# Patient Record
Sex: FEMALE | Race: WHITE | NOT HISPANIC OR LATINO | URBAN - METROPOLITAN AREA
[De-identification: names, ages, dates, MRNs, and addresses within clinical notes are randomized per-mention and may not be internally consistent; named-entity substitution may affect disease eponyms.]

---

## 2017-06-13 ENCOUNTER — EMERGENCY (EMERGENCY)
Facility: HOSPITAL | Age: 77
LOS: 1 days | Discharge: HOME | End: 2017-06-13
Attending: EMERGENCY MEDICINE | Admitting: EMERGENCY MEDICINE
Payer: MEDICARE

## 2017-06-13 VITALS
RESPIRATION RATE: 20 BRPM | DIASTOLIC BLOOD PRESSURE: 67 MMHG | TEMPERATURE: 97 F | OXYGEN SATURATION: 97 % | SYSTOLIC BLOOD PRESSURE: 104 MMHG | HEART RATE: 106 BPM

## 2017-06-13 PROCEDURE — 99285 EMERGENCY DEPT VISIT HI MDM: CPT | Mod: GC

## 2017-06-13 NOTE — ED ADULT TRIAGE NOTE - CHIEF COMPLAINT QUOTE
as per son patient was having difficulty getting out of the bathtub today. Generalized weakness. Patient denies falling.

## 2017-06-13 NOTE — ED ADULT NURSE NOTE - OBJECTIVE STATEMENT
pt states, "I was taking a bath at approx. at 1400 today and I could not find the strength to get out of the tub. my son found me approx x5 hours later" pt denies any LOC or fall. pt states she did not eat or drink a lot today and the house was very warm.

## 2017-06-13 NOTE — ED ADULT NURSE NOTE - ED STAT RN HANDOFF DETAILS
Report received from MARIE Orourke pt with sons at bedside awaiting Ultrasound  no signs of distress noted.

## 2017-06-14 VITALS
SYSTOLIC BLOOD PRESSURE: 138 MMHG | RESPIRATION RATE: 18 BRPM | HEART RATE: 86 BPM | DIASTOLIC BLOOD PRESSURE: 83 MMHG | OXYGEN SATURATION: 100 %

## 2017-06-14 LAB
24R-OH-CALCIDIOL SERPL-MCNC: 53.7 NG/ML — SIGNIFICANT CHANGE UP (ref 30–100)
ALBUMIN SERPL ELPH-MCNC: 4.2 G/DL — SIGNIFICANT CHANGE UP (ref 3.3–5)
ALP SERPL-CCNC: 50 U/L — SIGNIFICANT CHANGE UP (ref 40–120)
ALT FLD-CCNC: 16 U/L RC — SIGNIFICANT CHANGE UP (ref 10–45)
ANION GAP SERPL CALC-SCNC: 14 MMOL/L — SIGNIFICANT CHANGE UP (ref 5–17)
APPEARANCE UR: CLEAR — SIGNIFICANT CHANGE UP
AST SERPL-CCNC: 24 U/L — SIGNIFICANT CHANGE UP (ref 10–40)
BACTERIA # UR AUTO: ABNORMAL /HPF
BASOPHILS # BLD AUTO: 0 K/UL — SIGNIFICANT CHANGE UP (ref 0–0.2)
BASOPHILS NFR BLD AUTO: 0.3 % — SIGNIFICANT CHANGE UP (ref 0–2)
BILIRUB SERPL-MCNC: 0.8 MG/DL — SIGNIFICANT CHANGE UP (ref 0.2–1.2)
BILIRUB UR-MCNC: NEGATIVE — SIGNIFICANT CHANGE UP
BUN SERPL-MCNC: 32 MG/DL — HIGH (ref 7–23)
CALCIUM SERPL-MCNC: 10 MG/DL — SIGNIFICANT CHANGE UP (ref 8.4–10.5)
CALCIUM SERPL-MCNC: 10.7 MG/DL — HIGH (ref 8.4–10.5)
CHLORIDE SERPL-SCNC: 102 MMOL/L — SIGNIFICANT CHANGE UP (ref 96–108)
CK MB BLD-MCNC: 1 % — SIGNIFICANT CHANGE UP (ref 0–3.5)
CK MB CFR SERPL CALC: 4 NG/ML — HIGH (ref 0–3.8)
CK SERPL-CCNC: 398 U/L — HIGH (ref 25–170)
CO2 SERPL-SCNC: 22 MMOL/L — SIGNIFICANT CHANGE UP (ref 22–31)
COLOR SPEC: YELLOW — SIGNIFICANT CHANGE UP
CREAT SERPL-MCNC: 1.44 MG/DL — HIGH (ref 0.5–1.3)
DIFF PNL FLD: NEGATIVE — SIGNIFICANT CHANGE UP
EOSINOPHIL # BLD AUTO: 0 K/UL — SIGNIFICANT CHANGE UP (ref 0–0.5)
EOSINOPHIL NFR BLD AUTO: 0.3 % — SIGNIFICANT CHANGE UP (ref 0–6)
GLUCOSE SERPL-MCNC: 135 MG/DL — HIGH (ref 70–99)
GLUCOSE UR QL: NEGATIVE — SIGNIFICANT CHANGE UP
HCT VFR BLD CALC: 39.4 % — SIGNIFICANT CHANGE UP (ref 34.5–45)
HGB BLD-MCNC: 13.5 G/DL — SIGNIFICANT CHANGE UP (ref 11.5–15.5)
HYALINE CASTS # UR AUTO: ABNORMAL
KETONES UR-MCNC: NEGATIVE — SIGNIFICANT CHANGE UP
LEUKOCYTE ESTERASE UR-ACNC: ABNORMAL
LYMPHOCYTES # BLD AUTO: 1.2 K/UL — SIGNIFICANT CHANGE UP (ref 1–3.3)
LYMPHOCYTES # BLD AUTO: 7.5 % — LOW (ref 13–44)
MCHC RBC-ENTMCNC: 33.2 PG — SIGNIFICANT CHANGE UP (ref 27–34)
MCHC RBC-ENTMCNC: 34.4 GM/DL — SIGNIFICANT CHANGE UP (ref 32–36)
MCV RBC AUTO: 96.6 FL — SIGNIFICANT CHANGE UP (ref 80–100)
MONOCYTES # BLD AUTO: 1.1 K/UL — HIGH (ref 0–0.9)
MONOCYTES NFR BLD AUTO: 7.2 % — SIGNIFICANT CHANGE UP (ref 2–14)
NEUTROPHILS # BLD AUTO: 13.1 K/UL — HIGH (ref 1.8–7.4)
NEUTROPHILS NFR BLD AUTO: 84.7 % — HIGH (ref 43–77)
NITRITE UR-MCNC: NEGATIVE — SIGNIFICANT CHANGE UP
PH UR: 6 — SIGNIFICANT CHANGE UP (ref 5–8)
PLATELET # BLD AUTO: 264 K/UL — SIGNIFICANT CHANGE UP (ref 150–400)
POTASSIUM SERPL-MCNC: 4.5 MMOL/L — SIGNIFICANT CHANGE UP (ref 3.5–5.3)
POTASSIUM SERPL-SCNC: 4.5 MMOL/L — SIGNIFICANT CHANGE UP (ref 3.5–5.3)
PROT SERPL-MCNC: 7.4 G/DL — SIGNIFICANT CHANGE UP (ref 6–8.3)
PROT UR-MCNC: 30 MG/DL
PTH-INTACT FLD-MCNC: 43 PG/ML — SIGNIFICANT CHANGE UP (ref 15–65)
RBC # BLD: 4.08 M/UL — SIGNIFICANT CHANGE UP (ref 3.8–5.2)
RBC # FLD: 12 % — SIGNIFICANT CHANGE UP (ref 10.3–14.5)
RBC CASTS # UR COMP ASSIST: SIGNIFICANT CHANGE UP /HPF (ref 0–2)
SODIUM SERPL-SCNC: 138 MMOL/L — SIGNIFICANT CHANGE UP (ref 135–145)
SP GR SPEC: 1.02 — SIGNIFICANT CHANGE UP (ref 1.01–1.02)
TROPONIN T SERPL-MCNC: <0.01 NG/ML — SIGNIFICANT CHANGE UP (ref 0–0.06)
UROBILINOGEN FLD QL: NEGATIVE — SIGNIFICANT CHANGE UP
WBC # BLD: 15.5 K/UL — HIGH (ref 3.8–10.5)
WBC # FLD AUTO: 15.5 K/UL — HIGH (ref 3.8–10.5)
WBC UR QL: SIGNIFICANT CHANGE UP /HPF (ref 0–5)

## 2017-06-14 PROCEDURE — 83519 RIA NONANTIBODY: CPT

## 2017-06-14 PROCEDURE — 80053 COMPREHEN METABOLIC PANEL: CPT

## 2017-06-14 PROCEDURE — 82306 VITAMIN D 25 HYDROXY: CPT

## 2017-06-14 PROCEDURE — 81001 URINALYSIS AUTO W/SCOPE: CPT

## 2017-06-14 PROCEDURE — 82553 CREATINE MB FRACTION: CPT

## 2017-06-14 PROCEDURE — 82310 ASSAY OF CALCIUM: CPT

## 2017-06-14 PROCEDURE — 85027 COMPLETE CBC AUTOMATED: CPT

## 2017-06-14 PROCEDURE — 93926 LOWER EXTREMITY STUDY: CPT

## 2017-06-14 PROCEDURE — 83970 ASSAY OF PARATHORMONE: CPT

## 2017-06-14 PROCEDURE — 93005 ELECTROCARDIOGRAM TRACING: CPT

## 2017-06-14 PROCEDURE — 71010: CPT | Mod: 26

## 2017-06-14 PROCEDURE — 82550 ASSAY OF CK (CPK): CPT

## 2017-06-14 PROCEDURE — 93971 EXTREMITY STUDY: CPT

## 2017-06-14 PROCEDURE — 99284 EMERGENCY DEPT VISIT MOD MDM: CPT | Mod: 25

## 2017-06-14 PROCEDURE — 84484 ASSAY OF TROPONIN QUANT: CPT

## 2017-06-14 PROCEDURE — 93971 EXTREMITY STUDY: CPT | Mod: 26

## 2017-06-14 PROCEDURE — 71045 X-RAY EXAM CHEST 1 VIEW: CPT

## 2017-06-14 RX ORDER — SODIUM CHLORIDE 9 MG/ML
1000 INJECTION INTRAMUSCULAR; INTRAVENOUS; SUBCUTANEOUS ONCE
Qty: 0 | Refills: 0 | Status: COMPLETED | OUTPATIENT
Start: 2017-06-14 | End: 2017-06-14

## 2017-06-14 RX ORDER — CEPHALEXIN 500 MG
1 CAPSULE ORAL
Qty: 21 | Refills: 0 | OUTPATIENT
Start: 2017-06-14 | End: 2017-06-21

## 2017-06-14 RX ORDER — CEPHALEXIN 500 MG
500 CAPSULE ORAL ONCE
Qty: 0 | Refills: 0 | Status: COMPLETED | OUTPATIENT
Start: 2017-06-14 | End: 2017-06-14

## 2017-06-14 RX ADMIN — Medication 500 MILLIGRAM(S): at 11:39

## 2017-06-14 RX ADMIN — SODIUM CHLORIDE 1000 MILLILITER(S): 9 INJECTION INTRAMUSCULAR; INTRAVENOUS; SUBCUTANEOUS at 01:31

## 2017-06-14 RX ADMIN — SODIUM CHLORIDE 1000 MILLILITER(S): 9 INJECTION INTRAMUSCULAR; INTRAVENOUS; SUBCUTANEOUS at 02:17

## 2017-06-14 NOTE — ED PROVIDER NOTE - OBJECTIVE STATEMENT
77 year old woman with Hypertension, OA, osteoperosis presents with one day of weakness. Today ( a day with 96F temperatures) she was at home without air conditioning and decided to take a hot bath. On taking the bath she felt weak and was unable to climb out for 5 hours until family ( son and grandson ) arrived. She cites her OA knee pain as the reason for not being able to climb out and denies leg weakness, bowel or bladder incontinence, denies dizziness, chest pain shortness of breath , syncope, loss of consciousness. She feels her mouth is dry and says she has not been drinking any water.   On examination her left leg is visibly swollen and son at bedside says this is brand new. When sitting up from lying position in bed her HR rises from an 85 to a 107 BPM. 77 year old woman with Hypertension, OA, osteoporosis presents with one day of weakness. Today ( a day with 96F temperatures) she was at home without air conditioning and decided to take a hot bath. On taking the bath she felt weak and was unable to climb out for 5 hours until family ( son and grandson ) arrived. She cites her OA knee pain as the reason for not being able to climb out and denies leg weakness, bowel or bladder incontinence, denies dizziness, chest pain shortness of breath , syncope, loss of consciousness. She feels her mouth is dry and says she has not been drinking any water.   On examination her left leg is visibly swollen and son at bedside says this is brand new. When sitting up from lying position in bed her HR rises from an 85 to a 107 BPM.

## 2017-06-14 NOTE — ED PROVIDER NOTE - PLAN OF CARE
1. Follow up with your PMD within 48-72 hours.   2. Take Keflex 500 mg 1 tab 3x/day for 7 days.  Increase fluids. Tylenol 650mg every 4-6 hours with food for pain. 3. Worsening pain, new fever, chills, nausea, vomiting return to ER

## 2017-06-14 NOTE — ED PROVIDER NOTE - PROGRESS NOTE DETAILS
spoke to pt and family at bedside in details for the results of labs, UA, US. given copies of results for pmd followup. Started on keflex for UTI. agrees with plan of discharge -DOLORES Saunders pt w negative work up. stable for discharge. Tim bustos negative work up. stable for discharge.

## 2017-06-14 NOTE — ED PROVIDER NOTE - CARE PLAN
Principal Discharge DX:	Arthritis Principal Discharge DX:	Acute cystitis without hematuria  Instructions for follow-up, activity and diet:	1. Follow up with your PMD within 48-72 hours.   2. Take Keflex 500 mg 1 tab 3x/day for 7 days.  Increase fluids. Tylenol 650mg every 4-6 hours with food for pain. 3. Worsening pain, new fever, chills, nausea, vomiting return to ER

## 2017-06-14 NOTE — ED PROVIDER NOTE - MUSCULOSKELETAL, MLM
LLE visibly swollen more than the right, no calf tenderness, no pitting edema, full rom of all joints with knee crepitus, point tender at level of L5 verteba, marked Kyphosis , did not assess gait due to patient fear of rising from seated position and she was tachycardic ( orthostatic)

## 2017-06-14 NOTE — ED PROVIDER NOTE - NS ED MD ATTENDING STATEMENT
I have personally seen and examined this patient.  I have fully participated in the care of this patient. I have reviewed all pertinent clinical information, including history, physical exam, plan and the Resident’s note and agree except as noted

## 2017-06-14 NOTE — ED PROVIDER NOTE - ATTENDING CONTRIBUTION TO CARE
I was physically present for the E/M service provided. I agree with above history, physical, and plan which I have reviewed and edited where appropriate. I was physically present for the key portions of the service provided.    77F PMH of HTN and b/l OA domiciled with son p/w c/o spending 5 hours in a bathtub due to not being able to stand up. Ambulatory in ED. Does not normally take baths.  -NAD, Neurologic exam: A&O x3, speech clear, MAHENDRA, CN II-XII intact, motor strength +5/5 in all extremities, sensation equal to light touch bilaterally, finger-to-nose normal, gait steady; no focal knee TTP, no LE trauma, mild non-pitting edema of LLE to knee  -No CP, SOB or vertigo or focal weakness, afebrile  -Check electrolytes, CK, EKG, UA and LLE DVT Duplex  -Sons feel pt is safe to be at home if above neg. Pt ambulated from stretcher to commode several times during ED course without assistance.

## 2017-06-18 LAB — PTH RELATED PROT SERPL-MCNC: <1.1 PMOL/L — SIGNIFICANT CHANGE UP

## 2017-07-31 ENCOUNTER — INPATIENT (INPATIENT)
Facility: HOSPITAL | Age: 77
LOS: 16 days | Discharge: HOME | DRG: 457 | End: 2017-08-17
Attending: INTERNAL MEDICINE | Admitting: INTERNAL MEDICINE
Payer: MEDICARE

## 2017-07-31 VITALS — DIASTOLIC BLOOD PRESSURE: 70 MMHG | SYSTOLIC BLOOD PRESSURE: 104 MMHG | RESPIRATION RATE: 18 BRPM | HEART RATE: 90 BPM

## 2017-07-31 DIAGNOSIS — D72.829 ELEVATED WHITE BLOOD CELL COUNT, UNSPECIFIED: ICD-10-CM

## 2017-07-31 DIAGNOSIS — S22.089A UNSPECIFIED FRACTURE OF T11-T12 VERTEBRA, INITIAL ENCOUNTER FOR CLOSED FRACTURE: ICD-10-CM

## 2017-07-31 DIAGNOSIS — M25.559 PAIN IN UNSPECIFIED HIP: ICD-10-CM

## 2017-07-31 DIAGNOSIS — E78.00 PURE HYPERCHOLESTEROLEMIA, UNSPECIFIED: ICD-10-CM

## 2017-07-31 DIAGNOSIS — I10 ESSENTIAL (PRIMARY) HYPERTENSION: ICD-10-CM

## 2017-07-31 DIAGNOSIS — Z29.9 ENCOUNTER FOR PROPHYLACTIC MEASURES, UNSPECIFIED: ICD-10-CM

## 2017-07-31 DIAGNOSIS — M80.88XA OTHER OSTEOPOROSIS WITH CURRENT PATHOLOGICAL FRACTURE, VERTEBRA(E), INITIAL ENCOUNTER FOR FRACTURE: ICD-10-CM

## 2017-07-31 DIAGNOSIS — R53.1 WEAKNESS: ICD-10-CM

## 2017-07-31 LAB
ALBUMIN SERPL ELPH-MCNC: 3.9 G/DL — SIGNIFICANT CHANGE UP (ref 3.3–5)
ALP SERPL-CCNC: 74 U/L — SIGNIFICANT CHANGE UP (ref 40–120)
ALT FLD-CCNC: 14 U/L RC — SIGNIFICANT CHANGE UP (ref 10–45)
ANION GAP SERPL CALC-SCNC: 14 MMOL/L — SIGNIFICANT CHANGE UP (ref 5–17)
APPEARANCE UR: CLEAR — SIGNIFICANT CHANGE UP
AST SERPL-CCNC: 23 U/L — SIGNIFICANT CHANGE UP (ref 10–40)
BASOPHILS # BLD AUTO: 0 K/UL — SIGNIFICANT CHANGE UP (ref 0–0.2)
BASOPHILS NFR BLD AUTO: 0.3 % — SIGNIFICANT CHANGE UP (ref 0–2)
BILIRUB SERPL-MCNC: 0.3 MG/DL — SIGNIFICANT CHANGE UP (ref 0.2–1.2)
BILIRUB UR-MCNC: NEGATIVE — SIGNIFICANT CHANGE UP
BUN SERPL-MCNC: 18 MG/DL — SIGNIFICANT CHANGE UP (ref 7–23)
CALCIUM SERPL-MCNC: 9.6 MG/DL — SIGNIFICANT CHANGE UP (ref 8.4–10.5)
CHLORIDE SERPL-SCNC: 98 MMOL/L — SIGNIFICANT CHANGE UP (ref 96–108)
CO2 SERPL-SCNC: 23 MMOL/L — SIGNIFICANT CHANGE UP (ref 22–31)
COLOR SPEC: SIGNIFICANT CHANGE UP
CREAT SERPL-MCNC: 0.94 MG/DL — SIGNIFICANT CHANGE UP (ref 0.5–1.3)
DIFF PNL FLD: NEGATIVE — SIGNIFICANT CHANGE UP
EOSINOPHIL # BLD AUTO: 0.1 K/UL — SIGNIFICANT CHANGE UP (ref 0–0.5)
EOSINOPHIL NFR BLD AUTO: 0.8 % — SIGNIFICANT CHANGE UP (ref 0–6)
GLUCOSE SERPL-MCNC: 129 MG/DL — HIGH (ref 70–99)
GLUCOSE UR QL: NEGATIVE — SIGNIFICANT CHANGE UP
HCT VFR BLD CALC: 41.8 % — SIGNIFICANT CHANGE UP (ref 34.5–45)
HGB BLD-MCNC: 14.3 G/DL — SIGNIFICANT CHANGE UP (ref 11.5–15.5)
KETONES UR-MCNC: NEGATIVE — SIGNIFICANT CHANGE UP
LEUKOCYTE ESTERASE UR-ACNC: NEGATIVE — SIGNIFICANT CHANGE UP
LYMPHOCYTES # BLD AUTO: 1.4 K/UL — SIGNIFICANT CHANGE UP (ref 1–3.3)
LYMPHOCYTES # BLD AUTO: 10.3 % — LOW (ref 13–44)
MCHC RBC-ENTMCNC: 33.1 PG — SIGNIFICANT CHANGE UP (ref 27–34)
MCHC RBC-ENTMCNC: 34.3 GM/DL — SIGNIFICANT CHANGE UP (ref 32–36)
MCV RBC AUTO: 96.7 FL — SIGNIFICANT CHANGE UP (ref 80–100)
MONOCYTES # BLD AUTO: 1.2 K/UL — HIGH (ref 0–0.9)
MONOCYTES NFR BLD AUTO: 9 % — SIGNIFICANT CHANGE UP (ref 2–14)
NEUTROPHILS # BLD AUTO: 11.1 K/UL — HIGH (ref 1.8–7.4)
NEUTROPHILS NFR BLD AUTO: 79.6 % — HIGH (ref 43–77)
NITRITE UR-MCNC: NEGATIVE — SIGNIFICANT CHANGE UP
PH UR: 7 — SIGNIFICANT CHANGE UP (ref 5–8)
PLATELET # BLD AUTO: 403 K/UL — HIGH (ref 150–400)
POTASSIUM SERPL-MCNC: 5 MMOL/L — SIGNIFICANT CHANGE UP (ref 3.5–5.3)
POTASSIUM SERPL-SCNC: 5 MMOL/L — SIGNIFICANT CHANGE UP (ref 3.5–5.3)
PROT SERPL-MCNC: 7.5 G/DL — SIGNIFICANT CHANGE UP (ref 6–8.3)
PROT UR-MCNC: NEGATIVE — SIGNIFICANT CHANGE UP
RBC # BLD: 4.32 M/UL — SIGNIFICANT CHANGE UP (ref 3.8–5.2)
RBC # FLD: 12.3 % — SIGNIFICANT CHANGE UP (ref 10.3–14.5)
SODIUM SERPL-SCNC: 135 MMOL/L — SIGNIFICANT CHANGE UP (ref 135–145)
SP GR SPEC: 1.01 — SIGNIFICANT CHANGE UP (ref 1.01–1.02)
UROBILINOGEN FLD QL: NEGATIVE — SIGNIFICANT CHANGE UP
WBC # BLD: 13.9 K/UL — HIGH (ref 3.8–10.5)
WBC # FLD AUTO: 13.9 K/UL — HIGH (ref 3.8–10.5)

## 2017-07-31 PROCEDURE — 99223 1ST HOSP IP/OBS HIGH 75: CPT

## 2017-07-31 PROCEDURE — 72131 CT LUMBAR SPINE W/O DYE: CPT | Mod: 26

## 2017-07-31 PROCEDURE — 73523 X-RAY EXAM HIPS BI 5/> VIEWS: CPT | Mod: 26

## 2017-07-31 PROCEDURE — 99285 EMERGENCY DEPT VISIT HI MDM: CPT | Mod: 25

## 2017-07-31 PROCEDURE — 71250 CT THORAX DX C-: CPT | Mod: 26

## 2017-07-31 PROCEDURE — 93010 ELECTROCARDIOGRAM REPORT: CPT

## 2017-07-31 RX ORDER — TRAMADOL HYDROCHLORIDE 50 MG/1
25 TABLET ORAL EVERY 12 HOURS
Qty: 0 | Refills: 0 | Status: DISCONTINUED | OUTPATIENT
Start: 2017-07-31 | End: 2017-08-01

## 2017-07-31 RX ORDER — SODIUM CHLORIDE 9 MG/ML
500 INJECTION INTRAMUSCULAR; INTRAVENOUS; SUBCUTANEOUS ONCE
Qty: 0 | Refills: 0 | Status: COMPLETED | OUTPATIENT
Start: 2017-07-31 | End: 2017-07-31

## 2017-07-31 RX ORDER — SODIUM CHLORIDE 9 MG/ML
1000 INJECTION INTRAMUSCULAR; INTRAVENOUS; SUBCUTANEOUS
Qty: 0 | Refills: 0 | Status: DISCONTINUED | OUTPATIENT
Start: 2017-07-31 | End: 2017-08-03

## 2017-07-31 RX ORDER — ACETAMINOPHEN 500 MG
650 TABLET ORAL ONCE
Qty: 0 | Refills: 0 | Status: COMPLETED | OUTPATIENT
Start: 2017-07-31 | End: 2017-07-31

## 2017-07-31 RX ORDER — SODIUM CHLORIDE 9 MG/ML
500 INJECTION, SOLUTION INTRAVENOUS ONCE
Qty: 0 | Refills: 0 | Status: COMPLETED | OUTPATIENT
Start: 2017-07-31 | End: 2017-07-31

## 2017-07-31 RX ORDER — HEPARIN SODIUM 5000 [USP'U]/ML
5000 INJECTION INTRAVENOUS; SUBCUTANEOUS EVERY 8 HOURS
Qty: 0 | Refills: 0 | Status: DISCONTINUED | OUTPATIENT
Start: 2017-07-31 | End: 2017-08-06

## 2017-07-31 RX ORDER — LISINOPRIL 2.5 MG/1
10 TABLET ORAL DAILY
Qty: 0 | Refills: 0 | Status: DISCONTINUED | OUTPATIENT
Start: 2017-07-31 | End: 2017-08-07

## 2017-07-31 RX ADMIN — SODIUM CHLORIDE 2000 MILLILITER(S): 9 INJECTION INTRAMUSCULAR; INTRAVENOUS; SUBCUTANEOUS at 21:30

## 2017-07-31 RX ADMIN — Medication 650 MILLIGRAM(S): at 16:14

## 2017-07-31 RX ADMIN — TRAMADOL HYDROCHLORIDE 25 MILLIGRAM(S): 50 TABLET ORAL at 23:23

## 2017-07-31 RX ADMIN — TRAMADOL HYDROCHLORIDE 25 MILLIGRAM(S): 50 TABLET ORAL at 22:00

## 2017-07-31 RX ADMIN — Medication 650 MILLIGRAM(S): at 15:12

## 2017-07-31 RX ADMIN — HEPARIN SODIUM 5000 UNIT(S): 5000 INJECTION INTRAVENOUS; SUBCUTANEOUS at 22:00

## 2017-07-31 RX ADMIN — SODIUM CHLORIDE 500 MILLILITER(S): 9 INJECTION, SOLUTION INTRAVENOUS at 16:14

## 2017-07-31 NOTE — H&P ADULT - PROBLEM SELECTOR PLAN 7
- ppx: hsq  - diet: DASH/TLC, f/u nutrition consult  - dispo: 8 yue for further evaluation and treatment statin discontinued as outpatient due to concern over statin myopathy. May continue holding for now and have pt follow up with PCP upon discharge.

## 2017-07-31 NOTE — H&P ADULT - PROBLEM SELECTOR PLAN 5
continue lisinopril with hold parameters Likely acute stress reaction to pain and hospitalization with possible component of hemoconcentration  - fluid hydrate and recheck CBC  - pain control

## 2017-07-31 NOTE — H&P ADULT - PROBLEM SELECTOR PLAN 1
2/2 disuse atrophy and sedentary lifestyle from debilitating chronic hip and back pain with likely superimposed component of malnutrition. Unlikely neurologic etiology given benign/nonfocal neuro exam or cardiogenic etiology given no signs of heart failure on exam and no reports of chest pain.  - physical therapy consult for rehab eval  - nutrition consult 2/2 disuse atrophy and sedentary lifestyle from debilitating chronic hip and back pain with likely superimposed component of malnutrition. Given vertebral fractures, must also consider cord compression although less likely given good strength in bilateral LE's and no sensory deficits. Unlikely cardiogenic etiology given no signs of heart failure on exam and no reports of chest pain.  - physical therapy consult for rehab eval  - f/u neurosurgery consult  - nutrition consult

## 2017-07-31 NOTE — H&P ADULT - PROBLEM SELECTOR PLAN 2
2/2 severe osteoarthritis on R hip  - patient states OTC medications do not help her and she is not ready to try opiates/narcotics. Willing to try low dose Tramadol for now. Advised on side effects, including anticholinergic effects and possibility of sedation. Family and patient aware and agree to trial of medication.  - pt eval  - ortho eval

## 2017-07-31 NOTE — ED PROVIDER NOTE - ATTENDING CONTRIBUTION TO CARE
Attending MD Avila:   I personally have seen and examined this patient.  Physician assistant note reviewed and agree on plan of care and except where noted.  See HPI, PE, and MDM for details.

## 2017-07-31 NOTE — H&P ADULT - NSHPLABSRESULTS_GEN_ALL_CORE
.  LABS:                         14.3   13.9  )-----------( 403      ( 2017 14:30 )             41.8         135  |  98  |  18  ----------------------------<  129<H>  5.0   |  23  |  0.94    Ca    9.6      2017 14:30    TPro  7.5  /  Alb  3.9  /  TBili  0.3  /  DBili  x   /  AST  23  /  ALT  14  /  AlkPhos  74        Urinalysis Basic - ( 2017 15:14 )    Color: x / Appearance: Clear / S.014 / pH: x  Gluc: x / Ketone: Negative  / Bili: Negative / Urobili: Negative   Blood: x / Protein: Negative / Nitrite: Negative   Leuk Esterase: Negative / RBC: x / WBC x   Sq Epi: x / Non Sq Epi: x / Bacteria: x            RADIOLOGY, EKG & ADDITIONAL TESTS:  Pelvic/hip xray: severe degenerative disease with joint space narrowing, possible bone spur in L hip joint  CT lumbar spine: +posterior 12th rib fracture, +L1 transverse process fracture. + subacute/chronic t12 compression fracture

## 2017-07-31 NOTE — ED PROVIDER NOTE - MEDICAL DECISION MAKING DETAILS
Attending MD Avila: 77F presenting with b/l hip pain x weeks, no deformities or bony ttp, full PROM of hips so doubt fx. Will obtain XR pelvis and hips to r/o fx, labs, UA to eval for metabolic or infectious precipitant for generalized fatigue and leg pain

## 2017-07-31 NOTE — ED PROVIDER NOTE - PHYSICAL EXAMINATION
Attending MD Avila: A & O x 3, NAD, HEENT WNL and no facial asymmetry; lungs CTAB, heart with reg rhythm without murmur; abdomen soft NTND; extremities with no edema; neuro exam non focal with no motor or sensory deficits. painless PROM of b/l hips and knees, no bony ttp of pelvis or LEs, no distal NV compromise in b/l LEs, spine nontender

## 2017-07-31 NOTE — ED ADULT NURSE NOTE - OBJECTIVE STATEMENT
78 yo F BIBA for bilaterally hip pain and BLE weakness x 3 days. Pt states this morning the pain got progressively worse and was unable to ambulate to the bathroom and had to be carried by her son. VSS. a&ox3. Peripheral pulses equal bilat. Skin warm, pink. Denies urinary symptoms, abd/back pain, CP/SOB, fevers/chills.

## 2017-07-31 NOTE — H&P ADULT - PROBLEM SELECTOR PLAN 8
- ppx: hsq  - diet: DASH/TLC, f/u nutrition consult  - dispo: 8 yue for further evaluation and treatment

## 2017-07-31 NOTE — ED PROVIDER NOTE - OBJECTIVE STATEMENT
78yo F with PMH HTN BIBEMS c/o R hip pain and inability to walk x 3 days. Pt lives home with son. Reports she typically walks without assistance at baseline, now uses cane but has had difficulty ambulating 2/2 worsening weakness and hip pain over last 3 days. Pt on abx for UTI after seen in [facility] ED 7/14/17 c/o weakness and LLE swelling (doppler negative at time). Eating and drinking well. Also admits to L hip pain and back pain. Denies trauma, recent fall, dizziness, CP, palpitations, SOB, abd pain, n/v/c/d, urinary symptoms, calf pain, numbness/tingling.  PCP Sung Felder (not afil) 76yo F with PMH HTN, HLD, BIBEMS c/o R hip pain and inability to walk x 3 days. Pt lives home with son. Pt reports she typically walks without assistance at baseline, now uses cane but has had difficulty ambulating 2/2 worsening weakness and hip pain over last 3 days. As per son, pt seen in [facility] ED 7/14/17 for weakness and unable to get herself out of bath tub. Pt dx with UTI at time and reports complaint with abx, also with LLE swelling (doppler negative at time). Per son, pt with worsening weakness mostly over last week, now requiring to be carried for last 3 days. Pt was c/o R thigh pain so PCP d/c statin therapy recently.  Pt eating and drinking less. Also admits to L hip pain and back pain. Denies trauma, recent fall, dizziness, CP, palpitations, SOB, abd pain, n/v/c/d, urinary symptoms, calf pain, numbness/tingling.    PCP Sung Felder (not afil)

## 2017-07-31 NOTE — ED ADULT NURSE REASSESSMENT NOTE - NS ED NURSE REASSESS COMMENT FT1
Patient appears to be resting comfortably in stretcher; denies n/v/d, chest pain, sob; a&ox3; safety and comfort measures provided; family at bedside

## 2017-07-31 NOTE — H&P ADULT - PROBLEM SELECTOR PLAN 6
statin discontinued as outpatient due to concern over statin myopathy. May continue holding for now and have pt follow up with PCP upon discharge. continue lisinopril with hold parameters

## 2017-07-31 NOTE — ED PROVIDER NOTE - PROGRESS NOTE DETAILS
Attending MD Avila: XR hips neg for fx, labs unrevealing. patient still very unsteady on feet, unclear etiology but not safe for discharge, will admit

## 2017-07-31 NOTE — ED ADULT NURSE REASSESSMENT NOTE - NS ED NURSE REASSESS COMMENT FT1
Attempted to ambulated pt at this time, pt c/o inability to stand by herself. Pt unstable to ambulate on own, MD made aware. VSS.

## 2017-07-31 NOTE — H&P ADULT - PROBLEM SELECTOR PLAN 4
Pt with normal vit d 25 oh levels in June and no known history of osteoporosis. Repeat workup today. Must also rule out MM. Pt with normal vit d 25 oh levels in June and no known history of osteoporosis. Repeat workup today. Must also rule out MM.  - check vit d1,25 OH, vit d 25 OH  - recheck CBC upon fluid hydration as there is suspicion of hemoconcentration and pt may be anemic.   - check spep/upep to r/o monoclonal gammopathy/MM Pt with normal vit d 25 oh levels in June and no known history of osteoporosis. Repeat workup today. Given anxiety and tangientiality, concern for hyperthyroid driven osteoporosis. Must also rule out MM.  - check vit d1,25 OH, vit d 25 OH  - check TSH  - recheck CBC upon fluid hydration as there is suspicion of hemoconcentration and pt may be anemic.   - check spep/upep to r/o monoclonal gammopathy/MM

## 2017-08-01 LAB
% ALBUMIN: 49.3 % — SIGNIFICANT CHANGE UP
% ALPHA 1: 6.4 % — SIGNIFICANT CHANGE UP
% ALPHA 2: 15.1 % — SIGNIFICANT CHANGE UP
% BETA: 13.8 % — SIGNIFICANT CHANGE UP
% GAMMA: 15.4 % — SIGNIFICANT CHANGE UP
ALBUMIN SERPL ELPH-MCNC: 3.4 G/DL — LOW (ref 3.6–5.5)
ALBUMIN/GLOB SERPL ELPH: 1 RATIO — SIGNIFICANT CHANGE UP
ALPHA1 GLOB SERPL ELPH-MCNC: 0.4 G/DL — SIGNIFICANT CHANGE UP (ref 0.1–0.4)
ALPHA2 GLOB SERPL ELPH-MCNC: 1 G/DL — SIGNIFICANT CHANGE UP (ref 0.5–1)
ANION GAP SERPL CALC-SCNC: 15 MMOL/L — SIGNIFICANT CHANGE UP (ref 5–17)
B-GLOBULIN SERPL ELPH-MCNC: 0.9 G/DL — SIGNIFICANT CHANGE UP (ref 0.5–1)
BASOPHILS # BLD AUTO: 0.02 K/UL — SIGNIFICANT CHANGE UP (ref 0–0.2)
BASOPHILS NFR BLD AUTO: 0.2 % — SIGNIFICANT CHANGE UP (ref 0–2)
BUN SERPL-MCNC: 15 MG/DL — SIGNIFICANT CHANGE UP (ref 7–23)
CALCIUM SERPL-MCNC: 9.7 MG/DL — SIGNIFICANT CHANGE UP (ref 8.4–10.5)
CHLORIDE SERPL-SCNC: 98 MMOL/L — SIGNIFICANT CHANGE UP (ref 96–108)
CO2 SERPL-SCNC: 19 MMOL/L — LOW (ref 22–31)
CREAT SERPL-MCNC: 0.79 MG/DL — SIGNIFICANT CHANGE UP (ref 0.5–1.3)
CULTURE RESULTS: NO GROWTH — SIGNIFICANT CHANGE UP
EOSINOPHIL # BLD AUTO: 0.17 K/UL — SIGNIFICANT CHANGE UP (ref 0–0.5)
EOSINOPHIL NFR BLD AUTO: 1.8 % — SIGNIFICANT CHANGE UP (ref 0–6)
GAMMA GLOBULIN: 1 G/DL — SIGNIFICANT CHANGE UP (ref 0.6–1.6)
GLUCOSE SERPL-MCNC: 111 MG/DL — HIGH (ref 70–99)
HCT VFR BLD CALC: 38.5 % — SIGNIFICANT CHANGE UP (ref 34.5–45)
HGB BLD-MCNC: 13.3 G/DL — SIGNIFICANT CHANGE UP (ref 11.5–15.5)
IMM GRANULOCYTES NFR BLD AUTO: 0.4 % — SIGNIFICANT CHANGE UP (ref 0–1.5)
INTERPRETATION SERPL IFE-IMP: SIGNIFICANT CHANGE UP
LYMPHOCYTES # BLD AUTO: 1.71 K/UL — SIGNIFICANT CHANGE UP (ref 1–3.3)
LYMPHOCYTES # BLD AUTO: 17.6 % — SIGNIFICANT CHANGE UP (ref 13–44)
MCHC RBC-ENTMCNC: 31.1 PG — SIGNIFICANT CHANGE UP (ref 27–34)
MCHC RBC-ENTMCNC: 34.5 GM/DL — SIGNIFICANT CHANGE UP (ref 32–36)
MCV RBC AUTO: 90.2 FL — SIGNIFICANT CHANGE UP (ref 80–100)
MONOCYTES # BLD AUTO: 0.89 K/UL — SIGNIFICANT CHANGE UP (ref 0–0.9)
MONOCYTES NFR BLD AUTO: 9.2 % — SIGNIFICANT CHANGE UP (ref 2–14)
NEUTROPHILS # BLD AUTO: 6.87 K/UL — SIGNIFICANT CHANGE UP (ref 1.8–7.4)
NEUTROPHILS NFR BLD AUTO: 70.8 % — SIGNIFICANT CHANGE UP (ref 43–77)
PLATELET # BLD AUTO: 375 K/UL — SIGNIFICANT CHANGE UP (ref 150–400)
POTASSIUM SERPL-MCNC: 4.9 MMOL/L — SIGNIFICANT CHANGE UP (ref 3.5–5.3)
POTASSIUM SERPL-SCNC: 4.9 MMOL/L — SIGNIFICANT CHANGE UP (ref 3.5–5.3)
PROT PATTERN SERPL ELPH-IMP: SIGNIFICANT CHANGE UP
PROT SERPL-MCNC: 6.8 G/DL — SIGNIFICANT CHANGE UP (ref 6–8.3)
PROT SERPL-MCNC: 6.8 G/DL — SIGNIFICANT CHANGE UP (ref 6–8.3)
RBC # BLD: 4.27 M/UL — SIGNIFICANT CHANGE UP (ref 3.8–5.2)
RBC # FLD: 13.6 % — SIGNIFICANT CHANGE UP (ref 10.3–14.5)
SODIUM SERPL-SCNC: 132 MMOL/L — LOW (ref 135–145)
SPECIMEN SOURCE: SIGNIFICANT CHANGE UP
TSH SERPL-MCNC: 1.42 UIU/ML — SIGNIFICANT CHANGE UP (ref 0.27–4.2)
WBC # BLD: 9.7 K/UL — SIGNIFICANT CHANGE UP (ref 3.8–10.5)
WBC # FLD AUTO: 9.7 K/UL — SIGNIFICANT CHANGE UP (ref 3.8–10.5)

## 2017-08-01 PROCEDURE — 72149 MRI LUMBAR SPINE W/DYE: CPT | Mod: 26

## 2017-08-01 RX ORDER — ACETAMINOPHEN 500 MG
1000 TABLET ORAL ONCE
Qty: 0 | Refills: 0 | Status: COMPLETED | OUTPATIENT
Start: 2017-08-01 | End: 2017-08-01

## 2017-08-01 RX ORDER — TRAMADOL HYDROCHLORIDE 50 MG/1
25 TABLET ORAL EVERY 6 HOURS
Qty: 0 | Refills: 0 | Status: DISCONTINUED | OUTPATIENT
Start: 2017-08-01 | End: 2017-08-07

## 2017-08-01 RX ADMIN — TRAMADOL HYDROCHLORIDE 25 MILLIGRAM(S): 50 TABLET ORAL at 20:31

## 2017-08-01 RX ADMIN — TRAMADOL HYDROCHLORIDE 25 MILLIGRAM(S): 50 TABLET ORAL at 15:20

## 2017-08-01 RX ADMIN — Medication 400 MILLIGRAM(S): at 11:05

## 2017-08-01 RX ADMIN — TRAMADOL HYDROCHLORIDE 25 MILLIGRAM(S): 50 TABLET ORAL at 21:30

## 2017-08-01 RX ADMIN — LISINOPRIL 10 MILLIGRAM(S): 2.5 TABLET ORAL at 05:32

## 2017-08-01 RX ADMIN — HEPARIN SODIUM 5000 UNIT(S): 5000 INJECTION INTRAVENOUS; SUBCUTANEOUS at 14:39

## 2017-08-01 RX ADMIN — Medication 1000 MILLIGRAM(S): at 11:35

## 2017-08-01 RX ADMIN — HEPARIN SODIUM 5000 UNIT(S): 5000 INJECTION INTRAVENOUS; SUBCUTANEOUS at 20:34

## 2017-08-01 RX ADMIN — SODIUM CHLORIDE 75 MILLILITER(S): 9 INJECTION INTRAMUSCULAR; INTRAVENOUS; SUBCUTANEOUS at 00:47

## 2017-08-01 RX ADMIN — TRAMADOL HYDROCHLORIDE 25 MILLIGRAM(S): 50 TABLET ORAL at 14:35

## 2017-08-01 RX ADMIN — HEPARIN SODIUM 5000 UNIT(S): 5000 INJECTION INTRAVENOUS; SUBCUTANEOUS at 05:32

## 2017-08-01 NOTE — PROGRESS NOTE ADULT - PROBLEM SELECTOR PLAN 5
Likely acute stress reaction to pain and hospitalization with possible component of hemoconcentration  - fluid hydrate and recheck CBC  - pain control

## 2017-08-01 NOTE — PROGRESS NOTE ADULT - SUBJECTIVE AND OBJECTIVE BOX
Patient is a 77y old  Female who presents with a chief complaint of Progressively worsening hip and back pain + weakness (2017 21:16)      SUBJECTIVE / OVERNIGHT EVENTS:   Feels better.  Denies CP/SOB/Palpitation/HA.    MEDICATIONS  (STANDING):  sodium chloride 0.9%. 1000 milliLiter(s) (75 mL/Hr) IV Continuous <Continuous>  heparin  Injectable 5000 Unit(s) SubCutaneous every 8 hours  lisinopril 10 milliGRAM(s) Oral daily    MEDICATIONS  (PRN):  traMADol 25 milliGRAM(s) Oral every 6 hours PRN Moderate Pain (4 - 6)        CAPILLARY BLOOD GLUCOSE        I&O's Summary    01 Aug 2017 07:01  -  02 Aug 2017 02:14  --------------------------------------------------------  IN: 100 mL / OUT: 400 mL / NET: -300 mL        PHYSICAL EXAM:  GENERAL: NAD, well-developed  HEAD:  Atraumatic, Normocephalic  EYES: conjunctiva and sclera clear  NECK: Supple, No JVD  CHEST/LUNG: Clear to auscultation bilaterally; No wheeze  HEART: Regular rate and rhythm; No murmurs, rubs, or gallops  ABDOMEN: Soft, Nontender, Nondistended; Bowel sounds present  EXTREMITIES:  2+ Peripheral Pulses, No clubbing, cyanosis, or edema  NEUROLOGY: AAO X 3  SKIN: No rashes    LABS:                        13.3   9.70  )-----------( 375      ( 01 Aug 2017 07:42 )             38.5     08-    132<L>  |  98  |  15  ----------------------------<  111<H>  4.9   |  19<L>  |  0.79    Ca    9.7      01 Aug 2017 07:35    TPro  6.8  /  Alb  3.4<L>  /  TBili  x   /  DBili  x   /  AST  x   /  ALT  x   /  AlkPhos  x             Urinalysis Basic - ( 2017 15:14 )    Color: x / Appearance: Clear / S.014 / pH: x  Gluc: x / Ketone: Negative  / Bili: Negative / Urobili: Negative   Blood: x / Protein: Negative / Nitrite: Negative   Leuk Esterase: Negative / RBC: x / WBC x   Sq Epi: x / Non Sq Epi: x / Bacteria: x      CAPILLARY BLOOD GLUCOSE                    RADIOLOGY & ADDITIONAL TESTS:    Imaging Personally Reviewed:    Consultant(s) Notes Reviewed:      Care Discussed with Consultants/Other Providers:

## 2017-08-01 NOTE — PROGRESS NOTE ADULT - PROBLEM SELECTOR PLAN 1
T12 Compression Fx with Spinal cord compression:  Dw neuroSx / No need for steroids.  Bed rest  Pain control with tramadol.

## 2017-08-01 NOTE — PROGRESS NOTE ADULT - SUBJECTIVE AND OBJECTIVE BOX
Patient seen and examined at bedside.    T(C): 36.4 (08-01-17 @ 07:37), Max: 37.1 (07-31-17 @ 15:13)  HR: 86 (08-01-17 @ 07:37) (83 - 97)  BP: 121/70 (08-01-17 @ 07:37) (99/75 - 130/68)  RR: 18 (08-01-17 @ 07:37) (17 - 18)  SpO2: 99% (08-01-17 @ 07:37) (98% - 99%)  Wt(kg): --    Exam:    Awake, alert, AOX3  BUE 5/5 no hoffmans  BLE 5/5 except b/l HF 4/5 pain limited  SILT

## 2017-08-01 NOTE — CONSULT NOTE ADULT - SUBJECTIVE AND OBJECTIVE BOX
p ([id number])     HPI: 77F with history of osteoarthritis with 2 months of back pain and 1 week of progressive difficulty walking due to weakness with couple instances of urinary incontinence. She has chronic bilateral hip pain which contributes to her lower extremity weakness. Recently admitted for sepsis due to UTI. Denies history of falls, but has generalized weakness due to poor appetite and pain. Denies saddle anesthesia. On eval, patient denies any back pain or radiating pain. CT Lspine shows severe T12 compression fracture with retropulsion and mild-moderate canal stenosis.       PAST MEDICAL HISTORY   High cholesterol  Hypertension  Arthritis    PAST SURGICAL HISTORY   No significant past surgical history        MEDICATIONS:  Antibiotics:    Neuro:  traMADol 25 milliGRAM(s) Oral every 12 hours PRN    Anticoagulation:  heparin  Injectable 5000 Unit(s) SubCutaneous every 8 hours    Other:  sodium chloride 0.9%. 1000 milliLiter(s) IV Continuous <Continuous>  lisinopril 10 milliGRAM(s) Oral daily      FAMILY HISTORY:  No significant family history      REVIEW OF SYSTEMS:  Check here if all are normal other than Neurological [x]  General:  Eyes:  ENT:  Cardiac:  Respiratory:  GI:  Musculoskeletal:   Skin:  Neurologic:   Psychiatric:     PHYSICAL EXAMINATION:   T(C): 36.3 (17 @ 00:27), Max: 37.1 (17 @ 15:13)  HR: 97 (17 @ 00:27) (83 - 97)  BP: 122/75 (17 @ 21:16) (99/75 - 130/68)  RR: 18 (17 @ 00:27) (17 - 18)  SpO2: 98% (17 @ 00:27) (98% - 98%)  Wt(kg): --Height (cm): 168.91 ( @ 20:55)  Weight (kg): 78 ( @ 20:55)    General Examination:     Neurologic Examination:             Higher functions                 Normal [x]               Abnormal:      Cranial Nerves (ii-xii)           Normal [x]              Abnormal:     Motor Exam                       Normal []              Abnormal:   BLE HF 4/5, KF 5/5, KE 5/5, DF 5/5, PF 5/5                            Sensory Exam                   Normal [x]              Abnormal:    Reflexes                            Normal [x]              Abnormal:     Coordination                      Normal []              Abnormal:    Other:     LABS:                        14.3   13.9  )-----------( 403      ( 2017 14:30 )             41.8     -    135  |  98  |  18  ----------------------------<  129<H>  5.0   |  23  |  0.94    Ca    9.6      2017 14:30    TPro  7.5  /  Alb  3.9  /  TBili  0.3  /  DBili  x   /  AST  23  /  ALT  14  /  AlkPhos  74        Urinalysis Basic - ( 2017 15:14 )    Color: x / Appearance: Clear / S.014 / pH: x  Gluc: x / Ketone: Negative  / Bili: Negative / Urobili: Negative   Blood: x / Protein: Negative / Nitrite: Negative   Leuk Esterase: Negative / RBC: x / WBC x   Sq Epi: x / Non Sq Epi: x / Bacteria: x        RADIOLOGY & ADDITIONAL STUDIES:    CT: Near complete collapse of the T12 vertebral body with vacuum phenomenon   suggestive of a subacute to chronic process. MRI can be obtained as   clinically indicated for complete evaluation.

## 2017-08-01 NOTE — PROGRESS NOTE ADULT - PROBLEM SELECTOR PLAN 4
Pt with normal vit d 25 oh levels in June and no known history of osteoporosis. Repeat workup today. Given anxiety and tangientiality, concern for hyperthyroid driven osteoporosis. Must also rule out MM.  - check vit d1,25 OH, vit d 25 OH  - check TSH  spep/upep to r/o monoclonal gammopathy/MM

## 2017-08-01 NOTE — CONSULT NOTE ADULT - ASSESSMENT
77F with difficulty walking due to progressive lower extremity weakness with subacute to chronic appearing T12 compression fracture. Weakness may be related to hip arthritis although retropulsion of T12 vertebral body with cord compression is a consideration. Recommend urgent MRI of L-spine to assess degree of compression of conus. Bedrest until MRI done. Neurochecks q4h. Pain control.

## 2017-08-02 DIAGNOSIS — E87.1 HYPO-OSMOLALITY AND HYPONATREMIA: ICD-10-CM

## 2017-08-02 LAB
ANION GAP SERPL CALC-SCNC: 12 MMOL/L — SIGNIFICANT CHANGE UP (ref 5–17)
BUN SERPL-MCNC: 14 MG/DL — SIGNIFICANT CHANGE UP (ref 7–23)
CALCIUM SERPL-MCNC: 8.9 MG/DL — SIGNIFICANT CHANGE UP (ref 8.4–10.5)
CHLORIDE SERPL-SCNC: 92 MMOL/L — LOW (ref 96–108)
CO2 SERPL-SCNC: 22 MMOL/L — SIGNIFICANT CHANGE UP (ref 22–31)
CREAT SERPL-MCNC: 0.84 MG/DL — SIGNIFICANT CHANGE UP (ref 0.5–1.3)
GLUCOSE SERPL-MCNC: 118 MG/DL — HIGH (ref 70–99)
HCT VFR BLD CALC: 36.5 % — SIGNIFICANT CHANGE UP (ref 34.5–45)
HGB BLD-MCNC: 13.1 G/DL — SIGNIFICANT CHANGE UP (ref 11.5–15.5)
MCHC RBC-ENTMCNC: 32 PG — SIGNIFICANT CHANGE UP (ref 27–34)
MCHC RBC-ENTMCNC: 35.9 GM/DL — SIGNIFICANT CHANGE UP (ref 32–36)
MCV RBC AUTO: 89 FL — SIGNIFICANT CHANGE UP (ref 80–100)
PLATELET # BLD AUTO: 374 K/UL — SIGNIFICANT CHANGE UP (ref 150–400)
POTASSIUM SERPL-MCNC: 4.5 MMOL/L — SIGNIFICANT CHANGE UP (ref 3.5–5.3)
POTASSIUM SERPL-SCNC: 4.5 MMOL/L — SIGNIFICANT CHANGE UP (ref 3.5–5.3)
RBC # BLD: 4.1 M/UL — SIGNIFICANT CHANGE UP (ref 3.8–5.2)
RBC # FLD: 13.3 % — SIGNIFICANT CHANGE UP (ref 10.3–14.5)
SODIUM SERPL-SCNC: 126 MMOL/L — LOW (ref 135–145)
WBC # BLD: 9.27 K/UL — SIGNIFICANT CHANGE UP (ref 3.8–10.5)
WBC # FLD AUTO: 9.27 K/UL — SIGNIFICANT CHANGE UP (ref 3.8–10.5)

## 2017-08-02 RX ADMIN — TRAMADOL HYDROCHLORIDE 25 MILLIGRAM(S): 50 TABLET ORAL at 11:55

## 2017-08-02 RX ADMIN — HEPARIN SODIUM 5000 UNIT(S): 5000 INJECTION INTRAVENOUS; SUBCUTANEOUS at 05:54

## 2017-08-02 RX ADMIN — HEPARIN SODIUM 5000 UNIT(S): 5000 INJECTION INTRAVENOUS; SUBCUTANEOUS at 23:10

## 2017-08-02 RX ADMIN — HEPARIN SODIUM 5000 UNIT(S): 5000 INJECTION INTRAVENOUS; SUBCUTANEOUS at 17:15

## 2017-08-02 RX ADMIN — LISINOPRIL 10 MILLIGRAM(S): 2.5 TABLET ORAL at 05:54

## 2017-08-02 RX ADMIN — TRAMADOL HYDROCHLORIDE 25 MILLIGRAM(S): 50 TABLET ORAL at 12:56

## 2017-08-02 NOTE — CONSULT NOTE ADULT - SUBJECTIVE AND OBJECTIVE BOX
Patient is a 77y old  Female who presents with a chief complaint of Progressively worsening hip and back pain associated with weakness     HPI:  77f hx HTN, HLD, osteoarthritis presents with 3 days of Right hip pain and generalized weakness limiting ambulation. Also notes progressively worsening lower back pain since few months. Was recently admitted to [facility] for weakness which was attributed to sepsis 2/2 UTI. No injury, trauma, falls, no focal weaknesses, normally sedentary and often bedridden due to pain and weakness. At baseline has poor appetite, no acute changes. No dysuria, flank pain, n/v/d, no bowel habit changes. No sick contacts or recent travel. No HA, nuchal rigidity. Denies chest pain dyspnea,no prior cardiac history.MRI revealing severe T12 compression deformity for  possible Neurosurgical intervention.        PAST MEDICAL & SURGICAL HISTORY:  High cholesterol  Hypertension  Arthritis  No significant past surgical history      MEDICATIONS  (STANDING):  sodium chloride 0.9%. 1000 milliLiter(s) (75 mL/Hr) IV Continuous <Continuous>  heparin  Injectable 5000 Unit(s) SubCutaneous every 8 hours  lisinopril 10 milliGRAM(s) Oral daily    MEDICATIONS  (PRN):  traMADol 25 milliGRAM(s) Oral every 6 hours PRN Moderate Pain (4 - 6)      FAMILY HISTORY:No significant family history      SOCIAL HISTORY:Lives at home-Limited mobility bedridden    CIGARETTES:Non Smoker    ALCOHOL:Denies    REVIEW OF SYSTEMS:  CONSTITUTIONAL: No fever, weight loss, chronic fatigue  EYES: No eye pain, visual disturbances, or discharge  ENMT:  No difficulty hearing, tinnitus, vertigo; No sinus or throat pain  NECK: No pain or stiffness  RESPIRATORY: No cough, wheezing, chills or hemoptysis; No shortness of breath  CARDIOVASCULAR: No chest pain, palpitations, dizziness, or leg swelling  GASTROINTESTINAL: No abdominal or epigastric pain. No nausea, vomiting, or hematemesis; No diarrhea or constipation. No melena or hematochezia.  GENITOURINARY: No dysuria, frequency, hematuria, or incontinence  NEUROLOGICAL: No headaches, memory loss, loss of strength, numbness, or tremors  SKIN: No itching, burning, rashes, or lesions   LYMPH NODES: No enlarged glands  ENDOCRINE: No heat or cold intolerance; No hair loss  MUSCULOSKELETAL: Hip and back joint pain No swelling;No saddle anaesthesia  PSYCHIATRIC: No depression, anxiety, mood swings, has  difficulty sleeping  HEME/LYMPH: No easy bruising, or bleeding gums  ALLERY AND IMMUNOLOGIC: No hives or eczema    Vital Signs Last 24 Hrs  T(C): 36.3 (01 Aug 2017 23:32), Max: 36.5 (01 Aug 2017 15:02)  T(F): 97.4 (01 Aug 2017 23:32), Max: 97.7 (01 Aug 2017 15:02)  HR: 95 (02 Aug 2017 05:52) (86 - 96)  BP: 139/73 (02 Aug 2017 05:52) (121/70 - 139/81)  RR: 18 (01 Aug 2017 23:32) (18 - 18)  SpO2: 96% (01 Aug 2017 23:32) (96% - 99%)      PHYSICAL EXAM:  GENERAL: NAD, well-groomed, well-developed BMI-27  HEAD:  Atraumatic, Normocephalic  EYES: EOMI, PERRLA, conjunctiva and sclera clear  ENMT: No tonsillar erythema, exudates, or enlargement; Moist mucous membranes, Good dentition, No lesions  NECK: Supple, No JVD, Normal thyroid  NERVOUS SYSTEM:  Alert & Oriented X3, Fair concentration; Motor Strength 5/5 B/L upper and lower extremities; DTRs 2+ intact and symmetric  CHEST/LUNG: Clear to percussion bilaterally; No rales, rhonchi, wheezing, or rubs  HEART: Regular rate and rhythm; 2/6 systolic murmur,no rubs, or gallops  ABDOMEN: Soft, Nontender, Nondistended; Bowel sounds present  EXTREMITIES:  2+ Peripheral Pulses, No clubbing, cyanosis, trace edema  LYMPH: No lymphadenopathy noted  SKIN: No rashes or lesions      ECG: NORMAL SINUS RHYTHM  Ventricular Rate 90 BPM  QRS Duration 60 ms,NO ACUTE ST, T ABNORMALITIES>    LABS:                        13.3   9.70  )-----------( 375      ( 01 Aug 2017 07:42 )             38.5         132<L>  |  98  |  15  ----------------------------<  111<H>  4.9   |  19<L>  |  0.79    Ca    9.7      01 Aug 2017 07:35    TPro  6.8  /  Alb  3.4<L>  /  TBili  x   /  DBili  x   /  AST  x   /  ALT  x   /  AlkPhos  x       Urinalysis Basic - ( 2017 15:14 )    Color: x / Appearance: Clear / S.014 / pH: x  Gluc: x / Ketone: Negative  / Bili: Negative / Urobili: Negative   Blood: x / Protein: Negative / Nitrite: Negative   Leuk Esterase: Negative / RBC: x / WBC x   Sq Epi: x / Non Sq Epi: x / Bacteria: x    I&O's Summary    01 Aug 2017 07:01  -  02 Aug 2017 06:51  --------------------------------------------------------  IN: 100 mL / OUT: 700 mL / NET: -600 mL      RADIOLOGY & ADDITIONAL STUDIES:  LUMBO-SACRAL SPINE MRI :--Impression: Marked compression deformity of T12 with bony retropulsion and  compression of the distal cord which is likely is due to a subacute to  chronic fracture with some acute components.    CT CHEST  Acute comminuted segmental fracture of the posterior left 12th rib.  Nondisplaced anterior left third rib fracture. No pneumothorax.Complete  collapse of the T12 vertebral body with retropulsion into the canal.    CHEST-PORTABLE 2017 IMPRESSION:  Clear lungs.

## 2017-08-02 NOTE — PROGRESS NOTE ADULT - SUBJECTIVE AND OBJECTIVE BOX
Patient is a 77y old  Female who presents with a chief complaint of Progressively worsening hip and back pain + weakness (31 Jul 2017 21:16)      SUBJECTIVE / OVERNIGHT EVENTS:   Feels better.  Denies CP/SOB/Palpitation/HA.    MEDICATIONS  (STANDING):  sodium chloride 0.9%. 1000 milliLiter(s) (75 mL/Hr) IV Continuous <Continuous>  heparin  Injectable 5000 Unit(s) SubCutaneous every 8 hours  lisinopril 10 milliGRAM(s) Oral daily    MEDICATIONS  (PRN):  traMADol 25 milliGRAM(s) Oral every 6 hours PRN Moderate Pain (4 - 6)        CAPILLARY BLOOD GLUCOSE        I&O's Summary    01 Aug 2017 07:01  -  02 Aug 2017 07:00  --------------------------------------------------------  IN: 100 mL / OUT: 700 mL / NET: -600 mL    02 Aug 2017 07:01  -  02 Aug 2017 21:48  --------------------------------------------------------  IN: 0 mL / OUT: 400 mL / NET: -400 mL        PHYSICAL EXAM:  GENERAL: NAD, well-developed  HEAD:  Atraumatic, Normocephalic  EYES: conjunctiva and sclera clear  NECK: Supple, No JVD  CHEST/LUNG: Clear to auscultation bilaterally; No wheeze  HEART: Regular rate and rhythm; No murmurs, rubs, or gallops  ABDOMEN: Soft, Nontender, Nondistended; Bowel sounds present  EXTREMITIES:  2+ Peripheral Pulses, No clubbing, cyanosis, or edema  NEUROLOGY: AAO X 3  SKIN: No rashes    LABS:                        13.1   9.27  )-----------( 374      ( 02 Aug 2017 07:33 )             36.5     08-02    126<L>  |  92<L>  |  14  ----------------------------<  118<H>  4.5   |  22  |  0.84    Ca    8.9      02 Aug 2017 08:28    TPro  6.8  /  Alb  3.4<L>  /  TBili  x   /  DBili  x   /  AST  x   /  ALT  x   /  AlkPhos  x   08-01            CAPILLARY BLOOD GLUCOSE                    RADIOLOGY & ADDITIONAL TESTS:    Imaging Personally Reviewed:    Consultant(s) Notes Reviewed:      Care Discussed with Consultants/Other Providers:

## 2017-08-02 NOTE — PROGRESS NOTE ADULT - SUBJECTIVE AND OBJECTIVE BOX
Patient seen and examined at bedside.    T(C): 36.4 (08-02-17 @ 15:43), Max: 36.4 (08-02-17 @ 15:43)  HR: 96 (08-02-17 @ 15:43) (95 - 99)  BP: 138/81 (08-02-17 @ 15:43) (125/75 - 148/82)  RR: 18 (08-02-17 @ 15:43) (18 - 18)  SpO2: 97% (08-02-17 @ 15:43) (96% - 97%)  Wt(kg): --    Exam:    Awake, alert, AOX3  BUE 5/5  BLE HF 4/5 otherwise 5/5  SILT

## 2017-08-02 NOTE — CONSULT NOTE ADULT - ASSESSMENT
77f hx HTN, HLD, osteoarthritis presents with 3 days of Right hip pain and generalized weakness limiting ambulation.No syncope fall or dyspnea MRI reveals marked compression deformity of T12 with bony retropulsion and  compression of the distal cord,for possible Neurosurgical intervention.    She is at LOW to INTERMEDIATE cardiac risk with no cardiac contraindications for proposed surgery.  HTN controlled on Lisinopril  DVT prophylaxis and Incentive spirometry

## 2017-08-02 NOTE — PROGRESS NOTE ADULT - PROBLEM SELECTOR PLAN 1
Neurosurgery f/up noted.  For OR on monday. Considering pt's medical condition and nature of surgery, pt is at intermediate risk for surgery.

## 2017-08-02 NOTE — PROGRESS NOTE ADULT - PROBLEM SELECTOR PLAN 3
T12 Compression Fx with Spinal cord compression:  Dw neuroSx / No need for steroids.  Bed rest  Pain control with tramadol.  OR on monday.

## 2017-08-03 LAB
ANION GAP SERPL CALC-SCNC: 14 MMOL/L — SIGNIFICANT CHANGE UP (ref 5–17)
ANION GAP SERPL CALC-SCNC: 16 MMOL/L — SIGNIFICANT CHANGE UP (ref 5–17)
BUN SERPL-MCNC: 17 MG/DL — SIGNIFICANT CHANGE UP (ref 7–23)
BUN SERPL-MCNC: 19 MG/DL — SIGNIFICANT CHANGE UP (ref 7–23)
CALCIUM SERPL-MCNC: 8.8 MG/DL — SIGNIFICANT CHANGE UP (ref 8.4–10.5)
CALCIUM SERPL-MCNC: 9.3 MG/DL — SIGNIFICANT CHANGE UP (ref 8.4–10.5)
CHLORIDE SERPL-SCNC: 92 MMOL/L — LOW (ref 96–108)
CHLORIDE SERPL-SCNC: 95 MMOL/L — LOW (ref 96–108)
CO2 SERPL-SCNC: 20 MMOL/L — LOW (ref 22–31)
CO2 SERPL-SCNC: 21 MMOL/L — LOW (ref 22–31)
CREAT SERPL-MCNC: 0.73 MG/DL — SIGNIFICANT CHANGE UP (ref 0.5–1.3)
CREAT SERPL-MCNC: 0.77 MG/DL — SIGNIFICANT CHANGE UP (ref 0.5–1.3)
GLUCOSE SERPL-MCNC: 118 MG/DL — HIGH (ref 70–99)
GLUCOSE SERPL-MCNC: 120 MG/DL — HIGH (ref 70–99)
HCT VFR BLD CALC: 37.7 % — SIGNIFICANT CHANGE UP (ref 34.5–45)
HGB BLD-MCNC: 13.3 G/DL — SIGNIFICANT CHANGE UP (ref 11.5–15.5)
MCHC RBC-ENTMCNC: 31.7 PG — SIGNIFICANT CHANGE UP (ref 27–34)
MCHC RBC-ENTMCNC: 35.3 GM/DL — SIGNIFICANT CHANGE UP (ref 32–36)
MCV RBC AUTO: 89.8 FL — SIGNIFICANT CHANGE UP (ref 80–100)
PLATELET # BLD AUTO: 394 K/UL — SIGNIFICANT CHANGE UP (ref 150–400)
POTASSIUM SERPL-MCNC: 4.3 MMOL/L — SIGNIFICANT CHANGE UP (ref 3.5–5.3)
POTASSIUM SERPL-MCNC: 4.5 MMOL/L — SIGNIFICANT CHANGE UP (ref 3.5–5.3)
POTASSIUM SERPL-SCNC: 4.3 MMOL/L — SIGNIFICANT CHANGE UP (ref 3.5–5.3)
POTASSIUM SERPL-SCNC: 4.5 MMOL/L — SIGNIFICANT CHANGE UP (ref 3.5–5.3)
RBC # BLD: 4.2 M/UL — SIGNIFICANT CHANGE UP (ref 3.8–5.2)
RBC # FLD: 13.6 % — SIGNIFICANT CHANGE UP (ref 10.3–14.5)
SODIUM SERPL-SCNC: 128 MMOL/L — LOW (ref 135–145)
SODIUM SERPL-SCNC: 130 MMOL/L — LOW (ref 135–145)
WBC # BLD: 11.46 K/UL — HIGH (ref 3.8–10.5)
WBC # FLD AUTO: 11.46 K/UL — HIGH (ref 3.8–10.5)

## 2017-08-03 RX ORDER — SENNA PLUS 8.6 MG/1
1 TABLET ORAL AT BEDTIME
Qty: 0 | Refills: 0 | Status: DISCONTINUED | OUTPATIENT
Start: 2017-08-03 | End: 2017-08-07

## 2017-08-03 RX ORDER — DOCUSATE SODIUM 100 MG
100 CAPSULE ORAL THREE TIMES A DAY
Qty: 0 | Refills: 0 | Status: DISCONTINUED | OUTPATIENT
Start: 2017-08-03 | End: 2017-08-07

## 2017-08-03 RX ORDER — SODIUM CHLORIDE 9 MG/ML
1000 INJECTION INTRAMUSCULAR; INTRAVENOUS; SUBCUTANEOUS
Qty: 0 | Refills: 0 | Status: COMPLETED | OUTPATIENT
Start: 2017-08-03 | End: 2017-08-04

## 2017-08-03 RX ADMIN — HEPARIN SODIUM 5000 UNIT(S): 5000 INJECTION INTRAVENOUS; SUBCUTANEOUS at 14:26

## 2017-08-03 RX ADMIN — SENNA PLUS 1 TABLET(S): 8.6 TABLET ORAL at 21:17

## 2017-08-03 RX ADMIN — TRAMADOL HYDROCHLORIDE 25 MILLIGRAM(S): 50 TABLET ORAL at 22:53

## 2017-08-03 RX ADMIN — TRAMADOL HYDROCHLORIDE 25 MILLIGRAM(S): 50 TABLET ORAL at 09:57

## 2017-08-03 RX ADMIN — HEPARIN SODIUM 5000 UNIT(S): 5000 INJECTION INTRAVENOUS; SUBCUTANEOUS at 06:35

## 2017-08-03 RX ADMIN — LISINOPRIL 10 MILLIGRAM(S): 2.5 TABLET ORAL at 06:35

## 2017-08-03 RX ADMIN — HEPARIN SODIUM 5000 UNIT(S): 5000 INJECTION INTRAVENOUS; SUBCUTANEOUS at 21:17

## 2017-08-03 RX ADMIN — TRAMADOL HYDROCHLORIDE 25 MILLIGRAM(S): 50 TABLET ORAL at 08:57

## 2017-08-03 RX ADMIN — SODIUM CHLORIDE 50 MILLILITER(S): 9 INJECTION INTRAMUSCULAR; INTRAVENOUS; SUBCUTANEOUS at 14:25

## 2017-08-03 RX ADMIN — TRAMADOL HYDROCHLORIDE 25 MILLIGRAM(S): 50 TABLET ORAL at 23:25

## 2017-08-03 RX ADMIN — Medication 100 MILLIGRAM(S): at 21:17

## 2017-08-03 NOTE — PROGRESS NOTE ADULT - PROBLEM SELECTOR PLAN 1
Neurosurgery f/up noted.  For OR on monday. Considering pt's medical condition and nature of surgery, pt is at intermediate risk for surgery. Pt is cleared by cardio also.

## 2017-08-03 NOTE — PROGRESS NOTE ADULT - SUBJECTIVE AND OBJECTIVE BOX
Patient is a 77y old  Female who presents with a chief complaint of Progressively worsening hip and back pain + weakness (31 Jul 2017 21:16)      SUBJECTIVE / OVERNIGHT EVENTS:   Feels better.  Denies CP/SOB/Palpitation/HA.    MEDICATIONS  (STANDING):  heparin  Injectable 5000 Unit(s) SubCutaneous every 8 hours  lisinopril 10 milliGRAM(s) Oral daily  sodium chloride 0.9%. 1000 milliLiter(s) (50 mL/Hr) IV Continuous <Continuous>  docusate sodium 100 milliGRAM(s) Oral three times a day  senna 1 Tablet(s) Oral at bedtime    MEDICATIONS  (PRN):  traMADol 25 milliGRAM(s) Oral every 6 hours PRN Moderate Pain (4 - 6)        CAPILLARY BLOOD GLUCOSE        I&O's Summary    02 Aug 2017 07:01  -  03 Aug 2017 07:00  --------------------------------------------------------  IN: 0 mL / OUT: 400 mL / NET: -400 mL    03 Aug 2017 07:01  -  04 Aug 2017 02:30  --------------------------------------------------------  IN: 540 mL / OUT: 300 mL / NET: 240 mL        PHYSICAL EXAM:  GENERAL: NAD, well-developed  HEAD:  Atraumatic, Normocephalic  EYES: conjunctiva and sclera clear  NECK: Supple, No JVD  CHEST/LUNG: Clear to auscultation bilaterally; No wheeze  HEART: Regular rate and rhythm; No murmurs, rubs, or gallops  ABDOMEN: Soft, Nontender, Nondistended; Bowel sounds present  EXTREMITIES:  2+ Peripheral Pulses, No clubbing, cyanosis, or edema  NEUROLOGY: AAO X 3  SKIN: No rashes    LABS:                        13.3   11.46 )-----------( 394      ( 03 Aug 2017 08:53 )             37.7     08-03    128<L>  |  92<L>  |  17  ----------------------------<  118<H>  4.5   |  20<L>  |  0.73    Ca    8.8      03 Aug 2017 08:54              CAPILLARY BLOOD GLUCOSE                    RADIOLOGY & ADDITIONAL TESTS:    Imaging Personally Reviewed:    Consultant(s) Notes Reviewed:      Care Discussed with Consultants/Other Providers:

## 2017-08-03 NOTE — PROGRESS NOTE ADULT - PROBLEM SELECTOR PLAN 3
T12 Compression Fx with Spinal cord compression:  As per neuroSx / No need for steroids.  Bed rest  Pain control with tramadol.  OR on monday.

## 2017-08-03 NOTE — PROGRESS NOTE ADULT - SUBJECTIVE AND OBJECTIVE BOX
Patient seen and examined at bedside.    T(C): 36.5 (08-03-17 @ 08:16), Max: 36.5 (08-03-17 @ 08:16)  HR: 112 (08-03-17 @ 08:16) (96 - 112)  BP: 120/77 (08-03-17 @ 08:16) (120/67 - 138/81)  RR: 20 (08-03-17 @ 08:16) (18 - 20)  SpO2: 98% (08-03-17 @ 08:16) (97% - 98%)  Wt(kg): --    Exam:    Awake, Alert, AOX3  BUE 5/5  BLE HF 4+/5, otherwise 5/5  SILT

## 2017-08-04 LAB
ANION GAP SERPL CALC-SCNC: 14 MMOL/L — SIGNIFICANT CHANGE UP (ref 5–17)
BUN SERPL-MCNC: 23 MG/DL — SIGNIFICANT CHANGE UP (ref 7–23)
CALCIUM SERPL-MCNC: 9.2 MG/DL — SIGNIFICANT CHANGE UP (ref 8.4–10.5)
CHLORIDE SERPL-SCNC: 97 MMOL/L — SIGNIFICANT CHANGE UP (ref 96–108)
CO2 SERPL-SCNC: 22 MMOL/L — SIGNIFICANT CHANGE UP (ref 22–31)
CREAT SERPL-MCNC: 0.78 MG/DL — SIGNIFICANT CHANGE UP (ref 0.5–1.3)
GLUCOSE SERPL-MCNC: 108 MG/DL — HIGH (ref 70–99)
HCT VFR BLD CALC: 38 % — SIGNIFICANT CHANGE UP (ref 34.5–45)
HGB BLD-MCNC: 13.1 G/DL — SIGNIFICANT CHANGE UP (ref 11.5–15.5)
MCHC RBC-ENTMCNC: 33.3 PG — SIGNIFICANT CHANGE UP (ref 27–34)
MCHC RBC-ENTMCNC: 34.4 GM/DL — SIGNIFICANT CHANGE UP (ref 32–36)
MCV RBC AUTO: 96.6 FL — SIGNIFICANT CHANGE UP (ref 80–100)
PLATELET # BLD AUTO: 321 K/UL — SIGNIFICANT CHANGE UP (ref 150–400)
POTASSIUM SERPL-MCNC: 4.5 MMOL/L — SIGNIFICANT CHANGE UP (ref 3.5–5.3)
POTASSIUM SERPL-SCNC: 4.5 MMOL/L — SIGNIFICANT CHANGE UP (ref 3.5–5.3)
RBC # BLD: 3.94 M/UL — SIGNIFICANT CHANGE UP (ref 3.8–5.2)
RBC # FLD: 12.3 % — SIGNIFICANT CHANGE UP (ref 10.3–14.5)
SODIUM SERPL-SCNC: 133 MMOL/L — LOW (ref 135–145)
WBC # BLD: 13 K/UL — HIGH (ref 3.8–10.5)
WBC # FLD AUTO: 13 K/UL — HIGH (ref 3.8–10.5)

## 2017-08-04 RX ADMIN — Medication 100 MILLIGRAM(S): at 14:30

## 2017-08-04 RX ADMIN — Medication 100 MILLIGRAM(S): at 06:18

## 2017-08-04 RX ADMIN — SODIUM CHLORIDE 50 MILLILITER(S): 9 INJECTION INTRAMUSCULAR; INTRAVENOUS; SUBCUTANEOUS at 06:17

## 2017-08-04 RX ADMIN — HEPARIN SODIUM 5000 UNIT(S): 5000 INJECTION INTRAVENOUS; SUBCUTANEOUS at 21:33

## 2017-08-04 RX ADMIN — SODIUM CHLORIDE 50 MILLILITER(S): 9 INJECTION INTRAMUSCULAR; INTRAVENOUS; SUBCUTANEOUS at 14:31

## 2017-08-04 RX ADMIN — TRAMADOL HYDROCHLORIDE 25 MILLIGRAM(S): 50 TABLET ORAL at 20:10

## 2017-08-04 RX ADMIN — TRAMADOL HYDROCHLORIDE 25 MILLIGRAM(S): 50 TABLET ORAL at 19:37

## 2017-08-04 RX ADMIN — SENNA PLUS 1 TABLET(S): 8.6 TABLET ORAL at 21:33

## 2017-08-04 RX ADMIN — LISINOPRIL 10 MILLIGRAM(S): 2.5 TABLET ORAL at 06:18

## 2017-08-04 RX ADMIN — HEPARIN SODIUM 5000 UNIT(S): 5000 INJECTION INTRAVENOUS; SUBCUTANEOUS at 14:31

## 2017-08-04 RX ADMIN — Medication 100 MILLIGRAM(S): at 21:33

## 2017-08-04 RX ADMIN — HEPARIN SODIUM 5000 UNIT(S): 5000 INJECTION INTRAVENOUS; SUBCUTANEOUS at 06:17

## 2017-08-04 NOTE — PROGRESS NOTE ADULT - SUBJECTIVE AND OBJECTIVE BOX
MEDICINE, PROGRESS NOTE [phone] Covering Dr Curtis SCHREIBER, DOROTHY 77y [id number]    Patient seen and examined.  Patient is a 77y old  Female who presents with a chief complaint of Progressively worsening hip and back pain + weakness (31 Jul 2017 21:16)  Pt feels ok, has no current new complaints. States she is incontinent of urine.    PAST MEDICAL & SURGICAL HISTORY:  High cholesterol  Hypertension  Arthritis  No significant past surgical history    MEDICATIONS  (STANDING):  heparin  Injectable 5000 Unit(s) SubCutaneous every 8 hours  lisinopril 10 milliGRAM(s) Oral daily  docusate sodium 100 milliGRAM(s) Oral three times a day  senna 1 Tablet(s) Oral at bedtime    MEDICATIONS  (PRN):  traMADol 25 milliGRAM(s) Oral every 6 hours PRN Moderate Pain (4 - 6)    Allergies    No Known Allergies    Intolerances        PHYSICAL EXAM:  Constitutional: NAD  HEENT: Normocephalic, EOMI  Neck:  No JVD  Respiratory: CTA B/L, No wheezes  Cardiovascular: S1, S2, RRR, + systolic murmur  Gastrointestinal: BS+, soft, NT/ND  Extremities: No peripheral edema  Neurological: AAOX3, no focal deficits, urinary incontinence, sensation intact le b/l, +arom and prom le b/l  Psychiatric: Normal mood, normal affect  : No Robb    Vital Signs Last 24 Hrs  T(C): 36.6 (04 Aug 2017 15:20), Max: 36.6 (04 Aug 2017 15:20)  T(F): 97.9 (04 Aug 2017 15:20), Max: 97.9 (04 Aug 2017 15:20)  HR: 95 (04 Aug 2017 15:20) (83 - 102)  BP: 125/71 (04 Aug 2017 15:20) (93/65 - 125/71)  BP(mean): --  RR: 18 (04 Aug 2017 15:20) (18 - 18)  SpO2: 98% (04 Aug 2017 15:20) (97% - 99%)  I&O's Summary    03 Aug 2017 07:01  -  04 Aug 2017 07:00  --------------------------------------------------------  IN: 1140 mL / OUT: 300 mL / NET: 840 mL        LABS:                        13.1   13.0  )-----------( 321      ( 04 Aug 2017 11:57 )             38.0     08-04    133<L>  |  97  |  23  ----------------------------<  108<H>  4.5   |  22  |  0.78    Ca    9.2      04 Aug 2017 11:57                REVIEW OF SYSTEMS:      RADIOLOGY & ADDITIONAL STUDIES:      ASSESSMENT/PLAN:

## 2017-08-05 RX ADMIN — HEPARIN SODIUM 5000 UNIT(S): 5000 INJECTION INTRAVENOUS; SUBCUTANEOUS at 13:21

## 2017-08-05 RX ADMIN — Medication 100 MILLIGRAM(S): at 21:07

## 2017-08-05 RX ADMIN — SENNA PLUS 1 TABLET(S): 8.6 TABLET ORAL at 21:08

## 2017-08-05 RX ADMIN — TRAMADOL HYDROCHLORIDE 25 MILLIGRAM(S): 50 TABLET ORAL at 03:43

## 2017-08-05 RX ADMIN — HEPARIN SODIUM 5000 UNIT(S): 5000 INJECTION INTRAVENOUS; SUBCUTANEOUS at 21:07

## 2017-08-05 RX ADMIN — HEPARIN SODIUM 5000 UNIT(S): 5000 INJECTION INTRAVENOUS; SUBCUTANEOUS at 06:05

## 2017-08-05 RX ADMIN — TRAMADOL HYDROCHLORIDE 25 MILLIGRAM(S): 50 TABLET ORAL at 14:00

## 2017-08-05 RX ADMIN — Medication 100 MILLIGRAM(S): at 13:19

## 2017-08-05 RX ADMIN — TRAMADOL HYDROCHLORIDE 25 MILLIGRAM(S): 50 TABLET ORAL at 04:10

## 2017-08-05 RX ADMIN — TRAMADOL HYDROCHLORIDE 25 MILLIGRAM(S): 50 TABLET ORAL at 13:19

## 2017-08-05 RX ADMIN — LISINOPRIL 10 MILLIGRAM(S): 2.5 TABLET ORAL at 06:05

## 2017-08-05 RX ADMIN — Medication 100 MILLIGRAM(S): at 06:05

## 2017-08-05 NOTE — PROGRESS NOTE ADULT - SUBJECTIVE AND OBJECTIVE BOX
Patient seen and examined at bedside.    T(C): 36.3 (08-05-17 @ 08:11), Max: 36.8 (08-05-17 @ 00:38)  HR: 87 (08-05-17 @ 08:11) (87 - 95)  BP: 115/71 (08-05-17 @ 08:11) (113/72 - 125/71)  RR: 18 (08-05-17 @ 08:11) (18 - 18)  SpO2: 95% (08-05-17 @ 08:11) (95% - 98%)  Wt(kg): --    Exam:  AOx3, FC, PERRL, EOMI, V1-3 intact, no facial, palate renato symmetric, tongue midline, shrug 5/5  4/5 bilateral hip felxion, otherwise 5/5 throughout, no drift  SILT  No clonus or babinski    MRI yesterday: Marked compression deformity of T12 with bony retropulsion and compression  of the distal cord which is likely is due to a subacute to chronic fracture  with some acute components. Cannot determine if this is benign or malignant,  favor benign due to the lack of posterior element involvement. Large  right-sided synovial cyst with marked thecal sac compression L4-5.

## 2017-08-05 NOTE — PROGRESS NOTE ADULT - SUBJECTIVE AND OBJECTIVE BOX
MEDICINE, PROGRESS NOTE [phone]    DOROTHY SCHREIBER 77y [id number]    Patient seen and examined.  Patient is a 77y old  Female who presents with a chief complaint of Progressively worsening hip and back pain + weakness (31 Jul 2017 21:16)  Pt feels ok.    PAST MEDICAL & SURGICAL HISTORY:  High cholesterol  Hypertension  Arthritis  No significant past surgical history    MEDICATIONS  (STANDING):  heparin  Injectable 5000 Unit(s) SubCutaneous every 8 hours  lisinopril 10 milliGRAM(s) Oral daily  docusate sodium 100 milliGRAM(s) Oral three times a day  senna 1 Tablet(s) Oral at bedtime    MEDICATIONS  (PRN):  traMADol 25 milliGRAM(s) Oral every 6 hours PRN Moderate Pain (4 - 6)    Allergies    No Known Allergies    Intolerances        PHYSICAL EXAM:  Constitutional: NAD  HEENT: Normocephalic, EOMI  Neck:  No JVD  Respiratory: CTA B/L, No wheezes  Cardiovascular: S1, S2, RRR, + systolic murmur  Gastrointestinal: BS+, soft, NT/ND  Extremities: No peripheral edema  Neurological: AAOX3, no focal deficits  Psychiatric: Normal mood, normal affect  : No Robb    Vital Signs Last 24 Hrs  T(C): 36.6 (05 Aug 2017 15:25), Max: 36.8 (05 Aug 2017 00:38)  T(F): 97.9 (05 Aug 2017 15:25), Max: 98.2 (05 Aug 2017 00:38)  HR: 83 (05 Aug 2017 15:25) (83 - 95)  BP: 121/72 (05 Aug 2017 15:25) (113/72 - 121/72)  BP(mean): --  RR: 18 (05 Aug 2017 15:25) (18 - 18)  SpO2: 96% (05 Aug 2017 15:25) (95% - 97%)  I&O's Summary      LABS:                        13.1   13.0  )-----------( 321      ( 04 Aug 2017 11:57 )             38.0     08-04    133<L>  |  97  |  23  ----------------------------<  108<H>  4.5   |  22  |  0.78    Ca    9.2      04 Aug 2017 11:57                REVIEW OF SYSTEMS:      RADIOLOGY & ADDITIONAL STUDIES:      ASSESSMENT/PLAN:

## 2017-08-06 LAB
ANION GAP SERPL CALC-SCNC: 15 MMOL/L — SIGNIFICANT CHANGE UP (ref 5–17)
APTT BLD: 26.4 SEC — LOW (ref 27.5–37.4)
BLD GP AB SCN SERPL QL: NEGATIVE — SIGNIFICANT CHANGE UP
BUN SERPL-MCNC: 19 MG/DL — SIGNIFICANT CHANGE UP (ref 7–23)
CALCIUM SERPL-MCNC: 9.2 MG/DL — SIGNIFICANT CHANGE UP (ref 8.4–10.5)
CHLORIDE SERPL-SCNC: 94 MMOL/L — LOW (ref 96–108)
CO2 SERPL-SCNC: 21 MMOL/L — LOW (ref 22–31)
CREAT SERPL-MCNC: 0.74 MG/DL — SIGNIFICANT CHANGE UP (ref 0.5–1.3)
GLUCOSE SERPL-MCNC: 174 MG/DL — HIGH (ref 70–99)
HCT VFR BLD CALC: 40.8 % — SIGNIFICANT CHANGE UP (ref 34.5–45)
HGB BLD-MCNC: 12.9 G/DL — SIGNIFICANT CHANGE UP (ref 11.5–15.5)
INR BLD: 1.02 RATIO — SIGNIFICANT CHANGE UP (ref 0.88–1.16)
MCHC RBC-ENTMCNC: 30.5 PG — SIGNIFICANT CHANGE UP (ref 27–34)
MCHC RBC-ENTMCNC: 31.6 GM/DL — LOW (ref 32–36)
MCV RBC AUTO: 96.5 FL — SIGNIFICANT CHANGE UP (ref 80–100)
PLATELET # BLD AUTO: 376 K/UL — SIGNIFICANT CHANGE UP (ref 150–400)
POTASSIUM SERPL-MCNC: 4.4 MMOL/L — SIGNIFICANT CHANGE UP (ref 3.5–5.3)
POTASSIUM SERPL-SCNC: 4.4 MMOL/L — SIGNIFICANT CHANGE UP (ref 3.5–5.3)
PROTHROM AB SERPL-ACNC: 11.5 SEC — SIGNIFICANT CHANGE UP (ref 10–13.1)
RBC # BLD: 4.23 M/UL — SIGNIFICANT CHANGE UP (ref 3.8–5.2)
RBC # FLD: 12.5 % — SIGNIFICANT CHANGE UP (ref 10.3–14.5)
RH IG SCN BLD-IMP: POSITIVE — SIGNIFICANT CHANGE UP
SODIUM SERPL-SCNC: 130 MMOL/L — LOW (ref 135–145)
WBC # BLD: 12.6 K/UL — HIGH (ref 3.8–10.5)
WBC # FLD AUTO: 12.6 K/UL — HIGH (ref 3.8–10.5)

## 2017-08-06 RX ADMIN — TRAMADOL HYDROCHLORIDE 25 MILLIGRAM(S): 50 TABLET ORAL at 13:22

## 2017-08-06 RX ADMIN — Medication 100 MILLIGRAM(S): at 06:19

## 2017-08-06 RX ADMIN — TRAMADOL HYDROCHLORIDE 25 MILLIGRAM(S): 50 TABLET ORAL at 21:28

## 2017-08-06 RX ADMIN — Medication 100 MILLIGRAM(S): at 21:29

## 2017-08-06 RX ADMIN — SENNA PLUS 1 TABLET(S): 8.6 TABLET ORAL at 21:28

## 2017-08-06 RX ADMIN — HEPARIN SODIUM 5000 UNIT(S): 5000 INJECTION INTRAVENOUS; SUBCUTANEOUS at 06:19

## 2017-08-06 RX ADMIN — Medication 100 MILLIGRAM(S): at 15:24

## 2017-08-06 RX ADMIN — TRAMADOL HYDROCHLORIDE 25 MILLIGRAM(S): 50 TABLET ORAL at 22:28

## 2017-08-06 RX ADMIN — TRAMADOL HYDROCHLORIDE 25 MILLIGRAM(S): 50 TABLET ORAL at 12:22

## 2017-08-06 NOTE — PROGRESS NOTE ADULT - SUBJECTIVE AND OBJECTIVE BOX
Patient seen and examined at bedside.    T(C): 36.3 (08-06-17 @ 08:07), Max: 36.6 (08-05-17 @ 15:25)  HR: 89 (08-06-17 @ 08:07) (83 - 96)  BP: 103/63 (08-06-17 @ 08:07) (98/61 - 125/63)  RR: 18 (08-06-17 @ 08:07) (18 - 19)  SpO2: 94% (08-06-17 @ 08:07) (94% - 98%)  Wt(kg): --    Exam: NAD patient sitting eating in bed  AOx3, FC, PERRL, EOMI, V1-3 intact, no facial, palate renato symmetric, tongue midline, shrug 5/5  4/5 bilateral hip felxion, otherwise 5/5 throughout, no drift  SILT  No clonus or babinski

## 2017-08-06 NOTE — PROGRESS NOTE ADULT - SUBJECTIVE AND OBJECTIVE BOX
PRE-OP NOTE    Procedure: T10 - L2 psoterior spinal fusion  Attending: Vandana    HPI:  77f hx HTN, HLD, osteoarthritis presents with 3 days of R hip pain and generalized weakness limiting ambulation. Had to be carried to and from her room this morning due to weakness and pain. Also notes progressively worsening lower back pain x months. Was recently admitted to [facility] for weakness which was attributed to sepsis 2/2 UTI. No injury, trauma, falls, no focal weaknesses, normally sedentary and often bedridden due to pain and weakness. At baseline has poor appetite, no acute changes. No dysuria, flank pain, n/v/d, no bowel habit changes. No sick contacts or recent travel. No HA, nuchal rigidity. ROS otherwise negative.    In ED  ICU Vital Signs Last 24 Hrs  T(C): 36.7 (31 Jul 2017 20:55), Max: 37.1 (31 Jul 2017 15:13)  T(F): 98 (31 Jul 2017 20:55), Max: 98.7 (31 Jul 2017 15:13)  HR: 83 (31 Jul 2017 20:55) (83 - 94)  BP: 130/68 (31 Jul 2017 20:55) (99/75 - 130/68)  BP(mean): --  ABP: --  ABP(mean): --  RR: 18 (31 Jul 2017 20:55) (17 - 18)  SpO2: 98% (31 Jul 2017 20:55) (98% - 98%) (31 Jul 2017 21:16)      T(C): 36.3 (08-06-17 @ 08:07), Max: 36.6 (08-05-17 @ 15:25)  HR: 89 (08-06-17 @ 08:07) (83 - 96)  BP: 103/63 (08-06-17 @ 08:07) (98/61 - 125/63)  RR: 18 (08-06-17 @ 08:07) (18 - 19)  SpO2: 94% (08-06-17 @ 08:07) (94% - 98%)  Wt(kg): --    08-04    133<L>  |  97  |  23  ----------------------------<  108<H>  4.5   |  22  |  0.78    Ca    9.2      04 Aug 2017 11:57      CBC Full  -  ( 04 Aug 2017 11:57 )  WBC Count : 13.0 K/uL  Hemoglobin : 13.1 g/dL  Hematocrit : 38.0 %  Platelet Count - Automated : 321 K/uL  Mean Cell Volume : 96.6 fl  Mean Cell Hemoglobin : 33.3 pg  Mean Cell Hemoglobin Concentration : 34.4 gm/dL  Auto Neutrophil # : x  Auto Lymphocyte # : x  Auto Monocyte # : x  Auto Eosinophil # : x  Auto Basophil # : x  Auto Neutrophil % : x  Auto Lymphocyte % : x  Auto Monocyte % : x  Auto Eosinophil % : x  Auto Basophil % : x    PT/INR - ( 06 Aug 2017 09:15 )   PT: 11.5 sec;   INR: 1.02 ratio         PTT - ( 06 Aug 2017 09:15 )  PTT:26.4 sec    Pregnancy test: not needed    Type & Screen (in past 72hrs): one done and one pending    Medical clearance: done as per primary team     Consent: in chart

## 2017-08-06 NOTE — PROGRESS NOTE ADULT - SUBJECTIVE AND OBJECTIVE BOX
MEDICINE, PROGRESS NOTE [phone]    DOROTHY SCHREIBER 77y [id number]    Patient seen and examined.  Patient is a 77y old  Female who presents with a chief complaint of Progressively worsening hip and back pain + weakness (31 Jul 2017 21:16)  Pt resting in bed, states she feels ok.    PAST MEDICAL & SURGICAL HISTORY:  High cholesterol  Hypertension  Arthritis  No significant past surgical history    MEDICATIONS  (STANDING):  lisinopril 10 milliGRAM(s) Oral daily  docusate sodium 100 milliGRAM(s) Oral three times a day  senna 1 Tablet(s) Oral at bedtime    MEDICATIONS  (PRN):  traMADol 25 milliGRAM(s) Oral every 6 hours PRN Moderate Pain (4 - 6)    Allergies    No Known Allergies    Intolerances        PHYSICAL EXAM:  Constitutional: NAD  HEENT: Normocephalic, EOMI  Neck:  No JVD  Respiratory: CTA B/L, No wheezes  Cardiovascular: S1, S2, RRR, + systolic murmur  Gastrointestinal: BS+, soft, NT/ND  Extremities: No peripheral edema  Neurological: AAOX3, no focal deficits, + arom and prom x 4  Psychiatric: Normal mood, normal affect  : No Robb    Vital Signs Last 24 Hrs  T(C): 36.3 (06 Aug 2017 08:07), Max: 36.6 (05 Aug 2017 15:25)  T(F): 97.3 (06 Aug 2017 08:07), Max: 97.9 (05 Aug 2017 15:25)  HR: 89 (06 Aug 2017 08:07) (83 - 96)  BP: 103/63 (06 Aug 2017 08:07) (98/61 - 125/63)  BP(mean): --  RR: 18 (06 Aug 2017 08:07) (18 - 19)  SpO2: 94% (06 Aug 2017 08:07) (94% - 98%)  I&O's Summary    05 Aug 2017 07:01  -  06 Aug 2017 07:00  --------------------------------------------------------  IN: 200 mL / OUT: 0 mL / NET: 200 mL    06 Aug 2017 07:01  -  06 Aug 2017 13:37  --------------------------------------------------------  IN: 0 mL / OUT: 100 mL / NET: -100 mL        LABS:                        12.9   12.6  )-----------( 376      ( 06 Aug 2017 11:15 )             40.8     08-06    130<L>  |  94<L>  |  19  ----------------------------<  174<H>  4.4   |  21<L>  |  0.74    Ca    9.2      06 Aug 2017 11:15                REVIEW OF SYSTEMS:      RADIOLOGY & ADDITIONAL STUDIES:      ASSESSMENT/PLAN:

## 2017-08-07 ENCOUNTER — APPOINTMENT (OUTPATIENT)
Dept: NEUROSURGERY | Facility: HOSPITAL | Age: 77
End: 2017-08-07
Payer: MEDICARE

## 2017-08-07 PROBLEM — Z00.00 ENCOUNTER FOR PREVENTIVE HEALTH EXAMINATION: Status: ACTIVE | Noted: 2017-08-07

## 2017-08-07 LAB
ANION GAP SERPL CALC-SCNC: 12 MMOL/L — SIGNIFICANT CHANGE UP (ref 5–17)
BASOPHILS # BLD AUTO: 0 K/UL — SIGNIFICANT CHANGE UP (ref 0–0.2)
BASOPHILS NFR BLD AUTO: 0.2 % — SIGNIFICANT CHANGE UP (ref 0–2)
BUN SERPL-MCNC: 17 MG/DL — SIGNIFICANT CHANGE UP (ref 7–23)
CALCIUM SERPL-MCNC: 8.5 MG/DL — SIGNIFICANT CHANGE UP (ref 8.4–10.5)
CHLORIDE SERPL-SCNC: 102 MMOL/L — SIGNIFICANT CHANGE UP (ref 96–108)
CO2 SERPL-SCNC: 19 MMOL/L — LOW (ref 22–31)
CREAT SERPL-MCNC: 0.52 MG/DL — SIGNIFICANT CHANGE UP (ref 0.5–1.3)
EOSINOPHIL # BLD AUTO: 0.1 K/UL — SIGNIFICANT CHANGE UP (ref 0–0.5)
EOSINOPHIL NFR BLD AUTO: 1 % — SIGNIFICANT CHANGE UP (ref 0–6)
GLUCOSE SERPL-MCNC: 125 MG/DL — HIGH (ref 70–99)
HCT VFR BLD CALC: 29.8 % — LOW (ref 34.5–45)
HGB BLD-MCNC: 10.5 G/DL — LOW (ref 11.5–15.5)
LYMPHOCYTES # BLD AUTO: 1.3 K/UL — SIGNIFICANT CHANGE UP (ref 1–3.3)
LYMPHOCYTES # BLD AUTO: 9 % — LOW (ref 13–44)
MCHC RBC-ENTMCNC: 33.7 PG — SIGNIFICANT CHANGE UP (ref 27–34)
MCHC RBC-ENTMCNC: 35.2 GM/DL — SIGNIFICANT CHANGE UP (ref 32–36)
MCV RBC AUTO: 95.7 FL — SIGNIFICANT CHANGE UP (ref 80–100)
MONOCYTES # BLD AUTO: 1 K/UL — HIGH (ref 0–0.9)
MONOCYTES NFR BLD AUTO: 7.3 % — SIGNIFICANT CHANGE UP (ref 2–14)
NEUTROPHILS # BLD AUTO: 11.7 K/UL — HIGH (ref 1.8–7.4)
NEUTROPHILS NFR BLD AUTO: 82.5 % — HIGH (ref 43–77)
PLATELET # BLD AUTO: 276 K/UL — SIGNIFICANT CHANGE UP (ref 150–400)
POTASSIUM SERPL-MCNC: 4.5 MMOL/L — SIGNIFICANT CHANGE UP (ref 3.5–5.3)
POTASSIUM SERPL-SCNC: 4.5 MMOL/L — SIGNIFICANT CHANGE UP (ref 3.5–5.3)
RBC # BLD: 3.12 M/UL — LOW (ref 3.8–5.2)
RBC # FLD: 12.3 % — SIGNIFICANT CHANGE UP (ref 10.3–14.5)
SODIUM SERPL-SCNC: 133 MMOL/L — LOW (ref 135–145)
WBC # BLD: 14.2 K/UL — HIGH (ref 3.8–10.5)
WBC # FLD AUTO: 14.2 K/UL — HIGH (ref 3.8–10.5)

## 2017-08-07 PROCEDURE — 22614 ARTHRD PST TQ 1NTRSPC EA ADD: CPT

## 2017-08-07 PROCEDURE — 22842 INSERT SPINE FIXATION DEVICE: CPT

## 2017-08-07 PROCEDURE — 22610 ARTHRD PST TQ 1NTRSPC THRC: CPT

## 2017-08-07 RX ORDER — CEFAZOLIN SODIUM 1 G
2000 VIAL (EA) INJECTION EVERY 8 HOURS
Qty: 0 | Refills: 0 | Status: COMPLETED | OUTPATIENT
Start: 2017-08-07 | End: 2017-08-08

## 2017-08-07 RX ORDER — LISINOPRIL 2.5 MG/1
10 TABLET ORAL DAILY
Qty: 0 | Refills: 0 | Status: DISCONTINUED | OUTPATIENT
Start: 2017-08-07 | End: 2017-08-10

## 2017-08-07 RX ORDER — HYDROMORPHONE HYDROCHLORIDE 2 MG/ML
30 INJECTION INTRAMUSCULAR; INTRAVENOUS; SUBCUTANEOUS
Qty: 0 | Refills: 0 | Status: DISCONTINUED | OUTPATIENT
Start: 2017-08-07 | End: 2017-08-09

## 2017-08-07 RX ORDER — HYDROMORPHONE HYDROCHLORIDE 2 MG/ML
0.25 INJECTION INTRAMUSCULAR; INTRAVENOUS; SUBCUTANEOUS
Qty: 0 | Refills: 0 | Status: DISCONTINUED | OUTPATIENT
Start: 2017-08-07 | End: 2017-08-07

## 2017-08-07 RX ORDER — ONDANSETRON 8 MG/1
4 TABLET, FILM COATED ORAL EVERY 6 HOURS
Qty: 0 | Refills: 0 | Status: DISCONTINUED | OUTPATIENT
Start: 2017-08-07 | End: 2017-08-09

## 2017-08-07 RX ORDER — SENNA PLUS 8.6 MG/1
1 TABLET ORAL AT BEDTIME
Qty: 0 | Refills: 0 | Status: DISCONTINUED | OUTPATIENT
Start: 2017-08-07 | End: 2017-08-17

## 2017-08-07 RX ORDER — DIAZEPAM 5 MG
5 TABLET ORAL EVERY 12 HOURS
Qty: 0 | Refills: 0 | Status: DISCONTINUED | OUTPATIENT
Start: 2017-08-07 | End: 2017-08-14

## 2017-08-07 RX ORDER — NALOXONE HYDROCHLORIDE 4 MG/.1ML
0.1 SPRAY NASAL
Qty: 0 | Refills: 0 | Status: DISCONTINUED | OUTPATIENT
Start: 2017-08-07 | End: 2017-08-09

## 2017-08-07 RX ORDER — ONDANSETRON 8 MG/1
4 TABLET, FILM COATED ORAL ONCE
Qty: 0 | Refills: 0 | Status: DISCONTINUED | OUTPATIENT
Start: 2017-08-07 | End: 2017-08-07

## 2017-08-07 RX ORDER — DEXTROSE MONOHYDRATE, SODIUM CHLORIDE, AND POTASSIUM CHLORIDE 50; .745; 4.5 G/1000ML; G/1000ML; G/1000ML
1000 INJECTION, SOLUTION INTRAVENOUS
Qty: 0 | Refills: 0 | Status: DISCONTINUED | OUTPATIENT
Start: 2017-08-07 | End: 2017-08-15

## 2017-08-07 RX ORDER — ACETAMINOPHEN 500 MG
650 TABLET ORAL EVERY 6 HOURS
Qty: 0 | Refills: 0 | Status: DISCONTINUED | OUTPATIENT
Start: 2017-08-07 | End: 2017-08-17

## 2017-08-07 RX ORDER — HYDROMORPHONE HYDROCHLORIDE 2 MG/ML
0.25 INJECTION INTRAMUSCULAR; INTRAVENOUS; SUBCUTANEOUS
Qty: 0 | Refills: 0 | Status: DISCONTINUED | OUTPATIENT
Start: 2017-08-07 | End: 2017-08-09

## 2017-08-07 RX ORDER — DOCUSATE SODIUM 100 MG
100 CAPSULE ORAL THREE TIMES A DAY
Qty: 0 | Refills: 0 | Status: DISCONTINUED | OUTPATIENT
Start: 2017-08-07 | End: 2017-08-17

## 2017-08-07 RX ADMIN — HYDROMORPHONE HYDROCHLORIDE 30 MILLILITER(S): 2 INJECTION INTRAMUSCULAR; INTRAVENOUS; SUBCUTANEOUS at 22:11

## 2017-08-07 RX ADMIN — HYDROMORPHONE HYDROCHLORIDE 30 MILLILITER(S): 2 INJECTION INTRAMUSCULAR; INTRAVENOUS; SUBCUTANEOUS at 20:00

## 2017-08-07 RX ADMIN — HYDROMORPHONE HYDROCHLORIDE 0.25 MILLIGRAM(S): 2 INJECTION INTRAMUSCULAR; INTRAVENOUS; SUBCUTANEOUS at 17:02

## 2017-08-07 RX ADMIN — HYDROMORPHONE HYDROCHLORIDE 30 MILLILITER(S): 2 INJECTION INTRAMUSCULAR; INTRAVENOUS; SUBCUTANEOUS at 18:04

## 2017-08-07 RX ADMIN — Medication 100 MILLIGRAM(S): at 22:25

## 2017-08-07 RX ADMIN — SENNA PLUS 1 TABLET(S): 8.6 TABLET ORAL at 22:25

## 2017-08-07 RX ADMIN — HYDROMORPHONE HYDROCHLORIDE 30 MILLILITER(S): 2 INJECTION INTRAMUSCULAR; INTRAVENOUS; SUBCUTANEOUS at 20:47

## 2017-08-07 RX ADMIN — HYDROMORPHONE HYDROCHLORIDE 0.25 MILLIGRAM(S): 2 INJECTION INTRAMUSCULAR; INTRAVENOUS; SUBCUTANEOUS at 17:28

## 2017-08-07 RX ADMIN — HYDROMORPHONE HYDROCHLORIDE 0.25 MILLIGRAM(S): 2 INJECTION INTRAMUSCULAR; INTRAVENOUS; SUBCUTANEOUS at 17:15

## 2017-08-07 NOTE — PROGRESS NOTE ADULT - SUBJECTIVE AND OBJECTIVE BOX
MEDICINE, PROGRESS NOTE [phone]    DOROTHY SCHREIBER 77y [id number]    Patient seen and examined.  Patient is a 77y old  Female who presents with a chief complaint of Progressively worsening hip and back pain + weakness (31 Jul 2017 21:16)  Pt feels ok.    PAST MEDICAL & SURGICAL HISTORY:  High cholesterol  Hypertension  Arthritis  No significant past surgical history    MEDICATIONS  (STANDING):  HYDROmorphone PCA (1 mG/mL) 30 milliLiter(s) PCA Continuous PCA Continuous  lisinopril 10 milliGRAM(s) Oral daily  docusate sodium 100 milliGRAM(s) Oral three times a day  senna 1 Tablet(s) Oral at bedtime  sodium chloride 0.9% with potassium chloride 20 mEq/L 1000 milliLiter(s) (60 mL/Hr) IV Continuous <Continuous>  ceFAZolin   IVPB 2000 milliGRAM(s) IV Intermittent every 8 hours    MEDICATIONS  (PRN):  HYDROmorphone PCA (1 mG/mL) Rescue Clinician Bolus 0.25 milliGRAM(s) IV Push every 15 minutes PRN for Pain Scale GREATER THAN 6  naloxone Injectable 0.1 milliGRAM(s) IV Push every 3 minutes PRN For ANY of the following changes in patient status:  A. RR LESS THAN 10 breaths per minute, B. Oxygen saturation LESS THAN 90%, C. Sedation score of 6  ondansetron Injectable 4 milliGRAM(s) IV Push every 6 hours PRN Nausea  acetaminophen   Tablet 650 milliGRAM(s) Oral every 6 hours PRN For Temp greater than 38 C (100.4 F)  acetaminophen   Tablet. 650 milliGRAM(s) Oral every 6 hours PRN Mild Pain (1 - 3)  diazepam    Tablet 5 milliGRAM(s) Oral every 12 hours PRN muscle spasm    Allergies    No Known Allergies    Intolerances        PHYSICAL EXAM:  Constitutional: NAD  HEENT: Normocephalic, EOMI  Neck:  No JVD  Respiratory: CTA B/L, No wheezes  Cardiovascular: S1, S2, RRR, + systolic murmur  Gastrointestinal: BS+, soft, NT/ND  Extremities: No peripheral edema  Neurological: AAOX3, no focal deficits  Psychiatric: Normal mood, normal affect  : No Robb    Vital Signs Last 24 Hrs  T(C): 36.4 (07 Aug 2017 19:30), Max: 36.9 (07 Aug 2017 05:27)  T(F): 97.5 (07 Aug 2017 19:30), Max: 98.4 (07 Aug 2017 05:27)  HR: 98 (07 Aug 2017 19:30) (78 - 99)  BP: 998/46 (07 Aug 2017 19:30) (98/46 - 998/46)  BP(mean): 67 (07 Aug 2017 19:30) (67 - 84)  RR: 16 (07 Aug 2017 19:30) (13 - 18)  SpO2: 98% (07 Aug 2017 19:30) (95% - 100%)  I&O's Summary    06 Aug 2017 07:01  -  07 Aug 2017 07:00  --------------------------------------------------------  IN: 0 mL / OUT: 100 mL / NET: -100 mL    07 Aug 2017 07:01  -  07 Aug 2017 20:29  --------------------------------------------------------  IN: 300 mL / OUT: 255 mL / NET: 45 mL        LABS:                        10.5   14.2  )-----------( 276      ( 07 Aug 2017 16:22 )             29.8     08-07    133<L>  |  102  |  17  ----------------------------<  125<H>  4.5   |  19<L>  |  0.52    Ca    8.5      07 Aug 2017 16:22                REVIEW OF SYSTEMS:      RADIOLOGY & ADDITIONAL STUDIES:      ASSESSMENT/PLAN:

## 2017-08-07 NOTE — PROGRESS NOTE ADULT - SUBJECTIVE AND OBJECTIVE BOX
Patient seen and examined at bedside. Patient is now sp t10-l2 fusion. HVC, pca, arango.     T(C): 36.2 (08-07-17 @ 15:15), Max: 36.9 (08-07-17 @ 05:27)  HR: 97 (08-07-17 @ 17:00) (78 - 98)  BP: 98/46 (08-07-17 @ 17:00) (98/46 - 129/58)  RR: 15 (08-07-17 @ 17:00) (13 - 18)  SpO2: 97% (08-07-17 @ 17:00) (95% - 100%)  Wt(kg): --    Exam:  AOx3, FC, PERRL, EOMI, no facial   4/5 bilateral HF, otherwise 5/5 throughout, no drift  SILT  no clonus Patient seen and examined at bedside. Patient is now sp t10-l2 fusion. HVC, pca, arango.     T(C): 36.2 (08-07-17 @ 15:15), Max: 36.9 (08-07-17 @ 05:27)  HR: 97 (08-07-17 @ 17:00) (78 - 98)  BP: 98/46 (08-07-17 @ 17:00) (98/46 - 129/58)  RR: 15 (08-07-17 @ 17:00) (13 - 18)  SpO2: 97% (08-07-17 @ 17:00) (95% - 100%)  Wt(kg): --    PHYSICAL EXAM:    Constitutional: No Acute Distress     Neurological: AOx3, Following Commands    Motor exam:          Upper extremity                         Delt     Bicep     Tricep    HG                                                 R         5/5        5/5        5/5       5/5                                               L          5/5        5/5        5/5       5/5          Lower extremity                        HF         KF        KE       DF         PF                                                  R        4/5        5/5        5/5       5/5         5/5                                               L         4/5        5/5       5/5       5/5          5/5                                                 Sensation: [x] intact to light touch  [] decreased:     No clonus

## 2017-08-08 LAB
ANION GAP SERPL CALC-SCNC: 12 MMOL/L — SIGNIFICANT CHANGE UP (ref 5–17)
BASOPHILS # BLD AUTO: 0 K/UL — SIGNIFICANT CHANGE UP (ref 0–0.2)
BASOPHILS # BLD AUTO: 0.01 K/UL — SIGNIFICANT CHANGE UP (ref 0–0.2)
BASOPHILS NFR BLD AUTO: 0 % — SIGNIFICANT CHANGE UP (ref 0–2)
BASOPHILS NFR BLD AUTO: 0.1 % — SIGNIFICANT CHANGE UP (ref 0–2)
BUN SERPL-MCNC: 12 MG/DL — SIGNIFICANT CHANGE UP (ref 7–23)
CALCIUM SERPL-MCNC: 8 MG/DL — LOW (ref 8.4–10.5)
CHLORIDE SERPL-SCNC: 95 MMOL/L — LOW (ref 96–108)
CO2 SERPL-SCNC: 22 MMOL/L — SIGNIFICANT CHANGE UP (ref 22–31)
CREAT SERPL-MCNC: 0.58 MG/DL — SIGNIFICANT CHANGE UP (ref 0.5–1.3)
EOSINOPHIL # BLD AUTO: 0.05 K/UL — SIGNIFICANT CHANGE UP (ref 0–0.5)
EOSINOPHIL # BLD AUTO: 0.1 K/UL — SIGNIFICANT CHANGE UP (ref 0–0.5)
EOSINOPHIL NFR BLD AUTO: 0.3 % — SIGNIFICANT CHANGE UP (ref 0–6)
EOSINOPHIL NFR BLD AUTO: 0.5 % — SIGNIFICANT CHANGE UP (ref 0–6)
GLUCOSE SERPL-MCNC: 107 MG/DL — HIGH (ref 70–99)
HCT VFR BLD CALC: 25.8 % — LOW (ref 34.5–45)
HCT VFR BLD CALC: 27.1 % — LOW (ref 34.5–45)
HCT VFR BLD CALC: 28.5 % — LOW (ref 34.5–45)
HGB BLD-MCNC: 9.1 G/DL — LOW (ref 11.5–15.5)
HGB BLD-MCNC: 9.2 G/DL — LOW (ref 11.5–15.5)
HGB BLD-MCNC: 9.9 G/DL — LOW (ref 11.5–15.5)
IMM GRANULOCYTES NFR BLD AUTO: 0.6 % — SIGNIFICANT CHANGE UP (ref 0–1.5)
LYMPHOCYTES # BLD AUTO: 0.63 K/UL — LOW (ref 1–3.3)
LYMPHOCYTES # BLD AUTO: 1 K/UL — SIGNIFICANT CHANGE UP (ref 1–3.3)
LYMPHOCYTES # BLD AUTO: 5.7 % — LOW (ref 13–44)
LYMPHOCYTES # BLD AUTO: 5.8 % — LOW (ref 13–44)
MCHC RBC-ENTMCNC: 31.1 PG — SIGNIFICANT CHANGE UP (ref 27–34)
MCHC RBC-ENTMCNC: 31.7 PG — SIGNIFICANT CHANGE UP (ref 27–34)
MCHC RBC-ENTMCNC: 33.9 GM/DL — SIGNIFICANT CHANGE UP (ref 32–36)
MCHC RBC-ENTMCNC: 34 PG — SIGNIFICANT CHANGE UP (ref 27–34)
MCHC RBC-ENTMCNC: 34.7 GM/DL — SIGNIFICANT CHANGE UP (ref 32–36)
MCHC RBC-ENTMCNC: 35.3 GM/DL — SIGNIFICANT CHANGE UP (ref 32–36)
MCV RBC AUTO: 91.3 FL — SIGNIFICANT CHANGE UP (ref 80–100)
MCV RBC AUTO: 91.6 FL — SIGNIFICANT CHANGE UP (ref 80–100)
MCV RBC AUTO: 96.2 FL — SIGNIFICANT CHANGE UP (ref 80–100)
MONOCYTES # BLD AUTO: 0.68 K/UL — SIGNIFICANT CHANGE UP (ref 0–0.9)
MONOCYTES # BLD AUTO: 1.6 K/UL — HIGH (ref 0–0.9)
MONOCYTES NFR BLD AUTO: 6.1 % — SIGNIFICANT CHANGE UP (ref 2–14)
MONOCYTES NFR BLD AUTO: 9.1 % — SIGNIFICANT CHANGE UP (ref 2–14)
NEUTROPHILS # BLD AUTO: 14.6 K/UL — HIGH (ref 1.8–7.4)
NEUTROPHILS # BLD AUTO: 9.63 K/UL — HIGH (ref 1.8–7.4)
NEUTROPHILS NFR BLD AUTO: 84.6 % — HIGH (ref 43–77)
NEUTROPHILS NFR BLD AUTO: 87 % — HIGH (ref 43–77)
PLATELET # BLD AUTO: 258 K/UL — SIGNIFICANT CHANGE UP (ref 150–400)
PLATELET # BLD AUTO: 281 K/UL — SIGNIFICANT CHANGE UP (ref 150–400)
PLATELET # BLD AUTO: 282 K/UL — SIGNIFICANT CHANGE UP (ref 150–400)
POTASSIUM SERPL-MCNC: 5 MMOL/L — SIGNIFICANT CHANGE UP (ref 3.5–5.3)
POTASSIUM SERPL-SCNC: 5 MMOL/L — SIGNIFICANT CHANGE UP (ref 3.5–5.3)
RBC # BLD: 2.69 M/UL — LOW (ref 3.8–5.2)
RBC # BLD: 2.96 M/UL — LOW (ref 3.8–5.2)
RBC # BLD: 3.12 M/UL — LOW (ref 3.8–5.2)
RBC # FLD: 12.3 % — SIGNIFICANT CHANGE UP (ref 10.3–14.5)
RBC # FLD: 14.1 % — SIGNIFICANT CHANGE UP (ref 10.3–14.5)
RBC # FLD: 14.1 % — SIGNIFICANT CHANGE UP (ref 10.3–14.5)
SODIUM SERPL-SCNC: 129 MMOL/L — LOW (ref 135–145)
WBC # BLD: 10.96 K/UL — HIGH (ref 3.8–10.5)
WBC # BLD: 11.07 K/UL — HIGH (ref 3.8–10.5)
WBC # BLD: 17.2 K/UL — HIGH (ref 3.8–10.5)
WBC # FLD AUTO: 10.96 K/UL — HIGH (ref 3.8–10.5)
WBC # FLD AUTO: 11.07 K/UL — HIGH (ref 3.8–10.5)
WBC # FLD AUTO: 17.2 K/UL — HIGH (ref 3.8–10.5)

## 2017-08-08 RX ORDER — SODIUM CHLORIDE 9 MG/ML
500 INJECTION INTRAMUSCULAR; INTRAVENOUS; SUBCUTANEOUS ONCE
Qty: 0 | Refills: 0 | Status: COMPLETED | OUTPATIENT
Start: 2017-08-08 | End: 2017-08-08

## 2017-08-08 RX ADMIN — SENNA PLUS 1 TABLET(S): 8.6 TABLET ORAL at 22:39

## 2017-08-08 RX ADMIN — Medication 100 MILLIGRAM(S): at 05:28

## 2017-08-08 RX ADMIN — HYDROMORPHONE HYDROCHLORIDE 30 MILLILITER(S): 2 INJECTION INTRAMUSCULAR; INTRAVENOUS; SUBCUTANEOUS at 02:09

## 2017-08-08 RX ADMIN — HYDROMORPHONE HYDROCHLORIDE 30 MILLILITER(S): 2 INJECTION INTRAMUSCULAR; INTRAVENOUS; SUBCUTANEOUS at 23:09

## 2017-08-08 RX ADMIN — Medication 100 MILLIGRAM(S): at 13:51

## 2017-08-08 RX ADMIN — HYDROMORPHONE HYDROCHLORIDE 30 MILLILITER(S): 2 INJECTION INTRAMUSCULAR; INTRAVENOUS; SUBCUTANEOUS at 18:51

## 2017-08-08 RX ADMIN — Medication 100 MILLIGRAM(S): at 22:40

## 2017-08-08 RX ADMIN — LISINOPRIL 10 MILLIGRAM(S): 2.5 TABLET ORAL at 05:29

## 2017-08-08 RX ADMIN — HYDROMORPHONE HYDROCHLORIDE 30 MILLILITER(S): 2 INJECTION INTRAMUSCULAR; INTRAVENOUS; SUBCUTANEOUS at 06:07

## 2017-08-08 RX ADMIN — HYDROMORPHONE HYDROCHLORIDE 30 MILLILITER(S): 2 INJECTION INTRAMUSCULAR; INTRAVENOUS; SUBCUTANEOUS at 07:28

## 2017-08-08 RX ADMIN — Medication 100 MILLIGRAM(S): at 05:29

## 2017-08-08 RX ADMIN — HYDROMORPHONE HYDROCHLORIDE 30 MILLILITER(S): 2 INJECTION INTRAMUSCULAR; INTRAVENOUS; SUBCUTANEOUS at 13:54

## 2017-08-08 RX ADMIN — SODIUM CHLORIDE 3000 MILLILITER(S): 9 INJECTION INTRAMUSCULAR; INTRAVENOUS; SUBCUTANEOUS at 05:28

## 2017-08-08 RX ADMIN — Medication 100 MILLIGRAM(S): at 13:52

## 2017-08-08 NOTE — DIETITIAN INITIAL EVALUATION ADULT. - OTHER INFO
Nutrition assessment for length of stay. Pt reports stable wt around 156 pounds, denies significant changes in wt. Noted dosing wt of 171.9 pounds on 7/31 and 154.5 pounds on 8/2. Recommend confirming current wt as medically feasible. Pt reports fair po intake during admission, forgetful at times as to what she had to eat and the menu ordering procedure. RD obtained pt's dinner order to ensure pt ordered dinner. Pt denies the need for supplements. No nausea or vomiting at this time. Last bowel movement was yesterday. No chewing or swallowing difficulties at this time. No known food allergies.

## 2017-08-08 NOTE — DIETITIAN INITIAL EVALUATION ADULT. - ORAL INTAKE PTA
Pt reports having decreased appetite for awhile but reports fairly good po PTA. Pt reports taking MVI, calcium and fish oil PTA.

## 2017-08-08 NOTE — DIETITIAN INITIAL EVALUATION ADULT. - NUTRITION INTERVENTIONS
nutrient dense snacks/as per RN pt calls down meal selections with room service, RD obtained dinner order to confirm, pt declines health shakes or supplements at this time.

## 2017-08-08 NOTE — PHYSICAL THERAPY INITIAL EVALUATION ADULT - PERTINENT HX OF CURRENT PROBLEM, REHAB EVAL
77F with history of osteoarthritis with 2 months of back pain and 1 week of progressive difficulty walking due to weakness with couple instances of urinary incontinence. She has chronic bilateral hip pain which contributes to her lower extremity weakness. Denies history of falls, but has generalized weakness due to poor appetite and pain, CT Lspine shows severe T12 compression fracture with retropulsion and mild-moderate canal stenosis. Pt is now s/p of surgery mentioned above

## 2017-08-08 NOTE — DIETITIAN INITIAL EVALUATION ADULT. - NS AS NUTRI INTERV ED CONTENT
Encourage po intake with nutrient rich foods for surgical healing. Discussed avoiding canned soups and other high sodium foods.

## 2017-08-08 NOTE — DIETITIAN INITIAL EVALUATION ADULT. - ENERGY NEEDS
Ht: “, Wt: lbs, BMI: kg/m2, IBW: lbs (+/-10%), %IBW:  Pertinent Information: Pt presented with a chief complaint of Progressively worsening hip and back pain + weakness. Pt S/P thoracic spine fusion (8/7)   no edema or pressure injury Ht: 66.5“, Wt: 154.5 lbs- 8/2 (bed) also noted 171.9 lbs dosing, BMI: 24.6 kg/m2, IBW: 132.5 lbs (+/-10%), %IBW: 117%  Pertinent Information: Pt presented with a chief complaint of Progressively worsening hip and back pain + weakness. Pt S/P thoracic spine fusion (8/7)   no edema or pressure injury

## 2017-08-08 NOTE — DIETITIAN INITIAL EVALUATION ADULT. - ADHERENCE
Pt reports trying to limit salt and sugar in diet. Typical intake: cereal with milk and berries; soup for lunch; son who lives with her makes a big dinner (protein with mashed potatoes and vegetable or lasagne)

## 2017-08-08 NOTE — PHYSICAL THERAPY INITIAL EVALUATION ADULT - CRITERIA FOR SKILLED THERAPEUTIC INTERVENTIONS
risk reduction/prevention/impairments found/therapy frequency/functional limitations in following categories/anticipated discharge recommendation/anticipated equipment needs at discharge/rehab potential/predicted duration of therapy intervention

## 2017-08-08 NOTE — PROGRESS NOTE ADULT - SUBJECTIVE AND OBJECTIVE BOX
Day 1 of Anesthesia Pain Management Service    SUBJECTIVE: Patient is doing well with IV PCA    Pain Scale Score:	[X] Refer to charted pain scores    THERAPY:    [ ] IV PCA Morphine		[ ] 5 mg/mL	[ ] 1 mg/mL  [X] IV PCA Hydromorphone	[ ] 5 mg/mL	[X] 1 mg/mL  [ ] IV PCA Fentanyl		[ ] 50 micrograms/mL    Demand dose: 0.1 mg     Lockout: 6 minutes   Continuous Rate: 0 mg/hr  4 Hour Limit: 4 mg    MEDICATIONS  (STANDING):  HYDROmorphone PCA (1 mG/mL) 30 milliLiter(s) PCA Continuous PCA Continuous  lisinopril 10 milliGRAM(s) Oral daily  docusate sodium 100 milliGRAM(s) Oral three times a day  senna 1 Tablet(s) Oral at bedtime  sodium chloride 0.9% with potassium chloride 20 mEq/L 1000 milliLiter(s) (60 mL/Hr) IV Continuous <Continuous>  ceFAZolin   IVPB 2000 milliGRAM(s) IV Intermittent every 8 hours    MEDICATIONS  (PRN):  HYDROmorphone PCA (1 mG/mL) Rescue Clinician Bolus 0.25 milliGRAM(s) IV Push every 15 minutes PRN for Pain Scale GREATER THAN 6  naloxone Injectable 0.1 milliGRAM(s) IV Push every 3 minutes PRN For ANY of the following changes in patient status:  A. RR LESS THAN 10 breaths per minute, B. Oxygen saturation LESS THAN 90%, C. Sedation score of 6  ondansetron Injectable 4 milliGRAM(s) IV Push every 6 hours PRN Nausea  acetaminophen   Tablet 650 milliGRAM(s) Oral every 6 hours PRN For Temp greater than 38 C (100.4 F)  acetaminophen   Tablet. 650 milliGRAM(s) Oral every 6 hours PRN Mild Pain (1 - 3)  diazepam    Tablet 5 milliGRAM(s) Oral every 12 hours PRN muscle spasm      OBJECTIVE:    Sedation Score:	[ X] Alert	[ ] Drowsy 	[ ] Arousable	[ ] Asleep	[ ] Unresponsive    Side Effects:	[X ] None	[ ] Nausea	[ ] Vomiting	[ ] Pruritus  		[ ] Other:    Vital Signs Last 24 Hrs  T(C): 36.7 (08 Aug 2017 07:41), Max: 36.7 (08 Aug 2017 07:41)  T(F): 98.1 (08 Aug 2017 07:41), Max: 98.1 (08 Aug 2017 07:41)  HR: 111 (08 Aug 2017 07:41) (78 - 111)  BP: 104/64 (08 Aug 2017 07:41) (91/47 - 998/46)  BP(mean): 67 (07 Aug 2017 19:30) (67 - 84)  RR: 18 (08 Aug 2017 07:41) (13 - 18)  SpO2: 96% (08 Aug 2017 07:41) (16% - 100%)    ASSESSMENT/ PLAN    Therapy to  be:               [X] Continued   [ ] Discontinued   [ ] Changed to PRN Analgesics    Documentation and Verification of current medications:   [X] Done	[ ] Not done, not elligible    Comments: Consider PRN analgesics later today

## 2017-08-08 NOTE — DIETITIAN INITIAL EVALUATION ADULT. - NUTRITION INTERVENTION
Meals and Snack/Vitamin/Medical Food Supplements Medical Food Supplements/Nutrition Education/Vitamin/Meals and Snack

## 2017-08-08 NOTE — PROGRESS NOTE ADULT - SUBJECTIVE AND OBJECTIVE BOX
MEDICINE, PROGRESS NOTE [phone]    DOROTHY SCHREIBER 77y [id number]    Patient seen and examined.  Patient is a 77y old  Female who presents with a chief complaint of Progressively worsening hip and back pain + weakness (31 Jul 2017 21:16)  Pt feels ok with pain meds for now    PAST MEDICAL & SURGICAL HISTORY:  High cholesterol  Hypertension  Arthritis  No significant past surgical history    MEDICATIONS  (STANDING):  HYDROmorphone PCA (1 mG/mL) 30 milliLiter(s) PCA Continuous PCA Continuous  lisinopril 10 milliGRAM(s) Oral daily  docusate sodium 100 milliGRAM(s) Oral three times a day  senna 1 Tablet(s) Oral at bedtime  sodium chloride 0.9% with potassium chloride 20 mEq/L 1000 milliLiter(s) (60 mL/Hr) IV Continuous <Continuous>    MEDICATIONS  (PRN):  HYDROmorphone PCA (1 mG/mL) Rescue Clinician Bolus 0.25 milliGRAM(s) IV Push every 15 minutes PRN for Pain Scale GREATER THAN 6  naloxone Injectable 0.1 milliGRAM(s) IV Push every 3 minutes PRN For ANY of the following changes in patient status:  A. RR LESS THAN 10 breaths per minute, B. Oxygen saturation LESS THAN 90%, C. Sedation score of 6  ondansetron Injectable 4 milliGRAM(s) IV Push every 6 hours PRN Nausea  acetaminophen   Tablet 650 milliGRAM(s) Oral every 6 hours PRN For Temp greater than 38 C (100.4 F)  acetaminophen   Tablet. 650 milliGRAM(s) Oral every 6 hours PRN Mild Pain (1 - 3)  diazepam    Tablet 5 milliGRAM(s) Oral every 12 hours PRN muscle spasm    Allergies    No Known Allergies    Intolerances        PHYSICAL EXAM:  Constitutional: NAD  HEENT: Normocephalic, EOMI  Neck:  No JVD  Respiratory: CTA B/L, No wheezes  Cardiovascular: S1, S2, RRR, + systolic murmur  Gastrointestinal: BS+, soft, NT/ND  Extremities: No peripheral edema  Neurological: AAOX3, no focal deficits  Psychiatric: Normal mood, normal affect      Vital Signs Last 24 Hrs  T(C): 37.4 (08 Aug 2017 21:54), Max: 37.4 (08 Aug 2017 21:54)  T(F): 99.3 (08 Aug 2017 21:54), Max: 99.3 (08 Aug 2017 21:54)  HR: 108 (08 Aug 2017 22:04) (98 - 112)  BP: 92/55 (08 Aug 2017 22:04) (92/55 - 114/71)  BP(mean): --  RR: 18 (08 Aug 2017 21:54) (16 - 18)  SpO2: 98% (08 Aug 2017 21:54) (16% - 100%)  I&O's Summary    07 Aug 2017 07:01  -  08 Aug 2017 07:00  --------------------------------------------------------  IN: 550 mL / OUT: 1455 mL / NET: -905 mL    08 Aug 2017 07:01  -  08 Aug 2017 23:19  --------------------------------------------------------  IN: 890 mL / OUT: 1170 mL / NET: -280 mL        LABS:                        9.1    17.2  )-----------( 258      ( 08 Aug 2017 22:54 )             25.8     08-08    129<L>  |  95<L>  |  12  ----------------------------<  107<H>  5.0   |  22  |  0.58    Ca    8.0<L>      08 Aug 2017 07:45                REVIEW OF SYSTEMS:      RADIOLOGY & ADDITIONAL STUDIES:      ASSESSMENT/PLAN:

## 2017-08-08 NOTE — PHYSICAL THERAPY INITIAL EVALUATION ADULT - GAIT DEVIATIONS NOTED, PT EVAL
decreased velocity of limb motion/decreased weight-shifting ability/decreased stride length/increased time in double stance

## 2017-08-08 NOTE — PHYSICAL THERAPY INITIAL EVALUATION ADULT - ADDITIONAL COMMENTS
77 year old female who PTA was living in  with son and granchildren. in  reports 4 steps to enter + hand rail and 13-14 steps to 2nd floor bedroom. + bilat hand rails. PTA pt states she was walking house hold distances indepnedenly with a cane, able to take care of herself with ADL's and family helped out with IADL's.

## 2017-08-09 ENCOUNTER — TRANSCRIPTION ENCOUNTER (OUTPATIENT)
Age: 77
End: 2017-08-09

## 2017-08-09 LAB
ANION GAP SERPL CALC-SCNC: 13 MMOL/L — SIGNIFICANT CHANGE UP (ref 5–17)
BASOPHILS # BLD AUTO: 0.01 K/UL — SIGNIFICANT CHANGE UP (ref 0–0.2)
BASOPHILS NFR BLD AUTO: 0.1 % — SIGNIFICANT CHANGE UP (ref 0–2)
BUN SERPL-MCNC: 9 MG/DL — SIGNIFICANT CHANGE UP (ref 7–23)
CALCIUM SERPL-MCNC: 8.2 MG/DL — LOW (ref 8.4–10.5)
CHLORIDE SERPL-SCNC: 96 MMOL/L — SIGNIFICANT CHANGE UP (ref 96–108)
CO2 SERPL-SCNC: 22 MMOL/L — SIGNIFICANT CHANGE UP (ref 22–31)
CREAT SERPL-MCNC: 0.54 MG/DL — SIGNIFICANT CHANGE UP (ref 0.5–1.3)
EOSINOPHIL # BLD AUTO: 0.07 K/UL — SIGNIFICANT CHANGE UP (ref 0–0.5)
EOSINOPHIL NFR BLD AUTO: 0.5 % — SIGNIFICANT CHANGE UP (ref 0–6)
GLUCOSE SERPL-MCNC: 124 MG/DL — HIGH (ref 70–99)
HCT VFR BLD CALC: 26.3 % — LOW (ref 34.5–45)
HGB BLD-MCNC: 9.1 G/DL — LOW (ref 11.5–15.5)
IMM GRANULOCYTES NFR BLD AUTO: 0.7 % — SIGNIFICANT CHANGE UP (ref 0–1.5)
LYMPHOCYTES # BLD AUTO: 0.6 K/UL — LOW (ref 1–3.3)
LYMPHOCYTES # BLD AUTO: 3.9 % — LOW (ref 13–44)
MCHC RBC-ENTMCNC: 31.3 PG — SIGNIFICANT CHANGE UP (ref 27–34)
MCHC RBC-ENTMCNC: 34.6 GM/DL — SIGNIFICANT CHANGE UP (ref 32–36)
MCV RBC AUTO: 90.4 FL — SIGNIFICANT CHANGE UP (ref 80–100)
MONOCYTES # BLD AUTO: 1.28 K/UL — HIGH (ref 0–0.9)
MONOCYTES NFR BLD AUTO: 8.4 % — SIGNIFICANT CHANGE UP (ref 2–14)
NEUTROPHILS # BLD AUTO: 13.13 K/UL — HIGH (ref 1.8–7.4)
NEUTROPHILS NFR BLD AUTO: 86.4 % — HIGH (ref 43–77)
PLATELET # BLD AUTO: 274 K/UL — SIGNIFICANT CHANGE UP (ref 150–400)
POTASSIUM SERPL-MCNC: 3.8 MMOL/L — SIGNIFICANT CHANGE UP (ref 3.5–5.3)
POTASSIUM SERPL-SCNC: 3.8 MMOL/L — SIGNIFICANT CHANGE UP (ref 3.5–5.3)
RBC # BLD: 2.91 M/UL — LOW (ref 3.8–5.2)
RBC # FLD: 14 % — SIGNIFICANT CHANGE UP (ref 10.3–14.5)
SODIUM SERPL-SCNC: 131 MMOL/L — LOW (ref 135–145)
WBC # BLD: 15.19 K/UL — HIGH (ref 3.8–10.5)
WBC # FLD AUTO: 15.19 K/UL — HIGH (ref 3.8–10.5)

## 2017-08-09 RX ADMIN — HYDROMORPHONE HYDROCHLORIDE 30 MILLILITER(S): 2 INJECTION INTRAMUSCULAR; INTRAVENOUS; SUBCUTANEOUS at 02:46

## 2017-08-09 RX ADMIN — SENNA PLUS 1 TABLET(S): 8.6 TABLET ORAL at 22:03

## 2017-08-09 RX ADMIN — Medication 100 MILLIGRAM(S): at 13:49

## 2017-08-09 RX ADMIN — Medication 100 MILLIGRAM(S): at 06:33

## 2017-08-09 RX ADMIN — HYDROMORPHONE HYDROCHLORIDE 30 MILLILITER(S): 2 INJECTION INTRAMUSCULAR; INTRAVENOUS; SUBCUTANEOUS at 07:25

## 2017-08-09 RX ADMIN — Medication 100 MILLIGRAM(S): at 22:03

## 2017-08-09 NOTE — PROGRESS NOTE ADULT - SUBJECTIVE AND OBJECTIVE BOX
MEDICINE, PROGRESS NOTE [phone]    DOROTHY SCHREIBER 77y [id number]    Patient seen and examined.  Patient is a 77y old  Female who presents with a chief complaint of Progressively worsening hip and back pain + weakness (31 Jul 2017 21:16)  pt feels ok.    PAST MEDICAL & SURGICAL HISTORY:  High cholesterol  Hypertension  Arthritis  No significant past surgical history    MEDICATIONS  (STANDING):  lisinopril 10 milliGRAM(s) Oral daily  docusate sodium 100 milliGRAM(s) Oral three times a day  senna 1 Tablet(s) Oral at bedtime  sodium chloride 0.9% with potassium chloride 20 mEq/L 1000 milliLiter(s) (60 mL/Hr) IV Continuous <Continuous>    MEDICATIONS  (PRN):  acetaminophen   Tablet 650 milliGRAM(s) Oral every 6 hours PRN For Temp greater than 38 C (100.4 F)  acetaminophen   Tablet. 650 milliGRAM(s) Oral every 6 hours PRN Mild Pain (1 - 3)  diazepam    Tablet 5 milliGRAM(s) Oral every 12 hours PRN muscle spasm    Allergies    No Known Allergies    Intolerances        PHYSICAL EXAM:  Constitutional: NAD  HEENT: Normocephalic, EOMI  Neck:  No JVD  Respiratory: CTA B/L, No wheezes  Cardiovascular: S1, S2, RRR, + systolic murmur  Gastrointestinal: BS+, soft, NT/ND  Extremities: No peripheral edema  Neurological: AAOX3, no focal deficits  Psychiatric: Normal mood, normal affect  : No Robb    Vital Signs Last 24 Hrs  T(C): 36.7 (09 Aug 2017 16:21), Max: 37.4 (08 Aug 2017 21:54)  T(F): 98 (09 Aug 2017 16:21), Max: 99.3 (08 Aug 2017 21:54)  HR: 112 (09 Aug 2017 16:21) (102 - 118)  BP: 110/73 (09 Aug 2017 16:21) (92/49 - 110/73)  BP(mean): --  RR: 18 (09 Aug 2017 16:21) (18 - 18)  SpO2: 98% (09 Aug 2017 16:21) (95% - 98%)  I&O's Summary    08 Aug 2017 07:01  -  09 Aug 2017 07:00  --------------------------------------------------------  IN: 1860 mL / OUT: 2380 mL / NET: -520 mL    09 Aug 2017 07:01  -  09 Aug 2017 20:17  --------------------------------------------------------  IN: 360 mL / OUT: 655 mL / NET: -295 mL        LABS:                        9.1    15.19 )-----------( 274      ( 09 Aug 2017 10:42 )             26.3     08-09    131<L>  |  96  |  9   ----------------------------<  124<H>  3.8   |  22  |  0.54    Ca    8.2<L>      09 Aug 2017 10:42                REVIEW OF SYSTEMS:      RADIOLOGY & ADDITIONAL STUDIES:      ASSESSMENT/PLAN:

## 2017-08-09 NOTE — PROGRESS NOTE ADULT - SUBJECTIVE AND OBJECTIVE BOX
Day 2 of Anesthesia Pain Management Service    SUBJECTIVE: I didn't even realize I had the button    Pain Scale Score:	[X] Refer to charted pain scores    THERAPY:    [ ] IV PCA Morphine		[ ] 5 mg/mL	[ ] 1 mg/mL  [X] IV PCA Hydromorphone	[ ] 5 mg/mL	[X] 1 mg/mL  [ ] IV PCA Fentanyl		[ ] 50 micrograms/mL    Demand dose: 0.1 mg     Lockout: 6 minutes   Continuous Rate: 0 mg/hr  4 Hour Limit: 4 mg    MEDICATIONS  (STANDING):  lisinopril 10 milliGRAM(s) Oral daily  docusate sodium 100 milliGRAM(s) Oral three times a day  senna 1 Tablet(s) Oral at bedtime  sodium chloride 0.9% with potassium chloride 20 mEq/L 1000 milliLiter(s) (60 mL/Hr) IV Continuous <Continuous>    MEDICATIONS  (PRN):  acetaminophen   Tablet 650 milliGRAM(s) Oral every 6 hours PRN For Temp greater than 38 C (100.4 F)  acetaminophen   Tablet. 650 milliGRAM(s) Oral every 6 hours PRN Mild Pain (1 - 3)  diazepam    Tablet 5 milliGRAM(s) Oral every 12 hours PRN muscle spasm      OBJECTIVE:    Sedation Score:	[ X] Alert	[ ] Drowsy 	[ ] Arousable	[ ] Asleep	[ ] Unresponsive    Side Effects:	[X ] None	[ ] Nausea	[ ] Vomiting	[ ] Pruritus  		[ ] Other:    Vital Signs Last 24 Hrs  T(C): 36.9 (09 Aug 2017 08:00), Max: 37.4 (08 Aug 2017 21:54)  T(F): 98.4 (09 Aug 2017 08:00), Max: 99.3 (08 Aug 2017 21:54)  HR: 102 (09 Aug 2017 08:00) (98 - 118)  BP: 100/69 (09 Aug 2017 08:00) (92/49 - 100/69)  BP(mean): --  RR: 18 (09 Aug 2017 06:00) (18 - 18)  SpO2: 98% (09 Aug 2017 06:00) (96% - 100%)    ASSESSMENT/ PLAN    Therapy to  be:               [ ] Continued   [X ] Discontinued   [ ] Changed to PRN Analgesics    Documentation and Verification of current medications:   [X] Done	[ ] Not done, not elligible    Comments: Minimal use. D\C PCA change to PRN analgesics

## 2017-08-09 NOTE — PROGRESS NOTE ADULT - SUBJECTIVE AND OBJECTIVE BOX
Pain Management Attending Addendum    SUBJECTIVE:  no complaints    Therapy:	  [X ] IV PCA	   [ ] Epidural           [ ] s/p Spinal Opoid              [ ] Postpartum infusion	  [ ] Patient controlled regional anesthesia (PCRA)    [ ] prn Analgesics    OBJECTIVE:   [X ] No new signs     [ ] Other:    Side Effects:  [X ] None			[ ] Other:    Assessment of Catheter Site:		[ ] Intact		[ ] Other:    ASSESSMENT/PLAN  [X ] Continue current therapy    [ ] Therapy changed to:    [ ] IV PCA       [ ] Epidural     [ ] prn Analgesics     [ ] post partum infusion     no changes to current pain plan at this time

## 2017-08-09 NOTE — PROGRESS NOTE ADULT - SUBJECTIVE AND OBJECTIVE BOX
Patient seen and examined at bedside.    T(C): 36.7 (08-09-17 @ 07:49), Max: 36.7 (08-08-17 @ 17:21)  HR: 68 (08-09-17 @ 07:49) (64 - 71)  BP: 120/75 (08-09-17 @ 07:49) (110/68 - 120/75)  RR: 22 (08-09-17 @ 07:49) (16 - 22)  SpO2: 92% (08-09-17 @ 07:49) (92% - 93%)  Wt(kg): --    Exam:    Awake, alert, AOX3  BUE 5/5  BLE pain limited, at least 4/5  Incision clean/dry  HMV(70)

## 2017-08-10 LAB
ANION GAP SERPL CALC-SCNC: 12 MMOL/L — SIGNIFICANT CHANGE UP (ref 5–17)
BUN SERPL-MCNC: 12 MG/DL — SIGNIFICANT CHANGE UP (ref 7–23)
CALCIUM SERPL-MCNC: 8 MG/DL — LOW (ref 8.4–10.5)
CHLORIDE SERPL-SCNC: 100 MMOL/L — SIGNIFICANT CHANGE UP (ref 96–108)
CK MB BLD-MCNC: 1.8 % — SIGNIFICANT CHANGE UP (ref 0–3.5)
CK MB BLD-MCNC: 1.9 % — SIGNIFICANT CHANGE UP (ref 0–3.5)
CK MB CFR SERPL CALC: 1 NG/ML — SIGNIFICANT CHANGE UP (ref 0–3.8)
CK MB CFR SERPL CALC: 1 NG/ML — SIGNIFICANT CHANGE UP (ref 0–3.8)
CK SERPL-CCNC: 54 U/L — SIGNIFICANT CHANGE UP (ref 25–170)
CK SERPL-CCNC: 57 U/L — SIGNIFICANT CHANGE UP (ref 25–170)
CO2 SERPL-SCNC: 21 MMOL/L — LOW (ref 22–31)
CREAT SERPL-MCNC: 0.56 MG/DL — SIGNIFICANT CHANGE UP (ref 0.5–1.3)
GLUCOSE SERPL-MCNC: 134 MG/DL — HIGH (ref 70–99)
HCT VFR BLD CALC: 26.5 % — LOW (ref 34.5–45)
HGB BLD-MCNC: 9.1 G/DL — LOW (ref 11.5–15.5)
MCHC RBC-ENTMCNC: 30.8 PG — SIGNIFICANT CHANGE UP (ref 27–34)
MCHC RBC-ENTMCNC: 34.3 GM/DL — SIGNIFICANT CHANGE UP (ref 32–36)
MCV RBC AUTO: 89.8 FL — SIGNIFICANT CHANGE UP (ref 80–100)
PLATELET # BLD AUTO: 323 K/UL — SIGNIFICANT CHANGE UP (ref 150–400)
POTASSIUM SERPL-MCNC: 4.2 MMOL/L — SIGNIFICANT CHANGE UP (ref 3.5–5.3)
POTASSIUM SERPL-SCNC: 4.2 MMOL/L — SIGNIFICANT CHANGE UP (ref 3.5–5.3)
RBC # BLD: 2.95 M/UL — LOW (ref 3.8–5.2)
RBC # FLD: 14.1 % — SIGNIFICANT CHANGE UP (ref 10.3–14.5)
SODIUM SERPL-SCNC: 133 MMOL/L — LOW (ref 135–145)
T3 SERPL-MCNC: 68 NG/DL — LOW (ref 80–200)
T4 AB SER-ACNC: 5.6 UG/DL — SIGNIFICANT CHANGE UP (ref 4.6–12)
TROPONIN T SERPL-MCNC: <0.01 NG/ML — SIGNIFICANT CHANGE UP (ref 0–0.06)
TROPONIN T SERPL-MCNC: <0.01 NG/ML — SIGNIFICANT CHANGE UP (ref 0–0.06)
TSH SERPL-MCNC: 1.78 UIU/ML — SIGNIFICANT CHANGE UP (ref 0.27–4.2)
WBC # BLD: 15.25 K/UL — HIGH (ref 3.8–10.5)
WBC # FLD AUTO: 15.25 K/UL — HIGH (ref 3.8–10.5)

## 2017-08-10 PROCEDURE — 93010 ELECTROCARDIOGRAM REPORT: CPT

## 2017-08-10 RX ORDER — SODIUM CHLORIDE 9 MG/ML
250 INJECTION INTRAMUSCULAR; INTRAVENOUS; SUBCUTANEOUS ONCE
Qty: 0 | Refills: 0 | Status: COMPLETED | OUTPATIENT
Start: 2017-08-10 | End: 2017-08-10

## 2017-08-10 RX ORDER — DILTIAZEM HCL 120 MG
5 CAPSULE, EXT RELEASE 24 HR ORAL
Qty: 125 | Refills: 0 | Status: DISCONTINUED | OUTPATIENT
Start: 2017-08-10 | End: 2017-08-12

## 2017-08-10 RX ORDER — METOPROLOL TARTRATE 50 MG
5 TABLET ORAL ONCE
Qty: 0 | Refills: 0 | Status: COMPLETED | OUTPATIENT
Start: 2017-08-10 | End: 2017-08-10

## 2017-08-10 RX ADMIN — DEXTROSE MONOHYDRATE, SODIUM CHLORIDE, AND POTASSIUM CHLORIDE 60 MILLILITER(S): 50; .745; 4.5 INJECTION, SOLUTION INTRAVENOUS at 21:34

## 2017-08-10 RX ADMIN — DEXTROSE MONOHYDRATE, SODIUM CHLORIDE, AND POTASSIUM CHLORIDE 60 MILLILITER(S): 50; .745; 4.5 INJECTION, SOLUTION INTRAVENOUS at 04:55

## 2017-08-10 RX ADMIN — Medication 5 MG/HR: at 07:55

## 2017-08-10 RX ADMIN — Medication 100 MILLIGRAM(S): at 21:34

## 2017-08-10 RX ADMIN — Medication 5 MG/HR: at 21:34

## 2017-08-10 RX ADMIN — Medication 5 MILLIGRAM(S): at 06:37

## 2017-08-10 RX ADMIN — Medication 650 MILLIGRAM(S): at 15:30

## 2017-08-10 RX ADMIN — Medication 650 MILLIGRAM(S): at 16:54

## 2017-08-10 RX ADMIN — DEXTROSE MONOHYDRATE, SODIUM CHLORIDE, AND POTASSIUM CHLORIDE 60 MILLILITER(S): 50; .745; 4.5 INJECTION, SOLUTION INTRAVENOUS at 07:55

## 2017-08-10 RX ADMIN — Medication 5 MG/HR: at 04:53

## 2017-08-10 RX ADMIN — SODIUM CHLORIDE 500 MILLILITER(S): 9 INJECTION INTRAMUSCULAR; INTRAVENOUS; SUBCUTANEOUS at 07:02

## 2017-08-10 RX ADMIN — Medication 100 MILLIGRAM(S): at 04:55

## 2017-08-10 RX ADMIN — SENNA PLUS 1 TABLET(S): 8.6 TABLET ORAL at 21:33

## 2017-08-10 NOTE — CONSULT NOTE ADULT - SUBJECTIVE AND OBJECTIVE BOX
covering Dr Bales    CHIEF COMPLAINT: hip pain    HISTORY OF PRESENT ILLNESS:    77f hx HTN, HLD, osteoarthritis presents with 3 days of Right hip pain and generalized weakness limiting ambulation. Also notes progressively worsening lower back pain since few months. Was recently admitted to [facility] for weakness which was attributed to sepsis 2/2 UTI. No injury, trauma, falls, no focal weaknesses, normally sedentary and often bedridden due to pain and weakness. At baseline has poor appetite, no acute changes. No dysuria, flank pain, n/v/d, no bowel habit changes. No sick contacts or recent travel. No HA, nuchal rigidity. Denies chest pain dyspnea,no prior cardiac history.MRI revealing severe T12 compression deformity s/p spine surgery now post op day number 3 with episode of atrial fibrillation converted to sinus rhythm  She denies any chest pain, sob, palpitation, dizziness or syncope.     PAST MEDICAL & SURGICAL HISTORY:  High cholesterol  Hypertension  Arthritis  No significant past surgical history          MEDICATIONS:  diltiazem Infusion 5 mG/Hr IV Continuous <Continuous>        acetaminophen   Tablet 650 milliGRAM(s) Oral every 6 hours PRN  acetaminophen   Tablet. 650 milliGRAM(s) Oral every 6 hours PRN  diazepam    Tablet 5 milliGRAM(s) Oral every 12 hours PRN    docusate sodium 100 milliGRAM(s) Oral three times a day  senna 1 Tablet(s) Oral at bedtime      sodium chloride 0.9% with potassium chloride 20 mEq/L 1000 milliLiter(s) IV Continuous <Continuous>      FAMILY HISTORY:  No significant family history      Non-contributory    SOCIAL HISTORY:    No tobacco, drugs or etoh    Allergies    No Known Allergies    Intolerances    	    REVIEW OF SYSTEMS:  as above  The rest of the 14 points ROS reviewed and except above they are unremarkable.        PHYSICAL EXAM:  T(C): 36.3 (08-10-17 @ 20:29), Max: 37.2 (08-09-17 @ 21:54)  HR: 87 (08-10-17 @ 20:29) (85 - 130)  BP: 92/59 (08-10-17 @ 20:29) (82/40 - 108/74)  RR: 18 (08-10-17 @ 20:29) (18 - 18)  SpO2: 99% (08-10-17 @ 20:29) (97% - 99%)  Wt(kg): --  I&O's Summary    09 Aug 2017 07:01  -  10 Aug 2017 07:00  --------------------------------------------------------  IN: 600 mL / OUT: 670 mL / NET: -70 mL    10 Aug 2017 07:01  -  10 Aug 2017 21:25  --------------------------------------------------------  IN: 1140 mL / OUT: 200 mL / NET: 940 mL        Appearance: Normal	  HEENT:   Normal oral mucosa, PERRL, EOMI	  Cardiovascular: Normal S1 S2,    Murmur:   Neck: JVP normal  Respiratory: Lungs clear to auscultation  Gastrointestinal:  Soft, Non-tender, + BS	  Skin: normal   Neuro: No gross deficits.   Psychiatry:  Mood & affect appropriate  Ext: No edema    TELEMETRY: 	  afib converted to sinus   ECG:  	  RADIOLOGY:  OTHER: 	  	  LABS:	 	    CARDIAC MARKERS:  Troponin T, Serum: <0.01 ng/mL (08-10 @ 16:13)  Troponin T, Serum: <0.01 ng/mL (08-10 @ 14:46)                                  9.1    15.25 )-----------( 323      ( 10 Aug 2017 07:01 )             26.5     08-10    133<L>  |  100  |  12  ----------------------------<  134<H>  4.2   |  21<L>  |  0.56    Ca    8.0<L>      10 Aug 2017 07:01      proBNP:   Lipid Profile:   HgA1c:   TSH:       CHIEF COMPLAINT:    HISTORY OF PRESENT ILLNESS:    PAST MEDICAL & SURGICAL HISTORY:  High cholesterol  Hypertension  Arthritis  No significant past surgical history          MEDICATIONS:  diltiazem Infusion 5 mG/Hr IV Continuous <Continuous>        acetaminophen   Tablet 650 milliGRAM(s) Oral every 6 hours PRN  acetaminophen   Tablet. 650 milliGRAM(s) Oral every 6 hours PRN  diazepam    Tablet 5 milliGRAM(s) Oral every 12 hours PRN    docusate sodium 100 milliGRAM(s) Oral three times a day  senna 1 Tablet(s) Oral at bedtime      sodium chloride 0.9% with potassium chloride 20 mEq/L 1000 milliLiter(s) IV Continuous <Continuous>      FAMILY HISTORY:  No significant family history      Non-contributory    SOCIAL HISTORY:    No tobacco, drugs or etoh    Allergies    No Known Allergies    Intolerances    	    REVIEW OF SYSTEMS:  as above  The rest of the 14 points ROS reviewed and except above they are unremarkable.        PHYSICAL EXAM:  T(C): 36.3 (08-10-17 @ 20:29), Max: 37.2 (08-09-17 @ 21:54)  HR: 87 (08-10-17 @ 20:29) (85 - 130)  BP: 92/59 (08-10-17 @ 20:29) (82/40 - 108/74)  RR: 18 (08-10-17 @ 20:29) (18 - 18)  SpO2: 99% (08-10-17 @ 20:29) (97% - 99%)  Wt(kg): --  I&O's Summary    09 Aug 2017 07:01  -  10 Aug 2017 07:00  --------------------------------------------------------  IN: 600 mL / OUT: 670 mL / NET: -70 mL    10 Aug 2017 07:01  -  10 Aug 2017 21:25  --------------------------------------------------------  IN: 1140 mL / OUT: 200 mL / NET: 940 mL        Appearance: Normal	  HEENT:   Normal oral mucosa, PERRL, EOMI	  Cardiovascular: Normal S1 S2,    Murmur:   Neck: JVP normal  Respiratory: Lungs clear to auscultation  Gastrointestinal:  Soft, Non-tender, + BS	  Skin: normal   Neuro: No gross deficits.   Psychiatry:  Mood & affect appropriate  Ext: No edema    TELEMETRY: 	    ECG:  	  RADIOLOGY:  OTHER: 	  	  LABS:	 	    CARDIAC MARKERS:  Troponin T, Serum: <0.01 ng/mL (08-10 @ 16:13)  Troponin T, Serum: <0.01 ng/mL (08-10 @ 14:46)                                  9.1    15.25 )-----------( 323      ( 10 Aug 2017 07:01 )             26.5     08-10    133<L>  |  100  |  12  ----------------------------<  134<H>  4.2   |  21<L>  |  0.56    Ca    8.0<L>      10 Aug 2017 07:01      proBNP:   Lipid Profile:   HgA1c:   TSH:

## 2017-08-10 NOTE — CONSULT NOTE ADULT - ASSESSMENT
Afib  converted to sinus rhythm  change cardizem to PO at 30 q 8  DC losartan given low BP  check TSH, Check 2d echo  The calculated [id number] score is 3 however given recent spine surgery and risk of spinal hematoma the risk of bleed outweighs benefit  if in future if afib recurs, then a.c is highly recommended.     HTN  BP is on low side  will hold of to losartan  may need midodrine if she develops orthostatic hypotension   check orthostatic vitals

## 2017-08-10 NOTE — PROGRESS NOTE ADULT - SUBJECTIVE AND OBJECTIVE BOX
MEDICINE, PROGRESS NOTE [phone]    DOROTHY SCHREIBER 77y [id number]    Patient seen and examined.  Patient is a 77y old  Female who presents with a chief complaint of Progressively worsening hip and back pain + weakness (31 Jul 2017 21:16)  Pt feels ok and has no current complaints.    PAST MEDICAL & SURGICAL HISTORY:  High cholesterol  Hypertension  Arthritis  No significant past surgical history    MEDICATIONS  (STANDING):  lisinopril 10 milliGRAM(s) Oral daily  docusate sodium 100 milliGRAM(s) Oral three times a day  senna 1 Tablet(s) Oral at bedtime  sodium chloride 0.9% with potassium chloride 20 mEq/L 1000 milliLiter(s) (60 mL/Hr) IV Continuous <Continuous>  diltiazem Infusion 5 mG/Hr (5 mL/Hr) IV Continuous <Continuous>    MEDICATIONS  (PRN):  acetaminophen   Tablet 650 milliGRAM(s) Oral every 6 hours PRN For Temp greater than 38 C (100.4 F)  acetaminophen   Tablet. 650 milliGRAM(s) Oral every 6 hours PRN Mild Pain (1 - 3)  diazepam    Tablet 5 milliGRAM(s) Oral every 12 hours PRN muscle spasm    Allergies    No Known Allergies    Intolerances        PHYSICAL EXAM:  Constitutional: NAD  HEENT: Normocephalic, EOMI  Neck:  No JVD  Respiratory: CTA B/L, No wheezes  Cardiovascular: S1, S2, RRR, + systolic murmur  Gastrointestinal: BS+, soft, NT/ND  Extremities: No peripheral edema  Neurological: AAOX3, no focal deficits  Psychiatric: Normal mood, normal affect  : No Robb    Vital Signs Last 24 Hrs  T(C): 36.9 (10 Aug 2017 14:08), Max: 37.2 (09 Aug 2017 21:54)  T(F): 98.4 (10 Aug 2017 14:08), Max: 98.9 (09 Aug 2017 21:54)  HR: 94 (10 Aug 2017 15:58) (85 - 130)  BP: 96/64 (10 Aug 2017 15:58) (82/40 - 110/73)  BP(mean): --  RR: 18 (10 Aug 2017 15:58) (18 - 18)  SpO2: 97% (10 Aug 2017 15:58) (97% - 98%)  I&O's Summary    09 Aug 2017 07:01  -  10 Aug 2017 07:00  --------------------------------------------------------  IN: 600 mL / OUT: 670 mL / NET: -70 mL    10 Aug 2017 07:01  -  10 Aug 2017 16:08  --------------------------------------------------------  IN: 780 mL / OUT: 0 mL / NET: 780 mL        LABS:                        9.1    15.25 )-----------( 323      ( 10 Aug 2017 07:01 )             26.5     08-10    133<L>  |  100  |  12  ----------------------------<  134<H>  4.2   |  21<L>  |  0.56    Ca    8.0<L>      10 Aug 2017 07:01      CARDIAC MARKERS ( 10 Aug 2017 14:46 )  x     / <0.01 ng/mL / 57 U/L / x     / 1.0 ng/mL            REVIEW OF SYSTEMS:      RADIOLOGY & ADDITIONAL STUDIES:      ASSESSMENT/PLAN:

## 2017-08-10 NOTE — PROGRESS NOTE ADULT - SUBJECTIVE AND OBJECTIVE BOX
Patient seen and examined at bedside.    T(C): 36.9 (08-10-17 @ 00:44), Max: 37.2 (08-09-17 @ 21:54)  HR: 112 (08-10-17 @ 05:54) (100 - 130)  BP: 107/63 (08-10-17 @ 05:54) (94/56 - 110/73)  RR: 18 (08-10-17 @ 00:44) (18 - 18)  SpO2: 97% (08-10-17 @ 00:44) (95% - 98%)  Wt(kg): --    Exam:    Awake, alert, AOX3  BUE 5/5  BLE pain limited, at least 4/5  Incision clean/dry  HMV(70)

## 2017-08-10 NOTE — PROVIDER CONTACT NOTE (OTHER) - ASSESSMENT
Patient A/Ox4, no complaints of headache, SOB, chest pain or palpitations. BP 82/40 HR 85 rr18 98% on RA. Afib on tele, patient just received metoprolol 5mg IVP.

## 2017-08-11 LAB
ANION GAP SERPL CALC-SCNC: 13 MMOL/L — SIGNIFICANT CHANGE UP (ref 5–17)
BUN SERPL-MCNC: 11 MG/DL — SIGNIFICANT CHANGE UP (ref 7–23)
CALCIUM SERPL-MCNC: 8 MG/DL — LOW (ref 8.4–10.5)
CHLORIDE SERPL-SCNC: 101 MMOL/L — SIGNIFICANT CHANGE UP (ref 96–108)
CK MB CFR SERPL CALC: 1 NG/ML — SIGNIFICANT CHANGE UP (ref 0–3.8)
CK SERPL-CCNC: 52 U/L — SIGNIFICANT CHANGE UP (ref 25–170)
CO2 SERPL-SCNC: 19 MMOL/L — LOW (ref 22–31)
CREAT SERPL-MCNC: 0.58 MG/DL — SIGNIFICANT CHANGE UP (ref 0.5–1.3)
GLUCOSE SERPL-MCNC: 131 MG/DL — HIGH (ref 70–99)
HCT VFR BLD CALC: 26.3 % — LOW (ref 34.5–45)
HGB BLD-MCNC: 9 G/DL — LOW (ref 11.5–15.5)
MCHC RBC-ENTMCNC: 31.6 PG — SIGNIFICANT CHANGE UP (ref 27–34)
MCHC RBC-ENTMCNC: 34.2 GM/DL — SIGNIFICANT CHANGE UP (ref 32–36)
MCV RBC AUTO: 92.3 FL — SIGNIFICANT CHANGE UP (ref 80–100)
PLATELET # BLD AUTO: 330 K/UL — SIGNIFICANT CHANGE UP (ref 150–400)
POTASSIUM SERPL-MCNC: 5 MMOL/L — SIGNIFICANT CHANGE UP (ref 3.5–5.3)
POTASSIUM SERPL-SCNC: 5 MMOL/L — SIGNIFICANT CHANGE UP (ref 3.5–5.3)
RBC # BLD: 2.85 M/UL — LOW (ref 3.8–5.2)
RBC # FLD: 14 % — SIGNIFICANT CHANGE UP (ref 10.3–14.5)
SODIUM SERPL-SCNC: 133 MMOL/L — LOW (ref 135–145)
TROPONIN T SERPL-MCNC: <0.01 NG/ML — SIGNIFICANT CHANGE UP (ref 0–0.06)
WBC # BLD: 14.79 K/UL — HIGH (ref 3.8–10.5)
WBC # FLD AUTO: 14.79 K/UL — HIGH (ref 3.8–10.5)

## 2017-08-11 PROCEDURE — 93306 TTE W/DOPPLER COMPLETE: CPT | Mod: 26

## 2017-08-11 PROCEDURE — 99223 1ST HOSP IP/OBS HIGH 75: CPT | Mod: GC

## 2017-08-11 RX ADMIN — Medication 5 MG/HR: at 10:26

## 2017-08-11 RX ADMIN — Medication 100 MILLIGRAM(S): at 05:50

## 2017-08-11 RX ADMIN — Medication 100 MILLIGRAM(S): at 13:40

## 2017-08-11 RX ADMIN — DEXTROSE MONOHYDRATE, SODIUM CHLORIDE, AND POTASSIUM CHLORIDE 60 MILLILITER(S): 50; .745; 4.5 INJECTION, SOLUTION INTRAVENOUS at 10:26

## 2017-08-11 RX ADMIN — Medication 5 MILLIGRAM(S): at 19:37

## 2017-08-11 RX ADMIN — Medication 10 MILLIGRAM(S): at 10:26

## 2017-08-11 NOTE — CONSULT NOTE ADULT - CONSULT REASON
Atrial fibrillation
Cardiac preop evaluation
T12 compression fracture
s/p compression fracture surgery
afib

## 2017-08-11 NOTE — PROVIDER CONTACT NOTE (OTHER) - ASSESSMENT
Pt AOx4. No c/o pain/discomfort. Pt is asymptomatic. Temp is 98.5, BP is 103/65, Respirations are 18 and O2 saturation is 95 % on room air. Pt HR went upp to 178, pt is not sustaining, HR on cardiac monitor is 109. Pt AOx4. No c/o pain/discomfort. Pt is asymptomatic. Temp is 98.5, BP is 103/65, Respirations are 18 and O2 saturation is 95 % on room air. Pt HR went up to 178, pt is not sustaining, HR on cardiac monitor is 109.

## 2017-08-11 NOTE — PROGRESS NOTE ADULT - SUBJECTIVE AND OBJECTIVE BOX
Subjective: Patient seen and examined. No new events except as noted.     SUBJECTIVE/ROS:        MEDICATIONS:  MEDICATIONS  (STANDING):  docusate sodium 100 milliGRAM(s) Oral three times a day  senna 1 Tablet(s) Oral at bedtime  sodium chloride 0.9% with potassium chloride 20 mEq/L 1000 milliLiter(s) (60 mL/Hr) IV Continuous <Continuous>  diltiazem Infusion 5 mG/Hr (5 mL/Hr) IV Continuous <Continuous>      PHYSICAL EXAM:  T(C): 36.9 (08-11-17 @ 04:18), Max: 36.9 (08-10-17 @ 14:08)  HR: 109 (08-11-17 @ 04:25) (87 - 178)  BP: 103/65 (08-11-17 @ 04:18) (92/59 - 103/65)  RR: 18 (08-11-17 @ 04:18) (18 - 18)  SpO2: 95% (08-11-17 @ 04:18) (95% - 99%)  Wt(kg): --  I&O's Summary    10 Aug 2017 07:01  -  11 Aug 2017 07:00  --------------------------------------------------------  IN: 2160 mL / OUT: 450 mL / NET: 1710 mL          Appearance: Normal	  HEENT:   Normal oral mucosa, PERRL, EOMI	  Cardiovascular: Normal S1 S2,    Murmur:   Neck: JVP normal  Respiratory: Lungs clear to auscultation  Gastrointestinal:  Soft, Non-tender, + BS	  Skin: normal   Neuro: No gross deficits.   Psychiatry:  Mood & affect appropriate  Ext: No edema        LABS:    CARDIAC MARKERS:  CARDIAC MARKERS ( 11 Aug 2017 00:09 )  x     / <0.01 ng/mL / 52 U/L / x     / 1.0 ng/mL  CARDIAC MARKERS ( 10 Aug 2017 16:13 )  x     / <0.01 ng/mL / 54 U/L / x     / 1.0 ng/mL  CARDIAC MARKERS ( 10 Aug 2017 14:46 )  x     / <0.01 ng/mL / 57 U/L / x     / 1.0 ng/mL                                9.1    15.25 )-----------( 323      ( 10 Aug 2017 07:01 )             26.5     08-11    133<L>  |  101  |  11  ----------------------------<  131<H>  5.0   |  19<L>  |  0.58    Ca    8.0<L>      11 Aug 2017 07:12      proBNP:   Lipid Profile:   HgA1c:   TSH: Thyroid Stimulating Hormone, Serum: 1.78 uIU/mL (08-10 @ 17:59)            TELEMETRY: 	    ECG:  	  RADIOLOGY:   DIAGNOSTIC TESTING:  Echocardiogram:  Catheterization:  Stress Test:    OTHER:

## 2017-08-11 NOTE — PROGRESS NOTE ADULT - SUBJECTIVE AND OBJECTIVE BOX
MEDICINE, PROGRESS NOTE [phone]    DOROTHY SCHREIBER 77y [id number]    Patient seen and examined.  Patient is a 77y old  Female who presents with a chief complaint of Progressively worsening hip and back pain + weakness (31 Jul 2017 21:16)  pt has no new complaints.    PAST MEDICAL & SURGICAL HISTORY:  High cholesterol  Hypertension  Arthritis  No significant past surgical history    MEDICATIONS  (STANDING):  docusate sodium 100 milliGRAM(s) Oral three times a day  senna 1 Tablet(s) Oral at bedtime  sodium chloride 0.9% with potassium chloride 20 mEq/L 1000 milliLiter(s) (60 mL/Hr) IV Continuous <Continuous>  diltiazem Infusion 5 mG/Hr (5 mL/Hr) IV Continuous <Continuous>    MEDICATIONS  (PRN):  acetaminophen   Tablet 650 milliGRAM(s) Oral every 6 hours PRN For Temp greater than 38 C (100.4 F)  acetaminophen   Tablet. 650 milliGRAM(s) Oral every 6 hours PRN Mild Pain (1 - 3)  diazepam    Tablet 5 milliGRAM(s) Oral every 12 hours PRN muscle spasm    Allergies    No Known Allergies    Intolerances        PHYSICAL EXAM:  Constitutional: NAD  HEENT: Normocephalic, EOMI  Neck:  No JVD  Respiratory: CTA B/L, No wheezes  Cardiovascular: S1, S2, iRRR, + systolic murmur  Gastrointestinal: BS+, soft, NT/ND  Extremities: No peripheral edema  Neurological: AAOX3, no focal deficits  Psychiatric: Normal mood, normal affect  : No Robb    Vital Signs Last 24 Hrs  T(C): 36.5 (11 Aug 2017 14:08), Max: 36.9 (11 Aug 2017 04:18)  T(F): 97.7 (11 Aug 2017 14:08), Max: 98.5 (11 Aug 2017 04:18)  HR: 107 (11 Aug 2017 14:08) (87 - 178)  BP: 97/59 (11 Aug 2017 14:08) (92/59 - 103/65)  BP(mean): --  RR: 18 (11 Aug 2017 14:08) (18 - 18)  SpO2: 95% (11 Aug 2017 14:08) (95% - 99%)  I&O's Summary    10 Aug 2017 07:01  -  11 Aug 2017 07:00  --------------------------------------------------------  IN: 2160 mL / OUT: 450 mL / NET: 1710 mL    11 Aug 2017 07:01  -  11 Aug 2017 14:44  --------------------------------------------------------  IN: 900 mL / OUT: 0 mL / NET: 900 mL        LABS:                        9.0    14.79 )-----------( 330      ( 11 Aug 2017 07:12 )             26.3     08-11    133<L>  |  101  |  11  ----------------------------<  131<H>  5.0   |  19<L>  |  0.58    Ca    8.0<L>      11 Aug 2017 07:12      CARDIAC MARKERS ( 11 Aug 2017 00:09 )  x     / <0.01 ng/mL / 52 U/L / x     / 1.0 ng/mL  CARDIAC MARKERS ( 10 Aug 2017 16:13 )  x     / <0.01 ng/mL / 54 U/L / x     / 1.0 ng/mL  CARDIAC MARKERS ( 10 Aug 2017 14:46 )  x     / <0.01 ng/mL / 57 U/L / x     / 1.0 ng/mL            REVIEW OF SYSTEMS:      RADIOLOGY & ADDITIONAL STUDIES:      ASSESSMENT/PLAN:

## 2017-08-11 NOTE — CONSULT NOTE ADULT - SUBJECTIVE AND OBJECTIVE BOX
Date of Admission: 2017     Patient is a 77y old  Female who presents with a chief complaint of Progressively worsening hip and back pain + weakness (2017 21:16)      HISTORY OF PRESENT ILLNESS:     76yo woman w/PMHx HTN, HLD, and OA p/w R. hip pain and difficulty ambulating found to have subacute to chronic T12 compression fx s/p T10-L2 posterior spinal fusion  now EP consulted for new onset afib management.         Allergies    No Known Allergies    Intolerances    	    MEDICATIONS:  diltiazem Infusion 5 mG/Hr IV Continuous <Continuous>        acetaminophen   Tablet 650 milliGRAM(s) Oral every 6 hours PRN  acetaminophen   Tablet. 650 milliGRAM(s) Oral every 6 hours PRN  diazepam    Tablet 5 milliGRAM(s) Oral every 12 hours PRN    docusate sodium 100 milliGRAM(s) Oral three times a day  senna 1 Tablet(s) Oral at bedtime      sodium chloride 0.9% with potassium chloride 20 mEq/L 1000 milliLiter(s) IV Continuous <Continuous>      PAST MEDICAL & SURGICAL HISTORY:  High cholesterol  Hypertension  Arthritis  No significant past surgical history      FAMILY HISTORY:  No significant family history      SOCIAL HISTORY:    Nonsmoker; No alcohol use; Lives at home with son and grandson      REVIEW OF SYSTEMS:  See HPI. Otherwise, 10 point ROS done and otherwise negative.    PHYSICAL EXAM:  T(C): 36.9 (17 @ 04:18), Max: 36.9 (08-10-17 @ 14:08)  HR: 109 (17 @ 04:25) (87 - 178)  BP: 103/65 (17 @ 04:18) (92/59 - 103/65)  RR: 18 (17 @ 04:18) (18 - 18)  SpO2: 95% (17 @ 04:18) (95% - 99%)  Wt(kg): --  I&O's Summary    10 Aug 2017 07:  -  11 Aug 2017 07:00  --------------------------------------------------------  IN: 2160 mL / OUT: 450 mL / NET: 1710 mL    11 Aug 2017 07:01  -  11 Aug 2017 09:59  --------------------------------------------------------  IN: 480 mL / OUT: 0 mL / NET: 480 mL        Appearance: Normal	  HEENT:   Normal oral mucosa, PERRL, EOMI	  Lymphatic: No lymphadenopathy  Cardiovascular: Normal S1 S2, No JVD, No murmurs, No edema  Respiratory: Lungs clear to auscultation	  Psychiatry: A & O x 3, Mood & affect appropriate  Gastrointestinal:  Soft, Non-tender, + BS	  Skin: No rashes, No ecchymoses, No cyanosis	  Neurologic: Non-focal  Extremities: Normal range of motion, No clubbing, cyanosis or edema  Vascular: Peripheral pulses palpable 2+ bilaterally        LABS:	 	    CBC Full  -  ( 11 Aug 2017 07:12 )  WBC Count : 14.79 K/uL  Hemoglobin : 9.0 g/dL  Hematocrit : 26.3 %  Platelet Count - Automated : 330 K/uL  Mean Cell Volume : 92.3 fl  Mean Cell Hemoglobin : 31.6 pg  Mean Cell Hemoglobin Concentration : 34.2 gm/dL        133<L>  |  101  |  11  ----------------------------<  131<H>  5.0   |  19<L>  |  0.58  08-10    133<L>  |  100  |  12  ----------------------------<  134<H>  4.2   |  21<L>  |  0.56    Ca    8.0<L>      11 Aug 2017 07:12  Ca    8.0<L>      10 Aug 2017 07:01      TSH: Thyroid Stimulating Hormone, Serum: 1.78 uIU/mL (08-10 @ 17:59)      TELEMETRY: 	  Reviewed Afib 105-115 with PVCs/trigeminy     EC17 NSR HR 90    	  ASSESSMENT/PLAN: 	  77F w/HTN, HLD, and OA p/w R. hip pain/difficulty ambulating found to have subacute to chronic T12 compression fx s/p T10-L2 posterior spinal fusion  now EP consulted for new onset afib management.     #Atrial Fibrillation; CHADVASC 4 (Age, gender and HTN) HAS-BLED 2; Additionally with recent spinal surgery. TSH wnl. Leukocytosis may be related to recent sx. Remains afebrile. No localizing signs/symptoms of infection.   - Continue to monitor telemetry   - TTE pending   - Would titrate to dilt PO rather than gtt when possible   - AC held per cards at this time given recent spinal sx and risk of bleed. Would benefit in the future from AC.  - Will continue to follow    - Will d/w Dr. Rajput and f/u with primary team Date of Admission: 2017     Patient is a 77y old  Female who presents with a chief complaint of Progressively worsening hip and back pain + weakness (2017 21:16)      HISTORY OF PRESENT ILLNESS:     76yo woman w/PMHx HTN, HLD, and OA p/w R. hip pain and difficulty ambulating found to have subacute to chronic T12 compression fx s/p T10-L2 posterior spinal fusion  now EP consulted for new onset afib management. She currently reports feeling well and denies any symptoms of palpitations, lightheadedness, SOB, or CP. She denies any hx of afib.     Allergies    No Known Allergies    Intolerances      MEDICATIONS:  diltiazem Infusion 5 mG/Hr IV Continuous <Continuous>        acetaminophen   Tablet 650 milliGRAM(s) Oral every 6 hours PRN  acetaminophen   Tablet. 650 milliGRAM(s) Oral every 6 hours PRN  diazepam    Tablet 5 milliGRAM(s) Oral every 12 hours PRN    docusate sodium 100 milliGRAM(s) Oral three times a day  senna 1 Tablet(s) Oral at bedtime      sodium chloride 0.9% with potassium chloride 20 mEq/L 1000 milliLiter(s) IV Continuous <Continuous>      PAST MEDICAL & SURGICAL HISTORY:  High cholesterol  Hypertension  Arthritis  No significant past surgical history      FAMILY HISTORY:  No significant family history      SOCIAL HISTORY:    Nonsmoker; No alcohol use; Lives at home with son and grandson      REVIEW OF SYSTEMS:  See HPI. Otherwise, 10 point ROS done and otherwise negative.    PHYSICAL EXAM:  T(C): 36.9 (17 @ 04:18), Max: 36.9 (08-10-17 @ 14:08)  HR: 109 (17 @ 04:25) (87 - 178)  BP: 103/65 (17 @ 04:18) (92/59 - 103/65)  RR: 18 (17 @ 04:18) (18 - 18)  SpO2: 95% (17 @ 04:18) (95% - 99%)  Wt(kg): --  I&O's Summary    10 Aug 2017 07:01  -  11 Aug 2017 07:00  --------------------------------------------------------  IN: 2160 mL / OUT: 450 mL / NET: 1710 mL    11 Aug 2017 07:01  -  11 Aug 2017 09:59  --------------------------------------------------------  IN: 480 mL / OUT: 0 mL / NET: 480 mL      Appearance: Pleasant elderly woman resting in bed on her L side  HEENT:   Normal oral mucosa, OP clear  Lymphatic: No lymphadenopathy  Cardiovascular: Normal S1 S2, No JVD, No murmurs, No edema  Respiratory: Lungs clear to auscultation	  Psychiatry: A & O x 3, Mood & affect appropriate  Gastrointestinal:  Soft, Non-tender, + BS	  Skin: No rashes, No ecchymoses, No cyanosis	  Neurologic: Non-focal  Extremities: Normal range of motion, No clubbing, cyanosis or edema  Vascular: Peripheral pulses palpable 2+ bilaterally    LABS:	 	    CBC Full  -  ( 11 Aug 2017 07:12 )  WBC Count : 14.79 K/uL  Hemoglobin : 9.0 g/dL  Hematocrit : 26.3 %  Platelet Count - Automated : 330 K/uL  Mean Cell Volume : 92.3 fl  Mean Cell Hemoglobin : 31.6 pg  Mean Cell Hemoglobin Concentration : 34.2 gm/dL        133<L>  |  101  |  11  ----------------------------<  131<H>  5.0   |  19<L>  |  0.58  08-10    133<L>  |  100  |  12  ----------------------------<  134<H>  4.2   |  21<L>  |  0.56    Ca    8.0<L>      11 Aug 2017 07:12  Ca    8.0<L>      10 Aug 2017 07:01      TSH: Thyroid Stimulating Hormone, Serum: 1.78 uIU/mL (08-10 @ 17:59)      TELEMETRY: 	  Reviewed Afib 105-115 with PVCs/trigeminy     EC17 NSR HR 90    	  ASSESSMENT/PLAN: 	  77F w/HTN, HLD, and OA p/w R. hip pain/difficulty ambulating found to have subacute to chronic T12 compression fx s/p T10-L2 posterior spinal fusion  now EP consulted for new onset afib management.     #Atrial Fibrillation; CHADVASC 4 (Age, gender and HTN) HAS-BLED 2; Additionally with recent spinal surgery. TSH wnl. Leukocytosis may be acute inflammatory response related to recent sx. Remains afebrile. No localizing signs/symptoms of infection.   - Continue to monitor telemetry   - TTE pending   - Would titrate to dilt PO rather than gtt when possible; If unable to rate control with dilt, renal function wnl and could add dig.   - AC held per cards at this time given recent spinal sx and risk of bleed. Would benefit in the future from AC.  - Will continue to follow    - Will d/w Dr. Rajput and f/u with primary team Date of Admission: 2017     Patient is a 77y old  Female who presents with a chief complaint of Progressively worsening hip and back pain + weakness (2017 21:16)      HISTORY OF PRESENT ILLNESS:     76yo woman w/PMHx HTN, HLD, and OA p/w R. hip pain and difficulty ambulating found to have subacute to chronic T12 compression fx s/p T10-L2 posterior spinal fusion  now EP consulted for new onset afib management. She currently reports feeling well and denies any symptoms of palpitations, lightheadedness, SOB, or CP. She denies any hx of afib.     Allergies    No Known Allergies    Intolerances      MEDICATIONS:  diltiazem Infusion 5 mG/Hr IV Continuous <Continuous>        acetaminophen   Tablet 650 milliGRAM(s) Oral every 6 hours PRN  acetaminophen   Tablet. 650 milliGRAM(s) Oral every 6 hours PRN  diazepam    Tablet 5 milliGRAM(s) Oral every 12 hours PRN    docusate sodium 100 milliGRAM(s) Oral three times a day  senna 1 Tablet(s) Oral at bedtime      sodium chloride 0.9% with potassium chloride 20 mEq/L 1000 milliLiter(s) IV Continuous <Continuous>      PAST MEDICAL & SURGICAL HISTORY:  High cholesterol  Hypertension  Arthritis  No significant past surgical history      FAMILY HISTORY:  No significant family history      SOCIAL HISTORY:    Nonsmoker; No alcohol use; Lives at home with son and grandson      REVIEW OF SYSTEMS:  See HPI. Otherwise, 10 point ROS done and otherwise negative.    PHYSICAL EXAM:  T(C): 36.9 (17 @ 04:18), Max: 36.9 (08-10-17 @ 14:08)  HR: 109 (17 @ 04:25) (87 - 178)  BP: 103/65 (17 @ 04:18) (92/59 - 103/65)  RR: 18 (17 @ 04:18) (18 - 18)  SpO2: 95% (17 @ 04:18) (95% - 99%)  Wt(kg): --  I&O's Summary    10 Aug 2017 07:01  -  11 Aug 2017 07:00  --------------------------------------------------------  IN: 2160 mL / OUT: 450 mL / NET: 1710 mL    11 Aug 2017 07:01  -  11 Aug 2017 09:59  --------------------------------------------------------  IN: 480 mL / OUT: 0 mL / NET: 480 mL      Appearance: Pleasant elderly woman resting in bed on her L side  HEENT:   Normal oral mucosa, OP clear  Lymphatic: No lymphadenopathy  Cardiovascular: Normal S1 S2, No JVD, No murmurs, No edema  Respiratory: Lungs clear to auscultation	  Psychiatry: A & O x 3, Mood & affect appropriate  Gastrointestinal:  Soft, Non-tender, + BS	  Skin: No rashes, No ecchymoses, No cyanosis	  Neurologic: Non-focal  Extremities: Normal range of motion, No clubbing, cyanosis or edema  Vascular: Peripheral pulses palpable 2+ bilaterally    LABS:	 	    CBC Full  -  ( 11 Aug 2017 07:12 )  WBC Count : 14.79 K/uL  Hemoglobin : 9.0 g/dL  Hematocrit : 26.3 %  Platelet Count - Automated : 330 K/uL  Mean Cell Volume : 92.3 fl  Mean Cell Hemoglobin : 31.6 pg  Mean Cell Hemoglobin Concentration : 34.2 gm/dL        133<L>  |  101  |  11  ----------------------------<  131<H>  5.0   |  19<L>  |  0.58  08-10    133<L>  |  100  |  12  ----------------------------<  134<H>  4.2   |  21<L>  |  0.56    Ca    8.0<L>      11 Aug 2017 07:12  Ca    8.0<L>      10 Aug 2017 07:01      TSH: Thyroid Stimulating Hormone, Serum: 1.78 uIU/mL (08-10 @ 17:59)      TELEMETRY: 	  Reviewed Afib 105-115 with PVCs/trigeminy     EC17 NSR HR 90    	  ASSESSMENT/PLAN: 	  77F w/HTN, HLD, and OA p/w R. hip pain/difficulty ambulating found to have subacute to chronic T12 compression fx s/p T10-L2 posterior spinal fusion  now EP consulted for new onset afib management.     #Atrial Fibrillation; CHADVASC 4 (Age, gender and HTN) HAS-BLED 2; Additionally with recent spinal surgery. TSH wnl. Leukocytosis may be acute inflammatory response related to recent sx. Remains afebrile. No localizing signs/symptoms of infection.   - Continue to monitor telemetry   - Switch to PO Diltiazem 30mg q6; Titrate as needed    --TTE with normal LV function, mildly dilated LA   - AC held per cards at this time given recent spinal sx and risk of bleed. Would benefit in the future from AC.  - Will continue to follow    - Will d/w Dr. Rajput and f/u with primary team  - Patient seen and examined with Dr. Rajput

## 2017-08-11 NOTE — CONSULT NOTE ADULT - SUBJECTIVE AND OBJECTIVE BOX
Admitting Diagnosis:  Arthralgia of hip (M25.559): PAIN IN UNSPECIFIED HIP      HPI:  This is a 77y F with PMH of  HTN, HLD, osteoarthritis a/w rhip pain and generalized weakness found to have T12 compression fracture s/p posterior fusion.  Currently pt denies any weakness, numbness or incontinences.  She does note that she feels her stool is very loose, but she is on stool softeners. She has been able to get OOB to chair but not tried walking yet.  Also of note, pt had episodes of Afib with RVR post op.  She denies any other new neurologic deficits.     In ED  ICU Vital Signs Last 24 Hrs  T(C): 36.7 (31 Jul 2017 20:55), Max: 37.1 (31 Jul 2017 15:13)  T(F): 98 (31 Jul 2017 20:55), Max: 98.7 (31 Jul 2017 15:13)  HR: 83 (31 Jul 2017 20:55) (83 - 94)  BP: 130/68 (31 Jul 2017 20:55) (99/75 - 130/68)  BP(mean): --  ABP: --  ABP(mean): --  RR: 18 (31 Jul 2017 20:55) (17 - 18)  SpO2: 98% (31 Jul 2017 20:55) (98% - 98%) (31 Jul 2017 21:16)  ******    Past Medical History:  High cholesterol (E78.00)  Hypertension (I10)  Arthritis (M19.90)      Past Surgical History:  No significant past surgical history ([id number])      Social History:  No toxic habits    Family History:  FAMILY HISTORY:  No significant family history      Allergies:  No Known Allergies      ROS:  Constitutional: Patient offers no complaints of fevers or significant weight loss  Ears, Nose, Mouth and Throat: The patient presents with no abnormalities of the head, ears, eyes, nose or throat  Skin: Patient offers no concerns of new rashes or lesions  Respiratory: The patient presents with no abnormalities of the respiratory tract  Cardiovascular: The patient presents with no cardiac abnormalities  Gastrointestinal: The patient presents with no abnormalities of the GI system  Genitourinary: The patient presents with no dysuria, hematuria or frequent urination  Neurological: See HPI  Endocrine: Patient offers no complaints of excessive thirst, urination, or heat/cold intolerance    Advanced care planning reviewed and noted in the chart.    Medications:  docusate sodium 100 milliGRAM(s) Oral three times a day  senna 1 Tablet(s) Oral at bedtime  sodium chloride 0.9% with potassium chloride 20 mEq/L 1000 milliLiter(s) IV Continuous <Continuous>  acetaminophen   Tablet 650 milliGRAM(s) Oral every 6 hours PRN  acetaminophen   Tablet. 650 milliGRAM(s) Oral every 6 hours PRN  diazepam    Tablet 5 milliGRAM(s) Oral every 12 hours PRN  diltiazem Infusion 5 mG/Hr IV Continuous <Continuous>      Labs:  CBC Full  -  ( 11 Aug 2017 07:12 )  WBC Count : 14.79 K/uL  Hemoglobin : 9.0 g/dL  Hematocrit : 26.3 %  Platelet Count - Automated : 330 K/uL  Mean Cell Volume : 92.3 fl  Mean Cell Hemoglobin : 31.6 pg  Mean Cell Hemoglobin Concentration : 34.2 gm/dL  Auto Neutrophil # : x  Auto Lymphocyte # : x  Auto Monocyte # : x  Auto Eosinophil # : x  Auto Basophil # : x  Auto Neutrophil % : x  Auto Lymphocyte % : x  Auto Monocyte % : x  Auto Eosinophil % : x  Auto Basophil % : x    08-11    133<L>  |  101  |  11  ----------------------------<  131<H>  5.0   |  19<L>  |  0.58    Ca    8.0<L>      11 Aug 2017 07:12      CAPILLARY BLOOD GLUCOSE                Vitals:  Vital Signs Last 24 Hrs  T(C): 36.5 (11 Aug 2017 14:08), Max: 36.9 (11 Aug 2017 04:18)  T(F): 97.7 (11 Aug 2017 14:08), Max: 98.5 (11 Aug 2017 04:18)  HR: 107 (11 Aug 2017 14:08) (87 - 178)  BP: 97/59 (11 Aug 2017 14:08) (92/59 - 103/65)  BP(mean): --  RR: 18 (11 Aug 2017 14:08) (18 - 18)  SpO2: 95% (11 Aug 2017 14:08) (95% - 99%)    NEUROLOGICAL EXAM:    Mental status: Awake, alert, and in no apparent distress. Oriented to person, place and time. Language function is normal. Recent memory, digit span and concentration were normal.     Cranial Nerves: Pupils were equal, round, reactive to light. Extraocular movements were intact. Visual field were full. Fundoscopic exam was deferred. Facial sensation was intact to light touch. There was no facial asymmetry. The palate was upgoing symmetrically and tongue was midline. Hearing acuity was intact to finger rub AU. Shoulder shrug was full bilaterally    Motor exam: Bulk and tone were normal. Somewhat pain limited but with persistant effect strength was 5/5 in all four extremities. Fine finger movements were symmetric and normal. There was no pronator drift    Reflexes: 2+ in the bilateral upper extremities. 2+ in the bilateral lower extremities. Toes weremute.     Sensation: Intact to light touch.     Coordination: Finger-nose-finger and heel-to-shin was without dysmetria.     Gait deferred.

## 2017-08-11 NOTE — CONSULT NOTE ADULT - ASSESSMENT
77y F with PMH of  HTN, HLD, osteoarthritis a/w rhip pain and generalized weakness found to have T12 compression fracture s/p posterior fusion.    -Thought gait exam was deferred by pt, she appear so have full strength in her LE and rest of neuro exam appears intact  -Pain regimen appears sufficient.    -Would decrease dose or frequency of stool softener given pt c/o excessive loose stool.  If persists despite adjustment of dose, would consider checking stool studies to r/o infectious etiology  -neuro checks  -OOB to chair   -PT/OT  -Cardiology eval for Afib  -D/W pt and family

## 2017-08-12 LAB
ANION GAP SERPL CALC-SCNC: 13 MMOL/L — SIGNIFICANT CHANGE UP (ref 5–17)
BUN SERPL-MCNC: 7 MG/DL — SIGNIFICANT CHANGE UP (ref 7–23)
CALCIUM SERPL-MCNC: 8.6 MG/DL — SIGNIFICANT CHANGE UP (ref 8.4–10.5)
CHLORIDE SERPL-SCNC: 102 MMOL/L — SIGNIFICANT CHANGE UP (ref 96–108)
CO2 SERPL-SCNC: 20 MMOL/L — LOW (ref 22–31)
CREAT SERPL-MCNC: 0.53 MG/DL — SIGNIFICANT CHANGE UP (ref 0.5–1.3)
GLUCOSE SERPL-MCNC: 122 MG/DL — HIGH (ref 70–99)
POTASSIUM SERPL-MCNC: 4.8 MMOL/L — SIGNIFICANT CHANGE UP (ref 3.5–5.3)
POTASSIUM SERPL-SCNC: 4.8 MMOL/L — SIGNIFICANT CHANGE UP (ref 3.5–5.3)
SODIUM SERPL-SCNC: 135 MMOL/L — SIGNIFICANT CHANGE UP (ref 135–145)

## 2017-08-12 RX ORDER — DIGOXIN 250 MCG
0.25 TABLET ORAL EVERY 6 HOURS
Qty: 0 | Refills: 0 | Status: DISCONTINUED | OUTPATIENT
Start: 2017-08-12 | End: 2017-08-17

## 2017-08-12 RX ORDER — DIGOXIN 250 MCG
0.12 TABLET ORAL DAILY
Qty: 0 | Refills: 0 | Status: DISCONTINUED | OUTPATIENT
Start: 2017-08-13 | End: 2017-08-17

## 2017-08-12 RX ORDER — DIGOXIN 250 MCG
0.5 TABLET ORAL ONCE
Qty: 0 | Refills: 0 | Status: COMPLETED | OUTPATIENT
Start: 2017-08-12 | End: 2017-08-12

## 2017-08-12 RX ADMIN — Medication 100 MILLIGRAM(S): at 14:01

## 2017-08-12 RX ADMIN — Medication 650 MILLIGRAM(S): at 09:13

## 2017-08-12 RX ADMIN — SENNA PLUS 1 TABLET(S): 8.6 TABLET ORAL at 21:49

## 2017-08-12 RX ADMIN — Medication 5 MILLIGRAM(S): at 09:43

## 2017-08-12 RX ADMIN — DEXTROSE MONOHYDRATE, SODIUM CHLORIDE, AND POTASSIUM CHLORIDE 60 MILLILITER(S): 50; .745; 4.5 INJECTION, SOLUTION INTRAVENOUS at 09:14

## 2017-08-12 RX ADMIN — Medication 0.5 MILLIGRAM(S): at 11:35

## 2017-08-12 RX ADMIN — Medication 5 MG/HR: at 09:14

## 2017-08-12 RX ADMIN — Medication 5 MILLIGRAM(S): at 22:37

## 2017-08-12 RX ADMIN — Medication 100 MILLIGRAM(S): at 05:52

## 2017-08-12 RX ADMIN — Medication 650 MILLIGRAM(S): at 09:43

## 2017-08-12 RX ADMIN — Medication 100 MILLIGRAM(S): at 21:49

## 2017-08-12 NOTE — PROGRESS NOTE ADULT - SUBJECTIVE AND OBJECTIVE BOX
MEDICINE, PROGRESS NOTE [phone]    DOROTHY SCHREIBER 77y [id number]    Patient seen and examined.  Patient is a 77y old  Female who presents with a chief complaint of Progressively worsening hip and back pain + weakness (31 Jul 2017 21:16)  Pt feels ok.    PAST MEDICAL & SURGICAL HISTORY:  High cholesterol  Hypertension  Arthritis  No significant past surgical history    MEDICATIONS  (STANDING):  docusate sodium 100 milliGRAM(s) Oral three times a day  senna 1 Tablet(s) Oral at bedtime  sodium chloride 0.9% with potassium chloride 20 mEq/L 1000 milliLiter(s) (60 mL/Hr) IV Continuous <Continuous>  diltiazem Infusion 5 mG/Hr (5 mL/Hr) IV Continuous <Continuous>  digoxin  Injectable 0.25 milliGRAM(s) IV Push every 6 hours    MEDICATIONS  (PRN):  acetaminophen   Tablet 650 milliGRAM(s) Oral every 6 hours PRN For Temp greater than 38 C (100.4 F)  acetaminophen   Tablet. 650 milliGRAM(s) Oral every 6 hours PRN Mild Pain (1 - 3)  diazepam    Tablet 5 milliGRAM(s) Oral every 12 hours PRN muscle spasm    Allergies    No Known Allergies    Intolerances        PHYSICAL EXAM:  Constitutional: NAD  HEENT: Normocephalic, EOMI  Neck:  No JVD  Respiratory: CTA B/L, No wheezes  Cardiovascular: S1, S2, iRRR, + systolic murmur  Gastrointestinal: BS+, soft, NT/ND  Extremities: No peripheral edema  Neurological: AAOX3, no focal deficits  Psychiatric: Normal mood, normal affect  : No Robb    Vital Signs Last 24 Hrs  T(C): 36.4 (12 Aug 2017 13:02), Max: 36.9 (11 Aug 2017 20:47)  T(F): 97.6 (12 Aug 2017 13:02), Max: 98.5 (11 Aug 2017 20:47)  HR: 99 (12 Aug 2017 13:02) (90 - 123)  BP: 114/63 (12 Aug 2017 13:02) (95/50 - 114/63)  BP(mean): --  RR: 18 (12 Aug 2017 13:02) (18 - 18)  SpO2: 98% (12 Aug 2017 13:02) (94% - 99%)  I&O's Summary    11 Aug 2017 07:01  -  12 Aug 2017 07:00  --------------------------------------------------------  IN: 1980 mL / OUT: 0 mL / NET: 1980 mL    12 Aug 2017 07:01  -  12 Aug 2017 15:33  --------------------------------------------------------  IN: 720 mL / OUT: 300 mL / NET: 420 mL        LABS:                        9.0    14.79 )-----------( 330      ( 11 Aug 2017 07:12 )             26.3     08-12    135  |  102  |  7   ----------------------------<  122<H>  4.8   |  20<L>  |  0.53    Ca    8.6      12 Aug 2017 08:48      CARDIAC MARKERS ( 11 Aug 2017 00:09 )  x     / <0.01 ng/mL / 52 U/L / x     / 1.0 ng/mL  CARDIAC MARKERS ( 10 Aug 2017 16:13 )  x     / <0.01 ng/mL / 54 U/L / x     / 1.0 ng/mL            REVIEW OF SYSTEMS:      RADIOLOGY & ADDITIONAL STUDIES:      ASSESSMENT/PLAN:

## 2017-08-12 NOTE — PROGRESS NOTE ADULT - SUBJECTIVE AND OBJECTIVE BOX
HPI:  77f hx HTN, HLD, osteoarthritis presents with 3 days of R hip pain and generalized weakness limiting ambulation. Had to be carried to and from her room this morning due to weakness and pain. Also notes progressively worsening lower back pain x months. Was recently admitted to [facility] for weakness which was attributed to sepsis 2/2 UTI. No injury, trauma, falls, no focal weaknesses, normally sedentary and often bedridden due to pain and weakness. At baseline has poor appetite, no acute changes. No dysuria, flank pain, n/v/d, no bowel habit changes. No sick contacts or recent travel. No HA, nuchal rigidity. ROS otherwise negative.    Interval HPI:  Denies CP/SOB. Still with runs of DILLAN, specially when she is moved around from bed      Review Of Systems:  [X] 10 point review of systems is otherwise negative except as mentioned above    Medications:  docusate sodium 100 milliGRAM(s) Oral three times a day  senna 1 Tablet(s) Oral at bedtime  sodium chloride 0.9% with potassium chloride 20 mEq/L 1000 milliLiter(s) IV Continuous <Continuous>  acetaminophen   Tablet 650 milliGRAM(s) Oral every 6 hours PRN  acetaminophen   Tablet. 650 milliGRAM(s) Oral every 6 hours PRN  diazepam    Tablet 5 milliGRAM(s) Oral every 12 hours PRN  diltiazem Infusion 5 mG/Hr IV Continuous <Continuous>  digoxin  Injectable 0.25 milliGRAM(s) IV Push every 6 hours    PMH/PSH/FH/SH: [ ] Unchanged  Vitals:  T(C): 36.4 (08-12-17 @ 13:02), Max: 36.9 (08-11-17 @ 20:47)  HR: 99 (08-12-17 @ 13:02) (90 - 123)  BP: 114/63 (08-12-17 @ 13:02) (95/50 - 114/63)  BP(mean): --  RR: 18 (08-12-17 @ 13:02) (18 - 18)  SpO2: 98% (08-12-17 @ 13:02) (94% - 99%)  Wt(kg): --  Daily     Daily   I&O's Summary    11 Aug 2017 07:01  -  12 Aug 2017 07:00  --------------------------------------------------------  IN: 1980 mL / OUT: 0 mL / NET: 1980 mL    12 Aug 2017 07:01  -  12 Aug 2017 13:07  --------------------------------------------------------  IN: 720 mL / OUT: 300 mL / NET: 420 mL        Physical Exam:  Appearance: Normal	  HEENT:   Normal oral mucosa, PERRL, EOMI	  Lymphatic: No lymphadenopathy  Cardiovascular: Normal S1 S2, No JVD, No murmurs, No edema  Respiratory: Lungs clear to auscultation	  Psychiatry: A & O x 3, Mood & affect appropriate  Gastrointestinal:  Soft, Non-tender, + BS	  Skin: No rashes, No ecchymoses, No cyanosis	  Neurologic: Non-focal  Extremities: Normal range of motion, No clubbing, cyanosis or edema  Vascular: Peripheral pulses palpable 2+ bilaterally      08-12    135  |  102  |  7   ----------------------------<  122<H>  4.8   |  20<L>  |  0.53    Ca    8.6      12 Aug 2017 08:48      Interpretation of Telemetry: AF (HR = 90-140s)

## 2017-08-12 NOTE — PROGRESS NOTE ADULT - SUBJECTIVE AND OBJECTIVE BOX
Subjective: Patient seen and examined. No new events except as noted.     SUBJECTIVE/ROS:    No chest pain, or sob.   MEDICATIONS:  MEDICATIONS  (STANDING):  docusate sodium 100 milliGRAM(s) Oral three times a day  senna 1 Tablet(s) Oral at bedtime  sodium chloride 0.9% with potassium chloride 20 mEq/L 1000 milliLiter(s) (60 mL/Hr) IV Continuous <Continuous>  diltiazem Infusion 5 mG/Hr (5 mL/Hr) IV Continuous <Continuous>  digoxin  Injectable 0.25 milliGRAM(s) IV Push every 6 hours      PHYSICAL EXAM:  T(C): 36.7 (08-12-17 @ 05:01), Max: 36.9 (08-11-17 @ 20:47)  HR: 123 (08-12-17 @ 11:12) (90 - 123)  BP: 98/64 (08-12-17 @ 11:12) (95/50 - 111/57)  RR: 18 (08-12-17 @ 10:33) (18 - 18)  SpO2: 94% (08-12-17 @ 11:12) (94% - 99%)  Wt(kg): --  I&O's Summary    11 Aug 2017 07:01  -  12 Aug 2017 07:00  --------------------------------------------------------  IN: 1980 mL / OUT: 0 mL / NET: 1980 mL    12 Aug 2017 07:01  -  12 Aug 2017 12:28  --------------------------------------------------------  IN: 480 mL / OUT: 300 mL / NET: 180 mL          Appearance: Normal	  HEENT:   Normal oral mucosa, PERRL, EOMI	  Cardiovascular: Normal S1 S2,    Murmur:   Neck: JVP normal  Respiratory: Lungs clear to auscultation  Gastrointestinal:  Soft, Non-tender, + BS	  Skin: normal   Neuro: No gross deficits.   Psychiatry:  Mood & affect appropriate  Ext: No edema        LABS:    CARDIAC MARKERS:  CARDIAC MARKERS ( 11 Aug 2017 00:09 )  x     / <0.01 ng/mL / 52 U/L / x     / 1.0 ng/mL  CARDIAC MARKERS ( 10 Aug 2017 16:13 )  x     / <0.01 ng/mL / 54 U/L / x     / 1.0 ng/mL  CARDIAC MARKERS ( 10 Aug 2017 14:46 )  x     / <0.01 ng/mL / 57 U/L / x     / 1.0 ng/mL                                9.0    14.79 )-----------( 330      ( 11 Aug 2017 07:12 )             26.3     08-12    135  |  102  |  7   ----------------------------<  122<H>  4.8   |  20<L>  |  0.53    Ca    8.6      12 Aug 2017 08:48      proBNP:   Lipid Profile:   HgA1c:   TSH:           TELEMETRY: 	    ECG:  	  RADIOLOGY:   DIAGNOSTIC TESTING:  Echocardiogram:  Catheterization:  Stress Test:    OTHER:

## 2017-08-13 LAB
ANION GAP SERPL CALC-SCNC: 12 MMOL/L — SIGNIFICANT CHANGE UP (ref 5–17)
BUN SERPL-MCNC: 9 MG/DL — SIGNIFICANT CHANGE UP (ref 7–23)
CALCIUM SERPL-MCNC: 8.5 MG/DL — SIGNIFICANT CHANGE UP (ref 8.4–10.5)
CHLORIDE SERPL-SCNC: 103 MMOL/L — SIGNIFICANT CHANGE UP (ref 96–108)
CO2 SERPL-SCNC: 19 MMOL/L — LOW (ref 22–31)
CREAT SERPL-MCNC: 0.59 MG/DL — SIGNIFICANT CHANGE UP (ref 0.5–1.3)
GLUCOSE SERPL-MCNC: 112 MG/DL — HIGH (ref 70–99)
HCT VFR BLD CALC: 26.7 % — LOW (ref 34.5–45)
HCT VFR BLD CALC: 27.4 % — LOW (ref 34.5–45)
HGB BLD-MCNC: 9 G/DL — LOW (ref 11.5–15.5)
HGB BLD-MCNC: 9.2 G/DL — LOW (ref 11.5–15.5)
MCHC RBC-ENTMCNC: 30.7 PG — SIGNIFICANT CHANGE UP (ref 27–34)
MCHC RBC-ENTMCNC: 31.8 PG — SIGNIFICANT CHANGE UP (ref 27–34)
MCHC RBC-ENTMCNC: 32.8 GM/DL — SIGNIFICANT CHANGE UP (ref 32–36)
MCHC RBC-ENTMCNC: 34.5 GM/DL — SIGNIFICANT CHANGE UP (ref 32–36)
MCV RBC AUTO: 92.4 FL — SIGNIFICANT CHANGE UP (ref 80–100)
MCV RBC AUTO: 93.5 FL — SIGNIFICANT CHANGE UP (ref 80–100)
PLATELET # BLD AUTO: 390 K/UL — SIGNIFICANT CHANGE UP (ref 150–400)
PLATELET # BLD AUTO: 424 K/UL — HIGH (ref 150–400)
POTASSIUM SERPL-MCNC: 5 MMOL/L — SIGNIFICANT CHANGE UP (ref 3.5–5.3)
POTASSIUM SERPL-SCNC: 5 MMOL/L — SIGNIFICANT CHANGE UP (ref 3.5–5.3)
RBC # BLD: 2.89 M/UL — LOW (ref 3.8–5.2)
RBC # BLD: 2.93 M/UL — LOW (ref 3.8–5.2)
RBC # FLD: 14.1 % — SIGNIFICANT CHANGE UP (ref 10.3–14.5)
RBC # FLD: 14.3 % — SIGNIFICANT CHANGE UP (ref 10.3–14.5)
SODIUM SERPL-SCNC: 134 MMOL/L — LOW (ref 135–145)
WBC # BLD: 11.37 K/UL — HIGH (ref 3.8–10.5)
WBC # BLD: 13.02 K/UL — HIGH (ref 3.8–10.5)
WBC # FLD AUTO: 11.37 K/UL — HIGH (ref 3.8–10.5)
WBC # FLD AUTO: 13.02 K/UL — HIGH (ref 3.8–10.5)

## 2017-08-13 RX ADMIN — DEXTROSE MONOHYDRATE, SODIUM CHLORIDE, AND POTASSIUM CHLORIDE 60 MILLILITER(S): 50; .745; 4.5 INJECTION, SOLUTION INTRAVENOUS at 11:54

## 2017-08-13 RX ADMIN — SENNA PLUS 1 TABLET(S): 8.6 TABLET ORAL at 23:10

## 2017-08-13 RX ADMIN — Medication 100 MILLIGRAM(S): at 11:54

## 2017-08-13 RX ADMIN — Medication 0.12 MILLIGRAM(S): at 05:27

## 2017-08-13 RX ADMIN — Medication 100 MILLIGRAM(S): at 05:28

## 2017-08-13 RX ADMIN — Medication 100 MILLIGRAM(S): at 23:10

## 2017-08-13 NOTE — PROGRESS NOTE ADULT - SUBJECTIVE AND OBJECTIVE BOX
Subjective: Patient seen and examined. No new events except as noted.     SUBJECTIVE/ROS:  No chest pain, or sob.     MEDICATIONS:  MEDICATIONS  (STANDING):  docusate sodium 100 milliGRAM(s) Oral three times a day  senna 1 Tablet(s) Oral at bedtime  sodium chloride 0.9% with potassium chloride 20 mEq/L 1000 milliLiter(s) (60 mL/Hr) IV Continuous <Continuous>  digoxin  Injectable 0.25 milliGRAM(s) IV Push every 6 hours  digoxin     Tablet 0.125 milliGRAM(s) Oral daily  diltiazem    Tablet 30 milliGRAM(s) Oral every 6 hours      PHYSICAL EXAM:  T(C): 36.7 (08-13-17 @ 12:57), Max: 37 (08-12-17 @ 21:14)  HR: 101 (08-13-17 @ 12:57) (82 - 110)  BP: 100/61 (08-13-17 @ 12:57) (100/61 - 119/73)  RR: 18 (08-13-17 @ 12:57) (18 - 18)  SpO2: 100% (08-13-17 @ 12:57) (98% - 100%)  Wt(kg): --  I&O's Summary    12 Aug 2017 07:01  -  13 Aug 2017 07:00  --------------------------------------------------------  IN: 1914 mL / OUT: 1375 mL / NET: 539 mL    13 Aug 2017 07:01  -  13 Aug 2017 13:20  --------------------------------------------------------  IN: 960 mL / OUT: 100 mL / NET: 860 mL          Appearance: Normal	  HEENT:   Normal oral mucosa, PERRL, EOMI	  Cardiovascular: Normal S1 S2,    Murmur:   Neck: JVP normal  Respiratory: Lungs clear to auscultation  Gastrointestinal:  Soft, Non-tender, + BS	  Skin: normal   Neuro: No gross deficits.   Psychiatry:  Mood & affect appropriate  Ext: No edema        LABS:    CARDIAC MARKERS:  CARDIAC MARKERS ( 11 Aug 2017 00:09 )  x     / <0.01 ng/mL / 52 U/L / x     / 1.0 ng/mL  CARDIAC MARKERS ( 10 Aug 2017 16:13 )  x     / <0.01 ng/mL / 54 U/L / x     / 1.0 ng/mL  CARDIAC MARKERS ( 10 Aug 2017 14:46 )  x     / <0.01 ng/mL / 57 U/L / x     / 1.0 ng/mL                                9.0    11.37 )-----------( 424      ( 13 Aug 2017 08:52 )             27.4     08-13    134<L>  |  103  |  9   ----------------------------<  112<H>  5.0   |  19<L>  |  0.59    Ca    8.5      13 Aug 2017 08:52      proBNP:   Lipid Profile:   HgA1c:   TSH:           TELEMETRY: 	    ECG:  	  RADIOLOGY:   DIAGNOSTIC TESTING:  Echocardiogram:  Catheterization:  Stress Test:    OTHER:

## 2017-08-13 NOTE — PROGRESS NOTE ADULT - SUBJECTIVE AND OBJECTIVE BOX
MEDICINE, PROGRESS NOTE [phone]    DOROTHY SCHREIBER 77y [id number]    Patient seen and examined.  Patient is a 77y old  Female who presents with a chief complaint of Progressively worsening hip and back pain + weakness (31 Jul 2017 21:16)  Pt feels ok, occasional palpitations    PAST MEDICAL & SURGICAL HISTORY:  High cholesterol  Hypertension  Arthritis  No significant past surgical history    MEDICATIONS  (STANDING):  docusate sodium 100 milliGRAM(s) Oral three times a day  senna 1 Tablet(s) Oral at bedtime  sodium chloride 0.9% with potassium chloride 20 mEq/L 1000 milliLiter(s) (60 mL/Hr) IV Continuous <Continuous>  digoxin  Injectable 0.25 milliGRAM(s) IV Push every 6 hours  digoxin     Tablet 0.125 milliGRAM(s) Oral daily  diltiazem    Tablet 30 milliGRAM(s) Oral every 6 hours    MEDICATIONS  (PRN):  acetaminophen   Tablet 650 milliGRAM(s) Oral every 6 hours PRN For Temp greater than 38 C (100.4 F)  acetaminophen   Tablet. 650 milliGRAM(s) Oral every 6 hours PRN Mild Pain (1 - 3)  diazepam    Tablet 5 milliGRAM(s) Oral every 12 hours PRN muscle spasm    Allergies    No Known Allergies    Intolerances        PHYSICAL EXAM:  Constitutional: NAD  HEENT: Normocephalic, EOMI  Neck:  No JVD  Respiratory: CTA B/L, No wheezes  Cardiovascular: S1, S2, IRRR, + systolic murmur  Gastrointestinal: BS+, soft, NT/ND,   Extremities: No peripheral edema  Neurological: AAOX3, no focal deficits  Psychiatric: Normal mood, normal affect  : No Robb    Vital Signs Last 24 Hrs  T(C): 36.7 (13 Aug 2017 12:57), Max: 37 (12 Aug 2017 21:14)  T(F): 98.1 (13 Aug 2017 12:57), Max: 98.6 (12 Aug 2017 21:14)  HR: 101 (13 Aug 2017 12:57) (82 - 110)  BP: 100/61 (13 Aug 2017 12:57) (100/61 - 115/68)  BP(mean): --  RR: 18 (13 Aug 2017 12:57) (18 - 18)  SpO2: 100% (13 Aug 2017 12:57) (98% - 100%)  I&O's Summary    12 Aug 2017 07:01  -  13 Aug 2017 07:00  --------------------------------------------------------  IN: 1914 mL / OUT: 1375 mL / NET: 539 mL    13 Aug 2017 07:01  -  13 Aug 2017 18:47  --------------------------------------------------------  IN: 960 mL / OUT: 200 mL / NET: 760 mL        LABS:                        9.0    11.37 )-----------( 424      ( 13 Aug 2017 08:52 )             27.4     08-13    134<L>  |  103  |  9   ----------------------------<  112<H>  5.0   |  19<L>  |  0.59    Ca    8.5      13 Aug 2017 08:52                REVIEW OF SYSTEMS:      RADIOLOGY & ADDITIONAL STUDIES:      ASSESSMENT/PLAN:

## 2017-08-13 NOTE — PROGRESS NOTE ADULT - SUBJECTIVE AND OBJECTIVE BOX
Patient seen and examined at bedside.    Vital Signs Last 24 Hrs  T(C): 36.7 (13 Aug 2017 04:32), Max: 37 (12 Aug 2017 21:14)  T(F): 98 (13 Aug 2017 04:32), Max: 98.6 (12 Aug 2017 21:14)  HR: 110 (13 Aug 2017 04:32) (82 - 123)  BP: 115/68 (13 Aug 2017 04:32) (95/50 - 119/73)  BP(mean): --  RR: 18 (13 Aug 2017 04:32) (18 - 18)  SpO2: 98% (13 Aug 2017 04:32) (94% - 99%)    Exam:    Awake, alert, AOX3  BUE 5/5  BLE pain limited, at least 4/5  Incision clean/dry

## 2017-08-14 LAB
ANION GAP SERPL CALC-SCNC: 14 MMOL/L — SIGNIFICANT CHANGE UP (ref 5–17)
BUN SERPL-MCNC: 8 MG/DL — SIGNIFICANT CHANGE UP (ref 7–23)
CALCIUM SERPL-MCNC: 8.7 MG/DL — SIGNIFICANT CHANGE UP (ref 8.4–10.5)
CHLORIDE SERPL-SCNC: 98 MMOL/L — SIGNIFICANT CHANGE UP (ref 96–108)
CO2 SERPL-SCNC: 22 MMOL/L — SIGNIFICANT CHANGE UP (ref 22–31)
CREAT SERPL-MCNC: 0.56 MG/DL — SIGNIFICANT CHANGE UP (ref 0.5–1.3)
GLUCOSE SERPL-MCNC: 120 MG/DL — HIGH (ref 70–99)
HCT VFR BLD CALC: 25.7 % — LOW (ref 34.5–45)
HGB BLD-MCNC: 8.6 G/DL — LOW (ref 11.5–15.5)
MCHC RBC-ENTMCNC: 30.8 PG — SIGNIFICANT CHANGE UP (ref 27–34)
MCHC RBC-ENTMCNC: 33.5 GM/DL — SIGNIFICANT CHANGE UP (ref 32–36)
MCV RBC AUTO: 92.1 FL — SIGNIFICANT CHANGE UP (ref 80–100)
PLATELET # BLD AUTO: 433 K/UL — HIGH (ref 150–400)
POTASSIUM SERPL-MCNC: 4.5 MMOL/L — SIGNIFICANT CHANGE UP (ref 3.5–5.3)
POTASSIUM SERPL-SCNC: 4.5 MMOL/L — SIGNIFICANT CHANGE UP (ref 3.5–5.3)
RBC # BLD: 2.79 M/UL — LOW (ref 3.8–5.2)
RBC # FLD: 14.4 % — SIGNIFICANT CHANGE UP (ref 10.3–14.5)
SODIUM SERPL-SCNC: 134 MMOL/L — LOW (ref 135–145)
WBC # BLD: 9.11 K/UL — SIGNIFICANT CHANGE UP (ref 3.8–10.5)
WBC # FLD AUTO: 9.11 K/UL — SIGNIFICANT CHANGE UP (ref 3.8–10.5)

## 2017-08-14 PROCEDURE — 93010 ELECTROCARDIOGRAM REPORT: CPT

## 2017-08-14 RX ADMIN — Medication 100 MILLIGRAM(S): at 17:24

## 2017-08-14 RX ADMIN — DEXTROSE MONOHYDRATE, SODIUM CHLORIDE, AND POTASSIUM CHLORIDE 60 MILLILITER(S): 50; .745; 4.5 INJECTION, SOLUTION INTRAVENOUS at 05:05

## 2017-08-14 RX ADMIN — Medication 650 MILLIGRAM(S): at 05:05

## 2017-08-14 RX ADMIN — Medication 0.12 MILLIGRAM(S): at 05:05

## 2017-08-14 RX ADMIN — Medication 100 MILLIGRAM(S): at 23:25

## 2017-08-14 RX ADMIN — SENNA PLUS 1 TABLET(S): 8.6 TABLET ORAL at 23:24

## 2017-08-14 RX ADMIN — Medication 100 MILLIGRAM(S): at 05:05

## 2017-08-14 RX ADMIN — DEXTROSE MONOHYDRATE, SODIUM CHLORIDE, AND POTASSIUM CHLORIDE 60 MILLILITER(S): 50; .745; 4.5 INJECTION, SOLUTION INTRAVENOUS at 17:26

## 2017-08-14 NOTE — PROGRESS NOTE ADULT - SUBJECTIVE AND OBJECTIVE BOX
MEDICINE, PROGRESS NOTE [phone]    DOROTHY SCHREIBER 77y [id number]    Patient seen and examined.  Patient is a 77y old  Female who presents with a chief complaint of Progressively worsening hip and back pain + weakness (31 Jul 2017 21:16)  Pt feels ok    PAST MEDICAL & SURGICAL HISTORY:  High cholesterol  Hypertension  Arthritis  No significant past surgical history    MEDICATIONS  (STANDING):  docusate sodium 100 milliGRAM(s) Oral three times a day  senna 1 Tablet(s) Oral at bedtime  sodium chloride 0.9% with potassium chloride 20 mEq/L 1000 milliLiter(s) (60 mL/Hr) IV Continuous <Continuous>  digoxin  Injectable 0.25 milliGRAM(s) IV Push every 6 hours  digoxin     Tablet 0.125 milliGRAM(s) Oral daily  diltiazem    Tablet 60 milliGRAM(s) Oral every 6 hours    MEDICATIONS  (PRN):  acetaminophen   Tablet 650 milliGRAM(s) Oral every 6 hours PRN For Temp greater than 38 C (100.4 F)  acetaminophen   Tablet. 650 milliGRAM(s) Oral every 6 hours PRN Mild Pain (1 - 3)  diazepam    Tablet 5 milliGRAM(s) Oral every 12 hours PRN muscle spasm    Allergies    No Known Allergies    Intolerances        PHYSICAL EXAM:  Constitutional: NAD  HEENT: Normocephalic, EOMI  Neck:  No JVD  Respiratory: CTA B/L, No wheezes  Cardiovascular: S1, S2, IRRR, + systolic murmur  Gastrointestinal: BS+, soft, NT/ND  Extremities: No peripheral edema  Neurological: AAOX3, no focal deficits  Psychiatric: Normal mood, normal affect  : No Robb    Vital Signs Last 24 Hrs  T(C): 36.7 (14 Aug 2017 20:49), Max: 36.7 (14 Aug 2017 04:58)  T(F): 98.1 (14 Aug 2017 20:49), Max: 98.1 (14 Aug 2017 04:58)  HR: 90 (14 Aug 2017 20:49) (90 - 101)  BP: 109/66 (14 Aug 2017 20:49) (109/66 - 123/70)  BP(mean): --  RR: 18 (14 Aug 2017 20:49) (18 - 19)  SpO2: 98% (14 Aug 2017 20:49) (98% - 100%)  I&O's Summary    13 Aug 2017 07:01  -  14 Aug 2017 07:00  --------------------------------------------------------  IN: 960 mL / OUT: 1075 mL / NET: -115 mL    14 Aug 2017 07:01  -  14 Aug 2017 21:22  --------------------------------------------------------  IN: 1000 mL / OUT: 850 mL / NET: 150 mL        LABS:                        8.6    9.11  )-----------( 433      ( 14 Aug 2017 08:21 )             25.7     08-14    134<L>  |  98  |  8   ----------------------------<  120<H>  4.5   |  22  |  0.56    Ca    8.7      14 Aug 2017 08:23                REVIEW OF SYSTEMS:      RADIOLOGY & ADDITIONAL STUDIES:      ASSESSMENT/PLAN:

## 2017-08-14 NOTE — PROGRESS NOTE ADULT - SUBJECTIVE AND OBJECTIVE BOX
SUBJECTIVE: Pt denies any new neurologic deficits    Medications:  MEDICATIONS  (STANDING):  docusate sodium 100 milliGRAM(s) Oral three times a day  senna 1 Tablet(s) Oral at bedtime  sodium chloride 0.9% with potassium chloride 20 mEq/L 1000 milliLiter(s) (60 mL/Hr) IV Continuous <Continuous>  digoxin  Injectable 0.25 milliGRAM(s) IV Push every 6 hours  digoxin     Tablet 0.125 milliGRAM(s) Oral daily  diltiazem    Tablet 60 milliGRAM(s) Oral every 6 hours    MEDICATIONS  (PRN):  acetaminophen   Tablet 650 milliGRAM(s) Oral every 6 hours PRN For Temp greater than 38 C (100.4 F)  acetaminophen   Tablet. 650 milliGRAM(s) Oral every 6 hours PRN Mild Pain (1 - 3)  diazepam    Tablet 5 milliGRAM(s) Oral every 12 hours PRN muscle spasm      Labs:  CBC Full  -  ( 14 Aug 2017 08:21 )  WBC Count : 9.11 K/uL  Hemoglobin : 8.6 g/dL  Hematocrit : 25.7 %  Platelet Count - Automated : 433 K/uL  Mean Cell Volume : 92.1 fl  Mean Cell Hemoglobin : 30.8 pg  Mean Cell Hemoglobin Concentration : 33.5 gm/dL  Auto Neutrophil # : x  Auto Lymphocyte # : x  Auto Monocyte # : x  Auto Eosinophil # : x  Auto Basophil # : x  Auto Neutrophil % : x  Auto Lymphocyte % : x  Auto Monocyte % : x  Auto Eosinophil % : x  Auto Basophil % : x    08-13    134<L>  |  103  |  9   ----------------------------<  112<H>  5.0   |  19<L>  |  0.59    Ca    8.5      13 Aug 2017 08:52      CAPILLARY BLOOD GLUCOSE    Vitals:  Vital Signs Last 24 Hrs  T(C): 36.7 (14 Aug 2017 04:58), Max: 36.7 (13 Aug 2017 12:57)  T(F): 98.1 (14 Aug 2017 04:58), Max: 98.1 (13 Aug 2017 12:57)  HR: 98 (14 Aug 2017 04:58) (98 - 124)  BP: 119/67 (14 Aug 2017 04:58) (100/61 - 119/67)  BP(mean): --  RR: 19 (14 Aug 2017 04:58) (18 - 19)  SpO2: 100% (14 Aug 2017 04:58) (99% - 100%)      Gen: sitting in chair comfortably    NEUROLOGICAL EXAM:    Mental status: Awake, alert, and in no apparent distress. Oriented to person, place and time. Language function is normal. Recent memory, digit span and concentration were normal.     Cranial Nerves: Pupils were equal, round, reactive to light. Extraocular movements were intact. Visual field were full. Fundoscopic exam was deferred. Facial sensation was intact to light touch. There was no facial asymmetry. The palate was upgoing symmetrically and tongue was midline. Hearing acuity was intact to finger rub AU. Shoulder shrug was full bilaterally    Motor exam: Bulk and tone normal. Strength was 5/5 in all four extremities. Fine finger movements were symmetric and normal. There was no pronator drift    Reflexes: 2+ in the bilateral upper extremities. 2+ in the bilateral lower extremities. Toes weremute.     Sensation: Intact to light touch.     Coordination: Finger-nose-finger and heel-to-shin was without dysmetria.     Gait deferred.

## 2017-08-14 NOTE — PROGRESS NOTE ADULT - SUBJECTIVE AND OBJECTIVE BOX
Discussed anticoagulation with Dr. Ross. No absolute contraindication to anticoagulation for afib. Please call neurosurgery with any change in exam, otherwise may follow up with Dr. Ross as outpatient.

## 2017-08-14 NOTE — PROGRESS NOTE ADULT - SUBJECTIVE AND OBJECTIVE BOX
Subjective: Patient seen and examined. No new events except as noted.     SUBJECTIVE/ROS:    No chest pain, or sob.   MEDICATIONS:  MEDICATIONS  (STANDING):  docusate sodium 100 milliGRAM(s) Oral three times a day  senna 1 Tablet(s) Oral at bedtime  sodium chloride 0.9% with potassium chloride 20 mEq/L 1000 milliLiter(s) (60 mL/Hr) IV Continuous <Continuous>  digoxin  Injectable 0.25 milliGRAM(s) IV Push every 6 hours  digoxin     Tablet 0.125 milliGRAM(s) Oral daily  diltiazem    Tablet 60 milliGRAM(s) Oral every 6 hours      PHYSICAL EXAM:  T(C): 36.7 (08-14-17 @ 04:58), Max: 36.7 (08-13-17 @ 12:57)  HR: 98 (08-14-17 @ 04:58) (98 - 124)  BP: 119/67 (08-14-17 @ 04:58) (100/61 - 119/67)  RR: 19 (08-14-17 @ 04:58) (18 - 19)  SpO2: 100% (08-14-17 @ 04:58) (99% - 100%)  Wt(kg): --  I&O's Summary    13 Aug 2017 07:01  -  14 Aug 2017 07:00  --------------------------------------------------------  IN: 960 mL / OUT: 1075 mL / NET: -115 mL    14 Aug 2017 07:01  -  14 Aug 2017 10:40  --------------------------------------------------------  IN: 440 mL / OUT: 250 mL / NET: 190 mL          Appearance: Normal	  HEENT:   Normal oral mucosa, PERRL, EOMI	  Cardiovascular: Normal S1 S2,    Murmur:   Neck: JVP normal  Respiratory: Lungs clear to auscultation  Gastrointestinal:  Soft, Non-tender, + BS	  Skin: normal   Neuro: No gross deficits.   Psychiatry:  Mood & affect appropriate  Ext: No edema        LABS:    CARDIAC MARKERS:                                8.6    9.11  )-----------( 433      ( 14 Aug 2017 08:21 )             25.7     08-14    134<L>  |  98  |  8   ----------------------------<  120<H>  4.5   |  22  |  0.56    Ca    8.7      14 Aug 2017 08:23      proBNP:   Lipid Profile:   HgA1c:   TSH:           TELEMETRY: 	  afib  ECG:  	  RADIOLOGY:   DIAGNOSTIC TESTING:  Echocardiogram:  Catheterization:  Stress Test:    OTHER:

## 2017-08-15 LAB
ANION GAP SERPL CALC-SCNC: 13 MMOL/L — SIGNIFICANT CHANGE UP (ref 5–17)
BUN SERPL-MCNC: 8 MG/DL — SIGNIFICANT CHANGE UP (ref 7–23)
CALCIUM SERPL-MCNC: 8 MG/DL — LOW (ref 8.4–10.5)
CHLORIDE SERPL-SCNC: 99 MMOL/L — SIGNIFICANT CHANGE UP (ref 96–108)
CO2 SERPL-SCNC: 22 MMOL/L — SIGNIFICANT CHANGE UP (ref 22–31)
CREAT SERPL-MCNC: 0.56 MG/DL — SIGNIFICANT CHANGE UP (ref 0.5–1.3)
GLUCOSE SERPL-MCNC: 116 MG/DL — HIGH (ref 70–99)
HCT VFR BLD CALC: 27.6 % — LOW (ref 34.5–45)
HGB BLD-MCNC: 8.9 G/DL — LOW (ref 11.5–15.5)
MCHC RBC-ENTMCNC: 29.9 PG — SIGNIFICANT CHANGE UP (ref 27–34)
MCHC RBC-ENTMCNC: 32.2 GM/DL — SIGNIFICANT CHANGE UP (ref 32–36)
MCV RBC AUTO: 92.6 FL — SIGNIFICANT CHANGE UP (ref 80–100)
PLATELET # BLD AUTO: 451 K/UL — HIGH (ref 150–400)
POTASSIUM SERPL-MCNC: 4.4 MMOL/L — SIGNIFICANT CHANGE UP (ref 3.5–5.3)
POTASSIUM SERPL-SCNC: 4.4 MMOL/L — SIGNIFICANT CHANGE UP (ref 3.5–5.3)
RBC # BLD: 2.98 M/UL — LOW (ref 3.8–5.2)
RBC # FLD: 14.3 % — SIGNIFICANT CHANGE UP (ref 10.3–14.5)
SODIUM SERPL-SCNC: 134 MMOL/L — LOW (ref 135–145)
WBC # BLD: 8.95 K/UL — SIGNIFICANT CHANGE UP (ref 3.8–10.5)
WBC # FLD AUTO: 8.95 K/UL — SIGNIFICANT CHANGE UP (ref 3.8–10.5)

## 2017-08-15 PROCEDURE — 99233 SBSQ HOSP IP/OBS HIGH 50: CPT | Mod: GC

## 2017-08-15 RX ORDER — DILTIAZEM HCL 120 MG
240 CAPSULE, EXT RELEASE 24 HR ORAL DAILY
Qty: 0 | Refills: 0 | Status: DISCONTINUED | OUTPATIENT
Start: 2017-08-16 | End: 2017-08-17

## 2017-08-15 RX ORDER — APIXABAN 2.5 MG/1
5 TABLET, FILM COATED ORAL EVERY 12 HOURS
Qty: 0 | Refills: 0 | Status: DISCONTINUED | OUTPATIENT
Start: 2017-08-15 | End: 2017-08-15

## 2017-08-15 RX ADMIN — Medication 100 MILLIGRAM(S): at 13:39

## 2017-08-15 RX ADMIN — Medication 5 MILLIGRAM(S): at 00:34

## 2017-08-15 RX ADMIN — Medication 100 MILLIGRAM(S): at 21:54

## 2017-08-15 RX ADMIN — SENNA PLUS 1 TABLET(S): 8.6 TABLET ORAL at 21:54

## 2017-08-15 RX ADMIN — Medication 0.12 MILLIGRAM(S): at 05:22

## 2017-08-15 RX ADMIN — Medication 100 MILLIGRAM(S): at 05:22

## 2017-08-15 NOTE — PROGRESS NOTE ADULT - SUBJECTIVE AND OBJECTIVE BOX
MEDICINE, PROGRESS NOTE [phone]    DOROTHY SCHREIBER 77y [id number]    Patient seen and examined.  Patient is a 77y old  Female who presents with a chief complaint of Progressively worsening hip and back pain + weakness (31 Jul 2017 21:16)  Pt feels ok    PAST MEDICAL & SURGICAL HISTORY:  High cholesterol  Hypertension  Arthritis  No significant past surgical history    MEDICATIONS  (STANDING):  docusate sodium 100 milliGRAM(s) Oral three times a day  senna 1 Tablet(s) Oral at bedtime  digoxin  Injectable 0.25 milliGRAM(s) IV Push every 6 hours  digoxin     Tablet 0.125 milliGRAM(s) Oral daily  diltiazem    Tablet 60 milliGRAM(s) Oral every 6 hours    MEDICATIONS  (PRN):  acetaminophen   Tablet 650 milliGRAM(s) Oral every 6 hours PRN For Temp greater than 38 C (100.4 F)  acetaminophen   Tablet. 650 milliGRAM(s) Oral every 6 hours PRN Mild Pain (1 - 3)    Allergies    No Known Allergies    Intolerances        PHYSICAL EXAM:  Constitutional: NAD  HEENT: Normocephalic, EOMI  Neck:  No JVD  Respiratory: CTA B/L, No wheezes  Cardiovascular: S1, S2, RRR, + systolic murmur  Gastrointestinal: BS+, soft, NT/ND  Extremities: No peripheral edema  Neurological: AAOX3, no focal deficits  Psychiatric: Normal mood, normal affect  : No Robb    Vital Signs Last 24 Hrs  T(C): 36.7 (15 Aug 2017 14:12), Max: 37.2 (15 Aug 2017 11:51)  T(F): 98.1 (15 Aug 2017 14:12), Max: 98.9 (15 Aug 2017 11:51)  HR: 108 (15 Aug 2017 17:56) (80 - 108)  BP: 112/68 (15 Aug 2017 17:56) (96/53 - 115/71)  BP(mean): --  RR: 19 (15 Aug 2017 14:12) (18 - 20)  SpO2: 98% (15 Aug 2017 14:12) (95% - 98%)  I&O's Summary    14 Aug 2017 07:01  -  15 Aug 2017 07:00  --------------------------------------------------------  IN: 1720 mL / OUT: 1850 mL / NET: -130 mL    15 Aug 2017 07:01  -  15 Aug 2017 18:23  --------------------------------------------------------  IN: 600 mL / OUT: 900 mL / NET: -300 mL        LABS:                        8.9    8.95  )-----------( 451      ( 15 Aug 2017 08:37 )             27.6     08-15    134<L>  |  99  |  8   ----------------------------<  116<H>  4.4   |  22  |  0.56    Ca    8.0<L>      15 Aug 2017 08:41                REVIEW OF SYSTEMS:      RADIOLOGY & ADDITIONAL STUDIES:      ASSESSMENT/PLAN:

## 2017-08-15 NOTE — CHART NOTE - NSCHARTNOTEFT_GEN_A_CORE
Fit and deliver TLSO. Donned for fit orthosis fit well. Written instruction and contact information given. To notify office with any questions or concerns  Isaac HEARN  [facility]  [phone]
Called by [facility]ri radiology with reports of abnormal MRI [facility] spine- report reveals   "Marked compression deformity of T12 with bony retropulsion and   compression of the distal cord which is likely is due to a subacute to   chronic fracture with some acute components. Cannot determine if this is   benign or malignant, favor benign due to the lack of posterior element   involvement. Large right-sided synovial cyst with marked thecal sac   compression L4-5."  Pt placed on strict bedrest, continue pain control.  Neurosurgery consult recalled and attending made aware.
EVENT NOTE  Event: Called to patient's bedside for patient evaluation of fast heart rate. This is a 78yo F with PMH HTN, HLD, BIBEMS c/o R hip pain and inability to walk x 3 days. Pt lives home with son. Pt reports she typically walks without assistance at baseline, now uses cane but has had difficulty ambulating 2/2 worsening weakness and hip pain for 3 days. Patient evaluated bedside. Patient denies sob, palpitation, dizziness, lightheadedness. Patient was noted to have afib on completed ekg.        Vitals:T(F): 98.4 (08-10-17 @ 00:44)  HR: 112 (08-10-17 @ 05:54)  BP: 107/63 (08-10-17 @ 05:54)  RR: 18 (08-10-17 @ 00:44)  SpO2: 97% (08-10-17 @ 00:44)  Wt(kg): --    Potassium, Serum: 3.8 mmol/L (08-09 @ 10:42)  Chloride, Serum: 96 mmol/L (08-09 @ 10:42)  Carbon Dioxide, Serum: 22 mmol/L (08-09 @ 10:42)  Glucose, Serum: 124 mg/dL (08-09 @ 10:42)  Blood Urea Nitrogen, Serum: 9 mg/dL (08-09 @ 10:42)  WBC Count: 15.19 K/uL (08-09 @ 10:42)  Hemoglobin: 9.1 g/dL (08-09 @ 10:42)  Hematocrit: 26.3 % (08-09 @ 10:42)  Platelet Count - Automated: 274 K/uL (08-09 @ 10:42)    Creatinine, Serum: 0.54 08-09 @ 10:42          PHYSICAL EXAM:      General: A&OX3 no acute distress    Cardiovascular: S1S2 IRRR    Respiratory: clear lungs    Gastrointestinal: NT, ND, positive bowel sound    Extremities: positive pulses        Assessment/Plan: 78yo F with PMH HTN, HLD, BIBEMS c/o R hip pain and inability to walk x 3 days. Pt lives home with son. Pt reports she typically walks without assistance at baseline, now uses cane but has had difficulty ambulating 2/2 worsening weakness and hip pain over 3 days. Now in rapid afib      Interventions performed:  Stat EKG  cardizem IVP  Cardizem gtt  transfer to tele  called Dr. Nii Becker, NP Medicine Team # [id number]
Evaluate and measure for TLSO. To be fit and delivered 8/9/17.  Isaac HEARN  [facility]  [phone]
Medicine NP - critical results     Called by radiology resident - Jose Antonio Brunson of preliminary results of CT chest with report as follows: Age-indeterminate, severe compression deformity of T12.  A small amount of air is present within the vertebral body, suggestive of a subacute time course.  Associated bony retropulsion causes moderate to severe cord compression at this level.  Trace retrolisthesis of L1 on L2. There is surrounding perivertebral soft tissue swelling.  Acute, mildly comminuted left posterior 12th rib fracture.  Acute, minimally displaced bilateral transverse process fractures of L1. Cholelithiasis.  Right hepatic lobe cyst.  Left renal cyst and colonic diverticulosis.  MRI of lumbar spine ordered per radiology recommendations.  Findings discussed with Dr. Dumont, admitting Hospitalist.  Neuro consult and ortho consult pending.  Call placed to attending - Dr. Acevedo, callback awaited.  Monitoring in progress.    Susy Lewis, LIO-BC  [phone]
Source: Patient [ X]    Family [ ]     other [X ] medical record   Pt presented with a chief complaint of Progressively worsening hip and back pain + weakness. Pt S/P thoracic spine fusion (8/7). Pt reports good appetite and oral intake, able to consume >75% of meals. Pt denies any GI distress at this time , last BM today.  Diet : DASH/TLC       PO intake:  < 50% [ ] 50-75% [ ]   % [X]  other :     Source for PO intake [X] Patient [ ] family [ ] chart [ ] staff [ ] other      Current Weight: (8/15) 173 pounds dosing weight (7/31) 171.9 pounds - weight stable       Pertinent Medications: MEDICATIONS  (STANDING):  docusate sodium 100 milliGRAM(s) Oral three times a day  senna 1 Tablet(s) Oral at bedtime  digoxin  Injectable 0.25 milliGRAM(s) IV Push every 6 hours  digoxin     Tablet 0.125 milliGRAM(s) Oral daily  diltiazem    Tablet 60 milliGRAM(s) Oral every 6 hours    MEDICATIONS  (PRN):  acetaminophen   Tablet 650 milliGRAM(s) Oral every 6 hours PRN For Temp greater than 38 C (100.4 F)  acetaminophen   Tablet. 650 milliGRAM(s) Oral every 6 hours PRN Mild Pain (1 - 3)    Pertinent Labs:  08-15 Na134 mmol/L<L> Glu 116 mg/dL<H> K+ 4.4 mmol/L Cr  0.56 mg/dL BUN 8 mg/dL Phos n/a   Alb n/a   PAB n/a         Skin: No edema. No pressure ulcers noted.     Estimated Needs:   [X ] no change since previous assessment  [ ] recalculated:       Previous Nutrition Diagnosis:     [X ] Suboptimal oral intake         Nutrition Diagnosis is [ ] ongoing  [X] resolved with adequate po intake for past 6 days.          New Nutrition Diagnosis: [X] not applicable      Recommend    [ ] Change Diet To:    [ ] Nutrition Supplement    [ ] Nutrition Support    [ X] Other:  1. Provide food preferences as requested by Pt/family within diet restrictions  2. Encourage PO intake during meal times        Monitoring and Evaluation:     [ X] PO intake [ ] Tolerance to diet prescription [X] weights [X] follow up per protocol    [ ] other:

## 2017-08-15 NOTE — PROGRESS NOTE ADULT - SUBJECTIVE AND OBJECTIVE BOX
Date of Admission: 7/31/2017     24H hour events: No acute events overnight.         MEDICATIONS:  digoxin  Injectable 0.25 milliGRAM(s) IV Push every 6 hours  digoxin     Tablet 0.125 milliGRAM(s) Oral daily  diltiazem    Tablet 60 milliGRAM(s) Oral every 6 hours        acetaminophen   Tablet 650 milliGRAM(s) Oral every 6 hours PRN  acetaminophen   Tablet. 650 milliGRAM(s) Oral every 6 hours PRN    docusate sodium 100 milliGRAM(s) Oral three times a day  senna 1 Tablet(s) Oral at bedtime      sodium chloride 0.9% with potassium chloride 20 mEq/L 1000 milliLiter(s) IV Continuous <Continuous>      REVIEW OF SYSTEMS:  Complete 10point ROS negative.    PHYSICAL EXAM:  T(C): 36.4 (08-15-17 @ 04:28), Max: 36.7 (08-14-17 @ 14:27)  HR: 80 (08-15-17 @ 04:28) (80 - 106)  BP: 102/61 (08-15-17 @ 05:17) (96/53 - 123/70)  RR: 20 (08-15-17 @ 04:28) (18 - 20)  SpO2: 95% (08-15-17 @ 04:28) (95% - 100%)  Wt(kg): --  I&O's Summary    14 Aug 2017 07:01  -  15 Aug 2017 07:00  --------------------------------------------------------  IN: 1720 mL / OUT: 1850 mL / NET: -130 mL    15 Aug 2017 07:01  -  15 Aug 2017 08:51  --------------------------------------------------------  IN: 0 mL / OUT: 600 mL / NET: -600 mL        Appearance: Normal	  HEENT:   Normal oral mucosa, PERRL, EOMI	  Lymphatic: No lymphadenopathy  Cardiovascular: Normal S1 S2, No JVD, No murmurs, No edema  Respiratory: Lungs clear to auscultation	  Psychiatry: A & O x 3, Mood & affect appropriate  Gastrointestinal:  Soft, Non-tender, + BS	  Skin: No rashes, No ecchymoses, No cyanosis	  Neurologic: Non-focal  Extremities: Normal range of motion, No clubbing, cyanosis or edema  Vascular: Peripheral pulses palpable 2+ bilaterally        LABS:	 	    CBC Full  -  ( 14 Aug 2017 08:21 )  WBC Count : 9.11 K/uL  Hemoglobin : 8.6 g/dL  Hematocrit : 25.7 %  Platelet Count - Automated : 433 K/uL  Mean Cell Volume : 92.1 fl  Mean Cell Hemoglobin : 30.8 pg  Mean Cell Hemoglobin Concentration : 33.5 gm/dL    08-14    134<L>  |  98  |  8   ----------------------------<  120<H>  4.5   |  22  |  0.56  08-13    134<L>  |  103  |  9   ----------------------------<  112<H>  5.0   |  19<L>  |  0.59    Ca    8.7      14 Aug 2017 08:23  Ca    8.5      13 Aug 2017 08:52    TELEMETRY: 	  Afib 70-90s PVCs     PREVIOUS DIAGNOSTIC TESTING:    [ ] Echocardiogram/TTE: (08.11.17 @ 21:32) >  Dimensions:    Normal Values:  LA:     4.4    2.0 - 4.0 cm  Ao:     2.8    2.0 - 3.8 cm  SEPTUM: 0.9    0.6 - 1.2 cm  PWT:    0.8  0.6 - 1.1 cm  LVIDd:  4.4    3.0 - 5.6 cm  LVIDs:  2.6    1.8 - 4.0 cm  Derived variables:  LVMI: 63 g/m2  RWT: 0.36  Fractional short: 41 %  EF (Visual Estimate): 70-75 %    Conclusions:  1. Normal mitral valve. Mild mitral regurgitation.  2. Calcified trileaflet aortic valve with normal opening.  Minimal aortic regurgitation.  3. Mildly dilated left atrium.  LA volume index = 35 cc/m2.  4. Endocardium not well visualized; grossly normal left  ventricular systolic function.  5. Normal right ventricular size and function.  	  ASSESSMENT/PLAN: 	    77F w/HTN, HLD, and OA p/w R. hip pain/difficulty ambulating found to have subacute to chronic T12 compression fx s/p T10-L2 posterior spinal fusion 8/7 now EP consulted for new onset afib management.     #Afib CHADSVASC 3; Rate control improved on dig/cardizem tele AF 70-90s  - c/w cardizem 60mg q6 and dig 0.125mg qd; if rate remains controlled on this regimen would convert cardizem to once daily dosing prior to discharge.   - Monitor tele   - Would start AC once cleared per neurosx  - Will d/w Dr. Rajput       [id number] Date of Admission: 7/31/2017     24H hour events: No acute events overnight.     She reports back pain and reports that she is due for her pain meds. No CP, palpitations, nausea/vomiting or lightheadedness.       MEDICATIONS:  digoxin  Injectable 0.25 milliGRAM(s) IV Push every 6 hours  digoxin     Tablet 0.125 milliGRAM(s) Oral daily  diltiazem    Tablet 60 milliGRAM(s) Oral every 6 hours        acetaminophen   Tablet 650 milliGRAM(s) Oral every 6 hours PRN  acetaminophen   Tablet. 650 milliGRAM(s) Oral every 6 hours PRN    docusate sodium 100 milliGRAM(s) Oral three times a day  senna 1 Tablet(s) Oral at bedtime      sodium chloride 0.9% with potassium chloride 20 mEq/L 1000 milliLiter(s) IV Continuous <Continuous>      REVIEW OF SYSTEMS:  Complete 10point ROS negative.    PHYSICAL EXAM:  T(C): 36.4 (08-15-17 @ 04:28), Max: 36.7 (08-14-17 @ 14:27)  HR: 80 (08-15-17 @ 04:28) (80 - 106)  BP: 102/61 (08-15-17 @ 05:17) (96/53 - 123/70)  RR: 20 (08-15-17 @ 04:28) (18 - 20)  SpO2: 95% (08-15-17 @ 04:28) (95% - 100%)  Wt(kg): --  I&O's Summary    14 Aug 2017 07:01  -  15 Aug 2017 07:00  --------------------------------------------------------  IN: 1720 mL / OUT: 1850 mL / NET: -130 mL    15 Aug 2017 07:01  -  15 Aug 2017 08:51  --------------------------------------------------------  IN: 0 mL / OUT: 600 mL / NET: -600 mL        Appearance: Pleasant resting comfortably in a chair at bedside	  HEENT:   MMM OP clear	  Cardiovascular:Irregular,  nl S1 S2, No JVD, No murmurs, No edema  Respiratory: Lungs clear to auscultation	  Psychiatry: A & O x 3, Mood & affect appropriate  Gastrointestinal:  Soft, Non-tender, + BS	  Skin: No rashes, No ecchymoses, No cyanosis	  Neurologic: Non-focal  Extremities: Normal range of motion, No clubbing, or cyanosis  Vascular: Peripheral pulses palpable 2+ bilaterally        LABS:	 	    CBC Full  -  ( 14 Aug 2017 08:21 )  WBC Count : 9.11 K/uL  Hemoglobin : 8.6 g/dL  Hematocrit : 25.7 %  Platelet Count - Automated : 433 K/uL  Mean Cell Volume : 92.1 fl  Mean Cell Hemoglobin : 30.8 pg  Mean Cell Hemoglobin Concentration : 33.5 gm/dL    08-14    134<L>  |  98  |  8   ----------------------------<  120<H>  4.5   |  22  |  0.56  08-13    134<L>  |  103  |  9   ----------------------------<  112<H>  5.0   |  19<L>  |  0.59    Ca    8.7      14 Aug 2017 08:23  Ca    8.5      13 Aug 2017 08:52    TELEMETRY: 	  Afib 70-90s PVCs     PREVIOUS DIAGNOSTIC TESTING:    [ ] Echocardiogram/TTE: (08.11.17 @ 21:32) >  Dimensions:    Normal Values:  LA:     4.4    2.0 - 4.0 cm  Ao:     2.8    2.0 - 3.8 cm  SEPTUM: 0.9    0.6 - 1.2 cm  PWT:    0.8  0.6 - 1.1 cm  LVIDd:  4.4    3.0 - 5.6 cm  LVIDs:  2.6    1.8 - 4.0 cm  Derived variables:  LVMI: 63 g/m2  RWT: 0.36  Fractional short: 41 %  EF (Visual Estimate): 70-75 %    Conclusions:  1. Normal mitral valve. Mild mitral regurgitation.  2. Calcified trileaflet aortic valve with normal opening.  Minimal aortic regurgitation.  3. Mildly dilated left atrium.  LA volume index = 35 cc/m2.  4. Endocardium not well visualized; grossly normal left  ventricular systolic function.  5. Normal right ventricular size and function.  	  ASSESSMENT/PLAN: 	    77F w/HTN, HLD, and OA p/w R. hip pain/difficulty ambulating found to have subacute to chronic T12 compression fx s/p T10-L2 posterior spinal fusion 8/7 now EP consulted for new onset afib management.     #Afib CHADSVASC 3; Rate control improved on dig/cardizem tele AF 70-90s  - c/w cardizem 60mg q6 and dig 0.125mg qd; if rate remains controlled on this regimen would convert cardizem to once daily dosing prior to discharge.   - Monitor tele   - Would start AC once cleared per neurosx  - Will d/w Dr. Rajput       [id number]

## 2017-08-15 NOTE — PROGRESS NOTE ADULT - SUBJECTIVE AND OBJECTIVE BOX
Subjective: Patient seen and examined. No new events except as noted.     SUBJECTIVE/ROS:  No chest pain, or sob.     MEDICATIONS:  MEDICATIONS  (STANDING):  docusate sodium 100 milliGRAM(s) Oral three times a day  senna 1 Tablet(s) Oral at bedtime  sodium chloride 0.9% with potassium chloride 20 mEq/L 1000 milliLiter(s) (60 mL/Hr) IV Continuous <Continuous>  digoxin  Injectable 0.25 milliGRAM(s) IV Push every 6 hours  digoxin     Tablet 0.125 milliGRAM(s) Oral daily  diltiazem    Tablet 60 milliGRAM(s) Oral every 6 hours      PHYSICAL EXAM:  T(C): 36.4 (08-15-17 @ 04:28), Max: 36.7 (08-14-17 @ 14:27)  HR: 80 (08-15-17 @ 04:28) (80 - 106)  BP: 102/61 (08-15-17 @ 05:17) (96/53 - 123/70)  RR: 20 (08-15-17 @ 04:28) (18 - 20)  SpO2: 95% (08-15-17 @ 04:28) (95% - 100%)  Wt(kg): --  I&O's Summary    14 Aug 2017 07:01  -  15 Aug 2017 07:00  --------------------------------------------------------  IN: 1720 mL / OUT: 1850 mL / NET: -130 mL          Appearance: Normal	  HEENT:   Normal oral mucosa, PERRL, EOMI	  Cardiovascular: Normal S1 S2,    Murmur:   Neck: JVP normal  Respiratory: Lungs clear to auscultation  Gastrointestinal:  Soft, Non-tender, + BS	  Skin: normal   Neuro: No gross deficits.   Psychiatry:  Mood & affect appropriate  Ext: No edema        LABS:    CARDIAC MARKERS:                                8.6    9.11  )-----------( 433      ( 14 Aug 2017 08:21 )             25.7     08-14    134<L>  |  98  |  8   ----------------------------<  120<H>  4.5   |  22  |  0.56    Ca    8.7      14 Aug 2017 08:23      proBNP:   Lipid Profile:   HgA1c:   TSH:           TELEMETRY: 	    ECG:  	  RADIOLOGY:   DIAGNOSTIC TESTING:  Echocardiogram:  Catheterization:  Stress Test:    OTHER:

## 2017-08-16 LAB
ANION GAP SERPL CALC-SCNC: 15 MMOL/L — SIGNIFICANT CHANGE UP (ref 5–17)
BUN SERPL-MCNC: 8 MG/DL — SIGNIFICANT CHANGE UP (ref 7–23)
CALCIUM SERPL-MCNC: 8.8 MG/DL — SIGNIFICANT CHANGE UP (ref 8.4–10.5)
CHLORIDE SERPL-SCNC: 94 MMOL/L — LOW (ref 96–108)
CO2 SERPL-SCNC: 22 MMOL/L — SIGNIFICANT CHANGE UP (ref 22–31)
CREAT SERPL-MCNC: 0.63 MG/DL — SIGNIFICANT CHANGE UP (ref 0.5–1.3)
GLUCOSE SERPL-MCNC: 108 MG/DL — HIGH (ref 70–99)
HCT VFR BLD CALC: 31.7 % — LOW (ref 34.5–45)
HGB BLD-MCNC: 10.6 G/DL — LOW (ref 11.5–15.5)
MCHC RBC-ENTMCNC: 31.2 PG — SIGNIFICANT CHANGE UP (ref 27–34)
MCHC RBC-ENTMCNC: 33.4 GM/DL — SIGNIFICANT CHANGE UP (ref 32–36)
MCV RBC AUTO: 93.2 FL — SIGNIFICANT CHANGE UP (ref 80–100)
PLATELET # BLD AUTO: 534 K/UL — HIGH (ref 150–400)
POTASSIUM SERPL-MCNC: 4.6 MMOL/L — SIGNIFICANT CHANGE UP (ref 3.5–5.3)
POTASSIUM SERPL-SCNC: 4.6 MMOL/L — SIGNIFICANT CHANGE UP (ref 3.5–5.3)
RBC # BLD: 3.4 M/UL — LOW (ref 3.8–5.2)
RBC # FLD: 14.7 % — HIGH (ref 10.3–14.5)
SODIUM SERPL-SCNC: 131 MMOL/L — LOW (ref 135–145)
WBC # BLD: 10.87 K/UL — HIGH (ref 3.8–10.5)
WBC # FLD AUTO: 10.87 K/UL — HIGH (ref 3.8–10.5)

## 2017-08-16 PROCEDURE — 99233 SBSQ HOSP IP/OBS HIGH 50: CPT | Mod: GC

## 2017-08-16 RX ADMIN — Medication 100 MILLIGRAM(S): at 05:38

## 2017-08-16 RX ADMIN — SENNA PLUS 1 TABLET(S): 8.6 TABLET ORAL at 22:02

## 2017-08-16 RX ADMIN — Medication 240 MILLIGRAM(S): at 05:38

## 2017-08-16 RX ADMIN — Medication 650 MILLIGRAM(S): at 19:09

## 2017-08-16 RX ADMIN — Medication 0.12 MILLIGRAM(S): at 05:38

## 2017-08-16 RX ADMIN — Medication 100 MILLIGRAM(S): at 13:08

## 2017-08-16 RX ADMIN — Medication 650 MILLIGRAM(S): at 10:50

## 2017-08-16 RX ADMIN — Medication 100 MILLIGRAM(S): at 22:02

## 2017-08-16 RX ADMIN — Medication 650 MILLIGRAM(S): at 18:19

## 2017-08-16 RX ADMIN — Medication 650 MILLIGRAM(S): at 10:20

## 2017-08-16 NOTE — PROGRESS NOTE ADULT - SUBJECTIVE AND OBJECTIVE BOX
MEDICINE, PROGRESS NOTE [phone]    DOROTHY SCHREIBER 77y [id number]    Patient seen and examined.  Patient is a 77y old  Female who presents with a chief complaint of Progressively worsening hip and back pain + weakness (31 Jul 2017 21:16)  Pt feels ok    PAST MEDICAL & SURGICAL HISTORY:  High cholesterol  Hypertension  Arthritis  No significant past surgical history    MEDICATIONS  (STANDING):  docusate sodium 100 milliGRAM(s) Oral three times a day  senna 1 Tablet(s) Oral at bedtime  digoxin  Injectable 0.25 milliGRAM(s) IV Push every 6 hours  digoxin     Tablet 0.125 milliGRAM(s) Oral daily  diltiazem    milliGRAM(s) Oral daily    MEDICATIONS  (PRN):  acetaminophen   Tablet 650 milliGRAM(s) Oral every 6 hours PRN For Temp greater than 38 C (100.4 F)  acetaminophen   Tablet. 650 milliGRAM(s) Oral every 6 hours PRN Mild Pain (1 - 3)    Allergies    No Known Allergies    Intolerances        PHYSICAL EXAM:  Constitutional: NAD  HEENT: Normocephalic, EOMI  Neck:  No JVD  Respiratory: CTA B/L, No wheezes  Cardiovascular: S1, S2, IRRR, + systolic murmur  Gastrointestinal: BS+, soft, NT/ND  Extremities: No peripheral edema  Neurological: AAOX3, no focal deficits  Psychiatric: Normal mood, normal affect  : No Robb    Vital Signs Last 24 Hrs  T(C): 36.7 (16 Aug 2017 14:25), Max: 36.7 (16 Aug 2017 14:25)  T(F): 98.1 (16 Aug 2017 14:25), Max: 98.1 (16 Aug 2017 14:25)  HR: 93 (16 Aug 2017 18:17) (89 - 96)  BP: 112/69 (16 Aug 2017 18:17) (99/72 - 136/72)  BP(mean): --  RR: 18 (16 Aug 2017 14:25) (18 - 18)  SpO2: 96% (16 Aug 2017 14:25) (96% - 98%)  I&O's Summary    15 Aug 2017 07:01  -  16 Aug 2017 07:00  --------------------------------------------------------  IN: 720 mL / OUT: 2950 mL / NET: -2230 mL    16 Aug 2017 07:01  -  16 Aug 2017 18:31  --------------------------------------------------------  IN: 600 mL / OUT: 300 mL / NET: 300 mL        LABS:                        10.6   10.87 )-----------( 534      ( 16 Aug 2017 07:17 )             31.7     08-16    131<L>  |  94<L>  |  8   ----------------------------<  108<H>  4.6   |  22  |  0.63    Ca    8.8      16 Aug 2017 07:25                REVIEW OF SYSTEMS:      RADIOLOGY & ADDITIONAL STUDIES:      ASSESSMENT/PLAN:

## 2017-08-16 NOTE — PROGRESS NOTE ADULT - SUBJECTIVE AND OBJECTIVE BOX
24H hour events: No acute events    MEDICATIONS:  digoxin  Injectable 0.25 milliGRAM(s) IV Push every 6 hours  digoxin     Tablet 0.125 milliGRAM(s) Oral daily  diltiazem    milliGRAM(s) Oral daily        acetaminophen   Tablet 650 milliGRAM(s) Oral every 6 hours PRN  acetaminophen   Tablet. 650 milliGRAM(s) Oral every 6 hours PRN    docusate sodium 100 milliGRAM(s) Oral three times a day  senna 1 Tablet(s) Oral at bedtime          REVIEW OF SYSTEMS:  Complete 10point ROS negative.    PHYSICAL EXAM:  T(C): 36.6 (08-16-17 @ 05:22), Max: 37.2 (08-15-17 @ 11:51)  HR: 96 (08-16-17 @ 05:22) (86 - 108)  BP: 136/72 (08-16-17 @ 05:22) (112/56 - 136/72)  RR: 18 (08-16-17 @ 05:22) (18 - 19)  SpO2: 98% (08-16-17 @ 05:22) (98% - 98%)  Wt(kg): --  I&O's Summary    15 Aug 2017 07:01  -  16 Aug 2017 07:00  --------------------------------------------------------  IN: 720 mL / OUT: 2950 mL / NET: -2230 mL        Appearance: Normal	  HEENT:   Normal oral mucosa, PERRL, EOMI	  Lymphatic: No lymphadenopathy  Cardiovascular: Normal S1 S2, No JVD, No murmurs, No edema  Respiratory: Lungs clear to auscultation	  Psychiatry: A & O x 3, Mood & affect appropriate  Gastrointestinal:  Soft, Non-tender, + BS	  Skin: No rashes, No ecchymoses, No cyanosis	  Neurologic: Non-focal  Extremities: Normal range of motion, No clubbing, cyanosis or edema  Vascular: Peripheral pulses palpable 2+ bilaterally        LABS:	 	    CBC Full  -  ( 16 Aug 2017 07:17 )  WBC Count : 10.87 K/uL  Hemoglobin : 10.6 g/dL  Hematocrit : 31.7 %  Platelet Count - Automated : 534 K/uL  Mean Cell Volume : 93.2 fl  Mean Cell Hemoglobin : 31.2 pg  Mean Cell Hemoglobin Concentration : 33.4 gm/dL  Auto Neutrophil # : x  Auto Lymphocyte # : x  Auto Monocyte # : x  Auto Eosinophil # : x  Auto Basophil # : x  Auto Neutrophil % : x  Auto Lymphocyte % : x  Auto Monocyte % : x  Auto Eosinophil % : x  Auto Basophil % : x    08-15    134<L>  |  99  |  8   ----------------------------<  116<H>  4.4   |  22  |  0.56    Ca    8.0<L>      15 Aug 2017 08:41      TELEMETRY:  A fib , yesterday brief RVR to 150s     	  ASSESSMENT/PLAN: 	    77 year old woman w/HTN, HLD, and OA p/w R. hip pain/difficulty ambulating found to have subacute to chronic T12 compression fx s/p T10-L2 posterior spinal fusion 8/7 now EP consulted for new onset afib management.     Afib CHADSVASC 4; Rate control improved on dig/cardizem  - c/w cardizem 60mg q6 and dig 0.125mg qd; if rate remains controlled on this regimen would convert cardizem to once daily dosing prior to discharge.   - Monitor tele   - Would start systemic AC once cleared per neurosx - appears they are agreeable from resident who discussed w/ Dr Ross, see note 8/15/17      [id number]

## 2017-08-16 NOTE — PROGRESS NOTE ADULT - SUBJECTIVE AND OBJECTIVE BOX
Subjective: Patient seen and examined. No new events except as noted.     SUBJECTIVE/ROS:  No chest pain, or sob    MEDICATIONS:  MEDICATIONS  (STANDING):  docusate sodium 100 milliGRAM(s) Oral three times a day  senna 1 Tablet(s) Oral at bedtime  digoxin  Injectable 0.25 milliGRAM(s) IV Push every 6 hours  digoxin     Tablet 0.125 milliGRAM(s) Oral daily  diltiazem    milliGRAM(s) Oral daily      PHYSICAL EXAM:  T(C): 36.7 (08-16-17 @ 14:25), Max: 36.7 (08-16-17 @ 14:25)  HR: 89 (08-16-17 @ 14:25) (89 - 108)  BP: 99/72 (08-16-17 @ 14:25) (99/72 - 136/72)  RR: 18 (08-16-17 @ 14:25) (18 - 18)  SpO2: 96% (08-16-17 @ 14:25) (96% - 98%)  Wt(kg): --  I&O's Summary    15 Aug 2017 07:01  -  16 Aug 2017 07:00  --------------------------------------------------------  IN: 720 mL / OUT: 2950 mL / NET: -2230 mL    16 Aug 2017 07:01  -  16 Aug 2017 17:45  --------------------------------------------------------  IN: 600 mL / OUT: 300 mL / NET: 300 mL          Appearance: Normal	  HEENT:   Normal oral mucosa, PERRL, EOMI	  Cardiovascular: Normal S1 S2,    Murmur:   Neck: JVP normal  Respiratory: Lungs clear to auscultation  Gastrointestinal:  Soft, Non-tender, + BS	  Skin: normal   Neuro: No gross deficits.   Psychiatry:  Mood & affect appropriate  Ext: No edema        LABS:    CARDIAC MARKERS:                                10.6   10.87 )-----------( 534      ( 16 Aug 2017 07:17 )             31.7     08-16    131<L>  |  94<L>  |  8   ----------------------------<  108<H>  4.6   |  22  |  0.63    Ca    8.8      16 Aug 2017 07:25      proBNP:   Lipid Profile:   HgA1c:   TSH:           TELEMETRY: 	    ECG:  	  RADIOLOGY:   DIAGNOSTIC TESTING:  Echocardiogram:  Catheterization:  Stress Test:    OTHER:

## 2017-08-17 ENCOUNTER — TRANSCRIPTION ENCOUNTER (OUTPATIENT)
Age: 77
End: 2017-08-17

## 2017-08-17 VITALS
HEART RATE: 96 BPM | SYSTOLIC BLOOD PRESSURE: 102 MMHG | RESPIRATION RATE: 18 BRPM | OXYGEN SATURATION: 96 % | TEMPERATURE: 98 F | DIASTOLIC BLOOD PRESSURE: 64 MMHG

## 2017-08-17 LAB
ANION GAP SERPL CALC-SCNC: 14 MMOL/L — SIGNIFICANT CHANGE UP (ref 5–17)
BUN SERPL-MCNC: 11 MG/DL — SIGNIFICANT CHANGE UP (ref 7–23)
CALCIUM SERPL-MCNC: 8.7 MG/DL — SIGNIFICANT CHANGE UP (ref 8.4–10.5)
CHLORIDE SERPL-SCNC: 98 MMOL/L — SIGNIFICANT CHANGE UP (ref 96–108)
CO2 SERPL-SCNC: 23 MMOL/L — SIGNIFICANT CHANGE UP (ref 22–31)
CREAT SERPL-MCNC: 0.77 MG/DL — SIGNIFICANT CHANGE UP (ref 0.5–1.3)
GLUCOSE SERPL-MCNC: 109 MG/DL — HIGH (ref 70–99)
HCT VFR BLD CALC: 27.3 % — LOW (ref 34.5–45)
HGB BLD-MCNC: 9.1 G/DL — LOW (ref 11.5–15.5)
MCHC RBC-ENTMCNC: 30.4 PG — SIGNIFICANT CHANGE UP (ref 27–34)
MCHC RBC-ENTMCNC: 33.3 GM/DL — SIGNIFICANT CHANGE UP (ref 32–36)
MCV RBC AUTO: 91.3 FL — SIGNIFICANT CHANGE UP (ref 80–100)
PLATELET # BLD AUTO: 520 K/UL — HIGH (ref 150–400)
POTASSIUM SERPL-MCNC: 4.8 MMOL/L — SIGNIFICANT CHANGE UP (ref 3.5–5.3)
POTASSIUM SERPL-SCNC: 4.8 MMOL/L — SIGNIFICANT CHANGE UP (ref 3.5–5.3)
RBC # BLD: 2.99 M/UL — LOW (ref 3.8–5.2)
RBC # FLD: 14.6 % — HIGH (ref 10.3–14.5)
SODIUM SERPL-SCNC: 135 MMOL/L — SIGNIFICANT CHANGE UP (ref 135–145)
WBC # BLD: 8.16 K/UL — SIGNIFICANT CHANGE UP (ref 3.8–10.5)
WBC # FLD AUTO: 8.16 K/UL — SIGNIFICANT CHANGE UP (ref 3.8–10.5)

## 2017-08-17 PROCEDURE — 93010 ELECTROCARDIOGRAM REPORT: CPT

## 2017-08-17 RX ORDER — DOCUSATE SODIUM 100 MG
1 CAPSULE ORAL
Qty: 0 | Refills: 0 | DISCHARGE
Start: 2017-08-17

## 2017-08-17 RX ORDER — APIXABAN 2.5 MG/1
5 TABLET, FILM COATED ORAL EVERY 12 HOURS
Qty: 0 | Refills: 0 | Status: DISCONTINUED | OUTPATIENT
Start: 2017-08-17 | End: 2017-08-17

## 2017-08-17 RX ORDER — ACETAMINOPHEN 500 MG
2 TABLET ORAL
Qty: 0 | Refills: 0 | DISCHARGE
Start: 2017-08-17

## 2017-08-17 RX ORDER — SENNA PLUS 8.6 MG/1
1 TABLET ORAL
Qty: 0 | Refills: 0 | DISCHARGE
Start: 2017-08-17

## 2017-08-17 RX ORDER — DIGOXIN 250 MCG
1 TABLET ORAL
Qty: 0 | Refills: 0 | DISCHARGE
Start: 2017-08-17

## 2017-08-17 RX ORDER — APIXABAN 2.5 MG/1
1 TABLET, FILM COATED ORAL
Qty: 0 | Refills: 0 | DISCHARGE
Start: 2017-08-17

## 2017-08-17 RX ORDER — DILTIAZEM HCL 120 MG
1 CAPSULE, EXT RELEASE 24 HR ORAL
Qty: 0 | Refills: 0 | DISCHARGE
Start: 2017-08-17

## 2017-08-17 RX ADMIN — Medication 100 MILLIGRAM(S): at 04:57

## 2017-08-17 RX ADMIN — Medication 0.12 MILLIGRAM(S): at 04:57

## 2017-08-17 RX ADMIN — APIXABAN 5 MILLIGRAM(S): 2.5 TABLET, FILM COATED ORAL at 16:08

## 2017-08-17 RX ADMIN — Medication 100 MILLIGRAM(S): at 13:10

## 2017-08-17 RX ADMIN — Medication 240 MILLIGRAM(S): at 04:57

## 2017-08-17 RX ADMIN — Medication 650 MILLIGRAM(S): at 16:08

## 2017-08-17 NOTE — DISCHARGE NOTE ADULT - PATIENT PORTAL LINK FT
“You can access the [facility] Patient Portal, offered by [facility], by registering with the following website: [email or web address]”

## 2017-08-17 NOTE — PROVIDER CONTACT NOTE (OTHER) - REASON
High HR, Low BP
Low BP High HR
Patient BP low 82/40
Patient had run of MAT up to 160 for 3.5 sec
Pt converted back to Afib
Pt had 4 beats wide complex
Patient converted from AF to SR, rate 90s.

## 2017-08-17 NOTE — DISCHARGE NOTE ADULT - MEDICATION SUMMARY - MEDICATIONS TO STOP TAKING
I will STOP taking the medications listed below when I get home from the hospital:    cephalexin 500 mg oral capsule  -- 1 cap(s) by mouth every 8 hours  -- Finish all this medication unless otherwise directed by prescriber.

## 2017-08-17 NOTE — PROGRESS NOTE ADULT - PROVIDER SPECIALTY LIST ADULT
Anesthesia
Electrophysiology
Internal Medicine
Neurology
Neurology
Neurosurgery
Cardiology
Neurosurgery

## 2017-08-17 NOTE — DISCHARGE NOTE ADULT - MEDICATION SUMMARY - MEDICATIONS TO TAKE
I will START or STAY ON the medications listed below when I get home from the hospital:    TLSO Brace  -- DX: s/p  thoracic fusion  -- Indication: For thoracic fusion     acetaminophen 325 mg oral tablet  -- 2 tab(s) by mouth every 6 hours, As needed, Mild Pain (1 - 3)  -- Indication: For Pain     dilTIAZem 240 mg/24 hours oral capsule, extended release  -- 1 cap(s) by mouth once a day  -- Indication: For Afib     digoxin 125 mcg (0.125 mg) oral tablet  -- 1 tab(s) by mouth once a day  -- Indication: For Afib     apixaban 5 mg oral tablet  -- 1 tab(s) by mouth every 12 hours  -- Indication: For Afib     docusate sodium 100 mg oral capsule  -- 1 cap(s) by mouth 3 times a day  -- Indication: For constipation     senna oral tablet  -- 1 tab(s) by mouth once a day (at bedtime)  -- Indication: For constipation

## 2017-08-17 NOTE — PROGRESS NOTE ADULT - ASSESSMENT
Afib  converted back to afib overnight  cont cardizem  DC losartan given low BP  check TSH, Check 2d echo  The calculated [id number] score is 3 however given recent spine surgery and risk of spinal hematoma the risk of bleed outweighs benefit  if in future if afib recurs, then a.c is highly recommended.   will get EP input for further management to consider Digoxin load.    HTN  BP is on low side  will hold of to losartan  may need midodrine if she develops orthostatic hypotension   check orthostatic vitals
77F with T12 compression fx. Discussed plan with Attending, the patient is on the schedule for the OR tomorrow. Discussed risks benefits and alternatives with the patient. All questions answered. Continue with pain control. Neurochecks q4hrs.
Patient seen and the procedure was discussed. Al risks, benefits, and alternatives were discussed. All questions were answered. NPO after midnight Hold A/C
77F s/p posterior fusion for T12 compression fracture.     - pain control  - PT  - Brace when OOB
77F s/p posterior fusion for T12 compression fracture.   -will continue to monitor HMV output, likely DC drain tomorrow  - pain control  - PT  - Brace when OOB
77F s/p posterior fusion for T12 compression fracture.   -will continue to monitor HMV output, possible dc today  - pain control  - PT  - Brace when OOB
77F w/HTN, HLD, and OA p/w R. hip pain/difficulty ambulating found to have subacute to chronic T12 compression fx s/p T10-L2 posterior spinal fusion 8/7 now EP consulted for new onset afib management.   - remains on Cardizem gtt (5), transition to 30mg PO q6hrs once feasible  - given hypotension, we cannot titrate Cardizem up further; recommend starting Digoxin IV load (0.5mg IV, 0.25mg IV x2 q6h thereafter); maintenance dose 0.125mg PO OD once load complete  - no A/C given recent spine surgery  - continue tele monitoring      Josep Limon MD  [id number]    Switch to PO Diltiazem 30mg q6; Titrate as needed    --TTE with normal LV function, mildly dilated LA   - AC held per cards at this time given recent spinal sx and risk of bleed. Would benefit in the future from AC.  - Will continue to follow    - Will d/w Dr. Rajput and f/u with primary team  - Patient seen and examined with Dr. Rajput
77F with T12 compression fracture with retropulsion.   - Plan for OR Monday per Dr. Ross  - C/w pain management  -Please document medical clearance
77F with T12 compression fx with retropulsion. This morning has good strength and sensation, likey pain limited pending MRI. Keep on bedrest until MRI. Pain control.
77F with T12 compression fx. Discussed plan with Attending, plan now for OR Monday. Continue with pain control. Medically optimize and document medical clearance for OR.
77F with T12 compression fx. Discussed plan with Attending, plan now for OR Tuesday. Continue with pain control. Medically optimize and document medical clearance for OR.
77f hx as above presenting with weakness and hip + back pain
77y F with PMH of  HTN, HLD, osteoarthritis a/w rhip pain and generalized weakness found to have T12 compression fracture s/p posterior fusion.    -Neuro exam stable  -OOB to chair   -PT/OT  -neurosx f/u  -Cardiology eval for Afib  -Pt can f/u with me as an outpatient  -Please call with questions
Afib  back in afib, HR a bit elevated   cont cardizem  DC losartan given low BP  check TSH, Check 2d echo  The calculated [id number] score is 3 however given recent spine surgery and risk of spinal hematoma the risk of bleed outweighs benefit  if in future if afib recurs, then a.c is highly recommended.   appreciate EP input  on dig now    HTN  BP is on low side  will hold of to losartan  may need midodrine if she develops orthostatic hypotension   check orthostatic vitals
Afib  back in afib, HR a bit elevated   cont cardizem  off losartan  The calculated [id number] score is 3 however given recent spine surgery and risk of spinal hematoma the risk of bleed outweighs benefit  if in future if afib recurs, then a.c is highly recommended.   on dig   fu with EP recommendation     HTN  BP is on low side  will hold of to losartan  may need midodrine if she develops orthostatic hypotension   check orthostatic vitals
Afib  back in afib, HR is better controlled   cont cardizem  off losartan  The calculated [id number] score is 3 a/c is recommended. She is reportedly cleared by nsx.   may give eliquis 5 bid  on dig   fu with EP recommendation     HTN  BP is on low side  will hold of to losartan
Afib  back in afib, HR is better controlled   cont cardizem  off losartan  The calculated [id number] score is 3 a/c is recommended once cleared by nsx  on dig   fu with EP recommendation     HTN  BP is on low side  will hold of to losartan
Afib  back in afib, HR is better controlled   cont cardizem  off losartan  The calculated [id number] score is 3 a/c is recommended. She is reportedly cleared by nsx.   may give eliquis 5 bid  on dig   fu with EP recommendation     HTN  BP is on low side  will hold of to losartan
Afib  back in afib, HR is better controlled   cont cardizem  off losartan  The calculated [id number] score is 3 a/c is recommended once cleared by nsx  on dig   fu with EP recommendation     HTN  BP is on low side  will hold of to losartan
Afib with RVR - await EP recs for further managment  off a/c per cardio/ep    s/p spine sx  continue current care  PT  continue tele
Afib with rvr  s/p spine surgery    continue current care  neuro eval  hold off on a/c per cardio/ep  PT  f/u with neurosx as to whether to get a brace or not.
Afib with rvr  s/p spine sx    continue current care  appreciate neuro input  d/c cardizem drip start po 30mg q 6  continue tele
Afib/flutter  s/p spine sx    continue current care  Neurosurgery has cleared pt for anticoagulation, may proceed with cardio recs  check labs in am  d/c planning to [facility]
Afib/flutter  s/p spine sx    continue current care  Neurosurgery has cleared pt for anticoagulation, will start Eliquis    d/c planning to [facility]
Patient is now sp t10-l2 fusion. PACU then floor.    -pain control  -q4 neurochecks  -PT
S/p spine sx  Afib with netter control  hypotension    continue current care  check orthostatics  a/c once neurosx clears pt.  d/c planning to rehab
S/p spine sx  Afib with netter control  hypotension    continue current care  f/u with ep  d/c planning to rehab
S/p spine sx  leukocytosis    monitor fever curve  pt per neurosx  continue current care  will reeval pain regimen andrés
T12 compression fx with cord compression  neurosx following, OR planned for monday, cord compression management per neurosx.     Large right-sided synovial cyst with marked thecal sac compression L4-5.     management per neurosx    Hyponatremia - improving    continue current care  f/u with neurosurg as to steroids or not  await OR. Dr. Acevedo has risk stratified pt yesterday.
T12 compression fx with cord compression  neurosx following, OR planned for monday, cord compression management per neurosx.     Large right-sided synovial cyst with marked thecal sac compression L4-5.     management per neurosx    continue current care  steroids and cord compression management per neurosx.  OR risk stratification done on friday by dr ramirez.
cord compression s/p sx    continue current care  drain per sx  transition to oral meds  monitor orothostatics
s/p spine surgery  new onset Afib    cardio f/u   continue tele  pt per ortho
s/p thoracic spine fusion    continue current care  post op  care per neurosx  encourage incentive spirometry.
spinal compression fracture with cord compression  Large right-sided synovial cyst with marked thecal sac compression L4-5.     management per neurosx    cord compression and cyst management per neuro sx  please see Dr ramirez note for or risk stratification from friday  hold a/c

## 2017-08-17 NOTE — DISCHARGE NOTE ADULT - CARE PLAN
Principal Discharge DX:	Spinal fracture of T12 vertebra  Goal:	stable  Instructions for follow-up, activity and diet:	s/p spinal fusion  Secondary Diagnosis:	Afib  Goal:	stable  Instructions for follow-up, activity and diet:	Atrial fibrillation is the most common heart rhythm problem.  The condition puts you at risk for has stroke and heart attack  It helps if you control your blood pressure, not drink more than 1-2 alcohol drinks per day, cut down on caffeine, getting treatment for over active thyroid gland, and get regular exercise  Call your doctor if you feel your heart racing or beating unusually, chest tightness or pain, lightheaded, faint, shortness of breath especially with exercise  It is important to take your heart medication as prescribed  You may be on anticoagulation which is very important to take as directed - you may need blood work to monitor drug levels  Secondary Diagnosis:	Hypertension  Goal:	stable  Instructions for follow-up, activity and diet:	Follow up with your medical doctor to establish long term blood pressure treatment goals.  Secondary Diagnosis:	High cholesterol  Goal:	stable  Instructions for follow-up, activity and diet:	c/w current meds

## 2017-08-17 NOTE — DISCHARGE NOTE ADULT - HOSPITAL COURSE
to be completed by physician 77f hx HTN, HLD, osteoarthritis presents with 3 days of R hip pain and generalized weakness limiting ambulation. Had to be carried to and from her room this morning due to weakness and pain. Also notes progressively worsening lower back pain x months. Was recently admitted to [facility] for weakness which was attributed to sepsis 2/2 UTI. No injury, trauma, falls, no focal weaknesses, normally sedentary and often bedridden due to pain and weakness. At baseline has poor appetite, no acute changes. No dysuria, flank pain, n/v/d, no bowel habit changes. No sick contacts or recent travel. No HA, nuchal rigidity. ROS otherwise negative.    7/31 CT lumbar spine-Acute bilateral displaced L1 transverse fractures.  Acute comminuted segmental fracture of the posterior left 12th rib.  can be obtained for complete evaluation if there is clinical suspicion for  additional rib fractures.Near complete collapse of the T12 vertebral body with vacuum phenomenon suggestive of a subacute to chronic process.     CT chest (prelim)   Age-indeterminate, severe compression deformity of T12. A small amount of air is present within the vertebral body, suggestive of a subacute time course. Associated bony retropulsion causes moderate to severe cord compression at this level. Trace retrolisthesis of L1 on L2. There is surrounding perivertebral soft tissue swelling. Acute, mildly comminuted left posterior 12th rib fracture. Acute, minimally displaced bilateral transverse process fractures of L1. Cholelithiasis. Right hepatic lobe cyst. Left renal cyst. Colonic diverticulosis.         8/2:  	Cleared by cardiology.  Needs medical clearance.  Plan for surgery on Monday.  8/3 	Plan for sx tuesday. Cont pain management   8/4 	Confirmed with Dr. Ross's office, surgery will be Monday 8/7 at 2:30pm. 	Please make NPO after midnight on Sunday. Cont pain management. 	Discussed with family and pt. No steriods per Neurosx because pt's symptoms 	are not severe, please monitor for worsening symptoms of cord compression   8/8:  	TLSO brace to be delivered on 8/9/17          	c/w PCA   8/9 	Neuro SX: s/p posterior fusion for T12 compression fracture.   	will continue to monitor hemovac  output, likely DC drain tomorrow  	pain control, PT, Brace when OOB, f/u TOV- d/c'd conchita   8/10	obdulio Gil ( [facility] ) will see pt  	CE (-) x1, f/u x2S/P 250ml bolus for hypotension  8/11	Card: no AC 2/2 to risk of bleed, cont cardizem drip, D/C losartan  	PMD: rec for neuro consult. Dr. Dangelo service called  8/12; Digoxin load began by EP. Cardizem gtt d/c'd 30min. after 1st dose Cardizem po given per Dr. TIO Bales  8/13:Pt W rapid afib, spoke w, EP, increase diliatizem to 60mg po q6hrs.  8/14: Called placed to Nsx in reference to restarting A/C  8/15-As per NeuroSx may resume A/C, but hold off A/C as per Dr. Bales  8/16-c/w cardizem 60mg q6 and dig 0.125mg qd, if rate controlled will dose Dilt daily  Pt was dc to rehab

## 2017-08-17 NOTE — PROVIDER CONTACT NOTE (OTHER) - ACTION/TREATMENT ORDERED:
NP made aware. No interventions at present. Will continue to monitor.
Give NS 250cc bolus
Continue to monitor
EKG done and in chart.
NP aware. Will continue to monitor pt.
Spoke with FLORIN Almaraz. Ordered CBC stat. Labs will be drawn, will monitor pt closely.
Spoke with NP. Stated he will place order for EKG.  Will obtain EKG and notify NP of results.

## 2017-08-17 NOTE — PROVIDER CONTACT NOTE (OTHER) - DATE AND TIME:
11-Aug-2017 04:26
08-Aug-2017 22:18
10-Aug-2017 02:30
12-Aug-2017 10:32
17-Aug-2017 02:00
17-Aug-2017 06:35
10-Aug-2017 06:45

## 2017-08-17 NOTE — PROGRESS NOTE ADULT - SUBJECTIVE AND OBJECTIVE BOX
Subjective: Patient seen and examined. No new events except as noted.     SUBJECTIVE/ROS:    pt seen and examined earlier  no compliants     MEDICATIONS:  MEDICATIONS  (STANDING):  docusate sodium 100 milliGRAM(s) Oral three times a day  senna 1 Tablet(s) Oral at bedtime  digoxin  Injectable 0.25 milliGRAM(s) IV Push every 6 hours  digoxin     Tablet 0.125 milliGRAM(s) Oral daily  diltiazem    milliGRAM(s) Oral daily  apixaban 5 milliGRAM(s) Oral every 12 hours      PHYSICAL EXAM:  T(C): 36.7 (08-17-17 @ 14:32), Max: 36.8 (08-16-17 @ 20:48)  HR: 96 (08-17-17 @ 14:32) (93 - 112)  BP: 102/64 (08-17-17 @ 14:32) (96/62 - 126/66)  RR: 18 (08-17-17 @ 14:32) (18 - 18)  SpO2: 96% (08-17-17 @ 14:32) (96% - 97%)  Wt(kg): --  I&O's Summary    16 Aug 2017 07:01  -  17 Aug 2017 07:00  --------------------------------------------------------  IN: 1020 mL / OUT: 1100 mL / NET: -80 mL    17 Aug 2017 07:01  -  17 Aug 2017 17:12  --------------------------------------------------------  IN: 680 mL / OUT: 1150 mL / NET: -470 mL          Appearance: Normal	  HEENT:   Normal oral mucosa, PERRL, EOMI	  Cardiovascular: Normal S1 S2,    Murmur:   Neck: JVP normal  Respiratory: Lungs clear to auscultation  Gastrointestinal:  Soft, Non-tender, + BS	  Skin: normal   Neuro: No gross deficits.   Psychiatry:  Mood & affect appropriate  Ext: No edema        LABS:    CARDIAC MARKERS:                                9.1    8.16  )-----------( 520      ( 17 Aug 2017 07:09 )             27.3     08-17    135  |  98  |  11  ----------------------------<  109<H>  4.8   |  23  |  0.77    Ca    8.7      17 Aug 2017 06:58      proBNP:   Lipid Profile:   HgA1c:   TSH:           TELEMETRY: 	    ECG:  	  RADIOLOGY:   DIAGNOSTIC TESTING:  Echocardiogram:  Catheterization:  Stress Test:    OTHER:

## 2017-08-17 NOTE — PROGRESS NOTE ADULT - SUBJECTIVE AND OBJECTIVE BOX
Patient is a 77y old  Female who presents with a chief complaint of weakness (17 Aug 2017 10:53)      SUBJECTIVE / OVERNIGHT EVENTS:   Feels better.  Denies CP/SOB/Palpitation/HA.    MEDICATIONS  (STANDING):  docusate sodium 100 milliGRAM(s) Oral three times a day  senna 1 Tablet(s) Oral at bedtime  digoxin  Injectable 0.25 milliGRAM(s) IV Push every 6 hours  digoxin     Tablet 0.125 milliGRAM(s) Oral daily  diltiazem    milliGRAM(s) Oral daily  apixaban 5 milliGRAM(s) Oral every 12 hours    MEDICATIONS  (PRN):  acetaminophen   Tablet 650 milliGRAM(s) Oral every 6 hours PRN For Temp greater than 38 C (100.4 F)  acetaminophen   Tablet. 650 milliGRAM(s) Oral every 6 hours PRN Mild Pain (1 - 3)        CAPILLARY BLOOD GLUCOSE        I&O's Summary    16 Aug 2017 07:01  -  17 Aug 2017 07:00  --------------------------------------------------------  IN: 1020 mL / OUT: 1100 mL / NET: -80 mL    17 Aug 2017 07:01  -  17 Aug 2017 14:39  --------------------------------------------------------  IN: 680 mL / OUT: 1050 mL / NET: -370 mL        PHYSICAL EXAM:  GENERAL: NAD, well-developed  HEAD:  Atraumatic, Normocephalic  EYES: conjunctiva and sclera clear  NECK: Supple, No JVD  CHEST/LUNG: Clear to auscultation bilaterally; No wheeze  HEART: Regular rate and rhythm; No murmurs, rubs, or gallops  ABDOMEN: Soft, Nontender, Nondistended; Bowel sounds present  EXTREMITIES:  2+ Peripheral Pulses, No clubbing, cyanosis, or edema  NEUROLOGY: AAO X 3  SKIN: No rashes    LABS:                        9.1    8.16  )-----------( 520      ( 17 Aug 2017 07:09 )             27.3     08-17    135  |  98  |  11  ----------------------------<  109<H>  4.8   |  23  |  0.77    Ca    8.7      17 Aug 2017 06:58              CAPILLARY BLOOD GLUCOSE                    RADIOLOGY & ADDITIONAL TESTS:    Imaging Personally Reviewed:    Consultant(s) Notes Reviewed:      Care Discussed with Consultants/Other Providers:

## 2017-08-17 NOTE — DISCHARGE NOTE ADULT - PLAN OF CARE
stable Atrial fibrillation is the most common heart rhythm problem.  The condition puts you at risk for has stroke and heart attack  It helps if you control your blood pressure, not drink more than 1-2 alcohol drinks per day, cut down on caffeine, getting treatment for over active thyroid gland, and get regular exercise  Call your doctor if you feel your heart racing or beating unusually, chest tightness or pain, lightheaded, faint, shortness of breath especially with exercise  It is important to take your heart medication as prescribed  You may be on anticoagulation which is very important to take as directed - you may need blood work to monitor drug levels Follow up with your medical doctor to establish long term blood pressure treatment goals. c/w current meds s/p spinal fusion

## 2017-08-17 NOTE — PROVIDER CONTACT NOTE (OTHER) - ASSESSMENT
Patient has history of converting AF to SR. Was AF with rate 90s on Cardizem 240mg PO daily and digoxin for rate control.

## 2017-09-01 ENCOUNTER — APPOINTMENT (OUTPATIENT)
Dept: NEUROSURGERY | Facility: CLINIC | Age: 77
End: 2017-09-01
Payer: MEDICARE

## 2017-09-01 VITALS
BODY MASS INDEX: 26.2 KG/M2 | HEIGHT: 66.5 IN | HEART RATE: 78 BPM | WEIGHT: 165 LBS | SYSTOLIC BLOOD PRESSURE: 111 MMHG | DIASTOLIC BLOOD PRESSURE: 65 MMHG

## 2017-09-01 PROCEDURE — 99024 POSTOP FOLLOW-UP VISIT: CPT

## 2017-09-01 RX ORDER — APIXABAN 5 MG/1
5 TABLET, FILM COATED ORAL
Refills: 0 | Status: ACTIVE | COMMUNITY
Start: 2017-09-01

## 2017-09-01 RX ORDER — DILTIAZEM HYDROCHLORIDE 240 MG/1
240 CAPSULE, COATED, EXTENDED RELEASE ORAL
Refills: 0 | Status: ACTIVE | COMMUNITY
Start: 2017-09-01

## 2017-09-01 RX ORDER — SENNOSIDES 8.6 MG
8.6 TABLET ORAL
Refills: 0 | Status: ACTIVE | COMMUNITY
Start: 2017-09-01

## 2017-09-01 RX ORDER — DIGOXIN 125 UG/1
125 TABLET ORAL
Refills: 0 | Status: ACTIVE | COMMUNITY
Start: 2017-09-01

## 2017-09-05 ENCOUNTER — FORM ENCOUNTER (OUTPATIENT)
Age: 77
End: 2017-09-05

## 2017-10-19 PROCEDURE — 85027 COMPLETE CBC AUTOMATED: CPT

## 2017-10-19 PROCEDURE — 72170 X-RAY EXAM OF PELVIS: CPT

## 2017-10-19 PROCEDURE — 76000 FLUOROSCOPY <1 HR PHYS/QHP: CPT

## 2017-10-19 PROCEDURE — A9585: CPT

## 2017-10-19 PROCEDURE — 93306 TTE W/DOPPLER COMPLETE: CPT

## 2017-10-19 PROCEDURE — 80048 BASIC METABOLIC PNL TOTAL CA: CPT

## 2017-10-19 PROCEDURE — 84165 PROTEIN E-PHORESIS SERUM: CPT

## 2017-10-19 PROCEDURE — 87086 URINE CULTURE/COLONY COUNT: CPT

## 2017-10-19 PROCEDURE — 84484 ASSAY OF TROPONIN QUANT: CPT

## 2017-10-19 PROCEDURE — 93005 ELECTROCARDIOGRAM TRACING: CPT

## 2017-10-19 PROCEDURE — 97110 THERAPEUTIC EXERCISES: CPT

## 2017-10-19 PROCEDURE — 85730 THROMBOPLASTIN TIME PARTIAL: CPT

## 2017-10-19 PROCEDURE — 97116 GAIT TRAINING THERAPY: CPT

## 2017-10-19 PROCEDURE — C1713: CPT

## 2017-10-19 PROCEDURE — 72131 CT LUMBAR SPINE W/O DYE: CPT

## 2017-10-19 PROCEDURE — 85610 PROTHROMBIN TIME: CPT

## 2017-10-19 PROCEDURE — 82550 ASSAY OF CK (CPK): CPT

## 2017-10-19 PROCEDURE — 99285 EMERGENCY DEPT VISIT HI MDM: CPT | Mod: 25

## 2017-10-19 PROCEDURE — 84443 ASSAY THYROID STIM HORMONE: CPT

## 2017-10-19 PROCEDURE — C1769: CPT

## 2017-10-19 PROCEDURE — 84480 ASSAY TRIIODOTHYRONINE (T3): CPT

## 2017-10-19 PROCEDURE — 73522 X-RAY EXAM HIPS BI 3-4 VIEWS: CPT

## 2017-10-19 PROCEDURE — C1889: CPT

## 2017-10-19 PROCEDURE — 97530 THERAPEUTIC ACTIVITIES: CPT

## 2017-10-19 PROCEDURE — 72149 MRI LUMBAR SPINE W/DYE: CPT

## 2017-10-19 PROCEDURE — 84155 ASSAY OF PROTEIN SERUM: CPT

## 2017-10-19 PROCEDURE — 82553 CREATINE MB FRACTION: CPT

## 2017-10-19 PROCEDURE — 80053 COMPREHEN METABOLIC PANEL: CPT

## 2017-10-19 PROCEDURE — 81001 URINALYSIS AUTO W/SCOPE: CPT

## 2017-10-19 PROCEDURE — 86334 IMMUNOFIX E-PHORESIS SERUM: CPT

## 2017-10-19 PROCEDURE — 84436 ASSAY OF TOTAL THYROXINE: CPT

## 2017-10-19 PROCEDURE — 86850 RBC ANTIBODY SCREEN: CPT

## 2017-10-19 PROCEDURE — 86901 BLOOD TYPING SEROLOGIC RH(D): CPT

## 2017-10-19 PROCEDURE — 86900 BLOOD TYPING SEROLOGIC ABO: CPT

## 2017-10-19 PROCEDURE — 71250 CT THORAX DX C-: CPT

## 2017-10-19 PROCEDURE — 97161 PT EVAL LOW COMPLEX 20 MIN: CPT

## 2017-12-01 ENCOUNTER — APPOINTMENT (OUTPATIENT)
Dept: NEUROSURGERY | Facility: CLINIC | Age: 77
End: 2017-12-01
Payer: MEDICARE

## 2017-12-01 VITALS — DIASTOLIC BLOOD PRESSURE: 66 MMHG | SYSTOLIC BLOOD PRESSURE: 122 MMHG | HEIGHT: 64 IN | HEART RATE: 93 BPM

## 2017-12-01 PROCEDURE — 99213 OFFICE O/P EST LOW 20 MIN: CPT

## 2018-05-23 NOTE — PROVIDER CONTACT NOTE (OTHER) - SITUATION
Patient BP low 82/40
Pt converted back to Afib
BP 92/55 
BP 94/56 
Patient converted from AF to SR, rate 90s.
Patient had run of MAT up to 160 for 3.5 sec
Pt had 4 beats wide complex on tele
Sacral agenesis

## 2018-06-14 ENCOUNTER — APPOINTMENT (OUTPATIENT)
Dept: NEUROSURGERY | Facility: CLINIC | Age: 78
End: 2018-06-14
Payer: MEDICARE

## 2018-06-14 ENCOUNTER — APPOINTMENT (OUTPATIENT)
Dept: CT IMAGING | Facility: CLINIC | Age: 78
End: 2018-06-14
Payer: MEDICARE

## 2018-06-14 ENCOUNTER — OUTPATIENT (OUTPATIENT)
Dept: OUTPATIENT SERVICES | Facility: HOSPITAL | Age: 78
LOS: 1 days | End: 2018-06-14
Payer: MEDICARE

## 2018-06-14 ENCOUNTER — APPOINTMENT (OUTPATIENT)
Dept: CT IMAGING | Facility: CLINIC | Age: 78
End: 2018-06-14

## 2018-06-14 DIAGNOSIS — M48.50XA COLLAPSED VERTEBRA, NOT ELSEWHERE CLASSIFIED, SITE UNSPECIFIED, INITIAL ENCOUNTER FOR FRACTURE: ICD-10-CM

## 2018-06-14 PROCEDURE — 72128 CT CHEST SPINE W/O DYE: CPT | Mod: 26

## 2018-06-14 PROCEDURE — 72131 CT LUMBAR SPINE W/O DYE: CPT

## 2018-06-14 PROCEDURE — 76376 3D RENDER W/INTRP POSTPROCES: CPT | Mod: 26

## 2018-06-14 PROCEDURE — 76376 3D RENDER W/INTRP POSTPROCES: CPT

## 2018-06-14 PROCEDURE — 72131 CT LUMBAR SPINE W/O DYE: CPT | Mod: 26

## 2018-06-14 PROCEDURE — 99213 OFFICE O/P EST LOW 20 MIN: CPT

## 2018-06-14 PROCEDURE — 72128 CT CHEST SPINE W/O DYE: CPT

## 2018-06-14 RX ORDER — FERROUS SULFATE 137(45) MG
142 (45 FE) TABLET, EXTENDED RELEASE ORAL
Refills: 0 | Status: ACTIVE | COMMUNITY

## 2018-06-14 RX ORDER — DONEPEZIL HYDROCHLORIDE 5 MG/1
5 TABLET, FILM COATED ORAL
Refills: 0 | Status: ACTIVE | COMMUNITY

## 2018-06-15 ENCOUNTER — OTHER (OUTPATIENT)
Age: 78
End: 2018-06-15

## 2018-06-20 NOTE — H&P ADULT - HISTORY OF PRESENT ILLNESS
This encounter was created in error - please disregard.  
77f hx HTN, HLD, osteoarthritis presents with 3 days of R hip pain and generalized weakness limiting ambulation. Had to be carried to and from her room this morning due to weakness and pain. Also notes progressively worsening lower back pain x months. Was recently admitted to [facility] for weakness which was attributed to sepsis 2/2 UTI. No injury, trauma, falls, no focal weaknesses, normally sedentary and often bedridden due to pain and weakness. At baseline has poor appetite, no acute changes. No dysuria, flank pain, n/v/d, no bowel habit changes. No sick contacts or recent travel. No HA, nuchal rigidity. ROS otherwise negative.    In ED  ICU Vital Signs Last 24 Hrs  T(C): 36.7 (31 Jul 2017 20:55), Max: 37.1 (31 Jul 2017 15:13)  T(F): 98 (31 Jul 2017 20:55), Max: 98.7 (31 Jul 2017 15:13)  HR: 83 (31 Jul 2017 20:55) (83 - 94)  BP: 130/68 (31 Jul 2017 20:55) (99/75 - 130/68)  BP(mean): --  ABP: --  ABP(mean): --  RR: 18 (31 Jul 2017 20:55) (17 - 18)  SpO2: 98% (31 Jul 2017 20:55) (98% - 98%)

## 2018-07-09 ENCOUNTER — OTHER (OUTPATIENT)
Age: 78
End: 2018-07-09

## 2018-07-25 ENCOUNTER — OTHER (OUTPATIENT)
Age: 78
End: 2018-07-25

## 2019-04-28 NOTE — DIETITIAN INITIAL EVALUATION ADULT. - NS AS NUTRI INTERV MEALS SNACK
Ambulatory
Continue current diet. Encourage po intake with nutrient dense foods. Provide food preferences as able. Monitor weight, lab values, po intake and GI tolerance. RD to remain available for further nutrition interventions as indicated.

## 2019-06-24 ENCOUNTER — OTHER (OUTPATIENT)
Age: 79
End: 2019-06-24

## 2019-06-24 PROBLEM — I10 ESSENTIAL (PRIMARY) HYPERTENSION: Chronic | Status: ACTIVE | Noted: 2017-06-13

## 2019-06-24 PROBLEM — M19.90 UNSPECIFIED OSTEOARTHRITIS, UNSPECIFIED SITE: Chronic | Status: ACTIVE | Noted: 2017-06-13

## 2019-06-24 PROBLEM — E78.00 PURE HYPERCHOLESTEROLEMIA, UNSPECIFIED: Chronic | Status: ACTIVE | Noted: 2017-06-13

## 2019-07-14 ENCOUNTER — FORM ENCOUNTER (OUTPATIENT)
Age: 79
End: 2019-07-14

## 2019-07-15 ENCOUNTER — APPOINTMENT (OUTPATIENT)
Dept: CT IMAGING | Facility: CLINIC | Age: 79
End: 2019-07-15
Payer: MEDICARE

## 2019-07-15 ENCOUNTER — OUTPATIENT (OUTPATIENT)
Dept: OUTPATIENT SERVICES | Facility: HOSPITAL | Age: 79
LOS: 1 days | End: 2019-07-15
Payer: MEDICARE

## 2019-07-15 ENCOUNTER — APPOINTMENT (OUTPATIENT)
Dept: NEUROSURGERY | Facility: CLINIC | Age: 79
End: 2019-07-15
Payer: MEDICARE

## 2019-07-15 VITALS
SYSTOLIC BLOOD PRESSURE: 139 MMHG | WEIGHT: 132 LBS | BODY MASS INDEX: 25.91 KG/M2 | HEIGHT: 60 IN | HEART RATE: 85 BPM | DIASTOLIC BLOOD PRESSURE: 73 MMHG

## 2019-07-15 DIAGNOSIS — Z00.8 ENCOUNTER FOR OTHER GENERAL EXAMINATION: ICD-10-CM

## 2019-07-15 DIAGNOSIS — M81.0 AGE-RELATED OSTEOPOROSIS W/OUT CURRENT PATHOLOGICAL FRACTURE: ICD-10-CM

## 2019-07-15 DIAGNOSIS — I10 ESSENTIAL (PRIMARY) HYPERTENSION: ICD-10-CM

## 2019-07-15 DIAGNOSIS — I48.91 UNSPECIFIED ATRIAL FIBRILLATION: ICD-10-CM

## 2019-07-15 DIAGNOSIS — S22.080A WEDGE COMPRESSION FRACTURE OF T11-T12 VERTEBRA, INITIAL ENCOUNTER FOR CLOSED FRACTURE: ICD-10-CM

## 2019-07-15 DIAGNOSIS — Z98.1 ARTHRODESIS STATUS: ICD-10-CM

## 2019-07-15 DIAGNOSIS — Z87.891 PERSONAL HISTORY OF NICOTINE DEPENDENCE: ICD-10-CM

## 2019-07-15 PROCEDURE — 99213 OFFICE O/P EST LOW 20 MIN: CPT

## 2019-07-15 PROCEDURE — 72128 CT CHEST SPINE W/O DYE: CPT

## 2019-07-15 PROCEDURE — 72131 CT LUMBAR SPINE W/O DYE: CPT | Mod: 26

## 2019-07-15 PROCEDURE — 76376 3D RENDER W/INTRP POSTPROCES: CPT

## 2019-07-15 PROCEDURE — 72128 CT CHEST SPINE W/O DYE: CPT | Mod: 26

## 2019-07-15 PROCEDURE — 76376 3D RENDER W/INTRP POSTPROCES: CPT | Mod: 26

## 2019-07-15 PROCEDURE — 72131 CT LUMBAR SPINE W/O DYE: CPT

## 2019-07-16 PROBLEM — Z87.891 FORMER SMOKER: Status: ACTIVE | Noted: 2017-12-04

## 2019-07-16 PROBLEM — I48.91 ATRIAL FIBRILLATION: Status: RESOLVED | Noted: 2017-09-01 | Resolved: 2019-07-16

## 2019-07-16 PROBLEM — Z98.1 HISTORY OF LUMBAR SPINAL FUSION: Status: ACTIVE | Noted: 2017-09-01

## 2019-07-16 PROBLEM — S22.080A T12 COMPRESSION FRACTURE: Status: ACTIVE | Noted: 2017-09-01

## 2019-07-16 PROBLEM — I10 HIGH BLOOD PRESSURE: Status: RESOLVED | Noted: 2017-09-01 | Resolved: 2019-07-16

## 2019-07-16 PROBLEM — M81.0 OSTEOPOROSIS, POSTMENOPAUSAL: Status: ACTIVE | Noted: 2017-09-01

## 2019-07-17 NOTE — ASSESSMENT
[FreeTextEntry1] : This is a 80 yo female with non traumatic compression fx and kyphotic deformity who is s/p  T10-L2 posterior instrumentation and fusion ~ 2 years ago. She remains in kyphotic posture which has not been changed since her last office visit and neuro exam is otherwise unremarkable today. Recent CT T/L spine shows stable fusion. I have recommended\par \par - continue physical therapy/home exercise\par - TLSO brace for comfort (patient continues to refuse)\par - CT T/L spine wo in 9 months\par - RTC after image

## 2019-07-17 NOTE — HISTORY OF PRESENT ILLNESS
[FreeTextEntry1] : Ms. DOROTHY SCHREIBER is a 80 yo female with PMH of HTN, A.fib (on Eliquis), osteoporosis, severe >80% compression fracture at T12 with a kyphotic deformity who is s/p T10-L2 posterior instrumentation and fusion on 8/7/18. Post operatively she did well and presents today for routine follow up. She states mild back pain, kyphotic posture, occasional urinary incontinence have been remaining unchanged since the last office visit.  She denies weakness, dizziness, unsteady gait or any other neuro deficits. She ambulates with cane and has not been using back brace for her posture due to discomfort. Surgical incision site appears well healed.

## 2019-07-17 NOTE — PHYSICAL EXAM
[General Appearance - Alert] : alert [General Appearance - In No Acute Distress] : in no acute distress [General Appearance - Well Nourished] : well nourished [Longitudinal] : longitudinal [Clean] : clean [Dry] : dry [Well-Healed] : well-healed [No Drainage] : without drainage [Normal Skin] : normal [Oriented To Time, Place, And Person] : oriented to person, place, and time [Impaired Insight] : insight and judgment were intact [Affect] : the affect was normal [Memory Recent] : recent memory was not impaired [Cranial Nerves Optic (II)] : visual acuity intact bilaterally,  pupils equal round and reactive to light [Cranial Nerves Oculomotor (III)] : extraocular motion intact [Cranial Nerves Trigeminal (V)] : facial sensation intact symmetrically [Cranial Nerves Facial (VII)] : face symmetrical [Cranial Nerves Vestibulocochlear (VIII)] : hearing was intact bilaterally [Cranial Nerves Glossopharyngeal (IX)] : tongue and palate midline [Cranial Nerves Accessory (XI - Cranial And Spinal)] : head turning and shoulder shrug symmetric [Cranial Nerves Hypoglossal (XII)] : there was no tongue deviation with protrusion [Motor Strength] : muscle strength was normal in all four extremities [5] : S1 toe walking 5/5 [Sensation Tactile Decrease] : light touch was intact [Balance] : balance was intact [1+] : Patella left 1+ [No Visual Abnormalities] : no visible abnormailities [Normal] : normal [Able to toe walk] : the patient was able to toe walk [Able to heel walk] : the patient was able to heel walk [Sclera] : the sclera and conjunctiva were normal [PERRL With Normal Accommodation] : pupils were equal in size, round, reactive to light, with normal accommodation [Outer Ear] : the ears and nose were normal in appearance [Hearing Threshold Finger Rub Not [place]] : hearing was normal [Neck Appearance] : the appearance of the neck was normal [] : no respiratory distress [Exaggerated Use Of Accessory Muscles For Inspiration] : no accessory muscle use [Edema] : there was no peripheral edema [Abdomen Soft] : soft [Abdomen Tenderness] : non-tender [No CVA Tenderness] : no ~M costovertebral angle tenderness [No Spinal Tenderness] : no spinal tenderness [Abnormal Walk] : normal gait [Motor Tone] : muscle strength and tone were normal [Person] : oriented to person [Place] : oriented to place [Registration Intact] : recent registration memory intact [Span Intact] : the attention span was normal [Fluency] : fluency intact [Comprehension] : comprehension intact [Current Events] : adequate knowledge of current events [Vocabulary] : adequate range of vocabulary [Erythema] : not erythematous [Tender] : not tender [Warm] : not warm [Indurated] : not indurated [Fluctuant] : not fluctuant [FreeTextEntry1] : mid back [Over the Past 2 Weeks, Have You Felt Down, Depressed, or Hopeless?] : 1.) Over the past 2 weeks, have you felt down, depressed, or hopeless? No [Over the Past 2 Weeks, Have You Felt Little Interest or Pleasure Doing Things?] : 2.) Over the past 2 weeks, have you felt little interest or pleasure doing things? No [Time] : disoriented to time [Short Term Intact] : short term memory impaired [Remote Intact] : remote memory impaired [Concentration Intact] : a decrease in concentrating ability was observed [Visual Intact] : visual attention was ~T ~L decreased [Reading] : difficulty reading [Past History] : inadequate knowledge of personal past history [Romberg's Sign] : Romberg's sign was negtive [Fang] : Fang's sign was not demonstrated

## 2019-07-17 NOTE — REVIEW OF SYSTEMS
[As Noted in HPI] : as noted in HPI [Negative] : Respiratory [FreeTextEntry9] : back pain and kyphosis

## 2019-07-17 NOTE — REASON FOR VISIT
[Follow-Up: _____] : a [unfilled] follow-up visit [Family Member] : family member [FreeTextEntry1] : one year follow up

## 2019-08-22 NOTE — ED PROVIDER NOTE - MEDICAL DECISION MAKING DETAILS
77 year old woman with Hypertension and OA presents with weakness in the bathtub for one day, differential diagnosis includes heat exhaustion with or without rhabdomyolysis. Tachycardia is likely from volume contraction will hydrate with IV fluids. Meanwhile, tachycardia with the LLE swelling raises question of DVT / PE will US doppler that extremity. CBC, CMP , CE.
amputation

## 2019-12-17 ENCOUNTER — OTHER (OUTPATIENT)
Age: 79
End: 2019-12-17

## 2020-06-11 NOTE — H&P ADULT - PROBLEM/PLAN-6
High Dose Vitamin A Counseling: Side effects reviewed, pt to contact office should one occur. Valtrex Counseling: I discussed with the patient the risks of valacyclovir including but not limited to kidney damage, nausea, vomiting and severe allergy.  The patient understands that if the infection seems to be worsening or is not improving, they are to call. Oxybutynin Counseling:  I discussed with the patient the risks of oxybutynin including but not limited to skin rash, drowsiness, dry mouth, difficulty urinating, and blurred vision. Doxepin Counseling:  Patient advised that the medication is sedating and not to drive a car after taking this medication. Patient informed of potential adverse effects including but not limited to dry mouth, urinary retention, and blurry vision.  The patient verbalized understanding of the proper use and possible adverse effects of doxepin.  All of the patient's questions and concerns were addressed. Dapsone Counseling: I discussed with the patient the risks of dapsone including but not limited to hemolytic anemia, agranulocytosis, rashes, methemoglobinemia, kidney failure, peripheral neuropathy, headaches, GI upset, and liver toxicity.  Patients who start dapsone require monitoring including baseline LFTs and weekly CBCs for the first month, then every month thereafter.  The patient verbalized understanding of the proper use and possible adverse effects of dapsone.  All of the patient's questions and concerns were addressed. Odomzo Counseling- I discussed with the patient the risks of Odomzo including but not limited to nausea, vomiting, diarrhea, constipation, weight loss, changes in the sense of taste, decreased appetite, muscle spasms, and hair loss.  The patient verbalized understanding of the proper use and possible adverse effects of Odomzo.  All of the patient's questions and concerns were addressed. Tranexamic Acid Pregnancy And Lactation Text: It is unknown if this medication is safe during pregnancy or breast feeding. Cyclosporine Pregnancy And Lactation Text: This medication is Pregnancy Category C and it isn't know if it is safe during pregnancy. This medication is excreted in breast milk. Thalidomide Pregnancy And Lactation Text: This medication is Pregnancy Category X and is absolutely contraindicated during pregnancy. It is unknown if it is excreted in breast milk. Birth Control Pills Pregnancy And Lactation Text: This medication should be avoided if pregnant and for the first 30 days post-partum. Oxybutynin Pregnancy And Lactation Text: This medication is Pregnancy Category B and is considered safe during pregnancy. It is unknown if it is excreted in breast milk. Taltz Counseling: I discussed with the patient the risks of ixekizumab including but not limited to immunosuppression, serious infections, worsening of inflammatory bowel disease and drug reactions.  The patient understands that monitoring is required including a PPD at baseline and must alert us or the primary physician if symptoms of infection or other concerning signs are noted. Metronidazole Pregnancy And Lactation Text: This medication is Pregnancy Category B and considered safe during pregnancy.  It is also excreted in breast milk. Hydroquinone Counseling:  Patient advised that medication may result in skin irritation, lightening (hypopigmentation), dryness, and burning.  In the event of skin irritation, the patient was advised to reduce the amount of the drug applied or use it less frequently.  Rarely, spots that are treated with hydroquinone can become darker (pseudoochronosis).  Should this occur, patient instructed to stop medication and call the office. The patient verbalized understanding of the proper use and possible adverse effects of hydroquinone.  All of the patient's questions and concerns were addressed. Ilumya Pregnancy And Lactation Text: The risk during pregnancy and breastfeeding is uncertain with this medication. Itraconazole Pregnancy And Lactation Text: This medication is Pregnancy Category C and it isn't know if it is safe during pregnancy. It is also excreted in breast milk. Rhofade Pregnancy And Lactation Text: This medication has not been assigned a Pregnancy Risk Category. It is unknown if the medication is excreted in breast milk. Propranolol Pregnancy And Lactation Text: This medication is Pregnancy Category C and it isn't known if it is safe during pregnancy. It is excreted in breast milk. Infliximab Counseling:  I discussed with the patient the risks of infliximab including but not limited to myelosuppression, immunosuppression, autoimmune hepatitis, demyelinating diseases, lymphoma, and serious infections.  The patient understands that monitoring is required including a PPD at baseline and must alert us or the primary physician if symptoms of infection or other concerning signs are noted. Albendazole Counseling:  I discussed with the patient the risks of albendazole including but not limited to cytopenia, kidney damage, nausea/vomiting and severe allergy.  The patient understands that this medication is being used in an off-label manner. Azithromycin Pregnancy And Lactation Text: This medication is considered safe during pregnancy and is also secreted in breast milk. Minoxidil Pregnancy And Lactation Text: This medication has not been assigned a Pregnancy Risk Category but animal studies failed to show danger with the topical medication. It is unknown if the medication is excreted in breast milk. Stelara Counseling:  I discussed with the patient the risks of ustekinumab including but not limited to immunosuppression, malignancy, posterior leukoencephalopathy syndrome, and serious infections.  The patient understands that monitoring is required including a PPD at baseline and must alert us or the primary physician if symptoms of infection or other concerning signs are noted. Propranolol Counseling:  I discussed with the patient the risks of propranolol including but not limited to low heart rate, low blood pressure, low blood sugar, restlessness and increased cold sensitivity. They should call the office if they experience any of these side effects. Valtrex Pregnancy And Lactation Text: this medication is Pregnancy Category B and is considered safe during pregnancy. This medication is not directly found in breast milk but it's metabolite acyclovir is present. Elidel Counseling: Patient may experience a mild burning sensation during topical application. Elidel is not approved in children less than 2 years of age. There have been case reports of hematologic and skin malignancies in patients using topical calcineurin inhibitors although causality is questionable. Sski Pregnancy And Lactation Text: This medication is Pregnancy Category D and isn't considered safe during pregnancy. It is excreted in breast milk. Hydroxyzine Counseling: Patient advised that the medication is sedating and not to drive a car after taking this medication.  Patient informed of potential adverse effects including but not limited to dry mouth, urinary retention, and blurry vision.  The patient verbalized understanding of the proper use and possible adverse effects of hydroxyzine.  All of the patient's questions and concerns were addressed. Gabapentin Counseling: I discussed with the patient the risks of gabapentin including but not limited to dizziness, somnolence, fatigue and ataxia. Simponi Counseling:  I discussed with the patient the risks of golimumab including but not limited to myelosuppression, immunosuppression, autoimmune hepatitis, demyelinating diseases, lymphoma, and serious infections.  The patient understands that monitoring is required including a PPD at baseline and must alert us or the primary physician if symptoms of infection or other concerning signs are noted. Tazorac Counseling:  Patient advised that medication is irritating and drying.  Patient may need to apply sparingly and wash off after an hour before eventually leaving it on overnight.  The patient verbalized understanding of the proper use and possible adverse effects of tazorac.  All of the patient's questions and concerns were addressed. Albendazole Pregnancy And Lactation Text: This medication is Pregnancy Category C and it isn't known if it is safe during pregnancy. It is also excreted in breast milk. Cellcept Counseling:  I discussed with the patient the risks of mycophenolate mofetil including but not limited to infection/immunosuppression, GI upset, hypokalemia, hypercholesterolemia, bone marrow suppression, lymphoproliferative disorders, malignancy, GI ulceration/bleed/perforation, colitis, interstitial lung disease, kidney failure, progressive multifocal leukoencephalopathy, and birth defects.  The patient understands that monitoring is required including a baseline creatinine and regular CBC testing. In addition, patient must alert us immediately if symptoms of infection or other concerning signs are noted. Wartpeel Pregnancy And Lactation Text: This medication is Pregnancy Category X and contraindicated in pregnancy and in women who may become pregnant. It is unknown if this medication is excreted in breast milk. Xolair Pregnancy And Lactation Text: This medication is Pregnancy Category B and is considered safe during pregnancy. This medication is excreted in breast milk. Griseofulvin Counseling:  I discussed with the patient the risks of griseofulvin including but not limited to photosensitivity, cytopenia, liver damage, nausea/vomiting and severe allergy.  The patient understands that this medication is best absorbed when taken with a fatty meal (e.g., ice cream or french fries). Drysol Counseling:  I discussed with the patient the risks of drysol/aluminum chloride including but not limited to skin rash, itching, irritation, burning. Tetracycline Counseling: Patient counseled regarding possible photosensitivity and increased risk for sunburn.  Patient instructed to avoid sunlight, if possible.  When exposed to sunlight, patients should wear protective clothing, sunglasses, and sunscreen.  The patient was instructed to call the office immediately if the following severe adverse effects occur:  hearing changes, easy bruising/bleeding, severe headache, or vision changes.  The patient verbalized understanding of the proper use and possible adverse effects of tetracycline.  All of the patient's questions and concerns were addressed. Patient understands to avoid pregnancy while on therapy due to potential birth defects. Mirvaso Counseling: Mirvaso is a topical medication which can decrease superficial blood flow where applied. Side effects are uncommon and include stinging, redness and allergic reactions. Colchicine Counseling:  Patient counseled regarding adverse effects including but not limited to stomach upset (nausea, vomiting, stomach pain, or diarrhea).  Patient instructed to limit alcohol consumption while taking this medication.  Colchicine may reduce blood counts especially with prolonged use.  The patient understands that monitoring of kidney function and blood counts may be required, especially at baseline. The patient verbalized understanding of the proper use and possible adverse effects of colchicine.  All of the patient's questions and concerns were addressed. Hydroxyzine Pregnancy And Lactation Text: This medication is not safe during pregnancy and should not be taken. It is also excreted in breast milk and breast feeding isn't recommended. Terbinafine Pregnancy And Lactation Text: This medication is Pregnancy Category B and is considered safe during pregnancy. It is also excreted in breast milk and breast feeding isn't recommended. Xelbradz Pregnancy And Lactation Text: This medication is Pregnancy Category D and is not considered safe during pregnancy.  The risk during breast feeding is also uncertain. Isotretinoin Pregnancy And Lactation Text: This medication is Pregnancy Category X and is considered extremely dangerous during pregnancy. It is unknown if it is excreted in breast milk. Spironolactone Pregnancy And Lactation Text: This medication can cause feminization of the male fetus and should be avoided during pregnancy. The active metabolite is also found in breast milk. Spironolactone Counseling: Patient advised regarding risks of diarrhea, abdominal pain, hyperkalemia, birth defects (for female patients), liver toxicity and renal toxicity. The patient may need blood work to monitor liver and kidney function and potassium levels while on therapy. The patient verbalized understanding of the proper use and possible adverse effects of spironolactone.  All of the patient's questions and concerns were addressed. Otezla Counseling: The side effects of Otezla were discussed with the patient, including but not limited to worsening or new depression, weight loss, diarrhea, nausea, upper respiratory tract infection, and headache. Patient instructed to call the office should any adverse effect occur.  The patient verbalized understanding of the proper use and possible adverse effects of Otezla.  All the patient's questions and concerns were addressed. Cimzia Pregnancy And Lactation Text: This medication crosses the placenta but can be considered safe in certain situations. Cimzia may be excreted in breast milk. Topical Sulfur Applications Pregnancy And Lactation Text: This medication is Pregnancy Category C and has an unknown safety profile during pregnancy. It is unknown if this topical medication is excreted in breast milk. Methotrexate Counseling:  Patient counseled regarding adverse effects of methotrexate including but not limited to nausea, vomiting, abnormalities in liver function tests. Patients may develop mouth sores, rash, diarrhea, and abnormalities in blood counts. The patient understands that monitoring is required including LFT's and blood counts.  There is a rare possibility of scarring of the liver and lung problems that can occur when taking methotrexate. Persistent nausea, loss of appetite, pale stools, dark urine, cough, and shortness of breath should be reported immediately. Patient advised to discontinue methotrexate treatment at least three months before attempting to become pregnant.  I discussed the need for folate supplements while taking methotrexate.  These supplements can decrease side effects during methotrexate treatment. The patient verbalized understanding of the proper use and possible adverse effects of methotrexate.  All of the patient's questions and concerns were addressed. Hydroxychloroquine Pregnancy And Lactation Text: This medication has been shown to cause fetal harm but it isn't assigned a Pregnancy Risk Category. There are small amounts excreted in breast milk. Clofazimine Counseling:  I discussed with the patient the risks of clofazimine including but not limited to skin and eye pigmentation, liver damage, nausea/vomiting, gastrointestinal bleeding and allergy. Azathioprine Counseling:  I discussed with the patient the risks of azathioprine including but not limited to myelosuppression, immunosuppression, hepatotoxicity, lymphoma, and infections.  The patient understands that monitoring is required including baseline LFTs, Creatinine, possible TPMP genotyping and weekly CBCs for the first month and then every 2 weeks thereafter.  The patient verbalized understanding of the proper use and possible adverse effects of azathioprine.  All of the patient's questions and concerns were addressed. Clindamycin Pregnancy And Lactation Text: This medication can be used in pregnancy if certain situations. Clindamycin is also present in breast milk. Cosentyx Counseling:  I discussed with the patient the risks of Cosentyx including but not limited to worsening of Crohn's disease, immunosuppression, allergic reactions and infections.  The patient understands that monitoring is required including a PPD at baseline and must alert us or the primary physician if symptoms of infection or other concerning signs are noted. Cephalexin Counseling: I counseled the patient regarding use of cephalexin as an antibiotic for prophylactic and/or therapeutic purposes. Cephalexin (commonly prescribed under brand name Keflex) is a cephalosporin antibiotic which is active against numerous classes of bacteria, including most skin bacteria. Side effects may include nausea, diarrhea, gastrointestinal upset, rash, hives, yeast infections, and in rare cases, hepatitis, kidney disease, seizures, fever, confusion, neurologic symptoms, and others. Patients with severe allergies to penicillin medications are cautioned that there is about a 10% incidence of cross-reactivity with cephalosporins. When possible, patients with penicillin allergies should use alternatives to cephalosporins for antibiotic therapy. Finasteride Pregnancy And Lactation Text: This medication is absolutely contraindicated during pregnancy. It is unknown if it is excreted in breast milk. Finasteride Counseling:  I discussed with the patient the risks of use of finasteride including but not limited to decreased libido, decreased ejaculate volume, gynecomastia, and depression. Women should not handle medication.  All of the patient's questions and concerns were addressed. Nsaids Pregnancy And Lactation Text: These medications are considered safe up to 30 weeks gestation. It is excreted in breast milk. DISPLAY PLAN FREE TEXT Minocycline Counseling: Patient advised regarding possible photosensitivity and discoloration of the teeth, skin, lips, tongue and gums.  Patient instructed to avoid sunlight, if possible.  When exposed to sunlight, patients should wear protective clothing, sunglasses, and sunscreen.  The patient was instructed to call the office immediately if the following severe adverse effects occur:  hearing changes, easy bruising/bleeding, severe headache, or vision changes.  The patient verbalized understanding of the proper use and possible adverse effects of minocycline.  All of the patient's questions and concerns were addressed. Itraconazole Counseling:  I discussed with the patient the risks of itraconazole including but not limited to liver damage, nausea/vomiting, neuropathy, and severe allergy.  The patient understands that this medication is best absorbed when taken with acidic beverages such as non-diet cola or ginger ale.  The patient understands that monitoring is required including baseline LFTs and repeat LFTs at intervals.  The patient understands that they are to contact us or the primary physician if concerning signs are noted. SSKI Counseling:  I discussed with the patient the risks of SSKI including but not limited to thyroid abnormalities, metallic taste, GI upset, fever, headache, acne, arthralgias, paraesthesias, lymphadenopathy, easy bleeding, arrhythmias, and allergic reaction. Dupixent Pregnancy And Lactation Text: This medication likely crosses the placenta but the risk for the fetus is uncertain. This medication is excreted in breast milk. Cimetidine Counseling:  I discussed with the patient the risks of Cimetidine including but not limited to gynecomastia, headache, diarrhea, nausea, drowsiness, arrhythmias, pancreatitis, skin rashes, psychosis, bone marrow suppression and kidney toxicity. Minocycline Pregnancy And Lactation Text: This medication is Pregnancy Category D and not consider safe during pregnancy. It is also excreted in breast milk. Siliq Counseling:  I discussed with the patient the risks of Siliq including but not limited to new or worsening depression, suicidal thoughts and behavior, immunosuppression, malignancy, posterior leukoencephalopathy syndrome, and serious infections.  The patient understands that monitoring is required including a PPD at baseline and must alert us or the primary physician if symptoms of infection or other concerning signs are noted. There is also a special program designed to monitor depression which is required with Siliq. Isotretinoin Counseling: Patient should get monthly blood tests, not donate blood, not drive at night if vision affected, not share medication, and not undergo elective surgery for 6 months after tx completed. Side effects reviewed, pt to contact office should one occur. Niacinamide Pregnancy And Lactation Text: These medications are considered safe during pregnancy. Picato Counseling:  I discussed with the patient the risks of Picato including but not limited to erythema, scaling, itching, weeping, crusting, and pain. Topical Clindamycin Counseling: Patient counseled that this medication may cause skin irritation or allergic reactions.  In the event of skin irritation, the patient was advised to reduce the amount of the drug applied or use it less frequently.   The patient verbalized understanding of the proper use and possible adverse effects of clindamycin.  All of the patient's questions and concerns were addressed. Glycopyrrolate Pregnancy And Lactation Text: This medication is Pregnancy Category B and is considered safe during pregnancy. It is unknown if it is excreted breast milk. Erythromycin Pregnancy And Lactation Text: This medication is Pregnancy Category B and is considered safe during pregnancy. It is also excreted in breast milk. Stelara Pregnancy And Lactation Text: This medication is Pregnancy Category B and is considered safe during pregnancy. It is unknown if this medication is excreted in breast milk. Protopic Pregnancy And Lactation Text: This medication is Pregnancy Category C. It is unknown if this medication is excreted in breast milk when applied topically. Rituxan Pregnancy And Lactation Text: This medication is Pregnancy Category C and it isn't know if it is safe during pregnancy. It is unknown if this medication is excreted in breast milk but similar antibodies are known to be excreted. Colchicine Pregnancy And Lactation Text: This medication is Pregnancy Category C and isn't considered safe during pregnancy. It is excreted in breast milk. Rifampin Counseling: I discussed with the patient the risks of rifampin including but not limited to liver damage, kidney damage, red-orange body fluids, nausea/vomiting and severe allergy. Detail Level: Detailed Ivermectin Counseling:  Patient instructed to take medication on an empty stomach with a full glass of water.  Patient informed of potential adverse effects including but not limited to nausea, diarrhea, dizziness, itching, and swelling of the extremities or lymph nodes.  The patient verbalized understanding of the proper use and possible adverse effects of ivermectin.  All of the patient's questions and concerns were addressed. Doxepin Pregnancy And Lactation Text: This medication is Pregnancy Category C and it isn't known if it is safe during pregnancy. It is also excreted in breast milk and breast feeding isn't recommended. Cellcept Pregnancy And Lactation Text: This medication is Pregnancy Category D and isn't considered safe during pregnancy. It is unknown if this medication is excreted in breast milk. Rhofade Counseling: Rhofade is a topical medication which can decrease superficial blood flow where applied. Side effects are uncommon and include stinging, redness and allergic reactions. Solaraze Pregnancy And Lactation Text: This medication is Pregnancy Category B and is considered safe. There is some data to suggest avoiding during the third trimester. It is unknown if this medication is excreted in breast milk. Erythromycin Counseling:  I discussed with the patient the risks of erythromycin including but not limited to GI upset, allergic reaction, drug rash, diarrhea, increase in liver enzymes, and yeast infections. Cyclophosphamide Counseling:  I discussed with the patient the risks of cyclophosphamide including but not limited to hair loss, hormonal abnormalities, decreased fertility, abdominal pain, diarrhea, nausea and vomiting, bone marrow suppression and infection. The patient understands that monitoring is required while taking this medication. Benzoyl Peroxide Pregnancy And Lactation Text: This medication is Pregnancy Category C. It is unknown if benzoyl peroxide is excreted in breast milk. Ilumya Counseling: I discussed with the patient the risks of tildrakizumab including but not limited to immunosuppression, malignancy, posterior leukoencephalopathy syndrome, and serious infections.  The patient understands that monitoring is required including a PPD at baseline and must alert us or the primary physician if symptoms of infection or other concerning signs are noted. Minoxidil Counseling: Minoxidil is a topical medication which can increase blood flow where it is applied. It is uncertain how this medication increases hair growth. Side effects are uncommon and include stinging and allergic reactions. Cyclophosphamide Pregnancy And Lactation Text: This medication is Pregnancy Category D and it isn't considered safe during pregnancy. This medication is excreted in breast milk. 5-Fu Counseling: 5-Fluorouracil Counseling:  I discussed with the patient the risks of 5-fluorouracil including but not limited to erythema, scaling, itching, weeping, crusting, and pain. Eucrisa Counseling: Patient may experience a mild burning sensation during topical application. Eucrisa is not approved in children less than 2 years of age. Rituxan Counseling:  I discussed with the patient the risks of Rituxan infusions. Side effects can include infusion reactions, severe drug rashes including mucocutaneous reactions, reactivation of latent hepatitis and other infections and rarely progressive multifocal leukoencephalopathy.  All of the patient's questions and concerns were addressed. Wartpeel Counseling:  I discussed with the patient the risks of Wartpeel including but not limited to erythema, scaling, itching, weeping, crusting, and pain. Cimzia Counseling:  I discussed with the patient the risks of Cimzia including but not limited to immunosuppression, allergic reactions and infections.  The patient understands that monitoring is required including a PPD at baseline and must alert us or the primary physician if symptoms of infection or other concerning signs are noted. Bactrim Pregnancy And Lactation Text: This medication is Pregnancy Category D and is known to cause fetal risk.  It is also excreted in breast milk. Cyclosporine Counseling:  I discussed with the patient the risks of cyclosporine including but not limited to hypertension, gingival hyperplasia,myelosuppression, immunosuppression, liver damage, kidney damage, neurotoxicity, lymphoma, and serious infections. The patient understands that monitoring is required including baseline blood pressure, CBC, CMP, lipid panel and uric acid, and then 1-2 times monthly CMP and blood pressure. Doxycycline Pregnancy And Lactation Text: This medication is Pregnancy Category D and not consider safe during pregnancy. It is also excreted in breast milk but is considered safe for shorter treatment courses. Benzoyl Peroxide Counseling: Patient counseled that medicine may cause skin irritation and bleach clothing.  In the event of skin irritation, the patient was advised to reduce the amount of the drug applied or use it less frequently.   The patient verbalized understanding of the proper use and possible adverse effects of benzoyl peroxide.  All of the patient's questions and concerns were addressed. Xeljanz Counseling: I discussed with the patient the risks of Xeljanz therapy including increased risk of infection, liver issues, headache, diarrhea, or cold symptoms. Live vaccines should be avoided. They were instructed to call if they have any problems. Zyclara Counseling:  I discussed with the patient the risks of imiquimod including but not limited to erythema, scaling, itching, weeping, crusting, and pain.  Patient understands that the inflammatory response to imiquimod is variable from person to person and was educated regarded proper titration schedule.  If flu-like symptoms develop, patient knows to discontinue the medication and contact us. Prednisone Counseling:  I discussed with the patient the risks of prolonged use of prednisone including but not limited to weight gain, insomnia, osteoporosis, mood changes, diabetes, susceptibility to infection, glaucoma and high blood pressure.  In cases where prednisone use is prolonged, patients should be monitored with blood pressure checks, serum glucose levels and an eye exam.  Additionally, the patient may need to be placed on GI prophylaxis, PCP prophylaxis, and calcium and vitamin D supplementation and/or a bisphosphonate.  The patient verbalized understanding of the proper use and the possible adverse effects of prednisone.  All of the patient's questions and concerns were addressed. Humira Counseling:  I discussed with the patient the risks of adalimumab including but not limited to myelosuppression, immunosuppression, autoimmune hepatitis, demyelinating diseases, lymphoma, and serious infections.  The patient understands that monitoring is required including a PPD at baseline and must alert us or the primary physician if symptoms of infection or other concerning signs are noted. Bactrim Counseling:  I discussed with the patient the risks of sulfa antibiotics including but not limited to GI upset, allergic reaction, drug rash, diarrhea, dizziness, photosensitivity, and yeast infections.  Rarely, more serious reactions can occur including but not limited to aplastic anemia, agranulocytosis, methemoglobinemia, blood dyscrasias, liver or kidney failure, lung infiltrates or desquamative/blistering drug rashes. Solaraze Counseling:  I discussed with the patient the risks of Solaraze including but not limited to erythema, scaling, itching, weeping, crusting, and pain. Drysol Pregnancy And Lactation Text: This medication is considered safe during pregnancy and breast feeding. Doxycycline Counseling:  Patient counseled regarding possible photosensitivity and increased risk for sunburn.  Patient instructed to avoid sunlight, if possible.  When exposed to sunlight, patients should wear protective clothing, sunglasses, and sunscreen.  The patient was instructed to call the office immediately if the following severe adverse effects occur:  hearing changes, easy bruising/bleeding, severe headache, or vision changes.  The patient verbalized understanding of the proper use and possible adverse effects of doxycycline.  All of the patient's questions and concerns were addressed. High Dose Vitamin A Pregnancy And Lactation Text: High dose vitamin A therapy is contraindicated during pregnancy and breast feeding. Dupixent Counseling: I discussed with the patient the risks of dupilumab including but not limited to eye infection and irritation, cold sores, injection site reactions, worsening of asthma, allergic reactions and increased risk of parasitic infection.  Live vaccines should be avoided while taking dupilumab. Dupilumab will also interact with certain medications such as warfarin and cyclosporine. The patient understands that monitoring is required and they must alert us or the primary physician if symptoms of infection or other concerning signs are noted. Metronidazole Counseling:  I discussed with the patient the risks of metronidazole including but not limited to seizures, nausea/vomiting, a metallic taste in the mouth, nausea/vomiting and severe allergy. Griseofulvin Pregnancy And Lactation Text: This medication is Pregnancy Category X and is known to cause serious birth defects. It is unknown if this medication is excreted in breast milk but breast feeding should be avoided. Terbinafine Counseling: Patient counseling regarding adverse effects of terbinafine including but not limited to headache, diarrhea, rash, upset stomach, liver function test abnormalities, itching, taste/smell disturbance, nausea, abdominal pain, and flatulence.  There is a rare possibility of liver failure that can occur when taking terbinafine.  The patient understands that a baseline LFT and kidney function test may be required. The patient verbalized understanding of the proper use and possible adverse effects of terbinafine.  All of the patient's questions and concerns were addressed. Imiquimod Counseling:  I discussed with the patient the risks of imiquimod including but not limited to erythema, scaling, itching, weeping, crusting, and pain.  Patient understands that the inflammatory response to imiquimod is variable from person to person and was educated regarded proper titration schedule.  If flu-like symptoms develop, patient knows to discontinue the medication and contact us. Hydroxychloroquine Counseling:  I discussed with the patient that a baseline ophthalmologic exam is needed at the start of therapy and every year thereafter while on therapy. A CBC may also be warranted for monitoring.  The side effects of this medication were discussed with the patient, including but not limited to agranulocytosis, aplastic anemia, seizures, rashes, retinopathy, and liver toxicity. Patient instructed to call the office should any adverse effect occur.  The patient verbalized understanding of the proper use and possible adverse effects of Plaquenil.  All the patient's questions and concerns were addressed. Zyclara Pregnancy And Lactation Text: This medication is Pregnancy Category C. It is unknown if this medication is excreted in breast milk. Include Pregnancy/Lactation Warning?: No Niacinamide Counseling: I recommended taking niacin or niacinamide, also know as vitamin B3, twice daily. Recent evidence suggests that taking vitamin B3 (500 mg twice daily) can reduce the risk of actinic keratoses and non-melanoma skin cancers. Side effects of vitamin B3 include flushing and headache. Cephalexin Pregnancy And Lactation Text: This medication is Pregnancy Category B and considered safe during pregnancy.  It is also excreted in breast milk but can be used safely for shorter doses. Xolair Counseling:  Patient informed of potential adverse effects including but not limited to fever, muscle aches, rash and allergic reactions.  The patient verbalized understanding of the proper use and possible adverse effects of Xolair.  All of the patient's questions and concerns were addressed. Arava Counseling:  Patient counseled regarding adverse effects of Arava including but not limited to nausea, vomiting, abnormalities in liver function tests. Patients may develop mouth sores, rash, diarrhea, and abnormalities in blood counts. The patient understands that monitoring is required including LFTs and blood counts.  There is a rare possibility of scarring of the liver and lung problems that can occur when taking methotrexate. Persistent nausea, loss of appetite, pale stools, dark urine, cough, and shortness of breath should be reported immediately. Patient advised to discontinue Arava treatment and consult with a physician prior to attempting conception. The patient will have to undergo a treatment to eliminate Arava from the body prior to conception. Tranexamic Acid Counseling:  Patient advised of the small risk of bleeding problems with tranexamic acid. They were also instructed to call if they developed any nausea, vomiting or diarrhea. All of the patient's questions and concerns were addressed. Ketoconazole Counseling:   Patient counseled regarding improving absorption with orange juice.  Adverse effects include but are not limited to breast enlargement, headache, diarrhea, nausea, upset stomach, liver function test abnormalities, taste disturbance, and stomach pain.  There is a rare possibility of liver failure that can occur when taking ketoconazole. The patient understands that monitoring of LFTs may be required, especially at baseline. The patient verbalized understanding of the proper use and possible adverse effects of ketoconazole.  All of the patient's questions and concerns were addressed. Rifampin Pregnancy And Lactation Text: This medication is Pregnancy Category C and it isn't know if it is safe during pregnancy. It is also excreted in breast milk and should not be used if you are breast feeding. Bexarotene Counseling:  I discussed with the patient the risks of bexarotene including but not limited to hair loss, dry lips/skin/eyes, liver abnormalities, hyperlipidemia, pancreatitis, depression/suicidal ideation, photosensitivity, drug rash/allergic reactions, hypothyroidism, anemia, leukopenia, infection, cataracts, and teratogenicity.  Patient understands that they will need regular blood tests to check lipid profile, liver function tests, white blood cell count, thyroid function tests and pregnancy test if applicable. Fluconazole Counseling:  Patient counseled regarding adverse effects of fluconazole including but not limited to headache, diarrhea, nausea, upset stomach, liver function test abnormalities, taste disturbance, and stomach pain.  There is a rare possibility of liver failure that can occur when taking fluconazole.  The patient understands that monitoring of LFTs and kidney function test may be required, especially at baseline. The patient verbalized understanding of the proper use and possible adverse effects of fluconazole.  All of the patient's questions and concerns were addressed. Carac Counseling:  I discussed with the patient the risks of Carac including but not limited to erythema, scaling, itching, weeping, crusting, and pain. Opioid Pregnancy And Lactation Text: These medications can lead to premature delivery and should be avoided during pregnancy. These medications are also present in breast milk in small amounts. Ketoconazole Pregnancy And Lactation Text: This medication is Pregnancy Category C and it isn't know if it is safe during pregnancy. It is also excreted in breast milk and breast feeding isn't recommended. Tazorac Pregnancy And Lactation Text: This medication is not safe during pregnancy. It is unknown if this medication is excreted in breast milk. Dapsone Pregnancy And Lactation Text: This medication is Pregnancy Category C and is not considered safe during pregnancy or breast feeding. Skyrizi Counseling: I discussed with the patient the risks of risankizumab-rzaa including but not limited to immunosuppression, and serious infections.  The patient understands that monitoring is required including a PPD at baseline and must alert us or the primary physician if symptoms of infection or other concerning signs are noted. Quinolones Counseling:  I discussed with the patient the risks of fluoroquinolones including but not limited to GI upset, allergic reaction, drug rash, diarrhea, dizziness, photosensitivity, yeast infections, liver function test abnormalities, tendonitis/tendon rupture. Protopic Counseling: Patient may experience a mild burning sensation during topical application. Protopic is not approved in children less than 2 years of age. There have been case reports of hematologic and skin malignancies in patients using topical calcineurin inhibitors although causality is questionable. Methotrexate Pregnancy And Lactation Text: This medication is Pregnancy Category X and is known to cause fetal harm. This medication is excreted in breast milk. Azithromycin Counseling:  I discussed with the patient the risks of azithromycin including but not limited to GI upset, allergic reaction, drug rash, diarrhea, and yeast infections. Acitretin Pregnancy And Lactation Text: This medication is Pregnancy Category X and should not be given to women who are pregnant or may become pregnant in the future. This medication is excreted in breast milk. Acitretin Counseling:  I discussed with the patient the risks of acitretin including but not limited to hair loss, dry lips/skin/eyes, liver damage, hyperlipidemia, depression/suicidal ideation, photosensitivity.  Serious rare side effects can include but are not limited to pancreatitis, pseudotumor cerebri, bony changes, clot formation/stroke/heart attack.  Patient understands that alcohol is contraindicated since it can result in liver toxicity and significantly prolong the elimination of the drug by many years. Erivedge Counseling- I discussed with the patient the risks of Erivedge including but not limited to nausea, vomiting, diarrhea, constipation, weight loss, changes in the sense of taste, decreased appetite, muscle spasms, and hair loss.  The patient verbalized understanding of the proper use and possible adverse effects of Erivedge.  All of the patient's questions and concerns were addressed. Birth Control Pills Counseling: Birth Control Pill Counseling: I discussed with the patient the potential side effects of OCPs including but not limited to increased risk of stroke, heart attack, thrombophlebitis, deep venous thrombosis, hepatic adenomas, breast changes, GI upset, headaches, and depression.  The patient verbalized understanding of the proper use and possible adverse effects of OCPs. All of the patient's questions and concerns were addressed. Clindamycin Counseling: I counseled the patient regarding use of clindamycin as an antibiotic for prophylactic and/or therapeutic purposes. Clindamycin is active against numerous classes of bacteria, including skin bacteria. Side effects may include nausea, diarrhea, gastrointestinal upset, rash, hives, yeast infections, and in rare cases, colitis. Thalidomide Counseling: I discussed with the patient the risks of thalidomide including but not limited to birth defects, anxiety, weakness, chest pain, dizziness, cough and severe allergy. Nsaids Counseling: NSAID Counseling: I discussed with the patient that NSAIDs should be taken with food. Prolonged use of NSAIDs can result in the development of stomach ulcers.  Patient advised to stop taking NSAIDs if abdominal pain occurs.  The patient verbalized understanding of the proper use and possible adverse effects of NSAIDs.  All of the patient's questions and concerns were addressed. Topical Sulfur Applications Counseling: Topical Sulfur Counseling: Patient counseled that this medication may cause skin irritation or allergic reactions.  In the event of skin irritation, the patient was advised to reduce the amount of the drug applied or use it less frequently.   The patient verbalized understanding of the proper use and possible adverse effects of topical sulfur application.  All of the patient's questions and concerns were addressed. Opioid Counseling: I discussed with the patient the potential side effects of opioids including but not limited to addiction, altered mental status, and depression. I stressed avoiding alcohol, benzodiazepines, muscle relaxants and sleep aids unless specifically okayed by a physician. The patient verbalized understanding of the proper use and possible adverse effects of opioids. All of the patient's questions and concerns were addressed. They were instructed to flush the remaining pills down the toilet if they did not need them for pain. Bexarotene Pregnancy And Lactation Text: This medication is Pregnancy Category X and should not be given to women who are pregnant or may become pregnant. This medication should not be used if you are breast feeding. Topical Retinoid counseling:  Patient advised to apply a pea-sized amount only at bedtime and wait 30 minutes after washing their face before applying.  If too drying, patient may add a non-comedogenic moisturizer. The patient verbalized understanding of the proper use and possible adverse effects of retinoids.  All of the patient's questions and concerns were addressed. Tremfya Counseling: I discussed with the patient the risks of guselkumab including but not limited to immunosuppression, serious infections, worsening of inflammatory bowel disease and drug reactions.  The patient understands that monitoring is required including a PPD at baseline and must alert us or the primary physician if symptoms of infection or other concerning signs are noted. Sarecycline Counseling: Patient advised regarding possible photosensitivity and discoloration of the teeth, skin, lips, tongue and gums.  Patient instructed to avoid sunlight, if possible.  When exposed to sunlight, patients should wear protective clothing, sunglasses, and sunscreen.  The patient was instructed to call the office immediately if the following severe adverse effects occur:  hearing changes, easy bruising/bleeding, severe headache, or vision changes.  The patient verbalized understanding of the proper use and possible adverse effects of sarecycline.  All of the patient's questions and concerns were addressed. Enbrel Counseling:  I discussed with the patient the risks of etanercept including but not limited to myelosuppression, immunosuppression, autoimmune hepatitis, demyelinating diseases, lymphoma, and infections.  The patient understands that monitoring is required including a PPD at baseline and must alert us or the primary physician if symptoms of infection or other concerning signs are noted. Otezla Pregnancy And Lactation Text: This medication is Pregnancy Category C and it isn't known if it is safe during pregnancy. It is unknown if it is excreted in breast milk. Glycopyrrolate Counseling:  I discussed with the patient the risks of glycopyrrolate including but not limited to skin rash, drowsiness, dry mouth, difficulty urinating, and blurred vision.

## 2020-09-20 ENCOUNTER — INPATIENT (INPATIENT)
Facility: HOSPITAL | Age: 80
LOS: 3 days | Discharge: HOME | DRG: 690 | End: 2020-09-24
Attending: INTERNAL MEDICINE | Admitting: INTERNAL MEDICINE
Payer: MEDICARE

## 2020-09-20 VITALS
HEIGHT: 66.5 IN | RESPIRATION RATE: 16 BRPM | DIASTOLIC BLOOD PRESSURE: 88 MMHG | HEART RATE: 75 BPM | SYSTOLIC BLOOD PRESSURE: 124 MMHG | OXYGEN SATURATION: 97 % | WEIGHT: 145.06 LBS | TEMPERATURE: 98 F

## 2020-09-20 DIAGNOSIS — R55 SYNCOPE AND COLLAPSE: ICD-10-CM

## 2020-09-20 DIAGNOSIS — S22.080A WEDGE COMPRESSION FRACTURE OF T11-T12 VERTEBRA, INITIAL ENCOUNTER FOR CLOSED FRACTURE: Chronic | ICD-10-CM

## 2020-09-20 DIAGNOSIS — I10 ESSENTIAL (PRIMARY) HYPERTENSION: ICD-10-CM

## 2020-09-20 DIAGNOSIS — F03.90 UNSPECIFIED DEMENTIA WITHOUT BEHAVIORAL DISTURBANCE: ICD-10-CM

## 2020-09-20 DIAGNOSIS — A41.9 SEPSIS, UNSPECIFIED ORGANISM: ICD-10-CM

## 2020-09-20 DIAGNOSIS — I48.91 UNSPECIFIED ATRIAL FIBRILLATION: ICD-10-CM

## 2020-09-20 DIAGNOSIS — E78.00 PURE HYPERCHOLESTEROLEMIA, UNSPECIFIED: ICD-10-CM

## 2020-09-20 LAB
ALBUMIN SERPL ELPH-MCNC: 3.8 G/DL — SIGNIFICANT CHANGE UP (ref 3.3–5)
ALP SERPL-CCNC: 69 U/L — SIGNIFICANT CHANGE UP (ref 40–120)
ALT FLD-CCNC: 8 U/L — LOW (ref 10–45)
ANION GAP SERPL CALC-SCNC: 12 MMOL/L — SIGNIFICANT CHANGE UP (ref 5–17)
APTT BLD: 27.8 SEC — SIGNIFICANT CHANGE UP (ref 27.5–35.5)
AST SERPL-CCNC: 16 U/L — SIGNIFICANT CHANGE UP (ref 10–40)
BASE EXCESS BLDV CALC-SCNC: 3 MMOL/L — HIGH (ref -2–2)
BASOPHILS # BLD AUTO: 0.06 K/UL — SIGNIFICANT CHANGE UP (ref 0–0.2)
BASOPHILS NFR BLD AUTO: 0.4 % — SIGNIFICANT CHANGE UP (ref 0–2)
BILIRUB SERPL-MCNC: 0.4 MG/DL — SIGNIFICANT CHANGE UP (ref 0.2–1.2)
BUN SERPL-MCNC: 13 MG/DL — SIGNIFICANT CHANGE UP (ref 7–23)
CA-I SERPL-SCNC: 1.16 MMOL/L — SIGNIFICANT CHANGE UP (ref 1.12–1.3)
CALCIUM SERPL-MCNC: 9.3 MG/DL — SIGNIFICANT CHANGE UP (ref 8.4–10.5)
CHLORIDE BLDV-SCNC: 101 MMOL/L — SIGNIFICANT CHANGE UP (ref 96–108)
CHLORIDE SERPL-SCNC: 100 MMOL/L — SIGNIFICANT CHANGE UP (ref 96–108)
CO2 BLDV-SCNC: 30 MMOL/L — SIGNIFICANT CHANGE UP (ref 22–30)
CO2 SERPL-SCNC: 25 MMOL/L — SIGNIFICANT CHANGE UP (ref 22–31)
CREAT SERPL-MCNC: 0.94 MG/DL — SIGNIFICANT CHANGE UP (ref 0.5–1.3)
EOSINOPHIL # BLD AUTO: 0.24 K/UL — SIGNIFICANT CHANGE UP (ref 0–0.5)
EOSINOPHIL NFR BLD AUTO: 1.8 % — SIGNIFICANT CHANGE UP (ref 0–6)
GAS PNL BLDV: 138 MMOL/L — SIGNIFICANT CHANGE UP (ref 135–145)
GAS PNL BLDV: SIGNIFICANT CHANGE UP
GAS PNL BLDV: SIGNIFICANT CHANGE UP
GLUCOSE BLDV-MCNC: 136 MG/DL — HIGH (ref 70–99)
GLUCOSE SERPL-MCNC: 142 MG/DL — HIGH (ref 70–99)
HCO3 BLDV-SCNC: 28 MMOL/L — SIGNIFICANT CHANGE UP (ref 21–29)
HCT VFR BLD CALC: 38.7 % — SIGNIFICANT CHANGE UP (ref 34.5–45)
HCT VFR BLDA CALC: 39 % — SIGNIFICANT CHANGE UP (ref 39–50)
HGB BLD CALC-MCNC: 12.7 G/DL — SIGNIFICANT CHANGE UP (ref 11.5–15.5)
HGB BLD-MCNC: 12.3 G/DL — SIGNIFICANT CHANGE UP (ref 11.5–15.5)
IMM GRANULOCYTES NFR BLD AUTO: 0.9 % — SIGNIFICANT CHANGE UP (ref 0–1.5)
INR BLD: 1.45 RATIO — HIGH (ref 0.88–1.16)
LACTATE BLDV-MCNC: 2.9 MMOL/L — HIGH (ref 0.7–2)
LYMPHOCYTES # BLD AUTO: 1.76 K/UL — SIGNIFICANT CHANGE UP (ref 1–3.3)
LYMPHOCYTES # BLD AUTO: 13.2 % — SIGNIFICANT CHANGE UP (ref 13–44)
MAGNESIUM SERPL-MCNC: 2.3 MG/DL — SIGNIFICANT CHANGE UP (ref 1.6–2.6)
MCHC RBC-ENTMCNC: 30.8 PG — SIGNIFICANT CHANGE UP (ref 27–34)
MCHC RBC-ENTMCNC: 31.8 GM/DL — LOW (ref 32–36)
MCV RBC AUTO: 97 FL — SIGNIFICANT CHANGE UP (ref 80–100)
MONOCYTES # BLD AUTO: 0.8 K/UL — SIGNIFICANT CHANGE UP (ref 0–0.9)
MONOCYTES NFR BLD AUTO: 6 % — SIGNIFICANT CHANGE UP (ref 2–14)
NEUTROPHILS # BLD AUTO: 10.37 K/UL — HIGH (ref 1.8–7.4)
NEUTROPHILS NFR BLD AUTO: 77.7 % — HIGH (ref 43–77)
NRBC # BLD: 0 /100 WBCS — SIGNIFICANT CHANGE UP (ref 0–0)
NT-PROBNP SERPL-SCNC: 102 PG/ML — SIGNIFICANT CHANGE UP (ref 0–300)
PCO2 BLDV: 50 MMHG — SIGNIFICANT CHANGE UP (ref 35–50)
PH BLDV: 7.38 — SIGNIFICANT CHANGE UP (ref 7.35–7.45)
PHOSPHATE SERPL-MCNC: 3.6 MG/DL — SIGNIFICANT CHANGE UP (ref 2.5–4.5)
PLATELET # BLD AUTO: 389 K/UL — SIGNIFICANT CHANGE UP (ref 150–400)
PO2 BLDV: 32 MMHG — SIGNIFICANT CHANGE UP (ref 25–45)
POTASSIUM BLDV-SCNC: 4.2 MMOL/L — SIGNIFICANT CHANGE UP (ref 3.5–5.3)
POTASSIUM SERPL-MCNC: 4 MMOL/L — SIGNIFICANT CHANGE UP (ref 3.5–5.3)
POTASSIUM SERPL-SCNC: 4 MMOL/L — SIGNIFICANT CHANGE UP (ref 3.5–5.3)
PROT SERPL-MCNC: 7.5 G/DL — SIGNIFICANT CHANGE UP (ref 6–8.3)
PROTHROM AB SERPL-ACNC: 16.9 SEC — HIGH (ref 10.6–13.6)
RAPID RVP RESULT: SIGNIFICANT CHANGE UP
RBC # BLD: 3.99 M/UL — SIGNIFICANT CHANGE UP (ref 3.8–5.2)
RBC # FLD: 14.6 % — HIGH (ref 10.3–14.5)
SAO2 % BLDV: 55 % — LOW (ref 67–88)
SARS-COV-2 RNA SPEC QL NAA+PROBE: SIGNIFICANT CHANGE UP
SODIUM SERPL-SCNC: 137 MMOL/L — SIGNIFICANT CHANGE UP (ref 135–145)
TROPONIN T, HIGH SENSITIVITY RESULT: 14 NG/L — SIGNIFICANT CHANGE UP (ref 0–51)
TROPONIN T, HIGH SENSITIVITY RESULT: 15 NG/L — SIGNIFICANT CHANGE UP (ref 0–51)
WBC # BLD: 13.35 K/UL — HIGH (ref 3.8–10.5)
WBC # FLD AUTO: 13.35 K/UL — HIGH (ref 3.8–10.5)

## 2020-09-20 PROCEDURE — 99285 EMERGENCY DEPT VISIT HI MDM: CPT

## 2020-09-20 PROCEDURE — 71045 X-RAY EXAM CHEST 1 VIEW: CPT | Mod: 26

## 2020-09-20 PROCEDURE — 93010 ELECTROCARDIOGRAM REPORT: CPT

## 2020-09-20 RX ORDER — DIGOXIN 250 MCG
0.12 TABLET ORAL
Refills: 0 | Status: DISCONTINUED | OUTPATIENT
Start: 2020-09-20 | End: 2020-09-24

## 2020-09-20 RX ORDER — ATORVASTATIN CALCIUM 80 MG/1
10 TABLET, FILM COATED ORAL AT BEDTIME
Refills: 0 | Status: DISCONTINUED | OUTPATIENT
Start: 2020-09-20 | End: 2020-09-24

## 2020-09-20 RX ORDER — DILTIAZEM HCL 120 MG
180 CAPSULE, EXT RELEASE 24 HR ORAL DAILY
Refills: 0 | Status: DISCONTINUED | OUTPATIENT
Start: 2020-09-20 | End: 2020-09-24

## 2020-09-20 RX ORDER — FUROSEMIDE 40 MG
20 TABLET ORAL DAILY
Refills: 0 | Status: DISCONTINUED | OUTPATIENT
Start: 2020-09-20 | End: 2020-09-24

## 2020-09-20 RX ORDER — SODIUM CHLORIDE 9 MG/ML
500 INJECTION INTRAMUSCULAR; INTRAVENOUS; SUBCUTANEOUS ONCE
Refills: 0 | Status: COMPLETED | OUTPATIENT
Start: 2020-09-20 | End: 2020-09-20

## 2020-09-20 RX ORDER — MEMANTINE HYDROCHLORIDE 10 MG/1
10 TABLET ORAL DAILY
Refills: 0 | Status: DISCONTINUED | OUTPATIENT
Start: 2020-09-20 | End: 2020-09-24

## 2020-09-20 RX ORDER — DONEPEZIL HYDROCHLORIDE 10 MG/1
10 TABLET, FILM COATED ORAL AT BEDTIME
Refills: 0 | Status: DISCONTINUED | OUTPATIENT
Start: 2020-09-20 | End: 2020-09-24

## 2020-09-20 RX ORDER — APIXABAN 2.5 MG/1
5 TABLET, FILM COATED ORAL
Refills: 0 | Status: DISCONTINUED | OUTPATIENT
Start: 2020-09-20 | End: 2020-09-24

## 2020-09-20 RX ORDER — INFLUENZA VIRUS VACCINE 15; 15; 15; 15 UG/.5ML; UG/.5ML; UG/.5ML; UG/.5ML
0.5 SUSPENSION INTRAMUSCULAR ONCE
Refills: 0 | Status: COMPLETED | OUTPATIENT
Start: 2020-09-20 | End: 2020-09-20

## 2020-09-20 RX ORDER — FOLIC ACID 0.8 MG
1 TABLET ORAL DAILY
Refills: 0 | Status: DISCONTINUED | OUTPATIENT
Start: 2020-09-20 | End: 2020-09-24

## 2020-09-20 RX ADMIN — APIXABAN 5 MILLIGRAM(S): 2.5 TABLET, FILM COATED ORAL at 18:25

## 2020-09-20 RX ADMIN — ATORVASTATIN CALCIUM 10 MILLIGRAM(S): 80 TABLET, FILM COATED ORAL at 21:46

## 2020-09-20 RX ADMIN — DONEPEZIL HYDROCHLORIDE 10 MILLIGRAM(S): 10 TABLET, FILM COATED ORAL at 21:46

## 2020-09-20 RX ADMIN — SODIUM CHLORIDE 500 MILLILITER(S): 9 INJECTION INTRAMUSCULAR; INTRAVENOUS; SUBCUTANEOUS at 15:28

## 2020-09-20 NOTE — ED PROVIDER NOTE - CLINICAL SUMMARY MEDICAL DECISION MAKING FREE TEXT BOX
Elderly female comes to ed with wittness syncope without prodrome. Concerns for arrthymia/acs, check ekg.trop,labs,xry probable admit for same  Isrrael Tavares MD, [facility]

## 2020-09-20 NOTE — ED ADULT NURSE NOTE - OBJECTIVE STATEMENT
81 yo female presents to ED from home s/p syncopal event while going down stairs on stair lift. Per EMS, patient's son states patient was riding down the stairs when she "was out of it for a few seconds", regained consciousness shortly after. Patient states she has no recollection of the event, was buckled in and did not fall out of chair. Patient denies any SOB, CP, nvd, fever/chills, sick contacts, falls/loc. Patient A&Ox3, breathing spontaneously, airway patent, bl clear lungs, abdomen nontender, +pulses, cap refill <2 seconds. Cardiac monitor in place, MD at bedside.

## 2020-09-20 NOTE — H&P ADULT - NSICDXPASTMEDICALHX_GEN_ALL_CORE_FT
PAST MEDICAL HISTORY:  Afib     Arthritis     High cholesterol     Hypertension     Osteoporosis

## 2020-09-20 NOTE — H&P ADULT - HISTORY OF PRESENT ILLNESS
80 year old female with pmhx HTN, HLD, Afib on Digoxin and Eliquis, dementia presents to ED after syncopal episode today. Patient reports that she remembers being on he stair lift chair  and then remembers waking up surrounded by family. Per EMS pt was riding down stairs when she suddenly lost consciousness witnesses by son and daughter in law. LOC lasting seconds and pt returned to baseline immediately upon waking. No tongue biting or loss of bowl/bladder function. Pt was buckled in and did not sustain any injury. Otherwise has been feeling well. Denies recent illness, fevers, chills, chest pain, sob, abd pain, n/v/d, urinary symptoms. Patient seen now resting comfortably.

## 2020-09-20 NOTE — H&P ADULT - NSHPPHYSICALEXAM_GEN_ALL_CORE
PHYSICAL EXAM:  GENERAL: NAD, well nourished and conversant  HEAD:  Atraumatic  EYES: EOM, PERRLA, conjunctiva pink and sclera white  ENT: No tonsillar erythema, exudates, or enlargement, moist mucous membranes, good dentition, no lesions  NECK: Supple, No JVD, normal thyroid, carotids with normal upstrokes and no bruits  CHEST/LUNG: Clear to auscultation bilaterally, No rales, rhonchi, wheezing, or rubs  HEART: Regular rate and rhythm, No murmurs, rubs, or gallops  ABDOMEN: Soft, nondistended, no masses, guarding, tenderness or rebound, bowel sounds present  EXTREMITIES:  2+ Peripheral Pulses, No clubbing, cyanosis, or edema.   LYMPH: No lymphadenopathy noted  SKIN: No rashes or lesions  NERVOUS SYSTEM:  Alert & Oriented

## 2020-09-20 NOTE — H&P ADULT - ASSESSMENT
79 yo woman with a hx of PAF presents after a witness syncopal episode at home. Patient seen now resting comfortably

## 2020-09-20 NOTE — ED PROVIDER NOTE - PHYSICAL EXAMINATION
CONSTITUTIONAL: Patient is awake, alert and oriented x 3. Patient is well appearing and in no acute distress.  HEAD: NCAT,   NECK: supple, FROM  LUNGS: CTA B/L,  HEART: Reg Rate, Irregular Rhythm,   ABDOMEN: Soft nd/nt, no rebound or guarding.   EXTREMITY: no edema or calf tenderness b/l, FROM upper and lower ext b/l  SKIN: with no rash or lesions.   NEURO: No focal deficits

## 2020-09-20 NOTE — H&P ADULT - PROBLEM SELECTOR PLAN 2
continue current meds  monitor rate  continue eliquis for afib  continue digoxin and diltiazem for rate control

## 2020-09-20 NOTE — H&P ADULT - NSHPLABSRESULTS_GEN_ALL_CORE
12.3   13.35 )-----------( 389      ( 20 Sep 2020 15:00 )             38.7       09-20    137  |  100  |  13  ----------------------------<  142<H>  4.0   |  25  |  0.94    Ca    9.3      20 Sep 2020 15:00  Phos  3.6     09-20  Mg     2.3     09-20    TPro  7.5  /  Alb  3.8  /  TBili  0.4  /  DBili  x   /  AST  16  /  ALT  8<L>  /  AlkPhos  69  09-20                  PT/INR - ( 20 Sep 2020 15:00 )   PT: 16.9 sec;   INR: 1.45 ratio         PTT - ( 20 Sep 2020 15:00 )  PTT:27.8 sec    Lactate Trend            CAPILLARY BLOOD GLUCOSE      POCT Blood Glucose.: 133 mg/dL (20 Sep 2020 14:36)

## 2020-09-20 NOTE — ED ADULT NURSE NOTE - NSIMPLEMENTINTERV_GEN_ALL_ED
Implemented All Fall with Harm Risk Interventions:  [place] to call system. Call bell, personal items and telephone within reach. Instruct patient to call for assistance. Room bathroom lighting operational. Non-slip footwear when patient is off stretcher. Physically safe environment: no spills, clutter or unnecessary equipment. Stretcher in lowest position, wheels locked, appropriate side rails in place. Provide visual cue, wrist band, yellow gown, etc. Monitor gait and stability. Monitor for mental status changes and reorient to person, place, and time. Review medications for side effects contributing to fall risk. Reinforce activity limits and safety measures with patient and family. Provide visual clues: red socks.

## 2020-09-20 NOTE — ED PROVIDER NOTE - ATTENDING CONTRIBUTION TO CARE
80y female PMH Afib on dig/eliquis, of HTN, HLD, osteoporosis,compression fracture at T12 Comes to ED complains of syncope at home. According to EMS pt was descending stairs in electric stair elevator and had witnessed syncopal episode. Unconscious few min and recovered. Private Physician Joi  80y female PMH Afib on dig/eliquis, of HTN, HLD, osteoporosis,compression fracture at T12 Comes to ED complains of syncope at home. According to EMS pt was descending stairs in electric stair elevator and had witnessed syncopal episode. Unconscious few min and recovered. Pt has no recall of events. I was descending the stairs and then all of a sudden by son and his girlfriend were all over me. No cp.sob,palps,nv,fc,cough. sputum PE WDWN female awake alert normocephalic atraumatic neck supple chest clear ap abd soft +bs no mass guarding cv irregualr no rubs, gallops or murmurs, neuro gcs 15 speech fluent oriented x3, moves all extr pain light touch intact  Isrrael Tavares MD, [facility]

## 2020-09-20 NOTE — H&P ADULT - NSHPREVIEWOFSYSTEMS_GEN_ALL_CORE
REVIEW OF SYSTEMS:  CONSTITUTIONAL: No fever, change in weight, or fatigue  HEAD: No headache, dizziness or recent trauma  EYES: No eye pain, visual disturbances, or discharge  ENT:  No difficulty hearing, tinnitus, vertigo, No sinus or throat pain  NECK: No pain or stiffness  BREASTS: No pain, masses, or nipple discharge  RESPIRATORY: No cough, wheezing, chills or hemoptysis, No shortness of breath at rest or exertional shortness of breath  CARDIOVASCULAR: No chest pain, palpitations, dizziness, CHF, arrhythmia, cardiomegaly or leg swelling  GASTROINTESTINAL: No abdominal or epigastric pain. No nausea, vomiting, or hematemesis, No diarrhea or constipation. No melena or hematochezia.  GENITOURINARY: No dysuria, frequency, hematuria, or incontinence  SKIN: No itching, burning, rashes, or lesions   LYMPH NODES: No history of enlarged glands  ENDOCRINE: No heat or cold intolerance, No hair loss. No osteoporosis or thyroid disease  MUSCULOSKELETAL: No joint pain or swelling, No muscle, back, or extremity pain  PSYCHIATRIC: No depression, anxiety, mood swings, or difficulty sleeping  HEME/LYMPH: No easy bruising, anticoagulants, bleeding disorder or bleeding gums  ALLERGY AND IMMUNOLOGIC: No hives or eczema  NEUROLOGICAL: short term memory loss

## 2020-09-20 NOTE — ED PROVIDER NOTE - OBJECTIVE STATEMENT
80 year old female with pmhx HTN, HLD, Afib on Digoxin and Eliquis, dementia presents to ED after syncopal episode today. Patient reports that she remembers being on he stair lift chair  and then remembers waking up surrounded by family. Per EMS pt was riding down stairs when she suddenly lost consciousness witnesses by son and daughter in law. LOC lasting seconds and pt returned to baseline immediately upon waking. No tongue biting or loss of bowl/bladder function. Pt was buckled in and did not sustain any injury. Otherwise has been feeling well. Denies recent illness, fevers, chills, chest pain, sob, abd pain, n/v/d, urinary symptoms

## 2020-09-21 LAB
ANION GAP SERPL CALC-SCNC: 10 MMOL/L — SIGNIFICANT CHANGE UP (ref 5–17)
BUN SERPL-MCNC: 16 MG/DL — SIGNIFICANT CHANGE UP (ref 7–23)
CALCIUM SERPL-MCNC: 9 MG/DL — SIGNIFICANT CHANGE UP (ref 8.4–10.5)
CHLORIDE SERPL-SCNC: 101 MMOL/L — SIGNIFICANT CHANGE UP (ref 96–108)
CO2 SERPL-SCNC: 26 MMOL/L — SIGNIFICANT CHANGE UP (ref 22–31)
CREAT SERPL-MCNC: 1.06 MG/DL — SIGNIFICANT CHANGE UP (ref 0.5–1.3)
DIGOXIN SERPL-MCNC: 0.4 NG/ML — LOW (ref 0.8–2)
DIGOXIN SERPL-MCNC: 0.5 NG/ML — LOW (ref 0.8–2)
GLUCOSE SERPL-MCNC: 130 MG/DL — HIGH (ref 70–99)
HCT VFR BLD CALC: 35.1 % — SIGNIFICANT CHANGE UP (ref 34.5–45)
HGB BLD-MCNC: 11.2 G/DL — LOW (ref 11.5–15.5)
MCHC RBC-ENTMCNC: 30.9 PG — SIGNIFICANT CHANGE UP (ref 27–34)
MCHC RBC-ENTMCNC: 31.9 GM/DL — LOW (ref 32–36)
MCV RBC AUTO: 97 FL — SIGNIFICANT CHANGE UP (ref 80–100)
NRBC # BLD: 0 /100 WBCS — SIGNIFICANT CHANGE UP (ref 0–0)
PLATELET # BLD AUTO: 350 K/UL — SIGNIFICANT CHANGE UP (ref 150–400)
POTASSIUM SERPL-MCNC: 4 MMOL/L — SIGNIFICANT CHANGE UP (ref 3.5–5.3)
POTASSIUM SERPL-SCNC: 4 MMOL/L — SIGNIFICANT CHANGE UP (ref 3.5–5.3)
RBC # BLD: 3.62 M/UL — LOW (ref 3.8–5.2)
RBC # FLD: 14.6 % — HIGH (ref 10.3–14.5)
SARS-COV-2 IGG SERPL QL IA: NEGATIVE — SIGNIFICANT CHANGE UP
SARS-COV-2 IGM SERPL IA-ACNC: <0.1 INDEX — SIGNIFICANT CHANGE UP
SODIUM SERPL-SCNC: 137 MMOL/L — SIGNIFICANT CHANGE UP (ref 135–145)
WBC # BLD: 12.24 K/UL — HIGH (ref 3.8–10.5)
WBC # FLD AUTO: 12.24 K/UL — HIGH (ref 3.8–10.5)

## 2020-09-21 PROCEDURE — 93306 TTE W/DOPPLER COMPLETE: CPT | Mod: 26

## 2020-09-21 RX ORDER — SODIUM CHLORIDE 9 MG/ML
1000 INJECTION INTRAMUSCULAR; INTRAVENOUS; SUBCUTANEOUS
Refills: 0 | Status: DISCONTINUED | OUTPATIENT
Start: 2020-09-21 | End: 2020-09-24

## 2020-09-21 RX ADMIN — SODIUM CHLORIDE 50 MILLILITER(S): 9 INJECTION INTRAMUSCULAR; INTRAVENOUS; SUBCUTANEOUS at 21:14

## 2020-09-21 RX ADMIN — APIXABAN 5 MILLIGRAM(S): 2.5 TABLET, FILM COATED ORAL at 17:23

## 2020-09-21 RX ADMIN — ATORVASTATIN CALCIUM 10 MILLIGRAM(S): 80 TABLET, FILM COATED ORAL at 21:13

## 2020-09-21 RX ADMIN — DONEPEZIL HYDROCHLORIDE 10 MILLIGRAM(S): 10 TABLET, FILM COATED ORAL at 21:13

## 2020-09-21 RX ADMIN — MEMANTINE HYDROCHLORIDE 10 MILLIGRAM(S): 10 TABLET ORAL at 11:46

## 2020-09-21 RX ADMIN — APIXABAN 5 MILLIGRAM(S): 2.5 TABLET, FILM COATED ORAL at 05:23

## 2020-09-21 RX ADMIN — Medication 1 MILLIGRAM(S): at 11:46

## 2020-09-21 RX ADMIN — Medication 180 MILLIGRAM(S): at 05:23

## 2020-09-21 RX ADMIN — Medication 0.12 MILLIGRAM(S): at 08:12

## 2020-09-21 RX ADMIN — Medication 20 MILLIGRAM(S): at 05:23

## 2020-09-21 NOTE — PROGRESS NOTE ADULT - ASSESSMENT
81 yo woman with a hx of PAF presents after a witness syncopal episode at home. Patient seen now resting comfortably

## 2020-09-21 NOTE — PROGRESS NOTE ADULT - SUBJECTIVE AND OBJECTIVE BOX
80 year old female with pmhx HTN, HLD, Afib on Digoxin and Eliquis, dementia presents to ED after syncopal episode today. Patient reports that she remembers being on he stair lift chair  and then remembers waking up surrounded by family. Per EMS pt was riding down stairs when she suddenly lost consciousness witnesses by son and daughter in law. LOC lasting seconds and pt returned to baseline immediately upon waking. No tongue biting or loss of bowl/bladder function. Pt was buckled in and did not sustain any injury. Otherwise has been feeling well. Denies recent illness, fevers, chills, chest pain, sob, abd pain, n/v/d, urinary symptoms. Patient seen now resting comfortably. Patient with no arrhthymias on the monitor. No new witness syncopal events.     MEDICATIONS  (STANDING):  apixaban 5 milliGRAM(s) Oral two times a day  atorvastatin 10 milliGRAM(s) Oral at bedtime  digoxin     Tablet 0.125 milliGRAM(s) Oral <User Schedule>  diltiazem    milliGRAM(s) Oral daily  donepezil 10 milliGRAM(s) Oral at bedtime  folic acid 1 milliGRAM(s) Oral daily  furosemide    Tablet 20 milliGRAM(s) Oral daily  influenza   Vaccine 0.5 milliLiter(s) IntraMuscular once  memantine 10 milliGRAM(s) Oral daily    MEDICATIONS  (PRN):          VITALS:   T(C): 36.5 (09-21-20 @ 15:06), Max: 36.9 (09-20-20 @ 17:52)  HR: 80 (09-21-20 @ 15:06) (74 - 85)  BP: 119/73 (09-21-20 @ 15:06) (102/73 - 135/77)  RR: 18 (09-21-20 @ 15:06) (18 - 18)  SpO2: 98% (09-21-20 @ 15:06) (97% - 98%)  Wt(kg): --    PHYSICAL EXAM:  GENERAL: NAD, well nourished and conversant  HEAD:  Atraumatic  EYES: EOM, PERRLA, conjunctiva pink and sclera white  ENT: No tonsillar erythema, exudates, or enlargement, moist mucous membranes, good dentition, no lesions  NECK: Supple, No JVD, normal thyroid, carotids with normal upstrokes and no bruits  CHEST/LUNG: Clear to auscultation bilaterally, No rales, rhonchi, wheezing, or rubs  HEART: Regular rate and rhythm, No murmurs, rubs, or gallops  ABDOMEN: Soft, nondistended, no masses, guarding, tenderness or rebound, bowel sounds present  EXTREMITIES:  2+ Peripheral Pulses, No clubbing, cyanosis, or edema.   LYMPH: No lymphadenopathy noted  SKIN: No rashes or lesions  NERVOUS SYSTEM:  Alert & Oriented    LABS:        CBC Full  -  ( 21 Sep 2020 06:06 )  WBC Count : 12.24 K/uL  RBC Count : 3.62 M/uL  Hemoglobin : 11.2 g/dL  Hematocrit : 35.1 %  Platelet Count - Automated : 350 K/uL  Mean Cell Volume : 97.0 fl  Mean Cell Hemoglobin : 30.9 pg  Mean Cell Hemoglobin Concentration : 31.9 gm/dL  Auto Neutrophil # : x  Auto Lymphocyte # : x  Auto Monocyte # : x  Auto Eosinophil # : x  Auto Basophil # : x  Auto Neutrophil % : x  Auto Lymphocyte % : x  Auto Monocyte % : x  Auto Eosinophil % : x  Auto Basophil % : x    09-21    137  |  101  |  16  ----------------------------<  130<H>  4.0   |  26  |  1.06    Ca    9.0      21 Sep 2020 06:06  Phos  3.6     09-20  Mg     2.3     09-20    TPro  7.5  /  Alb  3.8  /  TBili  0.4  /  DBili  x   /  AST  16  /  ALT  8<L>  /  AlkPhos  69  09-20    LIVER FUNCTIONS - ( 20 Sep 2020 15:00 )  Alb: 3.8 g/dL / Pro: 7.5 g/dL / ALK PHOS: 69 U/L / ALT: 8 U/L / AST: 16 U/L / GGT: x           PT/INR - ( 20 Sep 2020 15:00 )   PT: 16.9 sec;   INR: 1.45 ratio         PTT - ( 20 Sep 2020 15:00 )  PTT:27.8 sec    CAPILLARY BLOOD GLUCOSE          RADIOLOGY & ADDITIONAL TESTS:

## 2020-09-21 NOTE — PROGRESS NOTE ADULT - PROBLEM SELECTOR PLAN 1
will monitor on tele  adjust meds as needed  ECHO done results pending  UA C&S sent to r/o Urinary tract infection

## 2020-09-22 LAB
ANION GAP SERPL CALC-SCNC: 10 MMOL/L — SIGNIFICANT CHANGE UP (ref 5–17)
APPEARANCE UR: ABNORMAL
BACTERIA # UR AUTO: ABNORMAL
BILIRUB UR-MCNC: NEGATIVE — SIGNIFICANT CHANGE UP
BUN SERPL-MCNC: 13 MG/DL — SIGNIFICANT CHANGE UP (ref 7–23)
CALCIUM SERPL-MCNC: 9 MG/DL — SIGNIFICANT CHANGE UP (ref 8.4–10.5)
CHLORIDE SERPL-SCNC: 103 MMOL/L — SIGNIFICANT CHANGE UP (ref 96–108)
CO2 SERPL-SCNC: 25 MMOL/L — SIGNIFICANT CHANGE UP (ref 22–31)
COD CRY URNS QL: ABNORMAL
COLOR SPEC: YELLOW — SIGNIFICANT CHANGE UP
CREAT SERPL-MCNC: 0.91 MG/DL — SIGNIFICANT CHANGE UP (ref 0.5–1.3)
DIFF PNL FLD: NEGATIVE — SIGNIFICANT CHANGE UP
EPI CELLS # UR: 1 /HPF — SIGNIFICANT CHANGE UP (ref 0–5)
GLUCOSE SERPL-MCNC: 145 MG/DL — HIGH (ref 70–99)
GLUCOSE UR QL: NEGATIVE — SIGNIFICANT CHANGE UP
HCT VFR BLD CALC: 34.4 % — LOW (ref 34.5–45)
HGB BLD-MCNC: 10.9 G/DL — LOW (ref 11.5–15.5)
HYALINE CASTS # UR AUTO: 0 /LPF — SIGNIFICANT CHANGE UP (ref 0–7)
KETONES UR-MCNC: NEGATIVE — SIGNIFICANT CHANGE UP
LEUKOCYTE ESTERASE UR-ACNC: ABNORMAL
MCHC RBC-ENTMCNC: 30.5 PG — SIGNIFICANT CHANGE UP (ref 27–34)
MCHC RBC-ENTMCNC: 31.7 GM/DL — LOW (ref 32–36)
MCV RBC AUTO: 96.4 FL — SIGNIFICANT CHANGE UP (ref 80–100)
NITRITE UR-MCNC: POSITIVE
NRBC # BLD: 0 /100 WBCS — SIGNIFICANT CHANGE UP (ref 0–0)
PH UR: 5.5 — SIGNIFICANT CHANGE UP (ref 5–8)
PLATELET # BLD AUTO: 327 K/UL — SIGNIFICANT CHANGE UP (ref 150–400)
POTASSIUM SERPL-MCNC: 3.9 MMOL/L — SIGNIFICANT CHANGE UP (ref 3.5–5.3)
POTASSIUM SERPL-SCNC: 3.9 MMOL/L — SIGNIFICANT CHANGE UP (ref 3.5–5.3)
PROT UR-MCNC: SIGNIFICANT CHANGE UP
RBC # BLD: 3.57 M/UL — LOW (ref 3.8–5.2)
RBC # FLD: 14.5 % — SIGNIFICANT CHANGE UP (ref 10.3–14.5)
RBC CASTS # UR COMP ASSIST: 1 /HPF — SIGNIFICANT CHANGE UP (ref 0–4)
SODIUM SERPL-SCNC: 138 MMOL/L — SIGNIFICANT CHANGE UP (ref 135–145)
SP GR SPEC: 1.02 — SIGNIFICANT CHANGE UP (ref 1.01–1.02)
UROBILINOGEN FLD QL: SIGNIFICANT CHANGE UP
WBC # BLD: 10.18 K/UL — SIGNIFICANT CHANGE UP (ref 3.8–10.5)
WBC # FLD AUTO: 10.18 K/UL — SIGNIFICANT CHANGE UP (ref 3.8–10.5)
WBC UR QL: 30 /HPF — HIGH (ref 0–5)

## 2020-09-22 RX ADMIN — Medication 1 MILLIGRAM(S): at 11:00

## 2020-09-22 RX ADMIN — APIXABAN 5 MILLIGRAM(S): 2.5 TABLET, FILM COATED ORAL at 16:29

## 2020-09-22 RX ADMIN — APIXABAN 5 MILLIGRAM(S): 2.5 TABLET, FILM COATED ORAL at 05:41

## 2020-09-22 RX ADMIN — Medication 180 MILLIGRAM(S): at 05:41

## 2020-09-22 RX ADMIN — Medication 20 MILLIGRAM(S): at 05:41

## 2020-09-22 RX ADMIN — ATORVASTATIN CALCIUM 10 MILLIGRAM(S): 80 TABLET, FILM COATED ORAL at 22:16

## 2020-09-22 RX ADMIN — MEMANTINE HYDROCHLORIDE 10 MILLIGRAM(S): 10 TABLET ORAL at 11:00

## 2020-09-22 RX ADMIN — DONEPEZIL HYDROCHLORIDE 10 MILLIGRAM(S): 10 TABLET, FILM COATED ORAL at 22:16

## 2020-09-22 NOTE — PHYSICAL THERAPY INITIAL EVALUATION ADULT - PERTINENT HX OF CURRENT PROBLEM, REHAB EVAL
81 yo woman with a hx of PAF presents after a witness syncopal episode at home. Pt was buckled into her chair lift & did not sustain injury.

## 2020-09-22 NOTE — PROGRESS NOTE ADULT - SUBJECTIVE AND OBJECTIVE BOX
80 year old female with pmhx HTN, HLD, Afib on Digoxin and Eliquis, dementia presents to ED after syncopal episode today. Patient reports that she remembers being on he stair lift chair  and then remembers waking up surrounded by family. Per EMS pt was riding down stairs when she suddenly lost consciousness witnesses by son and daughter in law. LOC lasting seconds and pt returned to baseline immediately upon waking. No tongue biting or loss of bowl/bladder function. Pt was buckled in and did not sustain any injury. Otherwise has been feeling well. Denies recent illness, fevers, chills, chest pain, sob, abd pain, n/v/d, urinary symptoms. Patient seen now resting comfortably. Patient with no arrhthymias on the monitor. No new witness syncopal events.     MEDICATIONS  (STANDING):  apixaban 5 milliGRAM(s) Oral two times a day  atorvastatin 10 milliGRAM(s) Oral at bedtime  digoxin     Tablet 0.125 milliGRAM(s) Oral <User Schedule>  diltiazem    milliGRAM(s) Oral daily  donepezil 10 milliGRAM(s) Oral at bedtime  folic acid 1 milliGRAM(s) Oral daily  furosemide    Tablet 20 milliGRAM(s) Oral daily  influenza   Vaccine 0.5 milliLiter(s) IntraMuscular once  memantine 10 milliGRAM(s) Oral daily  sodium chloride 0.9%. 1000 milliLiter(s) (50 mL/Hr) IV Continuous <Continuous>    MEDICATIONS  (PRN):          VITALS:   T(C): 36.7 (20 @ 10:58), Max: 36.8 (20 @ 05:39)  HR: 74 (20 @ 10:58) (74 - 87)  BP: 118/65 (20 @ 10:58) (118/65 - 121/62)  RR: 18 (20 @ 10:58) (18 - 18)  SpO2: 98% (20 @ 10:58) (96% - 98%)  Wt(kg): --      PHYSICAL EXAM:  GENERAL: NAD, well nourished and conversant  HEAD:  Atraumatic  EYES: EOM, PERRLA, conjunctiva pink and sclera white  ENT: No tonsillar erythema, exudates, or enlargement, moist mucous membranes, good dentition, no lesions  NECK: Supple, No JVD, normal thyroid, carotids with normal upstrokes and no bruits  CHEST/LUNG: Clear to auscultation bilaterally, No rales, rhonchi, wheezing, or rubs  HEART: Regular rate and rhythm, No murmurs, rubs, or gallops  ABDOMEN: Soft, nondistended, no masses, guarding, tenderness or rebound, bowel sounds present  EXTREMITIES:  2+ Peripheral Pulses, No clubbing, cyanosis, or edema.   LYMPH: No lymphadenopathy noted  SKIN: No rashes or lesions  NERVOUS SYSTEM:  Alert & Oriented  LABS:        CBC Full  -  ( 22 Sep 2020 06:07 )  WBC Count : 10.18 K/uL  RBC Count : 3.57 M/uL  Hemoglobin : 10.9 g/dL  Hematocrit : 34.4 %  Platelet Count - Automated : 327 K/uL  Mean Cell Volume : 96.4 fl  Mean Cell Hemoglobin : 30.5 pg  Mean Cell Hemoglobin Concentration : 31.7 gm/dL  Auto Neutrophil # : x  Auto Lymphocyte # : x  Auto Monocyte # : x  Auto Eosinophil # : x  Auto Basophil # : x  Auto Neutrophil % : x  Auto Lymphocyte % : x  Auto Monocyte % : x  Auto Eosinophil % : x  Auto Basophil % : x    09-22    138  |  103  |  13  ----------------------------<  145<H>  3.9   |  25  |  0.91    Ca    9.0      22 Sep 2020 06:06  Phos  3.6     09-20  Mg     2.3     09-20    TPro  7.5  /  Alb  3.8  /  TBili  0.4  /  DBili  x   /  AST  16  /  ALT  8<L>  /  AlkPhos  69  09-20    LIVER FUNCTIONS - ( 20 Sep 2020 15:00 )  Alb: 3.8 g/dL / Pro: 7.5 g/dL / ALK PHOS: 69 U/L / ALT: 8 U/L / AST: 16 U/L / GGT: x           PT/INR - ( 20 Sep 2020 15:00 )   PT: 16.9 sec;   INR: 1.45 ratio         PTT - ( 20 Sep 2020 15:00 )  PTT:27.8 sec  Urinalysis Basic - ( 21 Sep 2020 23:58 )    Color: Yellow / Appearance: Slightly Turbid / S.016 / pH: x  Gluc: x / Ketone: Negative  / Bili: Negative / Urobili: <2 mg/dL   Blood: x / Protein: Trace / Nitrite: Positive   Leuk Esterase: Large / RBC: 1 /HPF / WBC 30 /HPF   Sq Epi: x / Non Sq Epi: 1 /HPF / Bacteria: TNTC      CAPILLARY BLOOD GLUCOSE          RADIOLOGY & ADDITIONAL TESTS:

## 2020-09-22 NOTE — PHYSICAL THERAPY INITIAL EVALUATION ADULT - ADDITIONAL COMMENTS
pt lives in a private house w/ son & son;s GF who assists w/ ADLs. 3 steps to enter home w/ handrails, ne flight of steps to negotiate to second floor bedroom & bathroom w/ chair lift. Pt has RW & SC at home for ambulation, states she sometimes doesn't use either.

## 2020-09-22 NOTE — PROGRESS NOTE ADULT - PROBLEM SELECTOR PLAN 1
will monitor on tele  continue gentle hydration  echo was unremarkable  UA C&S sent to r/o Urinary tract infection awaiting results

## 2020-09-23 LAB
-  AMIKACIN: SIGNIFICANT CHANGE UP
-  AMOXICILLIN/CLAVULANIC ACID: SIGNIFICANT CHANGE UP
-  AMPICILLIN/SULBACTAM: SIGNIFICANT CHANGE UP
-  AMPICILLIN: SIGNIFICANT CHANGE UP
-  AZTREONAM: SIGNIFICANT CHANGE UP
-  CEFAZOLIN: SIGNIFICANT CHANGE UP
-  CEFEPIME: SIGNIFICANT CHANGE UP
-  CEFOXITIN: SIGNIFICANT CHANGE UP
-  CEFTRIAXONE: SIGNIFICANT CHANGE UP
-  CIPROFLOXACIN: SIGNIFICANT CHANGE UP
-  ERTAPENEM: SIGNIFICANT CHANGE UP
-  GENTAMICIN: SIGNIFICANT CHANGE UP
-  IMIPENEM: SIGNIFICANT CHANGE UP
-  LEVOFLOXACIN: SIGNIFICANT CHANGE UP
-  MEROPENEM: SIGNIFICANT CHANGE UP
-  NITROFURANTOIN: SIGNIFICANT CHANGE UP
-  PIPERACILLIN/TAZOBACTAM: SIGNIFICANT CHANGE UP
-  TIGECYCLINE: SIGNIFICANT CHANGE UP
-  TOBRAMYCIN: SIGNIFICANT CHANGE UP
-  TRIMETHOPRIM/SULFAMETHOXAZOLE: SIGNIFICANT CHANGE UP
ANION GAP SERPL CALC-SCNC: 12 MMOL/L — SIGNIFICANT CHANGE UP (ref 5–17)
BUN SERPL-MCNC: 21 MG/DL — SIGNIFICANT CHANGE UP (ref 7–23)
CALCIUM SERPL-MCNC: 9 MG/DL — SIGNIFICANT CHANGE UP (ref 8.4–10.5)
CHLORIDE SERPL-SCNC: 102 MMOL/L — SIGNIFICANT CHANGE UP (ref 96–108)
CO2 SERPL-SCNC: 26 MMOL/L — SIGNIFICANT CHANGE UP (ref 22–31)
CREAT SERPL-MCNC: 0.93 MG/DL — SIGNIFICANT CHANGE UP (ref 0.5–1.3)
CULTURE RESULTS: SIGNIFICANT CHANGE UP
GLUCOSE SERPL-MCNC: 141 MG/DL — HIGH (ref 70–99)
HCT VFR BLD CALC: 33.5 % — LOW (ref 34.5–45)
HGB BLD-MCNC: 10.8 G/DL — LOW (ref 11.5–15.5)
MCHC RBC-ENTMCNC: 30.8 PG — SIGNIFICANT CHANGE UP (ref 27–34)
MCHC RBC-ENTMCNC: 32.2 GM/DL — SIGNIFICANT CHANGE UP (ref 32–36)
MCV RBC AUTO: 95.4 FL — SIGNIFICANT CHANGE UP (ref 80–100)
METHOD TYPE: SIGNIFICANT CHANGE UP
NRBC # BLD: 0 /100 WBCS — SIGNIFICANT CHANGE UP (ref 0–0)
ORGANISM # SPEC MICROSCOPIC CNT: SIGNIFICANT CHANGE UP
ORGANISM # SPEC MICROSCOPIC CNT: SIGNIFICANT CHANGE UP
PLATELET # BLD AUTO: 340 K/UL — SIGNIFICANT CHANGE UP (ref 150–400)
POTASSIUM SERPL-MCNC: 4 MMOL/L — SIGNIFICANT CHANGE UP (ref 3.5–5.3)
POTASSIUM SERPL-SCNC: 4 MMOL/L — SIGNIFICANT CHANGE UP (ref 3.5–5.3)
RBC # BLD: 3.51 M/UL — LOW (ref 3.8–5.2)
RBC # FLD: 14.4 % — SIGNIFICANT CHANGE UP (ref 10.3–14.5)
SODIUM SERPL-SCNC: 140 MMOL/L — SIGNIFICANT CHANGE UP (ref 135–145)
SPECIMEN SOURCE: SIGNIFICANT CHANGE UP
WBC # BLD: 11.5 K/UL — HIGH (ref 3.8–10.5)
WBC # FLD AUTO: 11.5 K/UL — HIGH (ref 3.8–10.5)

## 2020-09-23 RX ORDER — PIPERACILLIN AND TAZOBACTAM 4; .5 G/20ML; G/20ML
3.38 INJECTION, POWDER, LYOPHILIZED, FOR SOLUTION INTRAVENOUS ONCE
Refills: 0 | Status: COMPLETED | OUTPATIENT
Start: 2020-09-23 | End: 2020-09-23

## 2020-09-23 RX ORDER — PIPERACILLIN AND TAZOBACTAM 4; .5 G/20ML; G/20ML
3.38 INJECTION, POWDER, LYOPHILIZED, FOR SOLUTION INTRAVENOUS EVERY 8 HOURS
Refills: 0 | Status: DISCONTINUED | OUTPATIENT
Start: 2020-09-23 | End: 2020-09-24

## 2020-09-23 RX ADMIN — Medication 1 MILLIGRAM(S): at 08:35

## 2020-09-23 RX ADMIN — MEMANTINE HYDROCHLORIDE 10 MILLIGRAM(S): 10 TABLET ORAL at 08:36

## 2020-09-23 RX ADMIN — APIXABAN 5 MILLIGRAM(S): 2.5 TABLET, FILM COATED ORAL at 16:11

## 2020-09-23 RX ADMIN — APIXABAN 5 MILLIGRAM(S): 2.5 TABLET, FILM COATED ORAL at 05:28

## 2020-09-23 RX ADMIN — Medication 0.12 MILLIGRAM(S): at 08:36

## 2020-09-23 RX ADMIN — PIPERACILLIN AND TAZOBACTAM 25 GRAM(S): 4; .5 INJECTION, POWDER, LYOPHILIZED, FOR SOLUTION INTRAVENOUS at 21:20

## 2020-09-23 RX ADMIN — DONEPEZIL HYDROCHLORIDE 10 MILLIGRAM(S): 10 TABLET, FILM COATED ORAL at 21:20

## 2020-09-23 RX ADMIN — Medication 20 MILLIGRAM(S): at 05:28

## 2020-09-23 RX ADMIN — Medication 180 MILLIGRAM(S): at 05:28

## 2020-09-23 RX ADMIN — ATORVASTATIN CALCIUM 10 MILLIGRAM(S): 80 TABLET, FILM COATED ORAL at 21:20

## 2020-09-23 RX ADMIN — PIPERACILLIN AND TAZOBACTAM 200 GRAM(S): 4; .5 INJECTION, POWDER, LYOPHILIZED, FOR SOLUTION INTRAVENOUS at 12:31

## 2020-09-23 NOTE — PROGRESS NOTE ADULT - PROBLEM SELECTOR PLAN 1
will monitor on tele  continue gentle hydration  echo was unremarkable  UA C&S sent to r/o Urinary tract infection awaiting results  Telemetry unremarkable

## 2020-09-24 ENCOUNTER — TRANSCRIPTION ENCOUNTER (OUTPATIENT)
Age: 80
End: 2020-09-24

## 2020-09-24 VITALS
HEART RATE: 98 BPM | OXYGEN SATURATION: 98 % | RESPIRATION RATE: 18 BRPM | DIASTOLIC BLOOD PRESSURE: 77 MMHG | TEMPERATURE: 99 F | SYSTOLIC BLOOD PRESSURE: 118 MMHG

## 2020-09-24 DIAGNOSIS — N39.0 URINARY TRACT INFECTION, SITE NOT SPECIFIED: ICD-10-CM

## 2020-09-24 LAB
ANION GAP SERPL CALC-SCNC: 13 MMOL/L — SIGNIFICANT CHANGE UP (ref 5–17)
BUN SERPL-MCNC: 23 MG/DL — SIGNIFICANT CHANGE UP (ref 7–23)
CALCIUM SERPL-MCNC: 9.1 MG/DL — SIGNIFICANT CHANGE UP (ref 8.4–10.5)
CHLORIDE SERPL-SCNC: 100 MMOL/L — SIGNIFICANT CHANGE UP (ref 96–108)
CO2 SERPL-SCNC: 24 MMOL/L — SIGNIFICANT CHANGE UP (ref 22–31)
CREAT SERPL-MCNC: 1.12 MG/DL — SIGNIFICANT CHANGE UP (ref 0.5–1.3)
GLUCOSE SERPL-MCNC: 140 MG/DL — HIGH (ref 70–99)
HCT VFR BLD CALC: 34 % — LOW (ref 34.5–45)
HGB BLD-MCNC: 10.9 G/DL — LOW (ref 11.5–15.5)
MCHC RBC-ENTMCNC: 30.9 PG — SIGNIFICANT CHANGE UP (ref 27–34)
MCHC RBC-ENTMCNC: 32.1 GM/DL — SIGNIFICANT CHANGE UP (ref 32–36)
MCV RBC AUTO: 96.3 FL — SIGNIFICANT CHANGE UP (ref 80–100)
NRBC # BLD: 0 /100 WBCS — SIGNIFICANT CHANGE UP (ref 0–0)
PLATELET # BLD AUTO: 348 K/UL — SIGNIFICANT CHANGE UP (ref 150–400)
POTASSIUM SERPL-MCNC: 4 MMOL/L — SIGNIFICANT CHANGE UP (ref 3.5–5.3)
POTASSIUM SERPL-SCNC: 4 MMOL/L — SIGNIFICANT CHANGE UP (ref 3.5–5.3)
RBC # BLD: 3.53 M/UL — LOW (ref 3.8–5.2)
RBC # FLD: 14.5 % — SIGNIFICANT CHANGE UP (ref 10.3–14.5)
SODIUM SERPL-SCNC: 137 MMOL/L — SIGNIFICANT CHANGE UP (ref 135–145)
WBC # BLD: 12.79 K/UL — HIGH (ref 3.8–10.5)
WBC # FLD AUTO: 12.79 K/UL — HIGH (ref 3.8–10.5)

## 2020-09-24 PROCEDURE — 82330 ASSAY OF CALCIUM: CPT

## 2020-09-24 PROCEDURE — 83735 ASSAY OF MAGNESIUM: CPT

## 2020-09-24 PROCEDURE — 85730 THROMBOPLASTIN TIME PARTIAL: CPT

## 2020-09-24 PROCEDURE — 85610 PROTHROMBIN TIME: CPT

## 2020-09-24 PROCEDURE — 85027 COMPLETE CBC AUTOMATED: CPT

## 2020-09-24 PROCEDURE — 99285 EMERGENCY DEPT VISIT HI MDM: CPT | Mod: 25

## 2020-09-24 PROCEDURE — 84295 ASSAY OF SERUM SODIUM: CPT

## 2020-09-24 PROCEDURE — 83880 ASSAY OF NATRIURETIC PEPTIDE: CPT

## 2020-09-24 PROCEDURE — 80053 COMPREHEN METABOLIC PANEL: CPT

## 2020-09-24 PROCEDURE — 82803 BLOOD GASES ANY COMBINATION: CPT

## 2020-09-24 PROCEDURE — 93306 TTE W/DOPPLER COMPLETE: CPT

## 2020-09-24 PROCEDURE — 86769 SARS-COV-2 COVID-19 ANTIBODY: CPT

## 2020-09-24 PROCEDURE — 71045 X-RAY EXAM CHEST 1 VIEW: CPT

## 2020-09-24 PROCEDURE — 81001 URINALYSIS AUTO W/SCOPE: CPT

## 2020-09-24 PROCEDURE — 85025 COMPLETE CBC W/AUTO DIFF WBC: CPT

## 2020-09-24 PROCEDURE — 82947 ASSAY GLUCOSE BLOOD QUANT: CPT

## 2020-09-24 PROCEDURE — 82962 GLUCOSE BLOOD TEST: CPT

## 2020-09-24 PROCEDURE — 87186 SC STD MICRODIL/AGAR DIL: CPT

## 2020-09-24 PROCEDURE — 85018 HEMOGLOBIN: CPT

## 2020-09-24 PROCEDURE — 82435 ASSAY OF BLOOD CHLORIDE: CPT

## 2020-09-24 PROCEDURE — 80162 ASSAY OF DIGOXIN TOTAL: CPT

## 2020-09-24 PROCEDURE — 0225U NFCT DS DNA&RNA 21 SARSCOV2: CPT

## 2020-09-24 PROCEDURE — 83605 ASSAY OF LACTIC ACID: CPT

## 2020-09-24 PROCEDURE — 87086 URINE CULTURE/COLONY COUNT: CPT

## 2020-09-24 PROCEDURE — 85014 HEMATOCRIT: CPT

## 2020-09-24 PROCEDURE — 80048 BASIC METABOLIC PNL TOTAL CA: CPT

## 2020-09-24 PROCEDURE — 84100 ASSAY OF PHOSPHORUS: CPT

## 2020-09-24 PROCEDURE — 93005 ELECTROCARDIOGRAM TRACING: CPT

## 2020-09-24 PROCEDURE — 84484 ASSAY OF TROPONIN QUANT: CPT

## 2020-09-24 PROCEDURE — 97161 PT EVAL LOW COMPLEX 20 MIN: CPT

## 2020-09-24 PROCEDURE — 84132 ASSAY OF SERUM POTASSIUM: CPT

## 2020-09-24 RX ORDER — PREGABALIN 225 MG/1
0 CAPSULE ORAL
Qty: 0 | Refills: 0 | DISCHARGE

## 2020-09-24 RX ORDER — FOLIC ACID 0.8 MG
0 TABLET ORAL
Qty: 0 | Refills: 0 | DISCHARGE

## 2020-09-24 RX ADMIN — PIPERACILLIN AND TAZOBACTAM 25 GRAM(S): 4; .5 INJECTION, POWDER, LYOPHILIZED, FOR SOLUTION INTRAVENOUS at 12:10

## 2020-09-24 RX ADMIN — Medication 20 MILLIGRAM(S): at 05:27

## 2020-09-24 RX ADMIN — MEMANTINE HYDROCHLORIDE 10 MILLIGRAM(S): 10 TABLET ORAL at 12:09

## 2020-09-24 RX ADMIN — PIPERACILLIN AND TAZOBACTAM 25 GRAM(S): 4; .5 INJECTION, POWDER, LYOPHILIZED, FOR SOLUTION INTRAVENOUS at 05:27

## 2020-09-24 RX ADMIN — Medication 1 MILLIGRAM(S): at 12:09

## 2020-09-24 RX ADMIN — Medication 180 MILLIGRAM(S): at 05:27

## 2020-09-24 RX ADMIN — APIXABAN 5 MILLIGRAM(S): 2.5 TABLET, FILM COATED ORAL at 05:27

## 2020-09-24 NOTE — DISCHARGE NOTE NURSING/CASE MANAGEMENT/SOCIAL WORK - PATIENT PORTAL LINK FT
You can access the FollowMyHealth Patient Portal offered by [facility] by registering at the following website: [email or web address]. By joining [facility]’s FollowMyHealth portal, you will also be able to view your health information using other applications (apps) compatible with our system.

## 2020-09-24 NOTE — PROGRESS NOTE ADULT - ASSESSMENT
79 yo woman with a hx of PAF presents after a witness syncopal episode at home. Patient seen now resting comfortably.  (+) uti with elevated wbc  Started no po Levaquin 250 mg po qd

## 2020-09-24 NOTE — DISCHARGE NOTE PROVIDER - NSDCCAREPROVSEEN_GEN_ALL_CORE_FT
Jose, Aroldo Baires  [facility] Medicine, Advance PracticeTeLouis De La Cruz Jose, Aroldo Baires  [facility] Medicine, Advance PracticeTewilliam Kerr, Jojo Gatica

## 2020-09-24 NOTE — DISCHARGE NOTE PROVIDER - PROVIDER TOKENS
PROVIDER:[TOKEN:[[id number]],FOLLOWUP:[2 weeks]] PROVIDER:[TOKEN:[[id number]],FOLLOWUP:[2 weeks]]

## 2020-09-24 NOTE — DISCHARGE NOTE PROVIDER - NSDCCPCAREPLAN_GEN_ALL_CORE_FT
PRINCIPAL DISCHARGE DIAGNOSIS  Diagnosis: Syncope  Assessment and Plan of Treatment: Fainting usually is caused by fear, stress, pain, standing too long, over tired, overheated, going to bathroom, or coughing  Blood pressure can drop if you do not drink enough, blood pressure medication, alcohol, bleeding,  Consult with your doctor about driving  Avoid activity or condition that causes your syncope  Lay down with your feet up when you feel like you might faint        SECONDARY DISCHARGE DIAGNOSES  Diagnosis: UTI (urinary tract infection)  Assessment and Plan of Treatment:     Diagnosis: Afib  Assessment and Plan of Treatment: Continue Apixiban   Atrial fibrillation is the most common heart rhythm problem & has the risk of stroke & heart attack  It helps if you control your blood pressure, not drink more than 1-2 alcohol drinks per day, cut down on caffeine, getting treatment for over active thyroid gland, & getting exercise  Call your doctor if you feel your heart racing or beating unusually, chest tightness or pain, lightheaded, faint, shortness of breath especially with exercise  It is important to take your heart medication as prescribed  You may be on anticoagulation which is very important to take as directed - you may need blood work to monitor drug levels      Diagnosis: Hypertension  Assessment and Plan of Treatment: Follow up with your medical doctor to establish long term blood pressure treatment goals.  Continue Cardizem    Diagnosis: Dementia  Assessment and Plan of Treatment: Continue Namenda and Aricept.     PRINCIPAL DISCHARGE DIAGNOSIS  Diagnosis: Syncope  Assessment and Plan of Treatment: Fainting usually is caused by fear, stress, pain, standing too long, over tired, overheated, going to bathroom, or coughing  Blood pressure can drop if you do not drink enough, blood pressure medication, alcohol, bleeding,  Consult with your doctor about driving  Avoid activity or condition that causes your syncope  Lay down with your feet up when you feel like you might faint        SECONDARY DISCHARGE DIAGNOSES  Diagnosis: UTI (urinary tract infection)  Assessment and Plan of Treatment: start LEvaquin 250 mg po daily  x 7 days    Diagnosis: Afib  Assessment and Plan of Treatment: Continue Apixiban   Atrial fibrillation is the most common heart rhythm problem & has the risk of stroke & heart attack  It helps if you control your blood pressure, not drink more than 1-2 alcohol drinks per day, cut down on caffeine, getting treatment for over active thyroid gland, & getting exercise  Call your doctor if you feel your heart racing or beating unusually, chest tightness or pain, lightheaded, faint, shortness of breath especially with exercise  It is important to take your heart medication as prescribed  You may be on anticoagulation which is very important to take as directed - you may need blood work to monitor drug levels      Diagnosis: Hypertension  Assessment and Plan of Treatment: Follow up with your medical doctor to establish long term blood pressure treatment goals.  Continue Cardizem    Diagnosis: Dementia  Assessment and Plan of Treatment: Continue Namenda and Aricept.

## 2020-09-24 NOTE — PROGRESS NOTE ADULT - SUBJECTIVE AND OBJECTIVE BOX
80 year old female with pmhx HTN, HLD, Afib on Digoxin and Eliquis, dementia presents to ED after syncopal episode today. Patient reports that she remembers being on he stair lift chair  and then remembers waking up surrounded by family. Per EMS pt was riding down stairs when she suddenly lost consciousness witnesses by son and daughter in law. LOC lasting seconds and pt returned to baseline immediately upon waking. No tongue biting or loss of bowl/bladder function. Pt was buckled in and did not sustain any injury. Otherwise has been feeling well. Denies recent illness, fevers, chills, chest pain, sob, abd pain, n/v/d, urinary symptoms. Patient seen now resting comfortably. Patient with no arrhthymias on the monitor. No new witness syncopal events.  r/O  UTI  (+) ecoli Sensitive to Levaquin  D/C on 250 mg Levaquin      MEDICATIONS  (STANDING):  apixaban 5 milliGRAM(s) Oral two times a day  atorvastatin 10 milliGRAM(s) Oral at bedtime  digoxin     Tablet 0.125 milliGRAM(s) Oral <User Schedule>  diltiazem    milliGRAM(s) Oral daily  donepezil 10 milliGRAM(s) Oral at bedtime  folic acid 1 milliGRAM(s) Oral daily  furosemide    Tablet 20 milliGRAM(s) Oral daily  influenza   Vaccine 0.5 milliLiter(s) IntraMuscular once  memantine 10 milliGRAM(s) Oral daily  sodium chloride 0.9%. 1000 milliLiter(s) (50 mL/Hr) IV Continuous <Continuous>    MEDICATIONS  (PRN):      Vital Signs Last 24 Hrs  T(C): 37.1 (24 Sep 2020 16:24), Max: 37.1 (24 Sep 2020 11:22)  T(F): 98.8 (24 Sep 2020 16:24), Max: 98.8 (24 Sep 2020 16:24)  HR: 98 (24 Sep 2020 16:24) (73 - 98)  BP: 118/77 (24 Sep 2020 16:24) (115/72 - 118/77)  BP(mean): --  RR: 18 (24 Sep 2020 16:24) (18 - 18)  SpO2: 98% (24 Sep 2020 16:24) (95% - 98%)    PHYSICAL EXAM:  GENERAL: NAD, well nourished and conversant  HEAD:  Atraumatic  EYES: EOM, PERRLA, conjunctiva pink and sclera white  ENT: No tonsillar erythema, exudates, or enlargement, moist mucous membranes, good dentition, no lesions  NECK: Supple, No JVD, normal thyroid, carotids with normal upstrokes and no bruits  CHEST/LUNG: Clear to auscultation bilaterally, No rales, rhonchi, wheezing, or rubs  HEART: Regular rate and rhythm, No murmurs, rubs, or gallops  ABDOMEN: Soft, nondistended, no masses, guarding, tenderness or rebound, bowel sounds present  EXTREMITIES:  2+ Peripheral Pulses, No clubbing, cyanosis, or edema.   LYMPH: No lymphadenopathy noted  SKIN: No rashes or lesions  NERVOUS SYSTEM:  Alert & Oriented  LABS:          CBC Full  -  ( 24 Sep 2020 07:13 )  WBC Count : 12.79 K/uL  RBC Count : 3.53 M/uL  Hemoglobin : 10.9 g/dL  Hematocrit : 34.0 %  Platelet Count - Automated : 348 K/uL  Mean Cell Volume : 96.3 fl  Mean Cell Hemoglobin : 30.9 pg  Mean Cell Hemoglobin Concentration : 32.1 gm/dL  Auto Neutrophil # : x  Auto Lymphocyte # : x  Auto Monocyte # : x  Auto Eosinophil # : x  Auto Basophil # : x  Auto Neutrophil % : x  Auto Lymphocyte % : x  Auto Monocyte % : x  Auto Eosinophil % : x  Auto Basophil % : x        137  |  100  |  23  ----------------------------<  140<H>  4.0   |  24  |  1.12    Ca    9.1      24 Sep 2020 07:13              CBC Full  -  ( 22 Sep 2020 06:07 )  WBC Count : 10.18 K/uL  RBC Count : 3.57 M/uL  Hemoglobin : 10.9 g/dL  Hematocrit : 34.4 %  Platelet Count - Automated : 327 K/uL  Mean Cell Volume : 96.4 fl  Mean Cell Hemoglobin : 30.5 pg  Mean Cell Hemoglobin Concentration : 31.7 gm/dL  Auto Neutrophil # : x  Auto Lymphocyte # : x  Auto Monocyte # : x  Auto Eosinophil # : x  Auto Basophil # : x  Auto Neutrophil % : x  Auto Lymphocyte % : x  Auto Monocyte % : x  Auto Eosinophil % : x  Auto Basophil % : x    -    138  |  103  |  13  ----------------------------<  145<H>  3.9   |  25  |  0.91    Ca    9.0      22 Sep 2020 06:06  Phos  3.6       Mg     2.3         TPro  7.5  /  Alb  3.8  /  TBili  0.4  /  DBili  x   /  AST  16  /  ALT  8<L>  /  AlkPhos  69      LIVER FUNCTIONS - ( 20 Sep 2020 15:00 )  Alb: 3.8 g/dL / Pro: 7.5 g/dL / ALK PHOS: 69 U/L / ALT: 8 U/L / AST: 16 U/L / GGT: x           PT/INR - ( 20 Sep 2020 15:00 )   PT: 16.9 sec;   INR: 1.45 ratio         PTT - ( 20 Sep 2020 15:00 )  PTT:27.8 sec  Urinalysis Basic - ( 21 Sep 2020 23:58 )    Color: Yellow / Appearance: Slightly Turbid / S.016 / pH: x  Gluc: x / Ketone: Negative  / Bili: Negative / Urobili: <2 mg/dL   Blood: x / Protein: Trace / Nitrite: Positive   Leuk Esterase: Large / RBC: 1 /HPF / WBC 30 /HPF   Sq Epi: x / Non Sq Epi: 1 /HPF / Bacteria: TNTC      CAPILLARY BLOOD GLUCOSE          RADIOLOGY & ADDITIONAL TESTS:

## 2020-09-24 NOTE — DISCHARGE NOTE PROVIDER - CARE PROVIDER_API CALL
Jose [facility]  [address] [place] [id number]  Phone: [phone]  Fax: [phone]  Follow Up Time: 2 weeks   Louis Kerr  GERIATRIC MEDICINE  [address] [place], [place] [id number]  Phone: [phone]  Fax: [phone]  Follow Up Time: 2 weeks

## 2020-09-24 NOTE — DISCHARGE NOTE PROVIDER - NSDCMRMEDTOKEN_GEN_ALL_CORE_FT
apixaban 5 mg oral tablet: 1 tab(s) orally every 12 hours  digoxin 125 mcg (0.125 mg) oral tablet: 1 tab(s) orally Monday, Wednesday, and Friday  DilTIAZem (Eqv-Cardizem CD) 180 mg/24 hours oral capsule, extended release: 1 cap(s) orally once a day  docusate sodium 100 mg oral capsule: 1 cap(s) orally once a day (in the evening)  donepezil 10 mg oral tablet: 1 tab(s) orally once a day (at bedtime)  folic acid:   furosemide 20 mg oral tablet: 1 tab(s) orally once a day (in the morning)  iron:   lovastatin 20 mg oral tablet: 1 tab(s) orally once a day (at bedtime)  Namenda 10 mg oral tablet: 1 tab(s) orally once a day (in the morning)  senna oral tablet: 1 tab(s) orally once a day (at bedtime)  Vitamin B12:    apixaban 5 mg oral tablet: 1 tab(s) orally every 12 hours  digoxin 125 mcg (0.125 mg) oral tablet: 1 tab(s) orally Monday, Wednesday, and Friday  DilTIAZem (Eqv-Cardizem CD) 180 mg/24 hours oral capsule, extended release: 1 cap(s) orally once a day  docusate sodium 100 mg oral capsule: 1 cap(s) orally once a day (in the evening)  donepezil 10 mg oral tablet: 1 tab(s) orally once a day (at bedtime)  ferrous sulfate 324 mg (65 mg elemental iron) oral delayed release tablet: 1 tab(s) orally once a day  folic acid 1 mg oral tablet: 1 tab(s) orally once a day  furosemide 20 mg oral tablet: 1 tab(s) orally once a day (in the morning)  levoFLOXacin 250 mg oral tablet: 1 tab(s) orally every 24 hours  lovastatin 20 mg oral tablet: 1 tab(s) orally once a day (at bedtime)  Namenda 10 mg oral tablet: 1 tab(s) orally once a day (in the morning)  senna oral tablet: 1 tab(s) orally once a day (at bedtime)  Vitamin B12 500 mcg oral tablet: 1 tab(s) orally once a day   apixaban 5 mg oral tablet: 1 tab(s) orally every 12 hours  digoxin 125 mcg (0.125 mg) oral tablet: 1 tab(s) orally Monday, Wednesday, and Friday  DilTIAZem (Eqv-Cardizem CD) 180 mg/24 hours oral capsule, extended release: 1 cap(s) orally once a day  docusate sodium 100 mg oral capsule: 1 cap(s) orally once a day (in the evening)  donepezil 10 mg oral tablet: 1 tab(s) orally once a day (at bedtime)  ferrous sulfate 324 mg (65 mg elemental iron) oral delayed release tablet: 1 tab(s) orally once a day  folic acid 1 mg oral tablet: 1 tab(s) orally once a day  furosemide 20 mg oral tablet: 1 tab(s) orally once a day (in the morning)  levoFLOXacin 250 mg oral tablet: 1 tab(s) orally every 24 hours  lovastatin 20 mg oral tablet: 1 tab(s) orally once a day (at bedtime)  Namenda 10 mg oral tablet: 1 tab(s) orally once a day (in the morning)  physical therapy : 1   senna oral tablet: 1 tab(s) orally once a day (at bedtime)  Vitamin B12 500 mcg oral tablet: 1 tab(s) orally once a day

## 2020-09-24 NOTE — DISCHARGE NOTE PROVIDER - HOSPITAL COURSE
80 year old female with pmhx HTN, HLD, Afib on Digoxin and Eliquis, dementia presents to ED after syncopal episode today. Patient reports that she remembers being on he stair lift chair  and then remembers waking up surrounded by family. Per EMS pt was riding down stairs when she suddenly lost consciousness witnesses by son and daughter in law. LOC lasting seconds and pt returned to baseline immediately upon waking. No tongue biting or loss of bowl/bladder function. Pt was buckled in and did not sustain any injury. Otherwise has been feeling well. Denies recent illness, fevers, chills, chest pain, sob, abd pain, n/v/d, urinary symptoms. Patient seen now resting comfortably. Patient with no arrhthymias on the monitor. No new witness syncopal events.  r/O  UTI        Problem/Plan - 1: Syncope.    - patient was positive orthostatic but resolved with gentle hydration   - echo with EF of 63%  Normal LV     Problem/plan: 2:  UTI   + cultures   continue Levaquin x 5 days       Problem/Plan - 3:   Afib.    Continue eliquis for afib  Continue digoxin and diltiazem for rate control.        Problem/Plan - 4:  Hypertension.    Continue Cardizem      Problem/Plan - 5:  High cholesterol.    Continue cholesterol lowing meds.      Problem/Plan - 6:   Dementia.    Continue Namenda and Aricept.       Patient stable for discharge      80 year old female with pmhx HTN, HLD, Afib on Digoxin and Eliquis, dementia presents to ED after syncopal episode today. Patient reports that she remembers being on he stair lift chair  and then remembers waking up surrounded by family. Per EMS pt was riding down stairs when she suddenly lost consciousness witnesses by son and daughter in law. LOC lasting seconds and pt returned to baseline immediately upon waking. No tongue biting or loss of bowl/bladder function. Pt was buckled in and did not sustain any injury. Otherwise has been feeling well. Denies recent illness, fevers, chills, chest pain, sob, abd pain, n/v/d, urinary symptoms. Patient seen now resting comfortably. Patient with no arrhthymias on the monitor. No new witness syncopal events.  r/O  UTI        Problem/Plan - 1: Syncope.    - patient was positive orthostatic but resolved with gentle hydration   - echo with EF of 63%  Normal LV     Problem/plan: 2:  UTI   + cultures   Levaquin 250 mg po  x 5 days       Problem/Plan - 3:   Afib.    Continue eliquis for afib  Continue digoxin and diltiazem for rate control.        Problem/Plan - 4:  Hypertension.    Continue Cardizem      Problem/Plan - 5:  High cholesterol.    Continue cholesterol lowing meds.      Problem/Plan - 6:   Dementia.    Continue Namenda and Aricept.       Patient stable for discharge

## 2021-08-22 ENCOUNTER — INPATIENT (INPATIENT)
Facility: HOSPITAL | Age: 81
LOS: 3 days | Discharge: HOME HEALTH | DRG: 309 | End: 2021-08-26
Attending: INTERNAL MEDICINE | Admitting: INTERNAL MEDICINE
Payer: MEDICARE

## 2021-08-22 VITALS
TEMPERATURE: 98 F | RESPIRATION RATE: 16 BRPM | HEIGHT: 66.5 IN | OXYGEN SATURATION: 96 % | DIASTOLIC BLOOD PRESSURE: 62 MMHG | SYSTOLIC BLOOD PRESSURE: 110 MMHG | HEART RATE: 90 BPM | WEIGHT: 149.91 LBS

## 2021-08-22 DIAGNOSIS — S22.080A WEDGE COMPRESSION FRACTURE OF T11-T12 VERTEBRA, INITIAL ENCOUNTER FOR CLOSED FRACTURE: Chronic | ICD-10-CM

## 2021-08-22 DIAGNOSIS — R55 SYNCOPE AND COLLAPSE: ICD-10-CM

## 2021-08-22 DIAGNOSIS — A41.9 SEPSIS, UNSPECIFIED ORGANISM: ICD-10-CM

## 2021-08-22 PROBLEM — I48.91 UNSPECIFIED ATRIAL FIBRILLATION: Chronic | Status: ACTIVE | Noted: 2020-09-20

## 2021-08-22 PROBLEM — M81.0 AGE-RELATED OSTEOPOROSIS WITHOUT CURRENT PATHOLOGICAL FRACTURE: Chronic | Status: ACTIVE | Noted: 2020-09-20

## 2021-08-22 LAB
ALBUMIN SERPL ELPH-MCNC: 3.4 G/DL — SIGNIFICANT CHANGE UP (ref 3.3–5)
ALP SERPL-CCNC: 58 U/L — SIGNIFICANT CHANGE UP (ref 40–120)
ALT FLD-CCNC: 13 U/L — SIGNIFICANT CHANGE UP (ref 10–45)
ANION GAP SERPL CALC-SCNC: 15 MMOL/L — SIGNIFICANT CHANGE UP (ref 5–17)
APPEARANCE UR: ABNORMAL
APTT BLD: 28.1 SEC — SIGNIFICANT CHANGE UP (ref 27.5–35.5)
AST SERPL-CCNC: 34 U/L — SIGNIFICANT CHANGE UP (ref 10–40)
BACTERIA # UR AUTO: ABNORMAL
BASOPHILS # BLD AUTO: 0.06 K/UL — SIGNIFICANT CHANGE UP (ref 0–0.2)
BASOPHILS NFR BLD AUTO: 0.5 % — SIGNIFICANT CHANGE UP (ref 0–2)
BILIRUB SERPL-MCNC: 0.3 MG/DL — SIGNIFICANT CHANGE UP (ref 0.2–1.2)
BILIRUB UR-MCNC: NEGATIVE — SIGNIFICANT CHANGE UP
BUN SERPL-MCNC: 28 MG/DL — HIGH (ref 7–23)
CALCIUM SERPL-MCNC: 9.3 MG/DL — SIGNIFICANT CHANGE UP (ref 8.4–10.5)
CHLORIDE SERPL-SCNC: 108 MMOL/L — SIGNIFICANT CHANGE UP (ref 96–108)
CO2 SERPL-SCNC: 18 MMOL/L — LOW (ref 22–31)
COLOR SPEC: YELLOW — SIGNIFICANT CHANGE UP
CREAT SERPL-MCNC: 0.87 MG/DL — SIGNIFICANT CHANGE UP (ref 0.5–1.3)
DIFF PNL FLD: NEGATIVE — SIGNIFICANT CHANGE UP
DIGOXIN SERPL-MCNC: 0.4 NG/ML — LOW (ref 0.8–2)
EOSINOPHIL # BLD AUTO: 0.22 K/UL — SIGNIFICANT CHANGE UP (ref 0–0.5)
EOSINOPHIL NFR BLD AUTO: 1.7 % — SIGNIFICANT CHANGE UP (ref 0–6)
EPI CELLS # UR: 3 /HPF — SIGNIFICANT CHANGE UP
GLUCOSE SERPL-MCNC: 187 MG/DL — HIGH (ref 70–99)
GLUCOSE UR QL: NEGATIVE — SIGNIFICANT CHANGE UP
HCT VFR BLD CALC: 37 % — SIGNIFICANT CHANGE UP (ref 34.5–45)
HGB BLD-MCNC: 11.5 G/DL — SIGNIFICANT CHANGE UP (ref 11.5–15.5)
HYALINE CASTS # UR AUTO: 0 /LPF — SIGNIFICANT CHANGE UP (ref 0–2)
IMM GRANULOCYTES NFR BLD AUTO: 1.4 % — SIGNIFICANT CHANGE UP (ref 0–1.5)
INR BLD: 1.28 RATIO — HIGH (ref 0.88–1.16)
KETONES UR-MCNC: NEGATIVE — SIGNIFICANT CHANGE UP
LEUKOCYTE ESTERASE UR-ACNC: ABNORMAL
LYMPHOCYTES # BLD AUTO: 15.7 % — SIGNIFICANT CHANGE UP (ref 13–44)
LYMPHOCYTES # BLD AUTO: 2.06 K/UL — SIGNIFICANT CHANGE UP (ref 1–3.3)
MAGNESIUM SERPL-MCNC: 2.4 MG/DL — SIGNIFICANT CHANGE UP (ref 1.6–2.6)
MCHC RBC-ENTMCNC: 31.1 GM/DL — LOW (ref 32–36)
MCHC RBC-ENTMCNC: 31.1 PG — SIGNIFICANT CHANGE UP (ref 27–34)
MCV RBC AUTO: 100 FL — SIGNIFICANT CHANGE UP (ref 80–100)
MONOCYTES # BLD AUTO: 0.79 K/UL — SIGNIFICANT CHANGE UP (ref 0–0.9)
MONOCYTES NFR BLD AUTO: 6 % — SIGNIFICANT CHANGE UP (ref 2–14)
NEUTROPHILS # BLD AUTO: 9.78 K/UL — HIGH (ref 1.8–7.4)
NEUTROPHILS NFR BLD AUTO: 74.7 % — SIGNIFICANT CHANGE UP (ref 43–77)
NITRITE UR-MCNC: POSITIVE
NRBC # BLD: 0 /100 WBCS — SIGNIFICANT CHANGE UP (ref 0–0)
OB PNL STL: POSITIVE
PH UR: 6 — SIGNIFICANT CHANGE UP (ref 5–8)
PLATELET # BLD AUTO: 280 K/UL — SIGNIFICANT CHANGE UP (ref 150–400)
POTASSIUM SERPL-MCNC: 5.1 MMOL/L — SIGNIFICANT CHANGE UP (ref 3.5–5.3)
POTASSIUM SERPL-SCNC: 5.1 MMOL/L — SIGNIFICANT CHANGE UP (ref 3.5–5.3)
PROT SERPL-MCNC: 7.1 G/DL — SIGNIFICANT CHANGE UP (ref 6–8.3)
PROT UR-MCNC: ABNORMAL
PROTHROM AB SERPL-ACNC: 15.2 SEC — HIGH (ref 10.6–13.6)
RBC # BLD: 3.7 M/UL — LOW (ref 3.8–5.2)
RBC # FLD: 14.6 % — HIGH (ref 10.3–14.5)
RBC CASTS # UR COMP ASSIST: 1 /HPF — SIGNIFICANT CHANGE UP (ref 0–4)
SARS-COV-2 RNA SPEC QL NAA+PROBE: SIGNIFICANT CHANGE UP
SODIUM SERPL-SCNC: 141 MMOL/L — SIGNIFICANT CHANGE UP (ref 135–145)
SP GR SPEC: 1.02 — SIGNIFICANT CHANGE UP (ref 1.01–1.02)
UROBILINOGEN FLD QL: NEGATIVE — SIGNIFICANT CHANGE UP
WBC # BLD: 13.09 K/UL — HIGH (ref 3.8–10.5)
WBC # FLD AUTO: 13.09 K/UL — HIGH (ref 3.8–10.5)
WBC UR QL: 55 /HPF — HIGH (ref 0–5)

## 2021-08-22 PROCEDURE — 99285 EMERGENCY DEPT VISIT HI MDM: CPT

## 2021-08-22 PROCEDURE — 70450 CT HEAD/BRAIN W/O DYE: CPT | Mod: 26

## 2021-08-22 PROCEDURE — 93010 ELECTROCARDIOGRAM REPORT: CPT

## 2021-08-22 PROCEDURE — 71045 X-RAY EXAM CHEST 1 VIEW: CPT | Mod: 26

## 2021-08-22 RX ORDER — APIXABAN 2.5 MG/1
5 TABLET, FILM COATED ORAL
Refills: 0 | Status: DISCONTINUED | OUTPATIENT
Start: 2021-08-23 | End: 2021-08-26

## 2021-08-22 RX ORDER — FOLIC ACID 0.8 MG
1 TABLET ORAL
Qty: 0 | Refills: 0 | DISCHARGE

## 2021-08-22 RX ORDER — ATORVASTATIN CALCIUM 80 MG/1
20 TABLET, FILM COATED ORAL AT BEDTIME
Refills: 0 | Status: DISCONTINUED | OUTPATIENT
Start: 2021-08-22 | End: 2021-08-26

## 2021-08-22 RX ORDER — MEMANTINE HYDROCHLORIDE 10 MG/1
1 TABLET ORAL
Qty: 0 | Refills: 0 | DISCHARGE

## 2021-08-22 RX ORDER — PANTOPRAZOLE SODIUM 20 MG/1
40 TABLET, DELAYED RELEASE ORAL
Refills: 0 | Status: DISCONTINUED | OUTPATIENT
Start: 2021-08-22 | End: 2021-08-23

## 2021-08-22 RX ORDER — DIGOXIN 250 MCG
125 TABLET ORAL DAILY
Refills: 0 | Status: DISCONTINUED | OUTPATIENT
Start: 2021-08-22 | End: 2021-08-25

## 2021-08-22 RX ORDER — MEMANTINE HYDROCHLORIDE 10 MG/1
10 TABLET ORAL
Refills: 0 | Status: DISCONTINUED | OUTPATIENT
Start: 2021-08-22 | End: 2021-08-26

## 2021-08-22 RX ORDER — PREGABALIN 225 MG/1
1 CAPSULE ORAL
Qty: 0 | Refills: 0 | DISCHARGE

## 2021-08-22 RX ORDER — DILTIAZEM HCL 120 MG
180 CAPSULE, EXT RELEASE 24 HR ORAL DAILY
Refills: 0 | Status: DISCONTINUED | OUTPATIENT
Start: 2021-08-22 | End: 2021-08-26

## 2021-08-22 RX ORDER — DONEPEZIL HYDROCHLORIDE 10 MG/1
10 TABLET, FILM COATED ORAL AT BEDTIME
Refills: 0 | Status: DISCONTINUED | OUTPATIENT
Start: 2021-08-22 | End: 2021-08-26

## 2021-08-22 RX ORDER — POLYETHYLENE GLYCOL 3350 17 G/17G
17 POWDER, FOR SOLUTION ORAL DAILY
Refills: 0 | Status: DISCONTINUED | OUTPATIENT
Start: 2021-08-22 | End: 2021-08-22

## 2021-08-22 RX ADMIN — Medication 180 MILLIGRAM(S): at 22:51

## 2021-08-22 RX ADMIN — DONEPEZIL HYDROCHLORIDE 10 MILLIGRAM(S): 10 TABLET, FILM COATED ORAL at 22:51

## 2021-08-22 RX ADMIN — MEMANTINE HYDROCHLORIDE 10 MILLIGRAM(S): 10 TABLET ORAL at 22:51

## 2021-08-22 NOTE — ED PROVIDER NOTE - SHIFT CHANGE DETAILS
Attending note (Thomas): I have endorsed this patient to the incoming physician, including clinical history, physical exam, current results and assessment/plan.

## 2021-08-22 NOTE — ED PROVIDER NOTE - PROGRESS NOTE DETAILS
Karla Osborn, resident MD: pt has positive occult blood with h/h wnl. will admit for further GI evaluation, telemetry monitoring and echo.

## 2021-08-22 NOTE — ED PROVIDER NOTE - OBJECTIVE STATEMENT
82yo woman PMH afib, HTN, HLD, osteoporosis, ?dementia was brought in after an episode of LOC while chair lift up the stairs. She denies falling or any trauma. She states that she has been having more BMs recently that are black for the past several days to weeks. No lightheadedness, n/v or symptoms prior to episode. Pt denies any symptoms afterwards. No recent fevers, cough, n/v/d, abdominal pain, dysuria.  Spoke with pt's son ([phone]) who states that his fiance was with her when she passed out for >25 sec. She told her that she had been tired in the past few days but no other complaints. PMH afib on eliquis, digoxin 3 times a week, cardizem 180mg, lovastatin, lasix 20mg 4 times a week, aricept 10mg, memantine 10mg  PCP: Dr. Sung Caro (sons' fiance): [phone]

## 2021-08-22 NOTE — H&P ADULT - NSHPPHYSICALEXAM_GEN_ALL_CORE
pt. seen and examined, NAD     Vital Signs Last 24 Hrs  T(C): 36.6 (22 Aug 2021 23:23), Max: 36.8 (22 Aug 2021 17:40)  T(F): 97.9 (22 Aug 2021 23:23), Max: 98.3 (22 Aug 2021 17:40)  HR: 114 (22 Aug 2021 23:23) (79 - 114)  BP: 110/59 (22 Aug 2021 23:23) (98/77 - 130/72)  BP(mean): 77 (22 Aug 2021 18:30) (77 - 89)  RR: 18 (22 Aug 2021 23:23) (15 - 18)  SpO2: 100% (22 Aug 2021 23:23) (96% - 100%)    heent : nc/at , no pallor   neck: supple, no JVD  Lungs: B/L fair A/E ,no w/r/r  heart: s1s2 nml  abd: soft, NABS , NT/ND  ext: no e/c/c, pulses 2+  neuro: aaox2-3 , move all ext

## 2021-08-22 NOTE — H&P ADULT - ASSESSMENT
# Syncope :  -etiology unclear   neuro consult Dr. Ying called   cardio consult Dr. kaufman called     # UTI :   started on levaquin   pt. allergic to PCN   -f/u urine c/s     # CAD / ch afib with RVR  -c/w digoxin   -c/w eliquis for AC   cardio consulted   TTE     # HTN :  -c/w home meds     # Alz. dementia :  -supportive care   c/w aricept and namenda

## 2021-08-22 NOTE — ED ADULT NURSE REASSESSMENT NOTE - NS ED NURSE REASSESS COMMENT FT1
pt is aox4, vitals stable, resting comfortably, iv in place no signs of infiltration, UA/UC collected, pt repositioned in stretcher with son at bedside helpful and supportive of pt.

## 2021-08-22 NOTE — ED PROVIDER NOTE - PHYSICAL EXAMINATION
General: well-appearing elderly woman in no acute distress  Head: normocephalic, atraumatic  Eyes: pinpoint pupils bilaterally, pink conjunctiva  Mouth: moist mucous membranes  Neck: supple neck, no lymphadenopathy  CV: normal rate and rhythm, peripheral pulses 2+ bilateral UE and LE  Respiratory: clear to auscultation bilaterally  Abdomen: soft, nontender, nondistended  : no CVAT  MSK: no joint deformities  Neuro: alert and oriented x3, speech clear, moving all extremities spontaneously without difficulty  Skin: no rash noted

## 2021-08-22 NOTE — ED ADULT NURSE NOTE - NSIMPLEMENTINTERV_GEN_ALL_ED
Implemented All Fall with Harm Risk Interventions:  [place] to call system. Call bell, personal items and telephone within reach. Instruct patient to call for assistance. Room bathroom lighting operational. Non-slip footwear when patient is off stretcher. Physically safe environment: no spills, clutter or unnecessary equipment. Stretcher in lowest position, wheels locked, appropriate side rails in place. Provide visual cue, wrist band, yellow gown, etc. Monitor gait and stability. Monitor for mental status changes and reorient to person, place, and time. Review medications for side effects contributing to fall risk. Reinforce activity limits and safety measures with patient and family. Provide visual clues: red socks.

## 2021-08-22 NOTE — ED PROVIDER NOTE - NS ED ROS FT
General: no fever  Head: no headache or lightheadedness  Eyes: no vision change  ENT: no nasal discharge/congestion, no sore throat  CV: no chest pain  Resp: no SOB, no cough  GI: no N/V/D, no abdominal pain, +black stool  : no dysuria  MSK: no joint pain  Skin: no new rash  Neuro: no focal weakness, no change in sensation

## 2021-08-22 NOTE — H&P ADULT - NSHPLABSRESULTS_GEN_ALL_CORE
11.5   13.09 )-----------( 280      ( 22 Aug 2021 14:37 )             37.0     08-22    141  |  108  |  28<H>  ----------------------------<  187<H>  5.1   |  18<L>  |  0.87    Ca    9.3      22 Aug 2021 14:37  Mg     2.4     08-22    TPro  7.1  /  Alb  3.4  /  TBili  0.3  /  DBili  x   /  AST  34  /  ALT  13  /  AlkPhos  58  08-22

## 2021-08-22 NOTE — ED ADULT NURSE REASSESSMENT NOTE - NS ED NURSE REASSESS COMMENT FT1
pt is aox4, vitals stable, resting comfortably, iv in place no signs of infiltration, son is still at bedside, up dated on plan, pt pending bed placement, no change in status, will continue to monitor.

## 2021-08-22 NOTE — ED PROVIDER NOTE - CLINICAL SUMMARY MEDICAL DECISION MAKING FREE TEXT BOX
80yo woman presents with episode of witnessed syncope with no prodromal symptoms and currently is asymptomatic/ Pt endorses black stools. Concern for possible anemia vs infectious vs. cardiac etiology with structural vs electrical abnormalities. No actionable EKG abnormalities. Will obtain blood work, keep on telemetry monitoring, and likely admit for further management.

## 2021-08-22 NOTE — H&P ADULT - HISTORY OF PRESENT ILLNESS
80yo woman PMH afib, HTN, HLD, osteoporosis, dementia was brought in after an episode of LOC while chair lift up the stairs. She denies falling or any trauma. She states that she has been having more BMs recently that are black for the past several days to weeks. No lightheadedness, n/v or symptoms prior to episode. Pt denies any symptoms afterwards. No recent fevers, cough, n/v/d, abdominal pain, dysuria.  	Spoke with pt's son ([phone]) who states that his fiance was with her when she passed out for >25 sec. She told her that she had been tired in the past few days but no other complaints. PMH afib on eliquis, digoxin 3 times a week, cardizem 180mg, lovastatin, lasix 20mg 4 times a week, aricept 10mg, memantine 10mg  	PCP: Dr. Sung Caro (sons' fiance): [phone]

## 2021-08-22 NOTE — ED ADULT TRIAGE NOTE - MODE OF ARRIVAL
Ambulance Bactrim Counseling:  I discussed with the patient the risks of sulfa antibiotics including but not limited to GI upset, allergic reaction, drug rash, diarrhea, dizziness, photosensitivity, and yeast infections.  Rarely, more serious reactions can occur including but not limited to aplastic anemia, agranulocytosis, methemoglobinemia, blood dyscrasias, liver or kidney failure, lung infiltrates or desquamative/blistering drug rashes. Dapsone Pregnancy And Lactation Text: This medication is Pregnancy Category C and is not considered safe during pregnancy or breast feeding. Doxycycline Pregnancy And Lactation Text: This medication is Pregnancy Category D and not consider safe during pregnancy. It is also excreted in breast milk but is considered safe for shorter treatment courses. Sarecycline Counseling: Patient advised regarding possible photosensitivity and discoloration of the teeth, skin, lips, tongue and gums.  Patient instructed to avoid sunlight, if possible.  When exposed to sunlight, patients should wear protective clothing, sunglasses, and sunscreen.  The patient was instructed to call the office immediately if the following severe adverse effects occur:  hearing changes, easy bruising/bleeding, severe headache, or vision changes.  The patient verbalized understanding of the proper use and possible adverse effects of sarecycline.  All of the patient's questions and concerns were addressed. Minocycline Counseling: Patient advised regarding possible photosensitivity and discoloration of the teeth, skin, lips, tongue and gums.  Patient instructed to avoid sunlight, if possible.  When exposed to sunlight, patients should wear protective clothing, sunglasses, and sunscreen.  The patient was instructed to call the office immediately if the following severe adverse effects occur:  hearing changes, easy bruising/bleeding, severe headache, or vision changes.  The patient verbalized understanding of the proper use and possible adverse effects of minocycline.  All of the patient's questions and concerns were addressed. High Dose Vitamin A Pregnancy And Lactation Text: High dose vitamin A therapy is contraindicated during pregnancy and breast feeding. Spironolactone Counseling: Patient advised regarding risks of diarrhea, abdominal pain, hyperkalemia, birth defects (for female patients), liver toxicity and renal toxicity. The patient may need blood work to monitor liver and kidney function and potassium levels while on therapy. The patient verbalized understanding of the proper use and possible adverse effects of spironolactone.  All of the patient's questions and concerns were addressed. Detail Level: Detailed Tetracycline Counseling: Patient counseled regarding possible photosensitivity and increased risk for sunburn.  Patient instructed to avoid sunlight, if possible.  When exposed to sunlight, patients should wear protective clothing, sunglasses, and sunscreen.  The patient was instructed to call the office immediately if the following severe adverse effects occur:  hearing changes, easy bruising/bleeding, severe headache, or vision changes.  The patient verbalized understanding of the proper use and possible adverse effects of tetracycline.  All of the patient's questions and concerns were addressed. Patient understands to avoid pregnancy while on therapy due to potential birth defects. Tetracycline Pregnancy And Lactation Text: This medication is Pregnancy Category D and not consider safe during pregnancy. It is also excreted in breast milk. Azithromycin Counseling:  I discussed with the patient the risks of azithromycin including but not limited to GI upset, allergic reaction, drug rash, diarrhea, and yeast infections. Azithromycin Pregnancy And Lactation Text: This medication is considered safe during pregnancy and is also secreted in breast milk. Dapsone Counseling: I discussed with the patient the risks of dapsone including but not limited to hemolytic anemia, agranulocytosis, rashes, methemoglobinemia, kidney failure, peripheral neuropathy, headaches, GI upset, and liver toxicity.  Patients who start dapsone require monitoring including baseline LFTs and weekly CBCs for the first month, then every month thereafter.  The patient verbalized understanding of the proper use and possible adverse effects of dapsone.  All of the patient's questions and concerns were addressed. Topical Sulfur Applications Counseling: Topical Sulfur Counseling: Patient counseled that this medication may cause skin irritation or allergic reactions.  In the event of skin irritation, the patient was advised to reduce the amount of the drug applied or use it less frequently.   The patient verbalized understanding of the proper use and possible adverse effects of topical sulfur application.  All of the patient's questions and concerns were addressed. Birth Control Pills Counseling: Birth Control Pill Counseling: I discussed with the patient the potential side effects of OCPs including but not limited to increased risk of stroke, heart attack, thrombophlebitis, deep venous thrombosis, hepatic adenomas, breast changes, GI upset, headaches, and depression.  The patient verbalized understanding of the proper use and possible adverse effects of OCPs. All of the patient's questions and concerns were addressed. EMS Topical Clindamycin Pregnancy And Lactation Text: This medication is Pregnancy Category B and is considered safe during pregnancy. It is unknown if it is excreted in breast milk. Topical Sulfur Applications Pregnancy And Lactation Text: This medication is Pregnancy Category C and has an unknown safety profile during pregnancy. It is unknown if this topical medication is excreted in breast milk. Doxycycline Counseling:  Patient counseled regarding possible photosensitivity and increased risk for sunburn.  Patient instructed to avoid sunlight, if possible.  When exposed to sunlight, patients should wear protective clothing, sunglasses, and sunscreen.  The patient was instructed to call the office immediately if the following severe adverse effects occur:  hearing changes, easy bruising/bleeding, severe headache, or vision changes.  The patient verbalized understanding of the proper use and possible adverse effects of doxycycline.  All of the patient's questions and concerns were addressed. Topical Clindamycin Counseling: Patient counseled that this medication may cause skin irritation or allergic reactions.  In the event of skin irritation, the patient was advised to reduce the amount of the drug applied or use it less frequently.   The patient verbalized understanding of the proper use and possible adverse effects of clindamycin.  All of the patient's questions and concerns were addressed. Erythromycin Counseling:  I discussed with the patient the risks of erythromycin including but not limited to GI upset, allergic reaction, drug rash, diarrhea, increase in liver enzymes, and yeast infections. Tazorac Counseling:  Patient advised that medication is irritating and drying.  Patient may need to apply sparingly and wash off after an hour before eventually leaving it on overnight.  The patient verbalized understanding of the proper use and possible adverse effects of tazorac.  All of the patient's questions and concerns were addressed. Use Enhanced Medication Counseling?: No Tazorac Pregnancy And Lactation Text: This medication is not safe during pregnancy. It is unknown if this medication is excreted in breast milk. Birth Control Pills Pregnancy And Lactation Text: This medication should be avoided if pregnant and for the first 30 days post-partum. Isotretinoin Counseling: Patient should get monthly blood tests, not donate blood, not drive at night if vision affected, not share medication, and not undergo elective surgery for 6 months after tx completed. Side effects reviewed, pt to contact office should one occur. Benzoyl Peroxide Pregnancy And Lactation Text: This medication is Pregnancy Category C. It is unknown if benzoyl peroxide is excreted in breast milk. Erythromycin Pregnancy And Lactation Text: This medication is Pregnancy Category B and is considered safe during pregnancy. It is also excreted in breast milk. Benzoyl Peroxide Counseling: Patient counseled that medicine may cause skin irritation and bleach clothing.  In the event of skin irritation, the patient was advised to reduce the amount of the drug applied or use it less frequently.   The patient verbalized understanding of the proper use and possible adverse effects of benzoyl peroxide.  All of the patient's questions and concerns were addressed. Topical Retinoid counseling:  Patient advised to apply a pea-sized amount only at bedtime and wait 30 minutes after washing their face before applying.  If too drying, patient may add a non-comedogenic moisturizer. The patient verbalized understanding of the proper use and possible adverse effects of retinoids.  All of the patient's questions and concerns were addressed. Detail Level: Zone Isotretinoin Pregnancy And Lactation Text: This medication is Pregnancy Category X and is considered extremely dangerous during pregnancy. It is unknown if it is excreted in breast milk. High Dose Vitamin A Counseling: Side effects reviewed, pt to contact office should one occur. Spironolactone Pregnancy And Lactation Text: This medication can cause feminization of the male fetus and should be avoided during pregnancy. The active metabolite is also found in breast milk. Bactrim Pregnancy And Lactation Text: This medication is Pregnancy Category D and is known to cause fetal risk.  It is also excreted in breast milk. Topical Retinoid Pregnancy And Lactation Text: This medication is Pregnancy Category C. It is unknown if this medication is excreted in breast milk.

## 2021-08-22 NOTE — ED PROVIDER NOTE - ATTENDING CONTRIBUTION TO CARE
Attending Statement (AALIYAH Guzman MD):    HPI: 82y/o F h/o Afib (? paroxysmal; unclear if on AC talking to patient), HLD, HTN, arthritis, presenting after episode of passing out / LOC; states she was seated in a chair that travels up staircase, when she was riding, suddenly passed out for "few minutes" (witnessed by patient's family).  States she has been having increased bowel movements and associated black/dark stools for  the past few days.  States did not feel lightheaded or warm prior to episode of LOC.  No CP, No SOB, No Palpitations.  No nausea/vomiting, no abdominal pain.    Review of Systems:  -General: no fever or chills  -ENT: no congestion, no difficulty swallowing  -Pulmonary: no cough, no shortness of breath  -Cardiac: no chest pain, no palpitations; + syncope/LOC  -Gastrointestinal: no abdominal pain, no nausea, no vomiting, and no diarrhea. + black stools  -Genitourinary: no blood or pain with urination  -Musculoskeletal: no back or neck pain  -Skin: no rashes  -Endocrine: No h/o diabetes or thyroid disease  -Neurologic: No focal weakness or numbness    All else negative unless otherwise specified elsewhere in this note.    PSH/PMH as noted above    On Physical Exam:  General: well appearing, in NAD, speaking clearly in full sentences and without difficulty; cooperative with exam  HEENT: PERRL, MMM  Neck: no neck tenderness, no nuchal rigidity  Cardiac: s1s2, irregular, no murmurs  Lungs: CTABL  Abdomen: soft nontender/nondistended; dark stools on rectal exam, no bright red blood  : no bladder tenderness or distension  Skin: intact, no rash  Extremities: no peripheral edema, no gross deformities  Neuro: no facial asymmetry, moving all extremities, no rigidity/clonus    MDM: 82y/o F h/o Afib (? paroxysmal; unclear if on AC talking to patient), HLD, HTN, arthritis, presenting after episode of passing out / LOC; eval for anemia/GI bleed given h/o black stools.  Obtain cbc (to evaluate for leukocytosis or anemia), CMP (to evaluate for electrolyte abnormalities or renal/liver dysfunction), troponin; CXr; consider admission given age.

## 2021-08-22 NOTE — ED ADULT NURSE NOTE - OBJECTIVE STATEMENT
80 yo female with a PMH of early dementia, a.fib, HTN, HLD presents to the ED via EMS from home complaining of syncope. Patient was going up her stairs when she sustained a witnessed episode of syncope, as per EMS was "down for a couple of minutes." Denies any head trauma. Patient is unable to recall events. As per EMS was told by family that she has been eating a lot less and has been having loose stools. Patient unable to elaborate on this at this time. Awaiting to be seen by ED attending. Denies headache, dizziness, vision changes, chest pain, shortness of breath, abdominal pain, nausea, vomiting, diarrhea, fevers, chills, dysuria, hematuria, recent illness travel.

## 2021-08-23 LAB
ALBUMIN SERPL ELPH-MCNC: 3.2 G/DL — LOW (ref 3.3–5)
ALP SERPL-CCNC: 49 U/L — SIGNIFICANT CHANGE UP (ref 40–120)
ALT FLD-CCNC: 9 U/L — LOW (ref 10–45)
ANION GAP SERPL CALC-SCNC: 12 MMOL/L — SIGNIFICANT CHANGE UP (ref 5–17)
AST SERPL-CCNC: 11 U/L — SIGNIFICANT CHANGE UP (ref 10–40)
BILIRUB SERPL-MCNC: 0.2 MG/DL — SIGNIFICANT CHANGE UP (ref 0.2–1.2)
BUN SERPL-MCNC: 24 MG/DL — HIGH (ref 7–23)
CALCIUM SERPL-MCNC: 8.6 MG/DL — SIGNIFICANT CHANGE UP (ref 8.4–10.5)
CHLORIDE SERPL-SCNC: 108 MMOL/L — SIGNIFICANT CHANGE UP (ref 96–108)
CO2 SERPL-SCNC: 22 MMOL/L — SIGNIFICANT CHANGE UP (ref 22–31)
COVID-19 SPIKE DOMAIN AB INTERP: POSITIVE
COVID-19 SPIKE DOMAIN ANTIBODY RESULT: 39.3 U/ML — HIGH
CREAT SERPL-MCNC: 0.87 MG/DL — SIGNIFICANT CHANGE UP (ref 0.5–1.3)
GLUCOSE BLDC GLUCOMTR-MCNC: 123 MG/DL — HIGH (ref 70–99)
GLUCOSE SERPL-MCNC: 172 MG/DL — HIGH (ref 70–99)
HCT VFR BLD CALC: 31.5 % — LOW (ref 34.5–45)
HCT VFR BLD CALC: 32.9 % — LOW (ref 34.5–45)
HGB BLD-MCNC: 10.3 G/DL — LOW (ref 11.5–15.5)
HGB BLD-MCNC: 9.9 G/DL — LOW (ref 11.5–15.5)
MAGNESIUM SERPL-MCNC: 2.4 MG/DL — SIGNIFICANT CHANGE UP (ref 1.6–2.6)
MCHC RBC-ENTMCNC: 30.7 PG — SIGNIFICANT CHANGE UP (ref 27–34)
MCHC RBC-ENTMCNC: 30.9 PG — SIGNIFICANT CHANGE UP (ref 27–34)
MCHC RBC-ENTMCNC: 31.3 GM/DL — LOW (ref 32–36)
MCHC RBC-ENTMCNC: 31.4 GM/DL — LOW (ref 32–36)
MCV RBC AUTO: 98.2 FL — SIGNIFICANT CHANGE UP (ref 80–100)
MCV RBC AUTO: 98.4 FL — SIGNIFICANT CHANGE UP (ref 80–100)
NRBC # BLD: 0 /100 WBCS — SIGNIFICANT CHANGE UP (ref 0–0)
NRBC # BLD: 0 /100 WBCS — SIGNIFICANT CHANGE UP (ref 0–0)
PHOSPHATE SERPL-MCNC: 3.1 MG/DL — SIGNIFICANT CHANGE UP (ref 2.5–4.5)
PLATELET # BLD AUTO: 261 K/UL — SIGNIFICANT CHANGE UP (ref 150–400)
PLATELET # BLD AUTO: 282 K/UL — SIGNIFICANT CHANGE UP (ref 150–400)
POTASSIUM SERPL-MCNC: 3.9 MMOL/L — SIGNIFICANT CHANGE UP (ref 3.5–5.3)
POTASSIUM SERPL-SCNC: 3.9 MMOL/L — SIGNIFICANT CHANGE UP (ref 3.5–5.3)
PROT SERPL-MCNC: 6.2 G/DL — SIGNIFICANT CHANGE UP (ref 6–8.3)
RBC # BLD: 3.2 M/UL — LOW (ref 3.8–5.2)
RBC # BLD: 3.35 M/UL — LOW (ref 3.8–5.2)
RBC # FLD: 14.6 % — HIGH (ref 10.3–14.5)
RBC # FLD: 14.6 % — HIGH (ref 10.3–14.5)
SARS-COV-2 IGG+IGM SERPL QL IA: 39.3 U/ML — HIGH
SARS-COV-2 IGG+IGM SERPL QL IA: POSITIVE
SODIUM SERPL-SCNC: 142 MMOL/L — SIGNIFICANT CHANGE UP (ref 135–145)
TROPONIN T, HIGH SENSITIVITY RESULT: 12 NG/L — SIGNIFICANT CHANGE UP (ref 0–51)
TROPONIN T, HIGH SENSITIVITY RESULT: 14 NG/L — SIGNIFICANT CHANGE UP (ref 0–51)
WBC # BLD: 10.92 K/UL — HIGH (ref 3.8–10.5)
WBC # BLD: 11.86 K/UL — HIGH (ref 3.8–10.5)
WBC # FLD AUTO: 10.92 K/UL — HIGH (ref 3.8–10.5)
WBC # FLD AUTO: 11.86 K/UL — HIGH (ref 3.8–10.5)

## 2021-08-23 RX ORDER — PANTOPRAZOLE SODIUM 20 MG/1
40 TABLET, DELAYED RELEASE ORAL
Refills: 0 | Status: DISCONTINUED | OUTPATIENT
Start: 2021-08-23 | End: 2021-08-25

## 2021-08-23 RX ADMIN — MEMANTINE HYDROCHLORIDE 10 MILLIGRAM(S): 10 TABLET ORAL at 05:54

## 2021-08-23 RX ADMIN — PANTOPRAZOLE SODIUM 40 MILLIGRAM(S): 20 TABLET, DELAYED RELEASE ORAL at 19:02

## 2021-08-23 RX ADMIN — PANTOPRAZOLE SODIUM 40 MILLIGRAM(S): 20 TABLET, DELAYED RELEASE ORAL at 05:54

## 2021-08-23 RX ADMIN — ATORVASTATIN CALCIUM 20 MILLIGRAM(S): 80 TABLET, FILM COATED ORAL at 21:26

## 2021-08-23 RX ADMIN — MEMANTINE HYDROCHLORIDE 10 MILLIGRAM(S): 10 TABLET ORAL at 19:02

## 2021-08-23 RX ADMIN — APIXABAN 5 MILLIGRAM(S): 2.5 TABLET, FILM COATED ORAL at 05:54

## 2021-08-23 RX ADMIN — Medication 180 MILLIGRAM(S): at 21:26

## 2021-08-23 RX ADMIN — APIXABAN 5 MILLIGRAM(S): 2.5 TABLET, FILM COATED ORAL at 19:03

## 2021-08-23 RX ADMIN — DONEPEZIL HYDROCHLORIDE 10 MILLIGRAM(S): 10 TABLET, FILM COATED ORAL at 21:27

## 2021-08-23 RX ADMIN — Medication 125 MICROGRAM(S): at 05:54

## 2021-08-23 NOTE — PATIENT PROFILE ADULT - NSPROGENBLOODRESTRICT_GEN_A_NUR
none patient  examined no injuries  except right cheek laceration   will clean  and  suture  with  F/u PMD

## 2021-08-23 NOTE — PROGRESS NOTE ADULT - SUBJECTIVE AND OBJECTIVE BOX
Patient is a 81y old  Female who presents with a chief complaint of syncope (23 Aug 2021 10:14)      INTERVAL HPI/OVERNIGHT EVENTS:  T(C): 36.4 (21 @ 21:50), Max: 36.6 (21 @ 15:40)  HR: 91 (21 @ 21:50) (81 - 92)  BP: 114/68 (21 @ 21:50) (114/68 - 123/72)  RR: 18 (21 @ 21:50) (18 - 18)  SpO2: 100% (21 @ 21:50) (99% - 100%)  Wt(kg): --  I&O's Summary    23 Aug 2021 07:01  -  23 Aug 2021 23:45  --------------------------------------------------------  IN: 120 mL / OUT: 150 mL / NET: -30 mL        PAST MEDICAL & SURGICAL HISTORY:  Arthritis    Hypertension    High cholesterol    Afib    Osteoporosis    Compression fracture of T12 vertebra        SOCIAL HISTORY  Alcohol:  Tobacco:  Illicit substance use:    FAMILY HISTORY:    REVIEW OF SYSTEMS:  CONSTITUTIONAL: No fever, weight loss, or fatigue  EYES: No eye pain, visual disturbances, or discharge  ENMT:  No difficulty hearing, tinnitus, vertigo; No sinus or throat pain  NECK: No pain or stiffness  RESPIRATORY: No cough, wheezing, chills or hemoptysis; No shortness of breath  CARDIOVASCULAR: No chest pain, palpitations, dizziness, or leg swelling  GASTROINTESTINAL: No abdominal or epigastric pain. No nausea, vomiting, or hematemesis; No diarrhea or constipation. No melena or hematochezia.  GENITOURINARY: No dysuria, frequency, hematuria, or incontinence  NEUROLOGICAL: No headaches, memory loss, loss of strength, numbness, or tremors  SKIN: No itching, burning, rashes, or lesions   LYMPH NODES: No enlarged glands  ENDOCRINE: No heat or cold intolerance; No hair loss  MUSCULOSKELETAL: No joint pain or swelling; No muscle, back, or extremity pain  PSYCHIATRIC: No depression, anxiety, mood swings, or difficulty sleeping  HEME/LYMPH: No easy bruising, or bleeding gums  ALLERY AND IMMUNOLOGIC: No hives or eczema    RADIOLOGY & ADDITIONAL TESTS:    Imaging Personally Reviewed:  [ ] YES  [ ] NO    Consultant(s) Notes Reviewed:  [ ] YES  [ ] NO    PHYSICAL EXAM:  GENERAL: NAD, well-groomed, well-developed  HEAD:  Atraumatic, Normocephalic  EYES: EOMI, PERRLA, conjunctiva and sclera clear  ENMT: No tonsillar erythema, exudates, or enlargement; Moist mucous membranes, Good dentition, No lesions  NECK: Supple, No JVD, Normal thyroid  NERVOUS SYSTEM:  Alert & Oriented X3, Good concentration; Motor Strength 5/5 B/L upper and lower extremities; DTRs 2+ intact and symmetric  CHEST/LUNG: Clear to percussion bilaterally; No rales, rhonchi, wheezing, or rubs  HEART: Regular rate and rhythm; No murmurs, rubs, or gallops  ABDOMEN: Soft, Nontender, Nondistended; Bowel sounds present  EXTREMITIES:  2+ Peripheral Pulses, No clubbing, cyanosis, or edema  LYMPH: No lymphadenopathy noted  SKIN: No rashes or lesions    LABS:                        10.3   11.86 )-----------( 282      ( 23 Aug 2021 09:28 )             32.9     08-23    142  |  108  |  24<H>  ----------------------------<  172<H>  3.9   |  22  |  0.87    Ca    8.6      23 Aug 2021 06:12  Phos  3.1     08-23  Mg     2.4     08-23    TPro  6.2  /  Alb  3.2<L>  /  TBili  0.2  /  DBili  x   /  AST  11  /  ALT  9<L>  /  AlkPhos  49  08-23    PT/INR - ( 22 Aug 2021 14:37 )   PT: 15.2 sec;   INR: 1.28 ratio         PTT - ( 22 Aug 2021 14:37 )  PTT:28.1 sec  Urinalysis Basic - ( 22 Aug 2021 18:00 )    Color: Yellow / Appearance: Slightly Turbid / S.025 / pH: x  Gluc: x / Ketone: Negative  / Bili: Negative / Urobili: Negative   Blood: x / Protein: 30 mg/dL / Nitrite: Positive   Leuk Esterase: Large / RBC: 1 /hpf / WBC 55 /HPF   Sq Epi: x / Non Sq Epi: 3 /hpf / Bacteria: Many      CAPILLARY BLOOD GLUCOSE      POCT Blood Glucose.: 123 mg/dL (23 Aug 2021 17:27)        Urinalysis Basic - ( 22 Aug 2021 18:00 )    Color: Yellow / Appearance: Slightly Turbid / S.025 / pH: x  Gluc: x / Ketone: Negative  / Bili: Negative / Urobili: Negative   Blood: x / Protein: 30 mg/dL / Nitrite: Positive   Leuk Esterase: Large / RBC: 1 /hpf / WBC 55 /HPF   Sq Epi: x / Non Sq Epi: 3 /hpf / Bacteria: Many        MEDICATIONS  (STANDING):  apixaban 5 milliGRAM(s) Oral two times a day  atorvastatin 20 milliGRAM(s) Oral at bedtime  digoxin     Tablet 125 MICROGram(s) Oral daily  diltiazem    milliGRAM(s) Oral daily  donepezil 10 milliGRAM(s) Oral at bedtime  levoFLOXacin IVPB      levoFLOXacin IVPB 500 milliGRAM(s) IV Intermittent every 24 hours  memantine 10 milliGRAM(s) Oral two times a day  pantoprazole  Injectable 40 milliGRAM(s) IV Push two times a day    MEDICATIONS  (PRN):      Care Discussed with Consultants/Other Providers [ ] YES  [ ] NO Patient is a 81y old  Female who presents with a chief complaint of syncope (23 Aug 2021 10:14)      INTERVAL HPI/OVERNIGHT EVENTS: seen and examined, NAD  T(C): 36.4 (21 @ 21:50), Max: 36.6 (21 @ 15:40)  HR: 91 (21 @ 21:50) (81 - 92)  BP: 114/68 (21 @ 21:50) (114/68 - 123/72)  RR: 18 (21 @ 21:50) (18 - 18)  SpO2: 100% (21 @ 21:50) (99% - 100%)  Wt(kg): --  I&O's Summary    23 Aug 2021 07:01  -  23 Aug 2021 23:45  --------------------------------------------------------  IN: 120 mL / OUT: 150 mL / NET: -30 mL        PAST MEDICAL & SURGICAL HISTORY:  Arthritis    Hypertension    High cholesterol    Afib    Osteoporosis    Compression fracture of T12 vertebra        SOCIAL HISTORY  Alcohol:  Tobacco:  Illicit substance use:    FAMILY HISTORY:    REVIEW OF SYSTEMS:  CONSTITUTIONAL: No fever, weight loss, or fatigue  EYES: No eye pain, visual disturbances, or discharge  ENMT:  No difficulty hearing, tinnitus, vertigo; No sinus or throat pain  NECK: No pain or stiffness  RESPIRATORY: No cough, wheezing, chills or hemoptysis; No shortness of breath  CARDIOVASCULAR: No chest pain, palpitations, dizziness, or leg swelling  GASTROINTESTINAL: No abdominal or epigastric pain. No nausea, vomiting, or hematemesis; No diarrhea or constipation. No melena or hematochezia.  GENITOURINARY: No dysuria, frequency, hematuria, or incontinence  NEUROLOGICAL: No headaches, memory loss, loss of strength, numbness, or tremors  SKIN: No itching, burning, rashes, or lesions   LYMPH NODES: No enlarged glands  ENDOCRINE: No heat or cold intolerance; No hair loss  MUSCULOSKELETAL: No joint pain or swelling; No muscle, back, or extremity pain  PSYCHIATRIC: No depression, anxiety, mood swings, or difficulty sleeping  HEME/LYMPH: No easy bruising, or bleeding gums  ALLERY AND IMMUNOLOGIC: No hives or eczema    RADIOLOGY & ADDITIONAL TESTS:    Imaging Personally Reviewed:  [ ] YES  [ ] NO    Consultant(s) Notes Reviewed:  [ ] YES  [ ] NO    PHYSICAL EXAM:  GENERAL: NAD, well-groomed, well-developed  HEAD:  Atraumatic, Normocephalic  EYES: EOMI, PERRLA, conjunctiva and sclera clear  ENMT: No tonsillar erythema, exudates, or enlargement; Moist mucous membranes, Good dentition, No lesions  NECK: Supple, No JVD, Normal thyroid  NERVOUS SYSTEM:  Alert & Oriented X3, Good concentration; Motor Strength 5/5 B/L upper and lower extremities; DTRs 2+ intact and symmetric  CHEST/LUNG: Clear to percussion bilaterally; No rales, rhonchi, wheezing, or rubs  HEART: Regular rate and rhythm; No murmurs, rubs, or gallops  ABDOMEN: Soft, Nontender, Nondistended; Bowel sounds present  EXTREMITIES:  2+ Peripheral Pulses, No clubbing, cyanosis, or edema  LYMPH: No lymphadenopathy noted  SKIN: No rashes or lesions    LABS:                        10.3   11.86 )-----------( 282      ( 23 Aug 2021 09:28 )             32.9     08-23    142  |  108  |  24<H>  ----------------------------<  172<H>  3.9   |  22  |  0.87    Ca    8.6      23 Aug 2021 06:12  Phos  3.1     08-  Mg     2.4     08-23    TPro  6.2  /  Alb  3.2<L>  /  TBili  0.2  /  DBili  x   /  AST  11  /  ALT  9<L>  /  AlkPhos  49  08-23    PT/INR - ( 22 Aug 2021 14:37 )   PT: 15.2 sec;   INR: 1.28 ratio         PTT - ( 22 Aug 2021 14:37 )  PTT:28.1 sec  Urinalysis Basic - ( 22 Aug 2021 18:00 )    Color: Yellow / Appearance: Slightly Turbid / S.025 / pH: x  Gluc: x / Ketone: Negative  / Bili: Negative / Urobili: Negative   Blood: x / Protein: 30 mg/dL / Nitrite: Positive   Leuk Esterase: Large / RBC: 1 /hpf / WBC 55 /HPF   Sq Epi: x / Non Sq Epi: 3 /hpf / Bacteria: Many      CAPILLARY BLOOD GLUCOSE      POCT Blood Glucose.: 123 mg/dL (23 Aug 2021 17:27)        Urinalysis Basic - ( 22 Aug 2021 18:00 )    Color: Yellow / Appearance: Slightly Turbid / S.025 / pH: x  Gluc: x / Ketone: Negative  / Bili: Negative / Urobili: Negative   Blood: x / Protein: 30 mg/dL / Nitrite: Positive   Leuk Esterase: Large / RBC: 1 /hpf / WBC 55 /HPF   Sq Epi: x / Non Sq Epi: 3 /hpf / Bacteria: Many        MEDICATIONS  (STANDING):  apixaban 5 milliGRAM(s) Oral two times a day  atorvastatin 20 milliGRAM(s) Oral at bedtime  digoxin     Tablet 125 MICROGram(s) Oral daily  diltiazem    milliGRAM(s) Oral daily  donepezil 10 milliGRAM(s) Oral at bedtime  levoFLOXacin IVPB      levoFLOXacin IVPB 500 milliGRAM(s) IV Intermittent every 24 hours  memantine 10 milliGRAM(s) Oral two times a day  pantoprazole  Injectable 40 milliGRAM(s) IV Push two times a day    MEDICATIONS  (PRN):      Care Discussed with Consultants/Other Providers [ ] YES  [ ] NO

## 2021-08-23 NOTE — CONSULT NOTE ADULT - SUBJECTIVE AND OBJECTIVE BOX
[facility]        Patient is a 81y old  Female who presents with a chief complaint of syncope (22 Aug 2021 19:50)    Excerpt from H&P,"82yo woman PMH afib, HTN, HLD, osteoporosis, dementia was brought in after an episode of LOC while chair lift up the stairs. She denies falling or any trauma. She states that she has been having more BMs recently that are black for the past several days to weeks. No lightheadedness, n/v or symptoms prior to episode. Pt denies any symptoms afterwards. No recent fevers, cough, n/v/d, abdominal pain, dysuria.  	Spoke with pt's son ([phone]) who states that his fiance was with her when she passed out for >25 sec. She told her that she had been tired in the past few days but no other complaints. PMH afib on eliquis, digoxin 3 times a week, cardizem 180mg, lovastatin, lasix 20mg 4 times a week, aricept 10mg, memantine 10mg              *****PAST MEDICAL / Surgical  HISTORY:  PAST MEDICAL & SURGICAL HISTORY:  Arthritis    Hypertension    High cholesterol    Afib    Osteoporosis    Compression fracture of T12 vertebra             *****FAMILY HISTORY:  FAMILY HISTORY:  No significant family history             *****SOCIAL HISTORY:  Alcohol: None  Smoking: None         *****ALLERGIES:   Allergies    penicillin (Unknown)    Intolerances             *****MEDICATIONS: current medication reviewed and documented.   MEDICATIONS  (STANDING):  apixaban 5 milliGRAM(s) Oral two times a day  atorvastatin 20 milliGRAM(s) Oral at bedtime  digoxin     Tablet 125 MICROGram(s) Oral daily  diltiazem    milliGRAM(s) Oral daily  donepezil 10 milliGRAM(s) Oral at bedtime  levoFLOXacin IVPB      levoFLOXacin IVPB 500 milliGRAM(s) IV Intermittent every 24 hours  memantine 10 milliGRAM(s) Oral two times a day  pantoprazole  Injectable 40 milliGRAM(s) IV Push two times a day    MEDICATIONS  (PRN):           *****REVIEW OF SYSTEM:  GEN: no fever, no chills, no pain  RESP: no SOB, no cough, no sputum  CVS: no chest pain, no palpitations, no edema  GI: no abdominal pain, no nausea, no vomiting, no constipation, no diarrhea  : no dysurea, no frequency, no hematurea  Neuro: no headache, no dizziness  PSYCH: no anxiety, no depression  Derm : no itching, no rash         *****VITAL SIGNS:  T(C): 36.6 (21 @ 15:40), Max: 36.6 (21 @ 23:23)  HR: 81 (21 @ 15:40) (81 - 114)  BP: 117/63 (21 @ 15:40) (110/59 - 123/72)  RR: 18 (21 @ 15:40) (18 - 18)  SpO2: 100% (21 @ 15:40) (99% - 100%)  Wt(kg): --           *****PHYSICAL EXAM:   Alert oriented x2   Attention comprehension are fair. Able to name, repeat, without any difficulty.   Able to follow 1 step commands.  recall 2/5      EOMI fundi not visualized,  VFF to confrontration  No facial asymmetry   Tongue is midline   Palate elevates symmetrically   Moving all 4 ext symmetrically no pronator drift   Reflexes are symmetric throughout   sensation is grossly symmetric  Gait : not assessed.  B/L down going toes               *****LAB AND IMAGING:                          10.3   11.86 )-----------( 282      ( 23 Aug 2021 09:28 )             32.9                   142  |  108  |  24<H>  ----------------------------<  172<H>  3.9   |  22  |  0.87    Ca    8.6      23 Aug 2021 06:12  Phos  3.1       Mg     2.4         TPro  6.2  /  Alb  3.2<L>  /  TBili  0.2  /  DBili  x   /  AST  11  /  ALT  9<L>  /  AlkPhos  49      PT/INR - ( 22 Aug 2021 14:37 )   PT: 15.2 sec;   INR: 1.28 ratio         PTT - ( 22 Aug 2021 14:37 )  PTT:28.1 sec                        Urinalysis Basic - ( 22 Aug 2021 18:00 )    Color: Yellow / Appearance: Slightly Turbid / S.025 / pH: x  Gluc: x / Ketone: Negative  / Bili: Negative / Urobili: Negative   Blood: x / Protein: 30 mg/dL / Nitrite: Positive   Leuk Esterase: Large / RBC: 1 /hpf / WBC 55 /HPF   Sq Epi: x / Non Sq Epi: 3 /hpf / Bacteria: Many        [All pertinent recent Imaging reports reviewed]         *****A S S E S S M E N T   A N D   P L A N :        Excerpt from H&P,"82yo woman PMH afib, HTN, HLD, osteoporosis, dementia was brought in after an episode of LOC while chair lift up the stairs. She denies falling or any trauma. She states that she has been having more BMs recently that are black for the past several days to weeks. No lightheadedness, n/v or symptoms prior to episode. Pt denies any symptoms afterwards. No recent fevers, cough, n/v/d, abdominal pain, dysuria.  	Spoke with pt's son ([phone]) who states that his fiance was with her when she passed out for >25 sec. She told her that she had been tired in the past few days but no other complaints. PMH afib on eliquis, digoxin 3 times a week, cardizem 180mg, lovastatin, lasix 20mg 4 times a week, aricept 10mg, memantine 10mg       Problem/Recommendations 1:  Syncope of unclear etiology  suspect that it is cardiac as it was brief   tele monitoring   does not have any stigmata of sz , no incontinence or saddle anesthesia   r/o sepsis as cause    dig level ok   check orthostatics   trend h/h ? guiac positive    Problem/Recommendations 2: dementia   currently on aricept and namenda   regulate sleep wake cycle  reorient often   avoid daytime somnolence       Problem/Recommendations 3: abn urine   le positive nitrite positive   currently on levofloxacin       ___________________________  Will follow with you.  Thank you,  Marie Ying MD  Diplomate of the American Board of Neurology and Psychiatry.  Diplomate of the American Board of Vascular Neurology.   [facility] ([facility]), [facility]   Ph: [phone]    Differential diagnosis and plan of care discussed with patient after the evaluation.   Advanced care planning options discussed.   Pain assessed and judicious use of narcotics when appropriate was discussed.  Importance of Fall prevention discussed.  Counseling on Smoking and Alcohol cessation was offered when appropriate.  Counseling on Diet, exercise, and medication compliance was done.   83 minutes spent on the total encounter;  more than 50 % of the visit was spent on counseling  and or coordinating care by the attending physician.    Thank you for allowing me to participate in the care of this brittany patient. Please do not hesitate to call me if you have any questions.     This and subsequent notes  will  inherently be subject to errors including those of syntax and substitutions which may escape proofreading. In such instances original meaning may be extrapolated by contextual derivation.

## 2021-08-24 LAB
ANION GAP SERPL CALC-SCNC: 13 MMOL/L — SIGNIFICANT CHANGE UP (ref 5–17)
BUN SERPL-MCNC: 17 MG/DL — SIGNIFICANT CHANGE UP (ref 7–23)
CALCIUM SERPL-MCNC: 8.6 MG/DL — SIGNIFICANT CHANGE UP (ref 8.4–10.5)
CHLORIDE SERPL-SCNC: 102 MMOL/L — SIGNIFICANT CHANGE UP (ref 96–108)
CO2 SERPL-SCNC: 22 MMOL/L — SIGNIFICANT CHANGE UP (ref 22–31)
CREAT SERPL-MCNC: 0.86 MG/DL — SIGNIFICANT CHANGE UP (ref 0.5–1.3)
GLUCOSE SERPL-MCNC: 133 MG/DL — HIGH (ref 70–99)
HCT VFR BLD CALC: 30.3 % — LOW (ref 34.5–45)
HCT VFR BLD CALC: 31 % — LOW (ref 34.5–45)
HGB BLD-MCNC: 10 G/DL — LOW (ref 11.5–15.5)
HGB BLD-MCNC: 9.6 G/DL — LOW (ref 11.5–15.5)
MCHC RBC-ENTMCNC: 31.1 PG — SIGNIFICANT CHANGE UP (ref 27–34)
MCHC RBC-ENTMCNC: 31.2 PG — SIGNIFICANT CHANGE UP (ref 27–34)
MCHC RBC-ENTMCNC: 31.7 GM/DL — LOW (ref 32–36)
MCHC RBC-ENTMCNC: 32.3 GM/DL — SIGNIFICANT CHANGE UP (ref 32–36)
MCV RBC AUTO: 96.6 FL — SIGNIFICANT CHANGE UP (ref 80–100)
MCV RBC AUTO: 98.1 FL — SIGNIFICANT CHANGE UP (ref 80–100)
NRBC # BLD: 0 /100 WBCS — SIGNIFICANT CHANGE UP (ref 0–0)
NRBC # BLD: 0 /100 WBCS — SIGNIFICANT CHANGE UP (ref 0–0)
PLATELET # BLD AUTO: 249 K/UL — SIGNIFICANT CHANGE UP (ref 150–400)
PLATELET # BLD AUTO: 299 K/UL — SIGNIFICANT CHANGE UP (ref 150–400)
POTASSIUM SERPL-MCNC: 4.2 MMOL/L — SIGNIFICANT CHANGE UP (ref 3.5–5.3)
POTASSIUM SERPL-SCNC: 4.2 MMOL/L — SIGNIFICANT CHANGE UP (ref 3.5–5.3)
RBC # BLD: 3.09 M/UL — LOW (ref 3.8–5.2)
RBC # BLD: 3.21 M/UL — LOW (ref 3.8–5.2)
RBC # FLD: 14.3 % — SIGNIFICANT CHANGE UP (ref 10.3–14.5)
RBC # FLD: 14.6 % — HIGH (ref 10.3–14.5)
SODIUM SERPL-SCNC: 137 MMOL/L — SIGNIFICANT CHANGE UP (ref 135–145)
TSH SERPL-MCNC: 3.18 UIU/ML — SIGNIFICANT CHANGE UP (ref 0.27–4.2)
WBC # BLD: 11.03 K/UL — HIGH (ref 3.8–10.5)
WBC # BLD: 11.61 K/UL — HIGH (ref 3.8–10.5)
WBC # FLD AUTO: 11.03 K/UL — HIGH (ref 3.8–10.5)
WBC # FLD AUTO: 11.61 K/UL — HIGH (ref 3.8–10.5)

## 2021-08-24 RX ADMIN — ATORVASTATIN CALCIUM 20 MILLIGRAM(S): 80 TABLET, FILM COATED ORAL at 22:02

## 2021-08-24 RX ADMIN — Medication 125 MICROGRAM(S): at 05:46

## 2021-08-24 RX ADMIN — PANTOPRAZOLE SODIUM 40 MILLIGRAM(S): 20 TABLET, DELAYED RELEASE ORAL at 17:20

## 2021-08-24 RX ADMIN — Medication 180 MILLIGRAM(S): at 05:46

## 2021-08-24 RX ADMIN — MEMANTINE HYDROCHLORIDE 10 MILLIGRAM(S): 10 TABLET ORAL at 17:20

## 2021-08-24 RX ADMIN — APIXABAN 5 MILLIGRAM(S): 2.5 TABLET, FILM COATED ORAL at 05:45

## 2021-08-24 RX ADMIN — DONEPEZIL HYDROCHLORIDE 10 MILLIGRAM(S): 10 TABLET, FILM COATED ORAL at 22:02

## 2021-08-24 RX ADMIN — PANTOPRAZOLE SODIUM 40 MILLIGRAM(S): 20 TABLET, DELAYED RELEASE ORAL at 05:45

## 2021-08-24 RX ADMIN — MEMANTINE HYDROCHLORIDE 10 MILLIGRAM(S): 10 TABLET ORAL at 06:25

## 2021-08-24 RX ADMIN — APIXABAN 5 MILLIGRAM(S): 2.5 TABLET, FILM COATED ORAL at 17:20

## 2021-08-24 NOTE — PROGRESS NOTE ADULT - SUBJECTIVE AND OBJECTIVE BOX
[facility] Neurological Care [facility]      Seen earlier today, and examined.  - Today, patient is without complaints.           *****MEDICATIONS: Current medication reviewed and documented.    MEDICATIONS  (STANDING):  apixaban 5 milliGRAM(s) Oral two times a day  atorvastatin 20 milliGRAM(s) Oral at bedtime  digoxin     Tablet 125 MICROGram(s) Oral daily  diltiazem    milliGRAM(s) Oral daily  donepezil 10 milliGRAM(s) Oral at bedtime  levoFLOXacin IVPB      levoFLOXacin IVPB 500 milliGRAM(s) IV Intermittent every 24 hours  memantine 10 milliGRAM(s) Oral two times a day  pantoprazole  Injectable 40 milliGRAM(s) IV Push two times a day    MEDICATIONS  (PRN):          ***** VITAL SIGNS:  T(F): 97.6 (21 @ 04:21), Max: 97.6 (21 @ 21:50)  HR: 83 (21 @ 04:21) (83 - 91)  BP: 123/74 (21 @ 04:21) (114/68 - 123/74)  RR: 18 (21 @ 04:21) (18 - 18)  SpO2: 100% (21 @ 04:21) (100% - 100%)  Wt(kg): --  ,   I&O's Summary    23 Aug 2021 07:  -  24 Aug 2021 07:00  --------------------------------------------------------  IN: 120 mL / OUT: 150 mL / NET: -30 mL    24 Aug 2021 07:  -  24 Aug 2021 15:58  --------------------------------------------------------  IN: 240 mL / OUT: 0 mL / NET: 240 mL             *****PHYSICAL EXAM:   alert oriented x 3 attention comprehension are fair.  Able to name, repeat.   EOmi fundi not visualized   no nystagmus VFF to confrontation  Tongue is midline  Palate elevates symmetrically   Moving all 4 ext spontaneously no drift appreciated    Gait not assessed.            *****LAB AND IMAGIN.6    11.03 )-----------( 249      ( 24 Aug 2021 07:13 )             30.3               08-24    137  |  102  |  17  ----------------------------<  133<H>  4.2   |  22  |  0.86    Ca    8.6      24 Aug 2021 07:13  Phos  3.1     08-  Mg     2.4     08-    TPro  6.2  /  Alb  3.2<L>  /  TBili  0.2  /  DBili  x   /  AST  11  /  ALT  9<L>  /  AlkPhos  49  08-23           occult positiv e            Urinalysis Basic - ( 22 Aug 2021 18:00 )    Color: Yellow / Appearance: Slightly Turbid / S.025 / pH: x  Gluc: x / Ketone: Negative  / Bili: Negative / Urobili: Negative   Blood: x / Protein: 30 mg/dL / Nitrite: Positive   Leuk Esterase: Large / RBC: 1 /hpf / WBC 55 /HPF   Sq Epi: x / Non Sq Epi: 3 /hpf / Bacteria: Many      [All pertinent recent Imaging/Reports reviewed]           *****A S S E S S M E N T   A N D   P L A N :           Excerpt from H&P,"80yo woman PMH afib, HTN, HLD, osteoporosis, dementia was brought in after an episode of LOC while chair lift up the stairs. She denies falling or any trauma. She states that she has been having more BMs recently that are black for the past several days to weeks. No lightheadedness, n/v or symptoms prior to episode. Pt denies any symptoms afterwards. No recent fevers, cough, n/v/d, abdominal pain, dysuria.  	Spoke with pt's son ([phone]) who states that his fiance was with her when she passed out for >25 sec. She told her that she had been tired in the past few days but no other complaints. PMH afib on eliquis, digoxin 3 times a week, cardizem 180mg, lovastatin, lasix 20mg 4 times a week, aricept 10mg, memantine 10mg       Problem/Recommendations 1:  Syncope of unclear etiology  suspect that it is cardiac as it was brief   tele monitoring   does not have any stigmata of sz , no incontinence or saddle anesthesia   r/o sepsis as cause    dig level ok   check orthostatics   trend h/h ? guiac positive    Problem/Recommendations 2: dementia   currently on aricept and namenda   regulate sleep wake cycle  reorient often   avoid daytime somnolence       Problem/Recommendations 3: abn urine   le positive nitrite positive   currently on levofloxacin      Thank you for allowing me to participate in the care of this patient. Will continue to follow patient periodically. Please do not hesitate to call me if you have any  questions or if there has been a change in patients neurological status     ________________  Marie Ying MD  [facility]rological [facility] [facility]  [phone]      33 minutes spent on total encounter; more than 50 % of the visit was  spent counseling about plan of care, compliance to diet/exercise and medication regimen and or  coordinating care by the attending physician.      It is advised that stroke patients follow up with FLORIN Silveira @ [phone] in 1- 2 weeks.   Others please follow up with Dr. Michael Nissenbaum [phone] [facility]rological Care [facility]      Seen earlier today, and examined.  - Today, patient is without complaints.           *****MEDICATIONS: Current medication reviewed and documented.    MEDICATIONS  (STANDING):  apixaban 5 milliGRAM(s) Oral two times a day  atorvastatin 20 milliGRAM(s) Oral at bedtime  digoxin     Tablet 125 MICROGram(s) Oral daily  diltiazem    milliGRAM(s) Oral daily  donepezil 10 milliGRAM(s) Oral at bedtime  levoFLOXacin IVPB      levoFLOXacin IVPB 500 milliGRAM(s) IV Intermittent every 24 hours  memantine 10 milliGRAM(s) Oral two times a day  pantoprazole  Injectable 40 milliGRAM(s) IV Push two times a day    MEDICATIONS  (PRN):          ***** VITAL SIGNS:  T(F): 97.6 (21 @ 04:21), Max: 97.6 (21 @ 21:50)  HR: 83 (21 @ 04:21) (83 - 91)  BP: 123/74 (21 @ 04:21) (114/68 - 123/74)  RR: 18 (21 @ 04:21) (18 - 18)  SpO2: 100% (21 @ 04:21) (100% - 100%)  Wt(kg): --  ,   I&O's Summary    23 Aug 2021 07:  -  24 Aug 2021 07:00  --------------------------------------------------------  IN: 120 mL / OUT: 150 mL / NET: -30 mL    24 Aug 2021 07:  -  24 Aug 2021 15:58  --------------------------------------------------------  IN: 240 mL / OUT: 0 mL / NET: 240 mL             *****PHYSICAL EXAM:   alert oriented x 2.5   attention comprehension are fair.  Able to name, repeat. knows the name of current president but did not know previous president or current      EOmi fundi not visualized   no nystagmus VFF to confrontation  Tongue is midline  Palate elevates symmetrically   Moving all 4 ext spontaneously no drift appreciated    Gait not assessed.            *****LAB AND IMAGIN.6    11.03 )-----------( 249      ( 24 Aug 2021 07:13 )             30.3               08-24    137  |  102  |  17  ----------------------------<  133<H>  4.2   |  22  |  0.86    Ca    8.6      24 Aug 2021 07:13  Phos  3.1     08-23  Mg     2.4     08-23    TPro  6.2  /  Alb  3.2<L>  /  TBili  0.2  /  DBili  x   /  AST  11  /  ALT  9<L>  /  AlkPhos  49  08-23           occult positiv e            Urinalysis Basic - ( 22 Aug 2021 18:00 )    Color: Yellow / Appearance: Slightly Turbid / S.025 / pH: x  Gluc: x / Ketone: Negative  / Bili: Negative / Urobili: Negative   Blood: x / Protein: 30 mg/dL / Nitrite: Positive   Leuk Esterase: Large / RBC: 1 /hpf / WBC 55 /HPF   Sq Epi: x / Non Sq Epi: 3 /hpf / Bacteria: Many      [All pertinent recent Imaging/Reports reviewed]           *****A S S E S S M E N T   A N D   P L A N :           Excerpt from H&P,"82yo woman PM afib, HTN, HLD, osteoporosis, dementia was brought in after an episode of LOC while chair lift up the stairs. She denies falling or any trauma. She states that she has been having more BMs recently that are black for the past several days to weeks. No lightheadedness, n/v or symptoms prior to episode. Pt denies any symptoms afterwards. No recent fevers, cough, n/v/d, abdominal pain, dysuria.  	Spoke with pt's son ([phone]) who states that his fiance was with her when she passed out for >25 sec. She told her that she had been tired in the past few days but no other complaints. PMH afib on eliquis, digoxin 3 times a week, cardizem 180mg, lovastatin, lasix 20mg 4 times a week, aricept 10mg, memantine 10mg       Problem/Recommendations 1:  Syncope of unclear etiology  suspect that it is cardiac as it was brief   tele monitoring   does not have any stigmata of sz , no incontinence or saddle anesthesia   r/o sepsis as cause    dig level ok   check orthostatics   trend h/h ? guiac positive    Problem/Recommendations 2: dementia   currently on aricept and namenda   regulate sleep wake cycle  reorient often   avoid daytime somnolence       Problem/Recommendations 3: abn urine   le positive nitrite positive   currently on levofloxacin      Thank you for allowing me to participate in the care of this patient. Will continue to follow patient periodically. Please do not hesitate to call me if you have any  questions or if there has been a change in patients neurological status     ________________  Marie Ying MD  [facility] Neurological [facility]  [phone]      33 minutes spent on total encounter; more than 50 % of the visit was  spent counseling about plan of care, compliance to diet/exercise and medication regimen and or  coordinating care by the attending physician.      It is advised that stroke patients follow up with FLORIN Silveira @ [phone] in 1- 2 weeks.   Others please follow up with Dr. Michael Nissenbaum [phone]

## 2021-08-24 NOTE — PHYSICAL THERAPY INITIAL EVALUATION ADULT - PERTINENT HX OF CURRENT PROBLEM, REHAB EVAL
82yo woman PMH afib, HTN, HLD, osteoporosis, dementia was brought in after an episode of LOC while chair lift up the stairs. She denies falling or any trauma. She states that she has been having more BMs recently that are black for the past several days to weeks. No lightheadedness, n/v or symptoms prior to episode. CXR clear,CTH negative.

## 2021-08-24 NOTE — CONSULT NOTE ADULT - SUBJECTIVE AND OBJECTIVE BOX
CHIEF COMPLAINT:Patient is a 81y old  Female who presents with a chief complaint of syncope (23 Aug 2021 20:45)      HISTORY OF PRESENT ILLNESS:HPI:  82yo woman PMH afib, HTN, HLD, osteoporosis, dementia was brought in after an episode of LOC while chair lift up the stairs. She denies falling or any trauma. She states that she has been having more BMs recently that are black for the past several days to weeks. No lightheadedness, n/v or symptoms prior to episode. Pt denies any symptoms afterwards. No recent fevers, cough, n/v/d, abdominal pain, dysuria.  	Spoke with pt's son ([phone]) who states that his fiance was with her when she passed out for >25 sec. She told her that she had been tired in the past few days but no other complaints. PMH afib on eliquis, digoxin 3 times a week, cardizem 180mg, lovastatin, lasix 20mg 4 times a week, aricept 10mg, memantine 10mg  	PCP: Dr. Sung Caro (sons' fiance): [phone] (22 Aug 2021 19:50)      PAST MEDICAL & SURGICAL HISTORY:  Arthritis  Hypertension  Highcholesterol  Afib  Osteoporosis  Compression fracture of T12 vertebra      MEDICATIONS:  apixaban 5 milliGRAM(s) Oral two times a day  digoxin     Tablet 125 MICROGram(s) Oral daily  diltiazem    milliGRAM(s) Oral daily  levoFLOXacin IVPB      levoFLOXacin IVPB 500 milliGRAM(s) IV Intermittent every 24 hour  donepezil 10 milliGRAM(s) Oral at bedtime  memantine 10 milliGRAM(s) Oral two times a day  pantoprazole  Injectable 40 milliGRAM(s) IV Push two times a day  atorvastatin 20 milliGRAM(s) Oral at bedtime      FAMILY HISTORY:  No significant family history      Non-contributory    SOCIAL HISTORY:    [ -] Tobacco  [- ] Drugs  [- ] Alcohol    Allergies    penicillin (Unknown)    Intolerances    	    REVIEW OF SYSTEMS:  CONSTITUTIONAL: No fever  EYES: No eye pain, visual disturbances, or discharge  ENMT:  No difficulty hearing, tinnitus  NECK: No pain or stiffness  RESPIRATORY: No cough, wheezing,  CARDIOVASCULAR: No chest pain, palpitations, passing out, dizziness, or leg swelling  GASTROINTESTINAL:  No nausea, vomiting, diarrhea or constipation. No melena.  GENITOURINARY: No dysuria, hematuria  NEUROLOGICAL: No stroke like symptoms  SKIN: No burning or lesions   ENDOCRINE: No heat or cold intolerance  MUSCULOSKELETAL: No joint pain or swelling  PSYCHIATRIC: No  anxiety, mood swings  HEME/LYMPH: No bleeding gums  ALLERGY AND IMMUNOLOGIC: No hives or eczema	    All other ROS negative    PHYSICAL EXAM:  T(C): 36.4 (08-24-21 @ 04:21), Max: 36.6 (08-23-21 @ 15:40)  HR: 83 (08-24-21 @ 04:21) (81 - 91)  BP: 123/74 (08-24-21 @ 04:21) (114/68 - 123/74)  RR: 18 (08-24-21 @ 04:21) (18 - 18)  SpO2: 100% (08-24-21 @ 04:21) (100% - 100%)  Wt(kg): --  I&O's Summary    23 Aug 2021 07:01  -  24 Aug 2021 07:00  --------------------------------------------------------  IN: 120 mL / OUT: 150 mL / NET: -30 mL    24 Aug 2021 07:01  -  24 Aug 2021 15:17  --------------------------------------------------------  IN: 240 mL / OUT: 0 mL / NET: 240 mL        Appearance: Normal	  HEENT:   Normal oral mucosa, EOMI	  Cardiovascular:  S1 S2, No JVD,    Respiratory: Lungs clear to auscultation	  Psychiatry: Alert  Gastrointestinal:  Soft, Non-tender, + BS	  Skin: No rashes   Neurologic: Non-focal  Extremities:  No edema  Vascular: Peripheral pulses palpable    	      CARDIAC MARKERS:  Labs personally reviewed by me                          9.6    11.03 )-----------( 249      ( 24 Aug 2021 07:13 )             30.3     08-24    137  |  102  |  17  ----------------------------<  133<H>  4.2   |  22  |  0.86    Ca    8.6      24 Aug 2021 07:13  Phos  3.1     08-23  Mg     2.4     08-23    TPro  6.2  /  Alb  3.2<L>  /  TBili  0.2  /  DBili  x   /  AST  11  /  ALT  9<L>  /  AlkPhos  49  08-23  EKG: Personally reviewed by me -   Radiology: Personally reviewed by me -   < from: Transthoracic Echocardiogram (09.21.20 @ 14:18) >  EF (Levitz): 63 %    < end of copied text >  < from: Transthoracic Echocardiogram (09.21.20 @ 14:18) >  Conclusions:  1. Normal left ventricular internal dimensions and wall  thickness.  2. Normal left ventricular systolic function. No segmental  wall motion abnormalities.  3. Reversal of the E-A  waves of the mitral inflow pattern  is consistent with diastolicLV dysfunction.  4. Normal right ventricular size and function.  *** Compared with echocardiogram of 8/11/2017, no  significant changes noted.    < end of copied text >      Assessment /Plan:   82yo woman PMH afib, HTN, HLD, osteoporosis, dementia was brought in after an episode of LOC while chair lift up the stairs. She denies falling or any trauma. She states that she has been having more BMs recently that are black for the past several days to weeks. No lightheadedness, n/v or symptoms prior to episode. Pt denies any symptoms afterwards. No recent fevers, cough, n/v/d, abdominal pain, dysuria.      1. Syncope r/o cardiac Etiology, could be 2/2 UTI?  per daughter in law at bedside pt had syncopal episode last year with no significant findings--> was treated for UTI   no prior cardiac stents.   Echo 9/2020 normal LV/ RV function, no WMA   ortho negative   CE unremarkable   EKG Sinus with a few PVCs  pending repeat echo   US carotids  neuro following     2. AFIb   on Eliquis  Cardizem rate control     3. HLD   on statin     4. Dementia   c/w home meds     5. DVT ppx   on Eliquis     6. UTI   on Levaquin per primary   f/u ucx     Discussed with Vania and patient at bedside.       Karla Ap Graf DO [facility]  Cardiovascular Medicine  [address] [place] [address], [address]  Office [phone]  Cell [phone] CHIEF COMPLAINT:Patient is a 81y old  Female who presents with a chief complaint of syncope (23 Aug 2021 20:45)      HISTORY OF PRESENT ILLNESS:HPI:  80yo woman PMH afib, HTN, HLD, osteoporosis, dementia was brought in after an episode of LOC while chair lift up the stairs. She denies falling or any trauma. She states that she has been having more BMs recently that are black for the past several days to weeks. No lightheadedness, n/v or symptoms prior to episode. Pt denies any symptoms afterwards. No recent fevers, cough, n/v/d, abdominal pain, dysuria.  	Spoke with pt's son ([phone]) who states that his fiance was with her when she passed out for >25 sec. She told her that she had been tired in the past few days but no other complaints. PMH afib on eliquis, digoxin 3 times a week, cardizem 180mg, lovastatin, lasix 20mg 4 times a week, aricept 10mg, memantine 10mg  	PCP: Dr. Sung Caro (sons' fiance): [phone] (22 Aug 2021 19:50)      PAST MEDICAL & SURGICAL HISTORY:  Arthritis  Hypertension  Highcholesterol  Afib  Osteoporosis  Compression fracture of T12 vertebra      MEDICATIONS:  apixaban 5 milliGRAM(s) Oral two times a day  digoxin     Tablet 125 MICROGram(s) Oral daily  diltiazem    milliGRAM(s) Oral daily  levoFLOXacin IVPB      levoFLOXacin IVPB 500 milliGRAM(s) IV Intermittent every 24 hour  donepezil 10 milliGRAM(s) Oral at bedtime  memantine 10 milliGRAM(s) Oral two times a day  pantoprazole  Injectable 40 milliGRAM(s) IV Push two times a day  atorvastatin 20 milliGRAM(s) Oral at bedtime      FAMILY HISTORY:  No significant family history      Non-contributory    SOCIAL HISTORY:    [ -] Tobacco  [- ] Drugs  [- ] Alcohol    Allergies    penicillin (Unknown)    Intolerances    	    REVIEW OF SYSTEMS:  CONSTITUTIONAL: No fever  EYES: No eye pain, visual disturbances, or discharge  ENMT:  No difficulty hearing, tinnitus  NECK: No pain or stiffness  RESPIRATORY: No cough, wheezing,  CARDIOVASCULAR: No chest pain, palpitations, passing out, dizziness, or leg swelling  GASTROINTESTINAL:  No nausea, vomiting, diarrhea or constipation. No melena.  GENITOURINARY: No dysuria, hematuria  NEUROLOGICAL: No stroke like symptoms  SKIN: No burning or lesions   ENDOCRINE: No heat or cold intolerance  MUSCULOSKELETAL: No joint pain or swelling  PSYCHIATRIC: No  anxiety, mood swings  HEME/LYMPH: No bleeding gums  ALLERGY AND IMMUNOLOGIC: No hives or eczema	    All other ROS negative    PHYSICAL EXAM:  T(C): 36.4 (08-24-21 @ 04:21), Max: 36.6 (08-23-21 @ 15:40)  HR: 83 (08-24-21 @ 04:21) (81 - 91)  BP: 123/74 (08-24-21 @ 04:21) (114/68 - 123/74)  RR: 18 (08-24-21 @ 04:21) (18 - 18)  SpO2: 100% (08-24-21 @ 04:21) (100% - 100%)  Wt(kg): --  I&O's Summary    23 Aug 2021 07:01  -  24 Aug 2021 07:00  --------------------------------------------------------  IN: 120 mL / OUT: 150 mL / NET: -30 mL    24 Aug 2021 07:01  -  24 Aug 2021 15:17  --------------------------------------------------------  IN: 240 mL / OUT: 0 mL / NET: 240 mL        Appearance: Normal	  HEENT:   Normal oral mucosa, EOMI	  Cardiovascular:  S1 S2, No JVD,    Respiratory: Lungs clear to auscultation	  Psychiatry: Alert  Gastrointestinal:  Soft, Non-tender, + BS	  Skin: No rashes   Neurologic: Non-focal  Extremities:  No edema  Vascular: Peripheral pulses palpable    	      CARDIAC MARKERS:  Labs personally reviewed by me                          9.6    11.03 )-----------( 249      ( 24 Aug 2021 07:13 )             30.3     08-24    137  |  102  |  17  ----------------------------<  133<H>  4.2   |  22  |  0.86    Ca    8.6      24 Aug 2021 07:13  Phos  3.1     08-23  Mg     2.4     08-23    TPro  6.2  /  Alb  3.2<L>  /  TBili  0.2  /  DBili  x   /  AST  11  /  ALT  9<L>  /  AlkPhos  49  08-23  EKG: Personally reviewed by me -   Radiology: Personally reviewed by me -   < from: Transthoracic Echocardiogram (09.21.20 @ 14:18) >  EF (Levitz): 63 %    < end of copied text >  < from: Transthoracic Echocardiogram (09.21.20 @ 14:18) >  Conclusions:  1. Normal left ventricular internal dimensions and wall  thickness.  2. Normal left ventricular systolic function. No segmental  wall motion abnormalities.  3. Reversal of the E-A  waves of the mitral inflow pattern  is consistent with diastolicLV dysfunction.  4. Normal right ventricular size and function.  *** Compared with echocardiogram of 8/11/2017, no  significant changes noted.    < end of copied text >      Assessment /Plan:   80yo woman PMH afib, HTN, HLD, osteoporosis, dementia was brought in after an episode of LOC while chair lift up the stairs. She denies falling or any trauma. She states that she has been having more BMs recently that are black for the past several days to weeks. No lightheadedness, n/v or symptoms prior to episode. Pt denies any symptoms afterwards. No recent fevers, cough, n/v/d, abdominal pain, dysuria.      1. Syncope r/o cardiac Etiology, could be 2/2 UTI?  per daughter in law at bedside pt had syncopal episode last year with no significant findings--> was treated for UTI   no prior cardiac stents.   Echo 9/2020 normal LV/ RV function, no WMA   ortho negative   CE unremarkable   EKG Sinus with a few PVCs  pending repeat echo   US carotids  neuro following   EP eval Dr Carlin called     2. AFIb   on Eliquis  Cardizem rate control     3. HLD   on statin     4. Dementia   c/w home meds     5. DVT ppx   on Eliquis     6. UTI   on Levaquin per primary   f/u ucx     Discussed with Vania and patient at bedside.       Advanced care planning/advanced directives discussed with patient/family. DNR status including forceful chest compressions to attempt to restart the heart, ventilator support/artificial breathing, electric shock, artificial nutrition, health care proxy, Molst form all discussed with pt. Pt wishes to consider.  More than fifteen minutes spent on discussing advanced directives. OMT on six regions for acute somatic dysfunctions done at the bedside     Karla Graf DO [facility]  Cardiovascular Medicine  [address] [place] [address], [address]  Office [phone]  Cell [phone]

## 2021-08-24 NOTE — PROGRESS NOTE ADULT - SUBJECTIVE AND OBJECTIVE BOX
Patient is a 81y old  Female who presents with a chief complaint of syncope (24 Aug 2021 15:17)      INTERVAL HPI/OVERNIGHT EVENTS: denies any c/o of dizziness , no CP  T(C): 36.4 (08-24-21 @ 04:21), Max: 36.4 (08-23-21 @ 21:50)  HR: 83 (08-24-21 @ 04:21) (83 - 91)  BP: 123/74 (08-24-21 @ 04:21) (114/68 - 123/74)  RR: 18 (08-24-21 @ 04:21) (18 - 18)  SpO2: 100% (08-24-21 @ 04:21) (100% - 100%)  Wt(kg): --  I&O's Summary    23 Aug 2021 07:01  -  24 Aug 2021 07:00  --------------------------------------------------------  IN: 120 mL / OUT: 150 mL / NET: -30 mL    24 Aug 2021 07:01  -  24 Aug 2021 18:26  --------------------------------------------------------  IN: 240 mL / OUT: 0 mL / NET: 240 mL        PAST MEDICAL & SURGICAL HISTORY:  Arthritis    Hypertension    High cholesterol    Afib    Osteoporosis    Compression fracture of T12 vertebra        SOCIAL HISTORY  Alcohol:  Tobacco:  Illicit substance use:    FAMILY HISTORY:    REVIEW OF SYSTEMS:  CONSTITUTIONAL: No fever, weight loss, or fatigue  EYES: No eye pain, visual disturbances, or discharge  ENMT:  No difficulty hearing, tinnitus, vertigo; No sinus or throat pain  NECK: No pain or stiffness  RESPIRATORY: No cough, wheezing, chills or hemoptysis; No shortness of breath  CARDIOVASCULAR: No chest pain, palpitations, dizziness, or leg swelling  GASTROINTESTINAL: No abdominal or epigastric pain. No nausea, vomiting, or hematemesis; No diarrhea or constipation. No melena or hematochezia.  GENITOURINARY: No dysuria, frequency, hematuria, or incontinence  NEUROLOGICAL: No headaches, memory loss, loss of strength, numbness, or tremors  SKIN: No itching, burning, rashes, or lesions   LYMPH NODES: No enlarged glands  ENDOCRINE: No heat or cold intolerance; No hair loss  MUSCULOSKELETAL: No joint pain or swelling; No muscle, back, or extremity pain  PSYCHIATRIC: No depression, anxiety, mood swings, or difficulty sleeping  HEME/LYMPH: No easy bruising, or bleeding gums  ALLERY AND IMMUNOLOGIC: No hives or eczema    RADIOLOGY & ADDITIONAL TESTS:    Imaging Personally Reviewed:  [ ] YES  [ ] NO    Consultant(s) Notes Reviewed:  [ ] YES  [ ] NO    PHYSICAL EXAM:  GENERAL: NAD, well-groomed, well-developed  HEAD:  Atraumatic, Normocephalic  EYES: EOMI, PERRLA, conjunctiva and sclera clear  ENMT: No tonsillar erythema, exudates, or enlargement; Moist mucous membranes, Good dentition, No lesions  NECK: Supple, No JVD, Normal thyroid  NERVOUS SYSTEM:  Alert & Oriented X3, Good concentration; Motor Strength 5/5 B/L upper and lower extremities; DTRs 2+ intact and symmetric  CHEST/LUNG: Clear to percussion bilaterally; No rales, rhonchi, wheezing, or rubs  HEART: Regular rate and rhythm; No murmurs, rubs, or gallops  ABDOMEN: Soft, Nontender, Nondistended; Bowel sounds present  EXTREMITIES:  2+ Peripheral Pulses, No clubbing, cyanosis, or edema  LYMPH: No lymphadenopathy noted  SKIN: No rashes or lesions    LABS:                        9.6    11.03 )-----------( 249      ( 24 Aug 2021 07:13 )             30.3     08-24    137  |  102  |  17  ----------------------------<  133<H>  4.2   |  22  |  0.86    Ca    8.6      24 Aug 2021 07:13  Phos  3.1     08-23  Mg     2.4     08-23    TPro  6.2  /  Alb  3.2<L>  /  TBili  0.2  /  DBili  x   /  AST  11  /  ALT  9<L>  /  AlkPhos  49  08-23        CAPILLARY BLOOD GLUCOSE                MEDICATIONS  (STANDING):  apixaban 5 milliGRAM(s) Oral two times a day  atorvastatin 20 milliGRAM(s) Oral at bedtime  digoxin     Tablet 125 MICROGram(s) Oral daily  diltiazem    milliGRAM(s) Oral daily  donepezil 10 milliGRAM(s) Oral at bedtime  levoFLOXacin IVPB      levoFLOXacin IVPB 500 milliGRAM(s) IV Intermittent every 24 hours  memantine 10 milliGRAM(s) Oral two times a day  pantoprazole  Injectable 40 milliGRAM(s) IV Push two times a day    MEDICATIONS  (PRN):      Care Discussed with Consultants/Other Providers [ ] YES  [ ] NO

## 2021-08-24 NOTE — PHYSICAL THERAPY INITIAL EVALUATION ADULT - GENERAL OBSERVATIONS, REHAB EVAL
Pt received semi supine in bed (+) tele ICU monitoring (+) cont pulse ox (+) primifit. Family at bedside.

## 2021-08-24 NOTE — PHYSICAL THERAPY INITIAL EVALUATION ADULT - ADDITIONAL COMMENTS
Pt lives in [address], with son and son's fiance who provided assist with ADLs as needed and only occasionally. There is 1 flight to bedroom and bathroom with chair lift. Pt ambulates with RW and supervision, is able to perform most ADLs independently. Pt also has HHA 2 days/week. Pt was participating in OPT 1x/week.

## 2021-08-24 NOTE — PHYSICAL THERAPY INITIAL EVALUATION ADULT - PLANNED THERAPY INTERVENTIONS, PT EVAL
GOAL: Pt will negotiate 3 steps independently within 2 weeks./balance training/bed mobility training/gait training/transfer training

## 2021-08-25 ENCOUNTER — TRANSCRIPTION ENCOUNTER (OUTPATIENT)
Age: 81
End: 2021-08-25

## 2021-08-25 LAB
-  AMIKACIN: SIGNIFICANT CHANGE UP
-  AMOXICILLIN/CLAVULANIC ACID: SIGNIFICANT CHANGE UP
-  AMPICILLIN/SULBACTAM: SIGNIFICANT CHANGE UP
-  AMPICILLIN: SIGNIFICANT CHANGE UP
-  AZTREONAM: SIGNIFICANT CHANGE UP
-  CEFAZOLIN: SIGNIFICANT CHANGE UP
-  CEFEPIME: SIGNIFICANT CHANGE UP
-  CEFOXITIN: SIGNIFICANT CHANGE UP
-  CEFTRIAXONE: SIGNIFICANT CHANGE UP
-  CIPROFLOXACIN: SIGNIFICANT CHANGE UP
-  ERTAPENEM: SIGNIFICANT CHANGE UP
-  GENTAMICIN: SIGNIFICANT CHANGE UP
-  IMIPENEM: SIGNIFICANT CHANGE UP
-  LEVOFLOXACIN: SIGNIFICANT CHANGE UP
-  MEROPENEM: SIGNIFICANT CHANGE UP
-  NITROFURANTOIN: SIGNIFICANT CHANGE UP
-  PIPERACILLIN/TAZOBACTAM: SIGNIFICANT CHANGE UP
-  TIGECYCLINE: SIGNIFICANT CHANGE UP
-  TOBRAMYCIN: SIGNIFICANT CHANGE UP
-  TRIMETHOPRIM/SULFAMETHOXAZOLE: SIGNIFICANT CHANGE UP
ANION GAP SERPL CALC-SCNC: 11 MMOL/L — SIGNIFICANT CHANGE UP (ref 5–17)
BUN SERPL-MCNC: 18 MG/DL — SIGNIFICANT CHANGE UP (ref 7–23)
CALCIUM SERPL-MCNC: 9.1 MG/DL — SIGNIFICANT CHANGE UP (ref 8.4–10.5)
CHLORIDE SERPL-SCNC: 103 MMOL/L — SIGNIFICANT CHANGE UP (ref 96–108)
CO2 SERPL-SCNC: 22 MMOL/L — SIGNIFICANT CHANGE UP (ref 22–31)
CREAT SERPL-MCNC: 0.98 MG/DL — SIGNIFICANT CHANGE UP (ref 0.5–1.3)
CULTURE RESULTS: SIGNIFICANT CHANGE UP
GLUCOSE SERPL-MCNC: 144 MG/DL — HIGH (ref 70–99)
HCT VFR BLD CALC: 29.8 % — LOW (ref 34.5–45)
HCT VFR BLD CALC: 32.5 % — LOW (ref 34.5–45)
HGB BLD-MCNC: 10.3 G/DL — LOW (ref 11.5–15.5)
HGB BLD-MCNC: 9.7 G/DL — LOW (ref 11.5–15.5)
MCHC RBC-ENTMCNC: 30.8 PG — SIGNIFICANT CHANGE UP (ref 27–34)
MCHC RBC-ENTMCNC: 31.5 PG — SIGNIFICANT CHANGE UP (ref 27–34)
MCHC RBC-ENTMCNC: 31.7 GM/DL — LOW (ref 32–36)
MCHC RBC-ENTMCNC: 32.6 GM/DL — SIGNIFICANT CHANGE UP (ref 32–36)
MCV RBC AUTO: 96.8 FL — SIGNIFICANT CHANGE UP (ref 80–100)
MCV RBC AUTO: 97.3 FL — SIGNIFICANT CHANGE UP (ref 80–100)
METHOD TYPE: SIGNIFICANT CHANGE UP
NRBC # BLD: 0 /100 WBCS — SIGNIFICANT CHANGE UP (ref 0–0)
NRBC # BLD: 0 /100 WBCS — SIGNIFICANT CHANGE UP (ref 0–0)
ORGANISM # SPEC MICROSCOPIC CNT: SIGNIFICANT CHANGE UP
ORGANISM # SPEC MICROSCOPIC CNT: SIGNIFICANT CHANGE UP
PLATELET # BLD AUTO: 262 K/UL — SIGNIFICANT CHANGE UP (ref 150–400)
PLATELET # BLD AUTO: 269 K/UL — SIGNIFICANT CHANGE UP (ref 150–400)
POTASSIUM SERPL-MCNC: 4 MMOL/L — SIGNIFICANT CHANGE UP (ref 3.5–5.3)
POTASSIUM SERPL-SCNC: 4 MMOL/L — SIGNIFICANT CHANGE UP (ref 3.5–5.3)
RBC # BLD: 3.08 M/UL — LOW (ref 3.8–5.2)
RBC # BLD: 3.34 M/UL — LOW (ref 3.8–5.2)
RBC # FLD: 14.3 % — SIGNIFICANT CHANGE UP (ref 10.3–14.5)
RBC # FLD: 14.4 % — SIGNIFICANT CHANGE UP (ref 10.3–14.5)
SODIUM SERPL-SCNC: 136 MMOL/L — SIGNIFICANT CHANGE UP (ref 135–145)
SPECIMEN SOURCE: SIGNIFICANT CHANGE UP
WBC # BLD: 10.83 K/UL — HIGH (ref 3.8–10.5)
WBC # BLD: 11.95 K/UL — HIGH (ref 3.8–10.5)
WBC # FLD AUTO: 10.83 K/UL — HIGH (ref 3.8–10.5)
WBC # FLD AUTO: 11.95 K/UL — HIGH (ref 3.8–10.5)

## 2021-08-25 PROCEDURE — 93306 TTE W/DOPPLER COMPLETE: CPT | Mod: 26

## 2021-08-25 PROCEDURE — 93880 EXTRACRANIAL BILAT STUDY: CPT | Mod: 26

## 2021-08-25 RX ORDER — PANTOPRAZOLE SODIUM 20 MG/1
40 TABLET, DELAYED RELEASE ORAL
Refills: 0 | Status: DISCONTINUED | OUTPATIENT
Start: 2021-08-25 | End: 2021-08-26

## 2021-08-25 RX ADMIN — PANTOPRAZOLE SODIUM 40 MILLIGRAM(S): 20 TABLET, DELAYED RELEASE ORAL at 05:10

## 2021-08-25 RX ADMIN — MEMANTINE HYDROCHLORIDE 10 MILLIGRAM(S): 10 TABLET ORAL at 18:10

## 2021-08-25 RX ADMIN — APIXABAN 5 MILLIGRAM(S): 2.5 TABLET, FILM COATED ORAL at 18:10

## 2021-08-25 RX ADMIN — ATORVASTATIN CALCIUM 20 MILLIGRAM(S): 80 TABLET, FILM COATED ORAL at 21:22

## 2021-08-25 RX ADMIN — DONEPEZIL HYDROCHLORIDE 10 MILLIGRAM(S): 10 TABLET, FILM COATED ORAL at 21:22

## 2021-08-25 RX ADMIN — Medication 125 MICROGRAM(S): at 05:11

## 2021-08-25 RX ADMIN — APIXABAN 5 MILLIGRAM(S): 2.5 TABLET, FILM COATED ORAL at 05:11

## 2021-08-25 RX ADMIN — MEMANTINE HYDROCHLORIDE 10 MILLIGRAM(S): 10 TABLET ORAL at 05:11

## 2021-08-25 RX ADMIN — PANTOPRAZOLE SODIUM 40 MILLIGRAM(S): 20 TABLET, DELAYED RELEASE ORAL at 18:10

## 2021-08-25 RX ADMIN — Medication 180 MILLIGRAM(S): at 05:11

## 2021-08-25 NOTE — DISCHARGE NOTE PROVIDER - NSDCCPCAREPLAN_GEN_ALL_CORE_FT
PRINCIPAL DISCHARGE DIAGNOSIS  Diagnosis: Syncope  Assessment and Plan of Treatment:   - CTHead neg  -patient will get home with home pT  - continue aricept and namenda  - cleared from neuro standpoint for discharge  - cleared from cards standpoint for discharge  - please follow up with primary care and cardiology to discuss with event monitoring or loop recordere appropriate as an outpatient      SECONDARY DISCHARGE DIAGNOSES  Diagnosis: Afib  Assessment and Plan of Treatment: Per electrophysiology, no need for digoxine  Continue eliquid 5 twice a day     PRINCIPAL DISCHARGE DIAGNOSIS  Diagnosis: Syncope  Assessment and Plan of Treatment:   - CTHead neg  -patient will get home with home pT  - continue aricept and namenda  - cleared from neuro standpoint for discharge  - cleared from cards standpoint for discharge  - please follow up with primary care and cardiology to discuss with event monitoring or loop recordere appropriate as an outpatient      SECONDARY DISCHARGE DIAGNOSES  Diagnosis: Afib  Assessment and Plan of Treatment: Per electrophysiology, no need for digoxine  Continue eliquid 5 twice a day    Diagnosis: Acute UTI  Assessment and Plan of Treatment: you completed 4 days of levaquin, no need for further antibiotics

## 2021-08-25 NOTE — DISCHARGE NOTE PROVIDER - PROVIDER TOKENS
PROVIDER:[TOKEN:[[id number]],FOLLOWUP:[1 week]],PROVIDER:[TOKEN:[[id number]],FOLLOWUP:[2 weeks]],PROVIDER:[TOKEN:[[id number]],FOLLOWUP:[1 week]],PROVIDER:[TOKEN:[[id number]],FOLLOWUP:[1 week]]

## 2021-08-25 NOTE — DISCHARGE NOTE PROVIDER - NSDCMRMEDTOKEN_GEN_ALL_CORE_FT
alendronate 70 mg oral tablet: 1 tab(s) orally once a week  apixaban 5 mg oral tablet: 1 tab(s) orally every 12 hours  digoxin 125 mcg (0.125 mg) oral tablet: 1 tab(s) orally Monday, Wednesday, and Friday  DilTIAZem (Eqv-Cardizem CD) 180 mg/24 hours oral capsule, extended release: 1 cap(s) orally once a day  docusate sodium 100 mg oral capsule: 1 cap(s) orally once a day (in the evening), As Needed  donepezil 10 mg oral tablet: 1 tab(s) orally once a day (at bedtime)  ferrous sulfate 324 mg (65 mg elemental iron) oral delayed release tablet: 1 tab(s) orally once a day  folic acid 0.8 mg oral tablet: 1 tab(s) orally once a day  furosemide 20 mg oral tablet: 1 tab(s) orally once a day (in the morning)  lovastatin 20 mg oral tablet: 1 tab(s) orally once a day (at bedtime)  Vitamin B12:   Vitamin D3: orally once a day  Xyzal 5 mg oral tablet: 1 tab(s) orally once a day, As Needed - for allergy symptoms   alendronate 70 mg oral tablet: 1 tab(s) orally once a week  apixaban 5 mg oral tablet: 1 tab(s) orally every 12 hours  DilTIAZem (Eqv-Cardizem CD) 180 mg/24 hours oral capsule, extended release: 1 cap(s) orally once a day  docusate sodium 100 mg oral capsule: 1 cap(s) orally once a day (in the evening), As Needed  donepezil 10 mg oral tablet: 1 tab(s) orally once a day (at bedtime)  ferrous sulfate 324 mg (65 mg elemental iron) oral delayed release tablet: 1 tab(s) orally once a day  folic acid 0.8 mg oral tablet: 1 tab(s) orally once a day  lovastatin 20 mg oral tablet: 1 tab(s) orally once a day (at bedtime)  memantine 10 mg oral tablet: 1 tab(s) orally 2 times a day  pantoprazole 40 mg oral delayed release tablet: 1 tab(s) orally 2 times a day  Vitamin B12:   Vitamin D3: orally once a day  Xyzal 5 mg oral tablet: 1 tab(s) orally once a day, As Needed - for allergy symptoms

## 2021-08-25 NOTE — PROGRESS NOTE ADULT - SUBJECTIVE AND OBJECTIVE BOX
[facility]      Seen earlier today, and examined.  - Today, patient is without complaints.           *****MEDICATIONS: Current medication reviewed and documented.    MEDICATIONS  (STANDING):  apixaban 5 milliGRAM(s) Oral two times a day  atorvastatin 20 milliGRAM(s) Oral at bedtime  diltiazem    milliGRAM(s) Oral daily  donepezil 10 milliGRAM(s) Oral at bedtime  levoFLOXacin IVPB      levoFLOXacin IVPB 500 milliGRAM(s) IV Intermittent every 24 hours  memantine 10 milliGRAM(s) Oral two times a day  pantoprazole    Tablet 40 milliGRAM(s) Oral two times a day    MEDICATIONS  (PRN):          ***** VITAL SIGNS:  T(F): 98.2 (08-25-21 @ 13:45), Max: 98.2 (08-25-21 @ 13:45)  HR: 89 (08-25-21 @ 13:45) (87 - 90)  BP: 137/71 (08-25-21 @ 13:45) (125/55 - 137/71)  RR: 18 (08-25-21 @ 13:45) (18 - 20)  SpO2: 96% (08-25-21 @ 13:45) (96% - 100%)  Wt(kg): --  ,   I&O's Summary    24 Aug 2021 07:01  -  25 Aug 2021 07:00  --------------------------------------------------------  IN: 240 mL / OUT: 0 mL / NET: 240 mL    25 Aug 2021 07:01  -  25 Aug 2021 17:38  --------------------------------------------------------  IN: 240 mL / OUT: 0 mL / NET: 240 mL            alert oriented x 2.5   attention comprehension are fair.  Able to name, repeat. knows the name of current president but did not know previous president or current      EOmi fundi not visualized   no nystagmus VFF to confrontation  Tongue is midline  Palate elevates symmetricall           *****LAB AND IMAGING:                        10.3   11.95 )-----------( 262      ( 25 Aug 2021 07:30 )             32.5               08-25    136  |  103  |  18  ----------------------------<  144<H>  4.0   |  22  |  0.98    Ca    9.1      25 Aug 2021 07:28                           [All pertinent recent Imaging/Reports reviewed]           *****A S S E S S M E N T   A N D   P L A N :       Excerpt from H&P,"80yo woman PMH afib, HTN, HLD, osteoporosis, dementia was brought in after an episode of LOC while chair lift up the stairs. She denies falling or any trauma. She states that she has been having more BMs recently that are black for the past several days to weeks. No lightheadedness, n/v or symptoms prior to episode. Pt denies any symptoms afterwards. No recent fevers, cough, n/v/d, abdominal pain, dysuria.  	Spoke with pt's son ([phone]) who states that his fiance was with her when she passed out for >25 sec. She told her that she had been tired in the past few days but no other complaints. PMH afib on eliquis, digoxin 3 times a week, cardizem 180mg, lovastatin, lasix 20mg 4 times a week, aricept 10mg, memantine 10mg       Problem/Recommendations 1:  Syncope of unclear etiology  suspect that it is cardiac as it was brief   tele monitoring   does not have any stigmata of sz , no incontinence or saddle anesthesia   r/o sepsis as cause    dig level ok   check orthostatics   trend h/h ? guiac positive    Problem/Recommendations 2: dementia   currently on aricept and namenda   regulate sleep wake cycle  reorient often   avoid daytime somnolence       Problem/Recommendations 3: abn urine   le positive nitrite positive   currently on levofloxacin      Thank you for allowing me to participate in the care of this patient. Will continue to follow patient periodically. Please do not hesitate to call me if you have any  questions or if there has been a change in patients neurological status     ________________  Marie Ying MD  [facility] ([facility]  [phone]      33 minutes spent on total encounter; more than 50 % of the visit was  spent counseling about plan of care, compliance to diet/exercise and medication regimen and or  coordinating care by the attending physician.      It is advised that stroke patients follow up with FLORIN Silveira @ [phone] in 1- 2 weeks.   Others please follow up with Dr. Michael Nissenbaum [phone]

## 2021-08-25 NOTE — CONSULT NOTE ADULT - SUBJECTIVE AND OBJECTIVE BOX
EP ATTENDING      HISTORY OF PRESENT ILLNESS: She is a pleasant 82 y/o female PMH HTN and PAF a/w syncope. She denies palpitations nor angina. Her workup here in the hospital includes unremarkable ekg and telemetry. She had an echo last year that was unremarkable. Repeat echo pending.    PAST MEDICAL & SURGICAL HISTORY:  Arthritis  Hypertension  High cholesterol  paroxysmal Afib  Osteoporosis    MEDICATIONS  (STANDING):  apixaban 5 milliGRAM(s) Oral two times a day  atorvastatin 20 milliGRAM(s) Oral at bedtime  diltiazem    milliGRAM(s) Oral daily  donepezil 10 milliGRAM(s) Oral at bedtime  levoFLOXacin IVPB      levoFLOXacin IVPB 500 milliGRAM(s) IV Intermittent every 24 hours  memantine 10 milliGRAM(s) Oral two times a day  pantoprazole  Injectable 40 milliGRAM(s) IV Push two times a day      Allergies    penicillin (Unknown)    Intolerances        FAMILY HISTORY:  No significant family history      Non-contributary for premature coronary disease or sudden cardiac death    SOCIAL HISTORY:    [x ] Non-smoker  [ ] Smoker  [ ] Alcohol      REVIEW OF SYSTEMS:  [ ]chest pain  [  ]shortness of breath  [  ]palpitations  [ x ]syncope  [ ]near syncope [ ]upper extremity weakness   [ ] lower extremity weakness  [  ]diplopia  [  ]altered mental status   [  ]fevers  [ ]chills [ ]nausea  [ ]vomitting  [  ]dysphagia    [ ]abdominal pain  [ ]melena  [ ]BRBPR    [  ]epistaxis  [  ]rash    [ ]lower extremity edema        [x ] All others negative	  [ ] Unable to obtain    PHYSICAL EXAM:  T(C): 36.6 (08-25-21 @ 04:31), Max: 36.7 (08-24-21 @ 20:22)  HR: 87 (08-25-21 @ 04:31) (87 - 90)  BP: 125/61 (08-25-21 @ 04:31) (125/55 - 125/61)  RR: 18 (08-25-21 @ 04:31) (18 - 20)  SpO2: 98% (08-25-21 @ 04:31) (98% - 100%)  Wt(kg): --    Appearance: Normal	  HEENT:   Normal oral mucosa, PERRL, EOMI	  Lymphatic: No lymphadenopathy , no edema  Cardiovascular: Normal S1 S2, No JVD, No murmurs , Peripheral pulses palpable 2+ bilaterally  Respiratory: Lungs clear to auscultation, normal effort 	  Gastrointestinal:  Soft, Non-tender, + BS	  Skin: No rashes, No ecchymoses, No cyanosis, warm to touch  Musculoskeletal: Normal range of motion, normal strength  Psychiatry:  Mood & affect appropriate      TELEMETRY: 	    ECG:  	    Echo:  NST:  Cath:  	  	  LABS:	 	                          10.3   11.95 )-----------( 262      ( 25 Aug 2021 07:30 )             32.5     08-25    136  |  103  |  18  ----------------------------<  144<H>  4.0   |  22  |  0.98    Ca    9.1      25 Aug 2021 07:28      proBNP:   Lipid Profile:   HgA1c:   TSH:     A/P) She is a pleasant 82 y/o female PMH HTN and PAF a/w syncope. She denies palpitations nor angina. Her workup here in the hospital includes unremarkable ekg and telemetry. She had an echo last year that was unremarkable. Repeat echo pending.    -continue eliquis for lifelong a/c given elevated chads-vasc score  -continue cardizem for rate control  -d/c dig as her AF is paroxysmal  -f/u repeat echo  -if inpatient workup unremarkable, then recommend outpatient event monitoring followed by outpatient ILR r/o offset pauses      Fiona Carlin M.D., [facility]  Cardiac Electrophysiology  [facility]  [address] [place] [address], -[address] [place] [address]  [email or web address]    office [phone]  pager [phone]

## 2021-08-25 NOTE — PROGRESS NOTE ADULT - SUBJECTIVE AND OBJECTIVE BOX
Patient is a 81y old  Female who presents with a chief complaint of syncope (25 Aug 2021 12:12)      INTERVAL HISTORY: feelsok ,getting echo     TELEMETRY Personally reviewed: sinus 80s' PVCs     REVIEW OF SYSTEMS:   CONSTITUTIONAL: No weakness  EYES/ENT: No visual changes; No throat pain  Neck: No pain or stiffness  Respiratory: No cough, wheezing, No shortness of breath  CARDIOVASCULAR: no chest pain or palpitations  GASTROINTESTINAL: No abdominal pain, no nausea, vomiting or hematemesis  GENITOURINARY: No dysuria, frequency or hematuria  NEUROLOGICAL: No stroke like symptoms  SKIN: No rashes    MEDICATIONS:  diltiazem    milliGRAM(s) Oral angelica      PHYSICAL EXAM:  T(C): 36.8 (08-25-21 @ 13:45), Max: 36.8 (08-25-21 @ 13:45)  HR: 89 (08-25-21 @ 13:45) (87 - 90)  BP: 137/71 (08-25-21 @ 13:45) (125/55 - 137/71)  RR: 18 (08-25-21 @ 13:45) (18 - 20)  SpO2: 96% (08-25-21 @ 13:45) (96% - 100%)  Wt(kg): --  I&O's Summary    24 Aug 2021 07:01  -  25 Aug 2021 07:00  --------------------------------------------------------  IN: 240 mL / OUT: 0 mL / NET: 240 m      Appearance: In no distress	  HEENT:    PERRL, EOMI	  Cardiovascular:  S1 S2, No JVD  Respiratory: Lungs clear to auscultation	  Gastrointestinal:  Soft, Non-tender, + BS	  Vascularature:  No edema of LE  Psychiatric: Appropriate affect   Neuro: no acute focal deficits                        10.3   11.95 )-----------( 262      ( 25 Aug 2021 07:30 )             32.5     08-25    136  |  103  |  18  ----------------------------<  144<H>  4.0   |  22  |  0.98    Ca    9.1      25 Aug 2021 07:28  Labs personally reviewed    ASSESSMENT/PLAN: 	  80yo woman PMH afib, HTN, HLD, osteoporosis, dementia was brought in after an episode of LOC while chair lift up the stairs. She denies falling or any trauma. She states that she has been having more BMs recently that are black for the past several days to weeks. No lightheadedness, n/v or symptoms prior to episode. Pt denies any symptoms afterwards. No recent fevers, cough, n/v/d, abdominal pain, dysuria.    1. Syncope r/o cardiac Etiology, could be 2/2 UTI?  per daughter in law at bedside pt had syncopal episode last year with no significant findings--> was treated for UTI   no prior cardiac stents.   Echo 9/2020 normal LV/ RV function, no WMA   ortho negative   CE unremarkable   EKG Sinus with a few PVCs  pending repeat echo   US carotids  neuro following   per EP dc dig given Afib is paroxysmal       2. AFIb   on Eliquis  Cardizem rate control   per EP dc dig      3. HLD   on statin     4. Dementia   c/w home meds     5. DVT ppx   on Eliquis     6. UTI   on Levaquin per primary   f/u ucx         Karla Graf DO [facility]  Cardiovascular Medicine  [address] [place] [address], [address]  Office: [phone]  Cell: [phone]

## 2021-08-25 NOTE — DISCHARGE NOTE PROVIDER - CARE PROVIDER_API CALL
JACLYN, PETER  Geriatric Medicine-Internal Medicine  [address] [place] [address]  [place] [address]  Phone: [phone]  Fax: [phone]  Follow Up Time: 1 week    Nissenbaum, Michael A (MD)  Neurology  [address] [facility] [address] [facility] [address]  [place] [address]  Phone: [phone]  Fax: [phone]  Follow Up Time: 2 weeks    Gustavo Graf (DO)  Cardiovascular Disease; Internal Medicine; Nuclear Cardiology  [address] [facility] [address], [address]  [place] [address]  Phone: [phone]  Fax: [phone]  Follow Up Time: 1 week    Fiona Carlin  CARDIAC ELECTROPHYSIOLOGY  [address] [place] [address] [address]  [place] [address]  Phone: [phone]  Fax: [phone]  Follow Up Time: 1 week

## 2021-08-25 NOTE — DISCHARGE NOTE PROVIDER - HOSPITAL COURSE
HPI:    Hospital Course:   HPI:  80yo woman PMH afib, HTN, HLD, osteoporosis, dementia was brought in after an episode of LOC while chair lift up the stairs. She denies falling or any trauma. She states that she has been having more BMs recently that are black for the past several days to weeks. No lightheadedness, n/v or symptoms prior to episode. Pt denies any symptoms afterwards. No recent fevers, cough, n/v/d, abdominal pain, dysuria.  	Spoke with pt's son ([phone]) who states that his fiance was with her when she passed out for >25 sec. She told her that she had been tired in the past few days but no other complaints. PMH afib on eliquis, digoxin 3 times a week, cardizem 180mg, lovastatin, lasix 20mg 4 times a week, aricept 10mg, memantine 10mg    Hospital Course:  #syncope- unclear etiology, ? cardiac  - CTH neg  - trop 14>12  - orthostatics neg  -carotid dopplers: No hemodynamically significant internal carotid artery stenoses. There is a flow-limiting stenosis of the left external carotid artery.  - TTE EF 70% normal, minimal TR  - continue aricept and namenda    #pafib  - continue eliquis 5 BID  - per EP dc dig given Afib is paroxysmal     # anemia  - Hgb drop from 115>9.9, now stable in 10's  - FOBT neg  - holding off GI consult at this time  - can follow up on routine cbc cmp in 1 -2 weeks with PCP    # UTI  - levaquin 8/22-     Pt to go home with home PT.  Pt reports she si fully vaccinated.   HPI:  80yo woman PMH afib, HTN, HLD, osteoporosis, dementia was brought in after an episode of LOC while chair lift up the stairs. She denies falling or any trauma. She states that she has been having more BMs recently that are black for the past several days to weeks. No lightheadedness, n/v or symptoms prior to episode. Pt denies any symptoms afterwards. No recent fevers, cough, n/v/d, abdominal pain, dysuria.  	Spoke with pt's son ([phone]) who states that his fiance was with her when she passed out for >25 sec. She told her that she had been tired in the past few days but no other complaints. PMH afib on eliquis, digoxin 3 times a week, cardizem 180mg, lovastatin, lasix 20mg 4 times a week, aricept 10mg, memantine 10mg    Hospital Course:  #syncope- unclear etiology, ? cardiac  - CTH neg  - trop 14>12  - orthostatics neg  -carotid dopplers: No hemodynamically significant internal carotid artery stenoses. There is a flow-limiting stenosis of the left external carotid artery.  - TTE EF 70% normal, minimal TR  - continue aricept and namenda  - cleared from neuro standpoint for discharge  - cleared from cards standpoint for discharge    #pafib  - continue eliquis 5 BID  - per EP dc dig given Afib is paroxysmal     # anemia  - Hgb drop from 115>9.9, now stable in 10's  - FOBT neg  - holding off GI consult at this time  - can follow up on routine cbc cmp in 1 -2 weeks with PCP    # UTI  - levaquin 8/22- 8/25    Pt to go home with home PT.  Pt reports she is fully vaccinated.  Pt is now HDS and safe for discharge. Discussed with medicine attending. Med rec to be reviewed prior to discharge.

## 2021-08-25 NOTE — PROGRESS NOTE ADULT - SUBJECTIVE AND OBJECTIVE BOX
Patient is a 81y old  Female who presents with a chief complaint of syncope (25 Aug 2021 17:40)      INTERVAL HPI/OVERNIGHT EVENTS: doing fair , denies any c/o  T(C): 36.9 (08-25-21 @ 20:42), Max: 36.9 (08-25-21 @ 20:42)  HR: 86 (08-25-21 @ 22:07) (81 - 89)  BP: 122/73 (08-25-21 @ 22:07) (122/73 - 137/71)  RR: 18 (08-25-21 @ 22:07) (18 - 18)  SpO2: 97% (08-25-21 @ 22:07) (96% - 98%)  Wt(kg): --  I&O's Summary    24 Aug 2021 07:01  -  25 Aug 2021 07:00  --------------------------------------------------------  IN: 240 mL / OUT: 0 mL / NET: 240 mL    25 Aug 2021 07:01  -  26 Aug 2021 02:20  --------------------------------------------------------  IN: 490 mL / OUT: 0 mL / NET: 490 mL        PAST MEDICAL & SURGICAL HISTORY:  Arthritis    Hypertension    High cholesterol    Afib    Osteoporosis    Compression fracture of T12 vertebra        SOCIAL HISTORY  Alcohol:  Tobacco:  Illicit substance use:    FAMILY HISTORY:    REVIEW OF SYSTEMS:  CONSTITUTIONAL: No fever, weight loss, or fatigue  EYES: No eye pain, visual disturbances, or discharge  ENMT:  No difficulty hearing, tinnitus, vertigo; No sinus or throat pain  NECK: No pain or stiffness  RESPIRATORY: No cough, wheezing, chills or hemoptysis; No shortness of breath  CARDIOVASCULAR: No chest pain, palpitations, dizziness, or leg swelling  GASTROINTESTINAL: No abdominal or epigastric pain. No nausea, vomiting, or hematemesis; No diarrhea or constipation. No melena or hematochezia.  GENITOURINARY: No dysuria, frequency, hematuria, or incontinence  NEUROLOGICAL: No headaches, memory loss, loss of strength, numbness, or tremors  SKIN: No itching, burning, rashes, or lesions   LYMPH NODES: No enlarged glands  ENDOCRINE: No heat or cold intolerance; No hair loss  MUSCULOSKELETAL: No joint pain or swelling; No muscle, back, or extremity pain  PSYCHIATRIC: No depression, anxiety, mood swings, or difficulty sleeping  HEME/LYMPH: No easy bruising, or bleeding gums  ALLERY AND IMMUNOLOGIC: No hives or eczema    RADIOLOGY & ADDITIONAL TESTS:    Imaging Personally Reviewed:  [ ] YES  [ ] NO    Consultant(s) Notes Reviewed:  [ ] YES  [ ] NO    PHYSICAL EXAM:  GENERAL: NAD, well-groomed, well-developed  HEAD:  Atraumatic, Normocephalic  EYES: EOMI, PERRLA, conjunctiva and sclera clear  ENMT: No tonsillar erythema, exudates, or enlargement; Moist mucous membranes, Good dentition, No lesions  NECK: Supple, No JVD, Normal thyroid  NERVOUS SYSTEM:  Alert & Oriented X3, Good concentration; Motor Strength 5/5 B/L upper and lower extremities; DTRs 2+ intact and symmetric  CHEST/LUNG: Clear to percussion bilaterally; No rales, rhonchi, wheezing, or rubs  HEART: Regular rate and rhythm; No murmurs, rubs, or gallops  ABDOMEN: Soft, Nontender, Nondistended; Bowel sounds present  EXTREMITIES:  2+ Peripheral Pulses, No clubbing, cyanosis, or edema  LYMPH: No lymphadenopathy noted  SKIN: No rashes or lesions    LABS:                        9.7    10.83 )-----------( 269      ( 25 Aug 2021 19:11 )             29.8     08-25    136  |  103  |  18  ----------------------------<  144<H>  4.0   |  22  |  0.98    Ca    9.1      25 Aug 2021 07:28          CAPILLARY BLOOD GLUCOSE                MEDICATIONS  (STANDING):  apixaban 5 milliGRAM(s) Oral two times a day  atorvastatin 20 milliGRAM(s) Oral at bedtime  diltiazem    milliGRAM(s) Oral daily  donepezil 10 milliGRAM(s) Oral at bedtime  levoFLOXacin IVPB      levoFLOXacin IVPB 500 milliGRAM(s) IV Intermittent every 24 hours  memantine 10 milliGRAM(s) Oral two times a day  pantoprazole    Tablet 40 milliGRAM(s) Oral two times a day    MEDICATIONS  (PRN):      Care Discussed with Consultants/Other Providers [ ] YES  [ ] NO

## 2021-08-25 NOTE — PROGRESS NOTE ADULT - ASSESSMENT
A/P  # Syncope :  -etiology unclear   neuro consult Dr. Ying called : done , likely cardiac in nature     # UTI :   started on levaquin   pt. allergic to PCN   -f/u urine c/s     # CAD / ch afib with RVR  -c/w digoxin   -c/w eliquis for AC   cardio consulted   TTE     # HTN :  -c/w home meds     # Alz. dementia :  -supportive care   c/w aricept and namenda     dispo: plan for d/c home in am         
  A/P  # Syncope :  -etiology unclear   neuro consult Dr. Ying called : done , likely cardiac in nature   echo done today : result pending     # UTI :   started on levaquin   pt. allergic to PCN   -f/u urine c/s     # CAD / ch afib with RVR  -c/w digoxin   -c/w eliquis for AC   cardio consulted   TTE     # HTN :  -c/w home meds     # Alz. dementia :  -supportive care   c/w aricept and namenda     dispo: plan for d/c home in am , d/c abx from tomorrow         
  A/P  # Syncope :  -etiology unclear   neuro consult Dr. Ying called : done , likely cardiac in nature   cardio consult Dr. kaufman called     # UTI :   started on levaquin   pt. allergic to PCN   -f/u urine c/s     # CAD / ch afib with RVR  -c/w digoxin   -c/w eliquis for AC   cardio consulted   TTE     # HTN :  -c/w home meds     # Alz. dementia :  -supportive care   c/w aricept and namenda

## 2021-08-26 ENCOUNTER — TRANSCRIPTION ENCOUNTER (OUTPATIENT)
Age: 81
End: 2021-08-26

## 2021-08-26 VITALS
TEMPERATURE: 98 F | SYSTOLIC BLOOD PRESSURE: 148 MMHG | HEART RATE: 66 BPM | RESPIRATION RATE: 18 BRPM | DIASTOLIC BLOOD PRESSURE: 77 MMHG | OXYGEN SATURATION: 97 %

## 2021-08-26 LAB
ALBUMIN SERPL ELPH-MCNC: 3.4 G/DL — SIGNIFICANT CHANGE UP (ref 3.3–5)
ALP SERPL-CCNC: 55 U/L — SIGNIFICANT CHANGE UP (ref 40–120)
ALT FLD-CCNC: 9 U/L — LOW (ref 10–45)
ANION GAP SERPL CALC-SCNC: 12 MMOL/L — SIGNIFICANT CHANGE UP (ref 5–17)
AST SERPL-CCNC: 11 U/L — SIGNIFICANT CHANGE UP (ref 10–40)
BASOPHILS # BLD AUTO: 0.02 K/UL — SIGNIFICANT CHANGE UP (ref 0–0.2)
BASOPHILS NFR BLD AUTO: 0.2 % — SIGNIFICANT CHANGE UP (ref 0–2)
BILIRUB SERPL-MCNC: 0.3 MG/DL — SIGNIFICANT CHANGE UP (ref 0.2–1.2)
BUN SERPL-MCNC: 14 MG/DL — SIGNIFICANT CHANGE UP (ref 7–23)
CALCIUM SERPL-MCNC: 9 MG/DL — SIGNIFICANT CHANGE UP (ref 8.4–10.5)
CHLORIDE SERPL-SCNC: 105 MMOL/L — SIGNIFICANT CHANGE UP (ref 96–108)
CO2 SERPL-SCNC: 23 MMOL/L — SIGNIFICANT CHANGE UP (ref 22–31)
CREAT SERPL-MCNC: 0.87 MG/DL — SIGNIFICANT CHANGE UP (ref 0.5–1.3)
EOSINOPHIL # BLD AUTO: 0.2 K/UL — SIGNIFICANT CHANGE UP (ref 0–0.5)
EOSINOPHIL NFR BLD AUTO: 2.2 % — SIGNIFICANT CHANGE UP (ref 0–6)
GLUCOSE SERPL-MCNC: 147 MG/DL — HIGH (ref 70–99)
HCT VFR BLD CALC: 31.5 % — LOW (ref 34.5–45)
HGB BLD-MCNC: 10 G/DL — LOW (ref 11.5–15.5)
IMM GRANULOCYTES NFR BLD AUTO: 1 % — SIGNIFICANT CHANGE UP (ref 0–1.5)
LYMPHOCYTES # BLD AUTO: 1.32 K/UL — SIGNIFICANT CHANGE UP (ref 1–3.3)
LYMPHOCYTES # BLD AUTO: 14.8 % — SIGNIFICANT CHANGE UP (ref 13–44)
MAGNESIUM SERPL-MCNC: 2.4 MG/DL — SIGNIFICANT CHANGE UP (ref 1.6–2.6)
MCHC RBC-ENTMCNC: 31.2 PG — SIGNIFICANT CHANGE UP (ref 27–34)
MCHC RBC-ENTMCNC: 31.7 GM/DL — LOW (ref 32–36)
MCV RBC AUTO: 98.1 FL — SIGNIFICANT CHANGE UP (ref 80–100)
MONOCYTES # BLD AUTO: 0.64 K/UL — SIGNIFICANT CHANGE UP (ref 0–0.9)
MONOCYTES NFR BLD AUTO: 7.2 % — SIGNIFICANT CHANGE UP (ref 2–14)
NEUTROPHILS # BLD AUTO: 6.66 K/UL — SIGNIFICANT CHANGE UP (ref 1.8–7.4)
NEUTROPHILS NFR BLD AUTO: 74.6 % — SIGNIFICANT CHANGE UP (ref 43–77)
NRBC # BLD: 0 /100 WBCS — SIGNIFICANT CHANGE UP (ref 0–0)
PLATELET # BLD AUTO: 262 K/UL — SIGNIFICANT CHANGE UP (ref 150–400)
POTASSIUM SERPL-MCNC: 4.3 MMOL/L — SIGNIFICANT CHANGE UP (ref 3.5–5.3)
POTASSIUM SERPL-SCNC: 4.3 MMOL/L — SIGNIFICANT CHANGE UP (ref 3.5–5.3)
PROT SERPL-MCNC: 6.6 G/DL — SIGNIFICANT CHANGE UP (ref 6–8.3)
RBC # BLD: 3.21 M/UL — LOW (ref 3.8–5.2)
RBC # FLD: 14.5 % — SIGNIFICANT CHANGE UP (ref 10.3–14.5)
SODIUM SERPL-SCNC: 140 MMOL/L — SIGNIFICANT CHANGE UP (ref 135–145)
WBC # BLD: 8.93 K/UL — SIGNIFICANT CHANGE UP (ref 3.8–10.5)
WBC # FLD AUTO: 8.93 K/UL — SIGNIFICANT CHANGE UP (ref 3.8–10.5)

## 2021-08-26 PROCEDURE — U0005: CPT

## 2021-08-26 PROCEDURE — 87077 CULTURE AEROBIC IDENTIFY: CPT

## 2021-08-26 PROCEDURE — 70450 CT HEAD/BRAIN W/O DYE: CPT

## 2021-08-26 PROCEDURE — 84100 ASSAY OF PHOSPHORUS: CPT

## 2021-08-26 PROCEDURE — 85025 COMPLETE CBC W/AUTO DIFF WBC: CPT

## 2021-08-26 PROCEDURE — 86769 SARS-COV-2 COVID-19 ANTIBODY: CPT

## 2021-08-26 PROCEDURE — 93306 TTE W/DOPPLER COMPLETE: CPT

## 2021-08-26 PROCEDURE — 84443 ASSAY THYROID STIM HORMONE: CPT

## 2021-08-26 PROCEDURE — 80048 BASIC METABOLIC PNL TOTAL CA: CPT

## 2021-08-26 PROCEDURE — 83735 ASSAY OF MAGNESIUM: CPT

## 2021-08-26 PROCEDURE — 82962 GLUCOSE BLOOD TEST: CPT

## 2021-08-26 PROCEDURE — 85027 COMPLETE CBC AUTOMATED: CPT

## 2021-08-26 PROCEDURE — 99285 EMERGENCY DEPT VISIT HI MDM: CPT

## 2021-08-26 PROCEDURE — U0003: CPT

## 2021-08-26 PROCEDURE — 82272 OCCULT BLD FECES 1-3 TESTS: CPT

## 2021-08-26 PROCEDURE — 80053 COMPREHEN METABOLIC PANEL: CPT

## 2021-08-26 PROCEDURE — 71045 X-RAY EXAM CHEST 1 VIEW: CPT

## 2021-08-26 PROCEDURE — 87186 SC STD MICRODIL/AGAR DIL: CPT

## 2021-08-26 PROCEDURE — 80162 ASSAY OF DIGOXIN TOTAL: CPT

## 2021-08-26 PROCEDURE — 84484 ASSAY OF TROPONIN QUANT: CPT

## 2021-08-26 PROCEDURE — 93005 ELECTROCARDIOGRAM TRACING: CPT

## 2021-08-26 PROCEDURE — 85730 THROMBOPLASTIN TIME PARTIAL: CPT

## 2021-08-26 PROCEDURE — 93880 EXTRACRANIAL BILAT STUDY: CPT

## 2021-08-26 PROCEDURE — 87086 URINE CULTURE/COLONY COUNT: CPT

## 2021-08-26 PROCEDURE — 81001 URINALYSIS AUTO W/SCOPE: CPT

## 2021-08-26 PROCEDURE — 36415 COLL VENOUS BLD VENIPUNCTURE: CPT

## 2021-08-26 PROCEDURE — 85610 PROTHROMBIN TIME: CPT

## 2021-08-26 PROCEDURE — 97161 PT EVAL LOW COMPLEX 20 MIN: CPT

## 2021-08-26 RX ORDER — PANTOPRAZOLE SODIUM 20 MG/1
1 TABLET, DELAYED RELEASE ORAL
Qty: 28 | Refills: 0
Start: 2021-08-26 | End: 2021-09-08

## 2021-08-26 RX ORDER — MEMANTINE HYDROCHLORIDE 10 MG/1
1 TABLET ORAL
Qty: 60 | Refills: 0
Start: 2021-08-26 | End: 2021-09-24

## 2021-08-26 RX ORDER — FUROSEMIDE 40 MG
1 TABLET ORAL
Qty: 0 | Refills: 0 | DISCHARGE

## 2021-08-26 RX ADMIN — PANTOPRAZOLE SODIUM 40 MILLIGRAM(S): 20 TABLET, DELAYED RELEASE ORAL at 05:33

## 2021-08-26 RX ADMIN — MEMANTINE HYDROCHLORIDE 10 MILLIGRAM(S): 10 TABLET ORAL at 05:33

## 2021-08-26 RX ADMIN — APIXABAN 5 MILLIGRAM(S): 2.5 TABLET, FILM COATED ORAL at 05:33

## 2021-08-26 RX ADMIN — Medication 180 MILLIGRAM(S): at 05:33

## 2021-08-26 NOTE — PROGRESS NOTE ADULT - PROVIDER SPECIALTY LIST ADULT
Neurology
Cardiology
Electrophysiology
Internal Medicine
Cardiology
Neurology
Internal Medicine
Internal Medicine

## 2021-08-26 NOTE — DISCHARGE NOTE NURSING/CASE MANAGEMENT/SOCIAL WORK - NSDCPEFALRISK_GEN_ALL_CORE
For information on Fall & injury Prevention, visit [email or web address]

## 2021-08-26 NOTE — DISCHARGE NOTE NURSING/CASE MANAGEMENT/SOCIAL WORK - PATIENT PORTAL LINK FT
You can access the FollowMyHealth Patient Portal offered by [facility] by registering at the following website: [email or web address]. By joining [employer]’s FollowMyHealth portal, you will also be able to view your health information using other applications (apps) compatible with our system.

## 2021-08-26 NOTE — PROGRESS NOTE ADULT - SUBJECTIVE AND OBJECTIVE BOX
Patient is a 81y old  Female who presents with a chief complaint of syncope (25 Aug 2021 20:20)      INTERVAL HISTORY: pt reports feeling well     TELEMETRY Personally reviewed: SR 60'S-70'S     REVIEW OF SYSTEMS:   CONSTITUTIONAL: No weakness  EYES/ENT: No visual changes; No throat pain  Neck: No pain or stiffness  Respiratory: No cough, wheezing, No shortness of breath  CARDIOVASCULAR: no chest pain or palpitations  GASTROINTESTINAL: No abdominal pain, no nausea, vomiting or hematemesis  GENITOURINARY: No dysuria, frequency or hematuria  NEUROLOGICAL: No stroke like symptoms  SKIN: No rashes    	  MEDICATIONS:  diltiazem    milliGRAM(s) Oral daily        PHYSICAL EXAM:  T(C): 36.4 (08-26-21 @ 05:59), Max: 36.9 (08-25-21 @ 20:42)  HR: 66 (08-26-21 @ 05:59) (66 - 89)  BP: 148/77 (08-26-21 @ 05:59) (122/73 - 148/77)  RR: 18 (08-26-21 @ 05:59) (18 - 18)  SpO2: 97% (08-26-21 @ 05:59) (96% - 97%)  Wt(kg): --  I&O's Summary    25 Aug 2021 07:01  -  26 Aug 2021 07:00  --------------------------------------------------------  IN: 490 mL / OUT: 200 mL / NET: 290 mL          Appearance: In no distress	  HEENT:    PERRL, EOMI	  Cardiovascular:  S1 S2, No JVD  Respiratory: Lungs clear to auscultation	  Gastrointestinal:  Soft, Non-tender, + BS	  Vascularature:  No edema of LE  Psychiatric: Appropriate affect   Neuro: no acute focal deficits                               10.0   8.93  )-----------( 262      ( 26 Aug 2021 06:26 )             31.5     08-26    140  |  105  |  14  ----------------------------<  147<H>  4.3   |  23  |  0.87    Ca    9.0      26 Aug 2021 06:26  Mg     2.4     08-26    TPro  6.6  /  Alb  3.4  /  TBili  0.3  /  DBili  x   /  AST  11  /  ALT  9<L>  /  AlkPhos  55  08-26        Labs personally reviewed  RADIOLOGY   < from: [facility] Duplex Carotid, Bilat (08.25.21 @ 16:06) >  No hemodynamically significant internal carotid artery stenoses    There is a flow-limiting stenosis of the left external carotid artery.    Measurement of carotid stenosis is based on velocity parameters that correlate the residual internal carotid diameter with that of the more distal vessel in accordance with a method such as the [employer] American Symptomatic Carotid Endarterectomy Trial (NASCET).      --- End of Report ---    < end of copied text >  < from: Transthoracic Echocardiogram (08.25.21 @ 10:07) >  Conclusions:  1. Normal left ventricular systolic function. No segmental  wall motion abnormalities.  2. Normal right ventricular size and function.  3. Normal tricuspid valve. Minimal tricuspid regurgitation.  *** Compared with echocardiogram of 9/21/2020, no  significant changes noted.  ------------------------------------------------------------------------    < end of copied text >      ASSESSMENT/PLAN: 	  80yo woman PMH afib, HTN, HLD, osteoporosis, dementia was brought in after an episode of LOC while chair lift up the stairs. She denies falling or any trauma. She states that she has been having more BMs recently that are black for the past several days to weeks. No lightheadedness, n/v or symptoms prior to episode. Pt denies any symptoms afterwards. No recent fevers, cough, n/v/d, abdominal pain, dysuria.    1. Syncope r/o cardiac Etiology, could be 2/2 UTI?  per daughter in law at bedside pt had syncopal episode last year with no significant findings--> was treated for UTI   no prior cardiac stents.   Echo 9/2020 normal LV/ RV function, no WMA   ortho negative   CE unremarkable   EKG Sinus with a few PVCs  repeat Echo normal as noted above     US carotids  neuro following   per EP dc dig given Afib is paroxysmal       2. AFIb   on Eliquis  Cardizem rate control   per EP dc dig      3. HLD   on statin     4. Dementia   c/w home meds     5. DVT ppx   on Eliquis     6. UTI   on Levaquin per primary   f/u ucx     d/c planning possibly today     Matt Mccarty [facility]   Gustavo Graf DO [facility]  Cardiovascular Medicine  [address] [place] [address], [address]  Office: [phone]  Cell: [phone]

## 2021-08-26 NOTE — PROGRESS NOTE ADULT - SUBJECTIVE AND OBJECTIVE BOX
EP     DATE OF SERVICE: 08-26-21    Denies chest pain, SOB, or palpitations.  Review of systems otherwise (-)  	  MEDICATIONS:  MEDICATIONS  (STANDING):  apixaban 5 milliGRAM(s) Oral two times a day  atorvastatin 20 milliGRAM(s) Oral at bedtime  diltiazem    milliGRAM(s) Oral daily  donepezil 10 milliGRAM(s) Oral at bedtime  levoFLOXacin IVPB      levoFLOXacin IVPB 500 milliGRAM(s) IV Intermittent every 24 hours  memantine 10 milliGRAM(s) Oral two times a day  pantoprazole    Tablet 40 milliGRAM(s) Oral two times a day      LABS:	 	    CARDIAC MARKERS:                                10.0   8.93  )-----------( 262      ( 26 Aug 2021 06:26 )             31.5     Hemoglobin: 10.0 g/dL (08-26 @ 06:26)  Hemoglobin: 9.7 g/dL (08-25 @ 19:11)  Hemoglobin: 10.3 g/dL (08-25 @ 07:30)  Hemoglobin: 10.0 g/dL (08-24 @ 19:35)  Hemoglobin: 9.6 g/dL (08-24 @ 07:13)      08-26    140  |  105  |  14  ----------------------------<  147<H>  4.3   |  23  |  0.87    Ca    9.0      26 Aug 2021 06:26  Mg     2.4     08-26    TPro  6.6  /  Alb  3.4  /  TBili  0.3  /  DBili  x   /  AST  11  /  ALT  9<L>  /  AlkPhos  55  08-26    Creatinine Trend: 0.87<--, 0.98<--, 0.86<--, 0.87<--, 0.87<--    COAGS:       proBNP:   Lipid Profile:   HgA1c:   TSH:       PHYSICAL EXAM:  T(C): 36.4 (08-26-21 @ 05:59), Max: 36.9 (08-25-21 @ 20:42)  HR: 66 (08-26-21 @ 05:59) (66 - 86)  BP: 148/77 (08-26-21 @ 05:59) (122/73 - 148/77)  RR: 18 (08-26-21 @ 05:59) (18 - 18)  SpO2: 97% (08-26-21 @ 05:59) (97% - 97%)  Wt(kg): --  I&O's Summary    25 Aug 2021 07:01  -  26 Aug 2021 07:00  --------------------------------------------------------  IN: 490 mL / OUT: 200 mL / NET: 290 mL      Gen: Appears well in NAD  HEENT:  (-)icterus (-)pallor  CV: N S1 S2 1/6 FARIHA (+)2 Pulses B/l  Resp:  Clear to auscultation B/L, normal effort  GI: (+) BS Soft, NT, ND  Lymph:  (-)Edema, (-)obvious lymphadenopathy  Skin: Warm to touch, Normal turgor  Psych: Appropriate mood and affect      TELEMETRY: SR 70-80s	      A/P) She is a pleasant 80 y/o female PMH HTN and PAF a/w syncope. She denies palpitations nor angina. Her workup here in the hospital includes unremarkable ekg and telemetry. She had an echo last year that was unremarkable. Repeat echo unremarkable.    -continue eliquis for lifelong a/c given elevated chads-vasc score  -continue cardizem for rate control  -d/c dig as her AF is paroxysmal  -repeat echo unremarkable  -if inpatient workup remains unremarkable, then recommend outpatient event monitoring followed by outpatient ILR r/o offset pauses    Marvel Allen PA-C  Pager: [phone]

## 2021-08-29 NOTE — PROVIDER CONTACT NOTE (OTHER) - NAME OF MD/NP/PA/DO NOTIFIED:
Sung, NP Discussed results with pt.  All questions were answered and return precautions discussed.  Pt is asx and comfortable at this time.  Unremarkable re-exam.  No further concerns at this time from pt.  Will follow up with PMD.  Pt understands and agrees with tx plan.

## 2021-09-18 ENCOUNTER — INPATIENT (INPATIENT)
Facility: HOSPITAL | Age: 81
LOS: 3 days | Discharge: HOME HEALTH | DRG: 690 | End: 2021-09-22
Attending: INTERNAL MEDICINE | Admitting: STUDENT IN AN ORGANIZED HEALTH CARE EDUCATION/TRAINING PROGRAM
Payer: MEDICARE

## 2021-09-18 VITALS
HEIGHT: 66.5 IN | DIASTOLIC BLOOD PRESSURE: 51 MMHG | HEART RATE: 98 BPM | OXYGEN SATURATION: 97 % | TEMPERATURE: 102 F | RESPIRATION RATE: 19 BRPM | SYSTOLIC BLOOD PRESSURE: 105 MMHG

## 2021-09-18 DIAGNOSIS — S22.080A WEDGE COMPRESSION FRACTURE OF T11-T12 VERTEBRA, INITIAL ENCOUNTER FOR CLOSED FRACTURE: Chronic | ICD-10-CM

## 2021-09-18 PROCEDURE — 99285 EMERGENCY DEPT VISIT HI MDM: CPT | Mod: CS,GC

## 2021-09-19 DIAGNOSIS — Z79.899 OTHER LONG TERM (CURRENT) DRUG THERAPY: ICD-10-CM

## 2021-09-19 DIAGNOSIS — N39.0 URINARY TRACT INFECTION, SITE NOT SPECIFIED: ICD-10-CM

## 2021-09-19 DIAGNOSIS — I48.20 CHRONIC ATRIAL FIBRILLATION, UNSPECIFIED: ICD-10-CM

## 2021-09-19 DIAGNOSIS — A41.9 SEPSIS, UNSPECIFIED ORGANISM: ICD-10-CM

## 2021-09-19 DIAGNOSIS — F03.90 UNSPECIFIED DEMENTIA, UNSPECIFIED SEVERITY, WITHOUT BEHAVIORAL DISTURBANCE, PSYCHOTIC DISTURBANCE, MOOD DISTURBANCE, AND ANXIETY: ICD-10-CM

## 2021-09-19 LAB
ALBUMIN SERPL ELPH-MCNC: 3 G/DL — LOW (ref 3.3–5)
ALBUMIN SERPL ELPH-MCNC: 3.9 G/DL — SIGNIFICANT CHANGE UP (ref 3.3–5)
ALP SERPL-CCNC: 50 U/L — SIGNIFICANT CHANGE UP (ref 40–120)
ALP SERPL-CCNC: 67 U/L — SIGNIFICANT CHANGE UP (ref 40–120)
ALT FLD-CCNC: 13 U/L — SIGNIFICANT CHANGE UP (ref 10–45)
ALT FLD-CCNC: 16 U/L — SIGNIFICANT CHANGE UP (ref 10–45)
ANION GAP SERPL CALC-SCNC: 11 MMOL/L — SIGNIFICANT CHANGE UP (ref 5–17)
ANION GAP SERPL CALC-SCNC: 13 MMOL/L — SIGNIFICANT CHANGE UP (ref 5–17)
ANION GAP SERPL CALC-SCNC: 18 MMOL/L — HIGH (ref 5–17)
APPEARANCE UR: CLEAR — SIGNIFICANT CHANGE UP
APTT BLD: 29.6 SEC — SIGNIFICANT CHANGE UP (ref 27.5–35.5)
AST SERPL-CCNC: 17 U/L — SIGNIFICANT CHANGE UP (ref 10–40)
AST SERPL-CCNC: 33 U/L — SIGNIFICANT CHANGE UP (ref 10–40)
BACTERIA # UR AUTO: ABNORMAL
BASE EXCESS BLDV CALC-SCNC: -1.2 MMOL/L — SIGNIFICANT CHANGE UP (ref -2–2)
BASE EXCESS BLDV CALC-SCNC: 0.2 MMOL/L — SIGNIFICANT CHANGE UP (ref -2–2)
BASOPHILS # BLD AUTO: 0.01 K/UL — SIGNIFICANT CHANGE UP (ref 0–0.2)
BASOPHILS # BLD AUTO: 0.03 K/UL — SIGNIFICANT CHANGE UP (ref 0–0.2)
BASOPHILS NFR BLD AUTO: 0.1 % — SIGNIFICANT CHANGE UP (ref 0–2)
BASOPHILS NFR BLD AUTO: 0.2 % — SIGNIFICANT CHANGE UP (ref 0–2)
BILIRUB SERPL-MCNC: 0.3 MG/DL — SIGNIFICANT CHANGE UP (ref 0.2–1.2)
BILIRUB SERPL-MCNC: 0.4 MG/DL — SIGNIFICANT CHANGE UP (ref 0.2–1.2)
BILIRUB UR-MCNC: NEGATIVE — SIGNIFICANT CHANGE UP
BUN SERPL-MCNC: 18 MG/DL — SIGNIFICANT CHANGE UP (ref 7–23)
BUN SERPL-MCNC: 21 MG/DL — SIGNIFICANT CHANGE UP (ref 7–23)
BUN SERPL-MCNC: 24 MG/DL — HIGH (ref 7–23)
CA-I SERPL-SCNC: 1.23 MMOL/L — SIGNIFICANT CHANGE UP (ref 1.15–1.33)
CA-I SERPL-SCNC: 1.24 MMOL/L — SIGNIFICANT CHANGE UP (ref 1.15–1.33)
CALCIUM SERPL-MCNC: 8.6 MG/DL — SIGNIFICANT CHANGE UP (ref 8.4–10.5)
CALCIUM SERPL-MCNC: 8.7 MG/DL — SIGNIFICANT CHANGE UP (ref 8.4–10.5)
CALCIUM SERPL-MCNC: 9.8 MG/DL — SIGNIFICANT CHANGE UP (ref 8.4–10.5)
CHLORIDE BLDV-SCNC: 102 MMOL/L — SIGNIFICANT CHANGE UP (ref 96–108)
CHLORIDE BLDV-SCNC: 103 MMOL/L — SIGNIFICANT CHANGE UP (ref 96–108)
CHLORIDE SERPL-SCNC: 100 MMOL/L — SIGNIFICANT CHANGE UP (ref 96–108)
CHLORIDE SERPL-SCNC: 100 MMOL/L — SIGNIFICANT CHANGE UP (ref 96–108)
CHLORIDE SERPL-SCNC: 96 MMOL/L — SIGNIFICANT CHANGE UP (ref 96–108)
CO2 BLDV-SCNC: 25 MMOL/L — SIGNIFICANT CHANGE UP (ref 22–26)
CO2 BLDV-SCNC: 26 MMOL/L — SIGNIFICANT CHANGE UP (ref 22–26)
CO2 SERPL-SCNC: 19 MMOL/L — LOW (ref 22–31)
CO2 SERPL-SCNC: 20 MMOL/L — LOW (ref 22–31)
CO2 SERPL-SCNC: 21 MMOL/L — LOW (ref 22–31)
COLOR SPEC: YELLOW — SIGNIFICANT CHANGE UP
CREAT SERPL-MCNC: 1 MG/DL — SIGNIFICANT CHANGE UP (ref 0.5–1.3)
CREAT SERPL-MCNC: 1.04 MG/DL — SIGNIFICANT CHANGE UP (ref 0.5–1.3)
CREAT SERPL-MCNC: 1.27 MG/DL — SIGNIFICANT CHANGE UP (ref 0.5–1.3)
DIFF PNL FLD: NEGATIVE — SIGNIFICANT CHANGE UP
DIGOXIN SERPL-MCNC: 0.4 NG/ML — LOW (ref 0.8–2)
EOSINOPHIL # BLD AUTO: 0.04 K/UL — SIGNIFICANT CHANGE UP (ref 0–0.5)
EOSINOPHIL # BLD AUTO: 0.06 K/UL — SIGNIFICANT CHANGE UP (ref 0–0.5)
EOSINOPHIL NFR BLD AUTO: 0.2 % — SIGNIFICANT CHANGE UP (ref 0–6)
EOSINOPHIL NFR BLD AUTO: 0.4 % — SIGNIFICANT CHANGE UP (ref 0–6)
EPI CELLS # UR: 1 /HPF — SIGNIFICANT CHANGE UP
GAS PNL BLDV: 135 MMOL/L — LOW (ref 136–145)
GAS PNL BLDV: 136 MMOL/L — SIGNIFICANT CHANGE UP (ref 136–145)
GAS PNL BLDV: SIGNIFICANT CHANGE UP
GLUCOSE BLDV-MCNC: 170 MG/DL — HIGH (ref 70–99)
GLUCOSE BLDV-MCNC: 201 MG/DL — HIGH (ref 70–99)
GLUCOSE SERPL-MCNC: 160 MG/DL — HIGH (ref 70–99)
GLUCOSE SERPL-MCNC: 186 MG/DL — HIGH (ref 70–99)
GLUCOSE SERPL-MCNC: 263 MG/DL — HIGH (ref 70–99)
GLUCOSE UR QL: NEGATIVE — SIGNIFICANT CHANGE UP
HCO3 BLDV-SCNC: 24 MMOL/L — SIGNIFICANT CHANGE UP (ref 22–29)
HCO3 BLDV-SCNC: 25 MMOL/L — SIGNIFICANT CHANGE UP (ref 22–29)
HCT VFR BLD CALC: 28.1 % — LOW (ref 34.5–45)
HCT VFR BLD CALC: 31.8 % — LOW (ref 34.5–45)
HCT VFR BLD CALC: 35.2 % — SIGNIFICANT CHANGE UP (ref 34.5–45)
HCT VFR BLDA CALC: 30 % — LOW (ref 34.5–46.5)
HCT VFR BLDA CALC: 35 % — SIGNIFICANT CHANGE UP (ref 34.5–46.5)
HGB BLD CALC-MCNC: 10 G/DL — LOW (ref 11.7–16.1)
HGB BLD CALC-MCNC: 11.6 G/DL — LOW (ref 11.7–16.1)
HGB BLD-MCNC: 11.3 G/DL — LOW (ref 11.5–15.5)
HGB BLD-MCNC: 8.8 G/DL — LOW (ref 11.5–15.5)
HGB BLD-MCNC: 9.9 G/DL — LOW (ref 11.5–15.5)
HOROWITZ INDEX BLDV+IHG-RTO: SIGNIFICANT CHANGE UP
HOROWITZ INDEX BLDV+IHG-RTO: SIGNIFICANT CHANGE UP
HYALINE CASTS # UR AUTO: 3 /LPF — HIGH (ref 0–2)
IMM GRANULOCYTES NFR BLD AUTO: 0.7 % — SIGNIFICANT CHANGE UP (ref 0–1.5)
IMM GRANULOCYTES NFR BLD AUTO: 1.3 % — SIGNIFICANT CHANGE UP (ref 0–1.5)
INR BLD: 1.91 RATIO — HIGH (ref 0.88–1.16)
KETONES UR-MCNC: NEGATIVE — SIGNIFICANT CHANGE UP
LACTATE BLDV-MCNC: 2.2 MMOL/L — HIGH (ref 0.7–2)
LACTATE BLDV-MCNC: 4.2 MMOL/L — CRITICAL HIGH (ref 0.7–2)
LEUKOCYTE ESTERASE UR-ACNC: NEGATIVE — SIGNIFICANT CHANGE UP
LYMPHOCYTES # BLD AUTO: 0.73 K/UL — LOW (ref 1–3.3)
LYMPHOCYTES # BLD AUTO: 1.21 K/UL — SIGNIFICANT CHANGE UP (ref 1–3.3)
LYMPHOCYTES # BLD AUTO: 4.4 % — LOW (ref 13–44)
LYMPHOCYTES # BLD AUTO: 8.9 % — LOW (ref 13–44)
MCHC RBC-ENTMCNC: 30.3 PG — SIGNIFICANT CHANGE UP (ref 27–34)
MCHC RBC-ENTMCNC: 30.5 PG — SIGNIFICANT CHANGE UP (ref 27–34)
MCHC RBC-ENTMCNC: 30.8 PG — SIGNIFICANT CHANGE UP (ref 27–34)
MCHC RBC-ENTMCNC: 31.1 GM/DL — LOW (ref 32–36)
MCHC RBC-ENTMCNC: 31.3 GM/DL — LOW (ref 32–36)
MCHC RBC-ENTMCNC: 32.1 GM/DL — SIGNIFICANT CHANGE UP (ref 32–36)
MCV RBC AUTO: 95.9 FL — SIGNIFICANT CHANGE UP (ref 80–100)
MCV RBC AUTO: 96.9 FL — SIGNIFICANT CHANGE UP (ref 80–100)
MCV RBC AUTO: 97.8 FL — SIGNIFICANT CHANGE UP (ref 80–100)
MONOCYTES # BLD AUTO: 0.93 K/UL — HIGH (ref 0–0.9)
MONOCYTES # BLD AUTO: 1.11 K/UL — HIGH (ref 0–0.9)
MONOCYTES NFR BLD AUTO: 6.7 % — SIGNIFICANT CHANGE UP (ref 2–14)
MONOCYTES NFR BLD AUTO: 6.9 % — SIGNIFICANT CHANGE UP (ref 2–14)
NEUTROPHILS # BLD AUTO: 11.27 K/UL — HIGH (ref 1.8–7.4)
NEUTROPHILS # BLD AUTO: 14.35 K/UL — HIGH (ref 1.8–7.4)
NEUTROPHILS NFR BLD AUTO: 83 % — HIGH (ref 43–77)
NEUTROPHILS NFR BLD AUTO: 87.2 % — HIGH (ref 43–77)
NITRITE UR-MCNC: POSITIVE
NRBC # BLD: 0 /100 WBCS — SIGNIFICANT CHANGE UP (ref 0–0)
PCO2 BLDV: 38 MMHG — LOW (ref 39–42)
PCO2 BLDV: 39 MMHG — SIGNIFICANT CHANGE UP (ref 39–42)
PH BLDV: 7.39 — SIGNIFICANT CHANGE UP (ref 7.32–7.43)
PH BLDV: 7.42 — SIGNIFICANT CHANGE UP (ref 7.32–7.43)
PH UR: 6 — SIGNIFICANT CHANGE UP (ref 5–8)
PLATELET # BLD AUTO: 221 K/UL — SIGNIFICANT CHANGE UP (ref 150–400)
PLATELET # BLD AUTO: 235 K/UL — SIGNIFICANT CHANGE UP (ref 150–400)
PLATELET # BLD AUTO: 289 K/UL — SIGNIFICANT CHANGE UP (ref 150–400)
PO2 BLDV: 40 MMHG — SIGNIFICANT CHANGE UP (ref 25–45)
PO2 BLDV: 52 MMHG — HIGH (ref 25–45)
POTASSIUM BLDV-SCNC: 4.6 MMOL/L — SIGNIFICANT CHANGE UP (ref 3.5–5.1)
POTASSIUM BLDV-SCNC: 4.6 MMOL/L — SIGNIFICANT CHANGE UP (ref 3.5–5.1)
POTASSIUM SERPL-MCNC: 4.1 MMOL/L — SIGNIFICANT CHANGE UP (ref 3.5–5.3)
POTASSIUM SERPL-MCNC: 4.3 MMOL/L — SIGNIFICANT CHANGE UP (ref 3.5–5.3)
POTASSIUM SERPL-MCNC: 4.7 MMOL/L — SIGNIFICANT CHANGE UP (ref 3.5–5.3)
POTASSIUM SERPL-SCNC: 4.1 MMOL/L — SIGNIFICANT CHANGE UP (ref 3.5–5.3)
POTASSIUM SERPL-SCNC: 4.3 MMOL/L — SIGNIFICANT CHANGE UP (ref 3.5–5.3)
POTASSIUM SERPL-SCNC: 4.7 MMOL/L — SIGNIFICANT CHANGE UP (ref 3.5–5.3)
PROCALCITONIN SERPL-MCNC: 0.5 NG/ML — HIGH (ref 0.02–0.1)
PROT SERPL-MCNC: 5.5 G/DL — LOW (ref 6–8.3)
PROT SERPL-MCNC: 7.4 G/DL — SIGNIFICANT CHANGE UP (ref 6–8.3)
PROT UR-MCNC: ABNORMAL
PROTHROM AB SERPL-ACNC: 22.2 SEC — HIGH (ref 10.6–13.6)
RAPID RVP RESULT: SIGNIFICANT CHANGE UP
RBC # BLD: 2.9 M/UL — LOW (ref 3.8–5.2)
RBC # BLD: 3.25 M/UL — LOW (ref 3.8–5.2)
RBC # BLD: 3.67 M/UL — LOW (ref 3.8–5.2)
RBC # FLD: 14.9 % — HIGH (ref 10.3–14.5)
RBC # FLD: 15 % — HIGH (ref 10.3–14.5)
RBC # FLD: 15.1 % — HIGH (ref 10.3–14.5)
RBC CASTS # UR COMP ASSIST: 1 /HPF — SIGNIFICANT CHANGE UP (ref 0–4)
SAO2 % BLDV: 66.3 % — LOW (ref 67–88)
SAO2 % BLDV: 83.5 % — SIGNIFICANT CHANGE UP (ref 67–88)
SARS-COV-2 RNA SPEC QL NAA+PROBE: SIGNIFICANT CHANGE UP
SODIUM SERPL-SCNC: 128 MMOL/L — LOW (ref 135–145)
SODIUM SERPL-SCNC: 133 MMOL/L — LOW (ref 135–145)
SODIUM SERPL-SCNC: 137 MMOL/L — SIGNIFICANT CHANGE UP (ref 135–145)
SP GR SPEC: 1.02 — SIGNIFICANT CHANGE UP (ref 1.01–1.02)
UROBILINOGEN FLD QL: NEGATIVE — SIGNIFICANT CHANGE UP
WBC # BLD: 11.83 K/UL — HIGH (ref 3.8–10.5)
WBC # BLD: 13.57 K/UL — HIGH (ref 3.8–10.5)
WBC # BLD: 16.47 K/UL — HIGH (ref 3.8–10.5)
WBC # FLD AUTO: 11.83 K/UL — HIGH (ref 3.8–10.5)
WBC # FLD AUTO: 13.57 K/UL — HIGH (ref 3.8–10.5)
WBC # FLD AUTO: 16.47 K/UL — HIGH (ref 3.8–10.5)
WBC UR QL: 2 /HPF — SIGNIFICANT CHANGE UP (ref 0–5)

## 2021-09-19 PROCEDURE — 74177 CT ABD & PELVIS W/CONTRAST: CPT | Mod: 26

## 2021-09-19 PROCEDURE — 71045 X-RAY EXAM CHEST 1 VIEW: CPT | Mod: 26

## 2021-09-19 PROCEDURE — 99223 1ST HOSP IP/OBS HIGH 75: CPT

## 2021-09-19 RX ORDER — FOLIC ACID 0.8 MG
1 TABLET ORAL DAILY
Refills: 0 | Status: DISCONTINUED | OUTPATIENT
Start: 2021-09-19 | End: 2021-09-22

## 2021-09-19 RX ORDER — CEFEPIME 1 G/1
1000 INJECTION, POWDER, FOR SOLUTION INTRAMUSCULAR; INTRAVENOUS EVERY 12 HOURS
Refills: 0 | Status: DISCONTINUED | OUTPATIENT
Start: 2021-09-19 | End: 2021-09-21

## 2021-09-19 RX ORDER — VANCOMYCIN HCL 1 G
1000 VIAL (EA) INTRAVENOUS ONCE
Refills: 0 | Status: COMPLETED | OUTPATIENT
Start: 2021-09-19 | End: 2021-09-19

## 2021-09-19 RX ORDER — FERROUS SULFATE 325(65) MG
325 TABLET ORAL DAILY
Refills: 0 | Status: DISCONTINUED | OUTPATIENT
Start: 2021-09-19 | End: 2021-09-22

## 2021-09-19 RX ORDER — CHOLECALCIFEROL (VITAMIN D3) 125 MCG
1000 CAPSULE ORAL DAILY
Refills: 0 | Status: DISCONTINUED | OUTPATIENT
Start: 2021-09-19 | End: 2021-09-22

## 2021-09-19 RX ORDER — MEMANTINE HYDROCHLORIDE 10 MG/1
10 TABLET ORAL
Refills: 0 | Status: DISCONTINUED | OUTPATIENT
Start: 2021-09-19 | End: 2021-09-22

## 2021-09-19 RX ORDER — DILTIAZEM HCL 120 MG
180 CAPSULE, EXT RELEASE 24 HR ORAL DAILY
Refills: 0 | Status: DISCONTINUED | OUTPATIENT
Start: 2021-09-19 | End: 2021-09-22

## 2021-09-19 RX ORDER — DONEPEZIL HYDROCHLORIDE 10 MG/1
10 TABLET, FILM COATED ORAL AT BEDTIME
Refills: 0 | Status: DISCONTINUED | OUTPATIENT
Start: 2021-09-19 | End: 2021-09-22

## 2021-09-19 RX ORDER — ATORVASTATIN CALCIUM 80 MG/1
10 TABLET, FILM COATED ORAL AT BEDTIME
Refills: 0 | Status: DISCONTINUED | OUTPATIENT
Start: 2021-09-19 | End: 2021-09-22

## 2021-09-19 RX ORDER — CEFEPIME 1 G/1
1000 INJECTION, POWDER, FOR SOLUTION INTRAMUSCULAR; INTRAVENOUS ONCE
Refills: 0 | Status: COMPLETED | OUTPATIENT
Start: 2021-09-19 | End: 2021-09-19

## 2021-09-19 RX ORDER — ACETAMINOPHEN 500 MG
650 TABLET ORAL EVERY 6 HOURS
Refills: 0 | Status: DISCONTINUED | OUTPATIENT
Start: 2021-09-19 | End: 2021-09-22

## 2021-09-19 RX ORDER — APIXABAN 2.5 MG/1
5 TABLET, FILM COATED ORAL EVERY 12 HOURS
Refills: 0 | Status: DISCONTINUED | OUTPATIENT
Start: 2021-09-19 | End: 2021-09-22

## 2021-09-19 RX ORDER — SENNA PLUS 8.6 MG/1
2 TABLET ORAL AT BEDTIME
Refills: 0 | Status: DISCONTINUED | OUTPATIENT
Start: 2021-09-19 | End: 2021-09-22

## 2021-09-19 RX ORDER — PREGABALIN 225 MG/1
1000 CAPSULE ORAL DAILY
Refills: 0 | Status: DISCONTINUED | OUTPATIENT
Start: 2021-09-19 | End: 2021-09-22

## 2021-09-19 RX ORDER — SODIUM CHLORIDE 9 MG/ML
500 INJECTION INTRAMUSCULAR; INTRAVENOUS; SUBCUTANEOUS
Refills: 0 | Status: COMPLETED | OUTPATIENT
Start: 2021-09-19 | End: 2021-09-19

## 2021-09-19 RX ORDER — SODIUM CHLORIDE 9 MG/ML
1000 INJECTION, SOLUTION INTRAVENOUS ONCE
Refills: 0 | Status: COMPLETED | OUTPATIENT
Start: 2021-09-19 | End: 2021-09-19

## 2021-09-19 RX ORDER — ACETAMINOPHEN 500 MG
975 TABLET ORAL ONCE
Refills: 0 | Status: COMPLETED | OUTPATIENT
Start: 2021-09-19 | End: 2021-09-19

## 2021-09-19 RX ORDER — TETANUS TOXOID, REDUCED DIPHTHERIA TOXOID AND ACELLULAR PERTUSSIS VACCINE, ADSORBED 5; 2.5; 8; 8; 2.5 [IU]/.5ML; [IU]/.5ML; UG/.5ML; UG/.5ML; UG/.5ML
0.5 SUSPENSION INTRAMUSCULAR ONCE
Refills: 0 | Status: COMPLETED | OUTPATIENT
Start: 2021-09-19 | End: 2021-09-19

## 2021-09-19 RX ORDER — LANOLIN ALCOHOL/MO/W.PET/CERES
3 CREAM (GRAM) TOPICAL AT BEDTIME
Refills: 0 | Status: DISCONTINUED | OUTPATIENT
Start: 2021-09-19 | End: 2021-09-22

## 2021-09-19 RX ORDER — PANTOPRAZOLE SODIUM 20 MG/1
40 TABLET, DELAYED RELEASE ORAL
Refills: 0 | Status: DISCONTINUED | OUTPATIENT
Start: 2021-09-19 | End: 2021-09-22

## 2021-09-19 RX ADMIN — MEMANTINE HYDROCHLORIDE 10 MILLIGRAM(S): 10 TABLET ORAL at 06:22

## 2021-09-19 RX ADMIN — Medication 1000 UNIT(S): at 11:51

## 2021-09-19 RX ADMIN — CEFEPIME 100 MILLIGRAM(S): 1 INJECTION, POWDER, FOR SOLUTION INTRAMUSCULAR; INTRAVENOUS at 23:09

## 2021-09-19 RX ADMIN — PANTOPRAZOLE SODIUM 40 MILLIGRAM(S): 20 TABLET, DELAYED RELEASE ORAL at 06:17

## 2021-09-19 RX ADMIN — Medication 325 MILLIGRAM(S): at 11:51

## 2021-09-19 RX ADMIN — CEFEPIME 100 MILLIGRAM(S): 1 INJECTION, POWDER, FOR SOLUTION INTRAMUSCULAR; INTRAVENOUS at 00:18

## 2021-09-19 RX ADMIN — SODIUM CHLORIDE 125 MILLILITER(S): 9 INJECTION INTRAMUSCULAR; INTRAVENOUS; SUBCUTANEOUS at 05:27

## 2021-09-19 RX ADMIN — MEMANTINE HYDROCHLORIDE 10 MILLIGRAM(S): 10 TABLET ORAL at 17:23

## 2021-09-19 RX ADMIN — APIXABAN 5 MILLIGRAM(S): 2.5 TABLET, FILM COATED ORAL at 17:23

## 2021-09-19 RX ADMIN — DONEPEZIL HYDROCHLORIDE 10 MILLIGRAM(S): 10 TABLET, FILM COATED ORAL at 23:09

## 2021-09-19 RX ADMIN — SODIUM CHLORIDE 1000 MILLILITER(S): 9 INJECTION, SOLUTION INTRAVENOUS at 00:15

## 2021-09-19 RX ADMIN — Medication 180 MILLIGRAM(S): at 06:17

## 2021-09-19 RX ADMIN — Medication 975 MILLIGRAM(S): at 00:15

## 2021-09-19 RX ADMIN — Medication 250 MILLIGRAM(S): at 04:51

## 2021-09-19 RX ADMIN — TETANUS TOXOID, REDUCED DIPHTHERIA TOXOID AND ACELLULAR PERTUSSIS VACCINE, ADSORBED 0.5 MILLILITER(S): 5; 2.5; 8; 8; 2.5 SUSPENSION INTRAMUSCULAR at 00:19

## 2021-09-19 RX ADMIN — ATORVASTATIN CALCIUM 10 MILLIGRAM(S): 80 TABLET, FILM COATED ORAL at 23:09

## 2021-09-19 RX ADMIN — CEFEPIME 100 MILLIGRAM(S): 1 INJECTION, POWDER, FOR SOLUTION INTRAMUSCULAR; INTRAVENOUS at 11:51

## 2021-09-19 RX ADMIN — PREGABALIN 1000 MICROGRAM(S): 225 CAPSULE ORAL at 11:51

## 2021-09-19 RX ADMIN — Medication 1 MILLIGRAM(S): at 11:51

## 2021-09-19 RX ADMIN — APIXABAN 5 MILLIGRAM(S): 2.5 TABLET, FILM COATED ORAL at 06:17

## 2021-09-19 NOTE — ED PROVIDER NOTE - CARE PLAN
1 Principal Discharge DX:	Sepsis, unspecified organism  Secondary Diagnosis:	UTI (urinary tract infection)

## 2021-09-19 NOTE — H&P ADULT - HISTORY OF PRESENT ILLNESS
Patient is an 81 year old female w/pmh   afib on dig and cardizem, HTN, HLD, osteoporosis, Dementai , recently admitted for syncope 8/22-8/25 , work up was non revealing , now presents for generalized weakness for the past    Patient is an 81 year old female w/pmh   afib, HTN, HLD, osteoporosis, Dementia, recently admitted for syncope 8/22-8/25 , work up was non revealing , now presents for generalized weakness for the past few days. Patient reports feeling weak on day of admission , unable to transfer to chair lift. She reports no fever or chills , no cough or runny nose , no chest pain or SOB , no dysuria or change in urinary frequency.  She has no abdominal pain and no diarrhea. She was recently treated for a UTI with Levaquin.

## 2021-09-19 NOTE — ED PROVIDER NOTE - PROGRESS NOTE DETAILS
Oz Rajput D.O., PGY3 (Resident)  Attempted to call Dr. Bhupinder parham3, last time at 2:15am. No call. Discussed with hospitalist. Stable for admission. Lactate improved at 1L LR from 4 -> 2, wbc 16, pro calc 0.5. Admit.

## 2021-09-19 NOTE — ED PROVIDER NOTE - PHYSICAL EXAMINATION
GENERAL: elderly female, lying in bed, NAD. borderlien normocardia with fever, otherwise Vital signs are within normal limits  EYES: Conjunctiva noninjected or pale, sclera anicteric  HENT: NC/AT, moist mucous membranes  NECK: Supple, trachea midline  LUNG: Nonlabored respirations, no wheezes, rales  CV: RRR, Pulses- Radial/dorsalis pedis: 2+ bilateral and equal  ABDOMEN: Nondistended, nontender, no rebound  MSK: No visible deformities, nontender extremities  SKIN: + right shin abrasion, nonbleeding by mole. No foreign bodies, crepitations, fluctuance  NEURO: AAOx4 (to person, place, time, event), no tremor, steady gait  PSYCH: Normal mood and affect

## 2021-09-19 NOTE — ED ADULT NURSE NOTE - OBJECTIVE STATEMENT
81F aaox4 ambulatory at home with assistance, bibems s/p unable to get up from bed and generalized weakness. h/o Afib HTN arthritis HLD and recent UTI which just started on PO antibiotics yesterday as per son. Febrile in the ED with 102 OT. Sepsis work up initiated.

## 2021-09-19 NOTE — ED PROVIDER NOTE - OBJECTIVE STATEMENT
81F hx of afib on dig and cardizem, HTN, HLD, osteoporosis, recent hospitalization for syncope, found to have uti on levaquin here for weakness x1 day. Patient states she feels fine. Per son, patient was trying to get into a chair lift and due to leg weakness, wasn't able to pick legs up and scrapped right shin. Denies fall,. LOC. Patient denies urinary sxs, chest pain, shortness of breath, abd pain.

## 2021-09-19 NOTE — H&P ADULT - PROBLEM SELECTOR PLAN 1
+ UA ,  findings c/w cystitis on CT , recently tx w/ Levaquin , listed penicillin allergy , however treated with cefepime in ED and well tolerated , will therefore continue   - cefepime   - f/u urine cultures

## 2021-09-19 NOTE — ED PROVIDER NOTE - ATTENDING CONTRIBUTION TO CARE
81 year old female with history of afib, HTN, LD, osteoporosis, UTI presented to ED with generalized weakness and fall. Pt fell while trying to get up from chair. No head or back injuries. States she scraped   her right shin. Noted to have febrile at triage. Will treat for undifferentiated sepsis. send cultures. Update tetanus. admit

## 2021-09-19 NOTE — H&P ADULT - ASSESSMENT
81F  w/pmh   afib, HTN, HLD, osteoporosis, Dementia, recently admitted for syncope 8/22-8/25 , p/w generalized weakness  in setting of UTI

## 2021-09-19 NOTE — ED PROVIDER NOTE - CLINICAL SUMMARY MEDICAL DECISION MAKING FREE TEXT BOX
81F hx of afib on dig and cardizem, HTN, HLD, osteoporosis, recent hospitalization for syncope, found to have uti on levaquin here for weakness x1 day, scraped R shin. Febrile here with borderline normocardia, otherwise vitals wnl. Patient meets sepsis criteria by vitals. Will eval for common source of infection (ex. urinary, bacterial pulmonary, viral uri; unlikely central neurologic such as meninigitis or encephalitis) vs metabolic derrangement. Check labs, lactate, UA, CXs, CXR, screening EKG, hydrate, empiric abxs (last Ucx with e.coli -> cefepime given) -> antipyretics, additional crystalloid infusion as clinically warranted.

## 2021-09-19 NOTE — H&P ADULT - PROBLEM SELECTOR PLAN 2
febrile , tachycardic , and leukocytosis , initial lactate 4.2 , improved with IV fluid and ABX , presumed urinary source , CXR clear , CT w/o acute intraabdominal source except for findings c/w cystitis   - continue cefepime   - Add 1G vancomycin x 1 dose , since recently hospitalized   - f/u urine and blood cultures   - s/p 1L IV fluid bolus    - additional 500cc IV normal saline x 1 dose  , and  reassess volume

## 2021-09-19 NOTE — ED PROVIDER NOTE - NS ED ROS FT
CONSTITUTIONAL: No fevers, chills, fatigue, dizziness, weakness  EYES: No double vision, blurry vision  ENT: No nasal congestion, runny nose, sore throat  CV: No chest pain, palpitations  PULM: No cough, shortness of breath  GI: No abdominal pain, nausea, vomiting, diarrhea, constipation  : No dysuria, polyuria, hematuria  SKIN: No rashes, swelling  MSK: + back pain (chronic)  NEURO: No headache, paresthesias  PSYCHIATRIC: Denies hallucinations

## 2021-09-19 NOTE — H&P ADULT - NSHPSOCIALHISTORY_GEN_ALL_CORE
Social History:    Lives with: (  ) alone  ( x ) children   (  ) spouse   (  ) parents  (  ) other    Substance Use (street drugs): (x  ) never used  (  ) other:  Tobacco Usage:  ( x  ) never smoked   (   ) former smoker   (   ) current smoker  (     ) pack year  (        ) last cigarette date  Alcohol Usage: occasional social  etoh use in past

## 2021-09-19 NOTE — H&P ADULT - NSHPPHYSICALEXAM_GEN_ALL_CORE
Vital Signs Last 24 Hrs  T(C): 37.7 (19 Sep 2021 00:35), Max: 38.8 (18 Sep 2021 23:42)  T(F): 99.8 (19 Sep 2021 00:35), Max: 101.8 (18 Sep 2021 23:42)  HR: 97 (19 Sep 2021 00:35) (97 - 98)  BP: 134/71 (19 Sep 2021 00:35) (105/51 - 134/71)  BP(mean): --  RR: 16 (19 Sep 2021 00:35) (16 - 19)  SpO2: 98% (19 Sep 2021 00:35) (97% - 98%) Vital Signs Last 24 Hrs  T(C): 37.7 (19 Sep 2021 00:35), Max: 38.8 (18 Sep 2021 23:42)  T(F): 99.8 (19 Sep 2021 00:35), Max: 101.8 (18 Sep 2021 23:42)  HR: 97 (19 Sep 2021 00:35) (97 - 98)  BP: 134/71 (19 Sep 2021 00:35) (105/51 - 134/71)  BP(mean): --  RR: 16 (19 Sep 2021 00:35) (16 - 19)  SpO2: 98% (19 Sep 2021 00:35) (97% - 98%)    GENERAL: alert and oriented to self and place , No acute distress, well-developed, obese   HEAD:  Atraumatic, Normocephalic  ENT: EOMI, PERRLA, conjunctiva and sclera clear,  moist mucosa no pharyngeal erythema or exudates   NECK: supple , no JVD   CHEST/LUNG: Clear to auscultation bilaterally; No wheeze, equal breath sounds bilaterally   BACK: No spinal tenderness,  No CVA tenderness   HEART: Regular rate and rhythm; No murmurs, rubs, or gallops  ABDOMEN: Soft, Nontender, Nondistended; Bowel sounds present  EXTREMITIES:  No clubbing, cyanosis, or edema  MSK: No joint swelling or effusions, ROM intact   PSYCH: Normal behavior/affect  NEUROLOGY: AAOx2-3, non-focal, cranial nerves intact   SKIN: Normal color, No rashes or lesions

## 2021-09-19 NOTE — H&P ADULT - PROBLEM SELECTOR PLAN 5
patient unable to list home medications , home med list recently updated on prior admission , will continue medications as listed   - pharmacy tech emailed to update and verify home medications   - day team to reconcile once completed

## 2021-09-19 NOTE — H&P ADULT - NSHPREVIEWOFSYSTEMS_GEN_ALL_CORE
CONSTITUTIONAL: + weakness,  no fevers or chills  EYES/ENT: No visual changes;  No dysphagia  NECK: No pain or stiffness  RESPIRATORY: No cough, wheezing, hemoptysis; No shortness of breath  CARDIOVASCULAR: No chest pain or palpitations; No lower extremity edema  EXTREMITIES: no le edema, cyanosis, clubbing  GASTROINTESTINAL: No abdominal or epigastric pain. No nausea, vomiting, or hematemesis; No diarrhea or constipation. No melena or hematochezia.  BACK: No back pain  GENITOURINARY: No dysuria, frequency or hematuria  NEUROLOGICAL: No numbness or weakness  MSK: no joint swelling or pain  SKIN: No itching, burning, rashes, or lesions   PSYCH: no agitation  All other review of systems is negative unless indicated above.

## 2021-09-19 NOTE — H&P ADULT - NSHPLABSRESULTS_GEN_ALL_CORE
Labs personally reviewed:                          11.3   16.47 )-----------( 289      ( 19 Sep 2021 00:18 )             35.2         137  |  100  |  24<H>  ----------------------------<  186<H>  4.7   |  19<L>  |  1.27    Ca    9.8      19 Sep 2021 00:18    TPro  7.4  /  Alb  3.9  /  TBili  0.3  /  DBili  x   /  AST  33  /  ALT  16  /  AlkPhos  67          LIVER FUNCTIONS - ( 19 Sep 2021 00:18 )  Alb: 3.9 g/dL / Pro: 7.4 g/dL / ALK PHOS: 67 U/L / ALT: 16 U/L / AST: 33 U/L / GGT: x           PT/INR - ( 19 Sep 2021 00:18 )   PT: 22.2 sec;   INR: 1.91 ratio         PTT - ( 19 Sep 2021 00:18 )  PTT:29.6 sec  Urinalysis Basic - ( 19 Sep 2021 01:25 )    Color: Yellow / Appearance: Clear / S.023 / pH: x  Gluc: x / Ketone: Negative  / Bili: Negative / Urobili: Negative   Blood: x / Protein: 30 mg/dL / Nitrite: Positive   Leuk Esterase: Negative / RBC: 1 /hpf / WBC 2 /HPF   Sq Epi: x / Non Sq Epi: 1 /hpf / Bacteria: Few      CAPILLARY BLOOD GLUCOSE          Imaging:  CXR personally reviewed: no focal opacity  CT abdomen pelvis: Urinary bladder wall is mildly thickened. Correlate clinically for cystitis.    Degenerative changes and orthopedic hardware at the thoracic and lumbar spine unchanged in appearance from the previous exam.      EKG personally reviewed: Labs personally reviewed:                          11.3   16.47 )-----------( 289      ( 19 Sep 2021 00:18 )             35.2         137  |  100  |  24<H>  ----------------------------<  186<H>  4.7   |  19<L>  |  1.27    Ca    9.8      19 Sep 2021 00:18    TPro  7.4  /  Alb  3.9  /  TBili  0.3  /  DBili  x   /  AST  33  /  ALT  16  /  AlkPhos  67          LIVER FUNCTIONS - ( 19 Sep 2021 00:18 )  Alb: 3.9 g/dL / Pro: 7.4 g/dL / ALK PHOS: 67 U/L / ALT: 16 U/L / AST: 33 U/L / GGT: x           PT/INR - ( 19 Sep 2021 00:18 )   PT: 22.2 sec;   INR: 1.91 ratio         PTT - ( 19 Sep 2021 00:18 )  PTT:29.6 sec  Urinalysis Basic - ( 19 Sep 2021 01:25 )    Color: Yellow / Appearance: Clear / S.023 / pH: x  Gluc: x / Ketone: Negative  / Bili: Negative / Urobili: Negative   Blood: x / Protein: 30 mg/dL / Nitrite: Positive   Leuk Esterase: Negative / RBC: 1 /hpf / WBC 2 /HPF   Sq Epi: x / Non Sq Epi: 1 /hpf / Bacteria: Few      CAPILLARY BLOOD GLUCOSE          Imaging:  CXR personally reviewed: no focal opacity  CT abdomen pelvis: Urinary bladder wall is mildly thickened. Correlate clinically for cystitis.    Degenerative changes and orthopedic hardware at the thoracic and lumbar spine unchanged in appearance from the previous exam.      EKG personally reviewed: sinus tachycardiac at 104 bpm

## 2021-09-20 LAB
ANION GAP SERPL CALC-SCNC: 12 MMOL/L — SIGNIFICANT CHANGE UP (ref 5–17)
BUN SERPL-MCNC: 12 MG/DL — SIGNIFICANT CHANGE UP (ref 7–23)
CALCIUM SERPL-MCNC: 8.8 MG/DL — SIGNIFICANT CHANGE UP (ref 8.4–10.5)
CHLORIDE SERPL-SCNC: 101 MMOL/L — SIGNIFICANT CHANGE UP (ref 96–108)
CO2 SERPL-SCNC: 22 MMOL/L — SIGNIFICANT CHANGE UP (ref 22–31)
COVID-19 SPIKE DOMAIN AB INTERP: POSITIVE
COVID-19 SPIKE DOMAIN ANTIBODY RESULT: 41.9 U/ML — HIGH
CREAT SERPL-MCNC: 0.94 MG/DL — SIGNIFICANT CHANGE UP (ref 0.5–1.3)
CULTURE RESULTS: SIGNIFICANT CHANGE UP
GLUCOSE SERPL-MCNC: 124 MG/DL — HIGH (ref 70–99)
HCT VFR BLD CALC: 33.1 % — LOW (ref 34.5–45)
HGB BLD-MCNC: 10.4 G/DL — LOW (ref 11.5–15.5)
MCHC RBC-ENTMCNC: 30.3 PG — SIGNIFICANT CHANGE UP (ref 27–34)
MCHC RBC-ENTMCNC: 31.4 GM/DL — LOW (ref 32–36)
MCV RBC AUTO: 96.5 FL — SIGNIFICANT CHANGE UP (ref 80–100)
NRBC # BLD: 0 /100 WBCS — SIGNIFICANT CHANGE UP (ref 0–0)
PLATELET # BLD AUTO: 227 K/UL — SIGNIFICANT CHANGE UP (ref 150–400)
POTASSIUM SERPL-MCNC: 4.3 MMOL/L — SIGNIFICANT CHANGE UP (ref 3.5–5.3)
POTASSIUM SERPL-SCNC: 4.3 MMOL/L — SIGNIFICANT CHANGE UP (ref 3.5–5.3)
RBC # BLD: 3.43 M/UL — LOW (ref 3.8–5.2)
RBC # FLD: 14.7 % — HIGH (ref 10.3–14.5)
SARS-COV-2 IGG+IGM SERPL QL IA: 41.9 U/ML — HIGH
SARS-COV-2 IGG+IGM SERPL QL IA: POSITIVE
SODIUM SERPL-SCNC: 135 MMOL/L — SIGNIFICANT CHANGE UP (ref 135–145)
SPECIMEN SOURCE: SIGNIFICANT CHANGE UP
WBC # BLD: 9.54 K/UL — SIGNIFICANT CHANGE UP (ref 3.8–10.5)
WBC # FLD AUTO: 9.54 K/UL — SIGNIFICANT CHANGE UP (ref 3.8–10.5)

## 2021-09-20 RX ADMIN — APIXABAN 5 MILLIGRAM(S): 2.5 TABLET, FILM COATED ORAL at 17:26

## 2021-09-20 RX ADMIN — MEMANTINE HYDROCHLORIDE 10 MILLIGRAM(S): 10 TABLET ORAL at 17:26

## 2021-09-20 RX ADMIN — Medication 1 MILLIGRAM(S): at 11:46

## 2021-09-20 RX ADMIN — MEMANTINE HYDROCHLORIDE 10 MILLIGRAM(S): 10 TABLET ORAL at 05:15

## 2021-09-20 RX ADMIN — PANTOPRAZOLE SODIUM 40 MILLIGRAM(S): 20 TABLET, DELAYED RELEASE ORAL at 05:16

## 2021-09-20 RX ADMIN — APIXABAN 5 MILLIGRAM(S): 2.5 TABLET, FILM COATED ORAL at 05:16

## 2021-09-20 RX ADMIN — PREGABALIN 1000 MICROGRAM(S): 225 CAPSULE ORAL at 11:47

## 2021-09-20 RX ADMIN — Medication 180 MILLIGRAM(S): at 05:16

## 2021-09-20 RX ADMIN — Medication 325 MILLIGRAM(S): at 11:46

## 2021-09-20 RX ADMIN — DONEPEZIL HYDROCHLORIDE 10 MILLIGRAM(S): 10 TABLET, FILM COATED ORAL at 21:15

## 2021-09-20 RX ADMIN — CEFEPIME 100 MILLIGRAM(S): 1 INJECTION, POWDER, FOR SOLUTION INTRAMUSCULAR; INTRAVENOUS at 21:15

## 2021-09-20 RX ADMIN — CEFEPIME 100 MILLIGRAM(S): 1 INJECTION, POWDER, FOR SOLUTION INTRAMUSCULAR; INTRAVENOUS at 11:46

## 2021-09-20 RX ADMIN — Medication 1000 UNIT(S): at 11:47

## 2021-09-20 RX ADMIN — ATORVASTATIN CALCIUM 10 MILLIGRAM(S): 80 TABLET, FILM COATED ORAL at 21:15

## 2021-09-20 NOTE — PHYSICAL THERAPY INITIAL EVALUATION ADULT - ADDITIONAL COMMENTS
Pt lives in [address], with son and son's fiance who provided assist with ADLs as needed and only occasionally. There is 1 flight to bedroom and bathroom with chair lift. Pt ambulates with RW and supervision, is able to perform most ADLs independently. Pt also has HHA 2 days/week. Pt was participating in OPT 1x/week. Pt lives in private house with 3 steps to enter, with son and son's fiance who provided assist with ADLs as needed and only occasionally. There is 1 flight to bedroom and bathroom with chair lift. Pt ambulates with RW and supervision, is able to perform most ADLs independently. Pt also has HHA 2 days/week. Pt was participating in OPT 1x/week.

## 2021-09-20 NOTE — PHYSICAL THERAPY INITIAL EVALUATION ADULT - PLANNED THERAPY INTERVENTIONS, PT EVAL
LTG 1: Stairs - Pt will be independent with negotiation of 3 steps with unilateral handrail within 4 weeks./balance training/bed mobility training/gait training/transfer training

## 2021-09-20 NOTE — PHYSICAL THERAPY INITIAL EVALUATION ADULT - PERTINENT HX OF CURRENT PROBLEM, REHAB EVAL
81 year old female w/pmh afib, HTN, HLD, osteoporosis, Dementia, recently admitted for syncope 8/22-8/25 , work up was non revealing , now presents for generalized weakness for the past few days. Patient reports feeling weak on day of admission, unable to transfer to chair lift.

## 2021-09-21 LAB
ANION GAP SERPL CALC-SCNC: 11 MMOL/L — SIGNIFICANT CHANGE UP (ref 5–17)
BUN SERPL-MCNC: 15 MG/DL — SIGNIFICANT CHANGE UP (ref 7–23)
CALCIUM SERPL-MCNC: 8.2 MG/DL — LOW (ref 8.4–10.5)
CHLORIDE SERPL-SCNC: 104 MMOL/L — SIGNIFICANT CHANGE UP (ref 96–108)
CO2 SERPL-SCNC: 21 MMOL/L — LOW (ref 22–31)
CREAT SERPL-MCNC: 0.89 MG/DL — SIGNIFICANT CHANGE UP (ref 0.5–1.3)
GLUCOSE SERPL-MCNC: 176 MG/DL — HIGH (ref 70–99)
HCT VFR BLD CALC: 30.7 % — LOW (ref 34.5–45)
HGB BLD-MCNC: 9.7 G/DL — LOW (ref 11.5–15.5)
MCHC RBC-ENTMCNC: 30.8 PG — SIGNIFICANT CHANGE UP (ref 27–34)
MCHC RBC-ENTMCNC: 31.6 GM/DL — LOW (ref 32–36)
MCV RBC AUTO: 97.5 FL — SIGNIFICANT CHANGE UP (ref 80–100)
NRBC # BLD: 0 /100 WBCS — SIGNIFICANT CHANGE UP (ref 0–0)
PLATELET # BLD AUTO: 215 K/UL — SIGNIFICANT CHANGE UP (ref 150–400)
POTASSIUM SERPL-MCNC: 4 MMOL/L — SIGNIFICANT CHANGE UP (ref 3.5–5.3)
POTASSIUM SERPL-SCNC: 4 MMOL/L — SIGNIFICANT CHANGE UP (ref 3.5–5.3)
RBC # BLD: 3.15 M/UL — LOW (ref 3.8–5.2)
RBC # FLD: 14.6 % — HIGH (ref 10.3–14.5)
SODIUM SERPL-SCNC: 136 MMOL/L — SIGNIFICANT CHANGE UP (ref 135–145)
WBC # BLD: 8.53 K/UL — SIGNIFICANT CHANGE UP (ref 3.8–10.5)
WBC # FLD AUTO: 8.53 K/UL — SIGNIFICANT CHANGE UP (ref 3.8–10.5)

## 2021-09-21 RX ADMIN — ATORVASTATIN CALCIUM 10 MILLIGRAM(S): 80 TABLET, FILM COATED ORAL at 21:23

## 2021-09-21 RX ADMIN — PREGABALIN 1000 MICROGRAM(S): 225 CAPSULE ORAL at 11:51

## 2021-09-21 RX ADMIN — APIXABAN 5 MILLIGRAM(S): 2.5 TABLET, FILM COATED ORAL at 05:09

## 2021-09-21 RX ADMIN — CEFEPIME 100 MILLIGRAM(S): 1 INJECTION, POWDER, FOR SOLUTION INTRAMUSCULAR; INTRAVENOUS at 11:56

## 2021-09-21 RX ADMIN — Medication 1000 UNIT(S): at 11:56

## 2021-09-21 RX ADMIN — MEMANTINE HYDROCHLORIDE 10 MILLIGRAM(S): 10 TABLET ORAL at 05:09

## 2021-09-21 RX ADMIN — MEMANTINE HYDROCHLORIDE 10 MILLIGRAM(S): 10 TABLET ORAL at 17:24

## 2021-09-21 RX ADMIN — Medication 180 MILLIGRAM(S): at 05:09

## 2021-09-21 RX ADMIN — APIXABAN 5 MILLIGRAM(S): 2.5 TABLET, FILM COATED ORAL at 17:24

## 2021-09-21 RX ADMIN — DONEPEZIL HYDROCHLORIDE 10 MILLIGRAM(S): 10 TABLET, FILM COATED ORAL at 21:22

## 2021-09-21 RX ADMIN — Medication 325 MILLIGRAM(S): at 11:52

## 2021-09-21 RX ADMIN — PANTOPRAZOLE SODIUM 40 MILLIGRAM(S): 20 TABLET, DELAYED RELEASE ORAL at 05:09

## 2021-09-21 RX ADMIN — Medication 1 MILLIGRAM(S): at 11:52

## 2021-09-21 NOTE — PROGRESS NOTE ADULT - SUBJECTIVE AND OBJECTIVE BOX
Patient is a 81y old  Female who presents with a chief complaint of generalized weakness x 1 day (19 Sep 2021 03:58)      INTERVAL HPI/OVERNIGHT EVENTS: seen and examined   T(C): 36.6 (21 @ 20:00), Max: 37.1 (21 @ 10:56)  HR: 72 (21 @ 20:00) (72 - 85)  BP: 135/75 (21 @ 20:00) (103/49 - 135/75)  RR: 18 (21 @ 20:00) (18 - 18)  SpO2: 94% (21 @ 20:00) (94% - 97%)  Wt(kg): --  I&O's Summary    19 Sep 2021 07:01  -  20 Sep 2021 07:00  --------------------------------------------------------  IN: 1490 mL / OUT: 1000 mL / NET: 490 mL    20 Sep 2021 07:01  -  20 Sep 2021 23:49  --------------------------------------------------------  IN: 650 mL / OUT: 600 mL / NET: 50 mL        PAST MEDICAL & SURGICAL HISTORY:  Arthritis    Hypertension    High cholesterol    Afib    Osteoporosis    Compression fracture of T12 vertebra        SOCIAL HISTORY  Alcohol:  Tobacco:  Illicit substance use:    FAMILY HISTORY:    REVIEW OF SYSTEMS:  CONSTITUTIONAL: No fever, weight loss, or fatigue  EYES: No eye pain, visual disturbances, or discharge  ENMT:  No difficulty hearing, tinnitus, vertigo; No sinus or throat pain  NECK: No pain or stiffness  RESPIRATORY: No cough, wheezing, chills or hemoptysis; No shortness of breath  CARDIOVASCULAR: No chest pain, palpitations, dizziness, or leg swelling  GASTROINTESTINAL: No abdominal or epigastric pain. No nausea, vomiting, or hematemesis; No diarrhea or constipation. No melena or hematochezia.  GENITOURINARY: No dysuria, frequency, hematuria, or incontinence  NEUROLOGICAL: No headaches, memory loss, loss of strength, numbness, or tremors  SKIN: No itching, burning, rashes, or lesions   LYMPH NODES: No enlarged glands  ENDOCRINE: No heat or cold intolerance; No hair loss  MUSCULOSKELETAL: No joint pain or swelling; No muscle, back, or extremity pain  PSYCHIATRIC: No depression, anxiety, mood swings, or difficulty sleeping  HEME/LYMPH: No easy bruising, or bleeding gums  ALLERY AND IMMUNOLOGIC: No hives or eczema    RADIOLOGY & ADDITIONAL TESTS:    Imaging Personally Reviewed:  [ ] YES  [ ] NO    Consultant(s) Notes Reviewed:  [ ] YES  [ ] NO    PHYSICAL EXAM:  GENERAL: NAD, well-groomed, well-developed  HEAD:  Atraumatic, Normocephalic  EYES: EOMI, PERRLA, conjunctiva and sclera clear  ENMT: No tonsillar erythema, exudates, or enlargement; Moist mucous membranes, Good dentition, No lesions  NECK: Supple, No JVD, Normal thyroid  NERVOUS SYSTEM:  Alert & Oriented X3, Good concentration; Motor Strength 5/5 B/L upper and lower extremities; DTRs 2+ intact and symmetric  CHEST/LUNG: Clear to percussion bilaterally; No rales, rhonchi, wheezing, or rubs  HEART: Regular rate and rhythm; No murmurs, rubs, or gallops  ABDOMEN: Soft, Nontender, Nondistended; Bowel sounds present  EXTREMITIES:  2+ Peripheral Pulses, No clubbing, cyanosis, or edema  LYMPH: No lymphadenopathy noted  SKIN: No rashes or lesions    LABS:                        10.4   9.54  )-----------( 227      ( 20 Sep 2021 07:17 )             33.1         135  |  101  |  12  ----------------------------<  124<H>  4.3   |  22  |  0.94    Ca    8.8      20 Sep 2021 07:15    TPro  5.5<L>  /  Alb  3.0<L>  /  TBili  0.4  /  DBili  x   /  AST  17  /  ALT  13  /  AlkPhos  50      PT/INR - ( 19 Sep 2021 00:18 )   PT: 22.2 sec;   INR: 1.91 ratio         PTT - ( 19 Sep 2021 00:18 )  PTT:29.6 sec  Urinalysis Basic - ( 19 Sep 2021 01:25 )    Color: Yellow / Appearance: Clear / S.023 / pH: x  Gluc: x / Ketone: Negative  / Bili: Negative / Urobili: Negative   Blood: x / Protein: 30 mg/dL / Nitrite: Positive   Leuk Esterase: Negative / RBC: 1 /hpf / WBC 2 /HPF   Sq Epi: x / Non Sq Epi: 1 /hpf / Bacteria: Few      CAPILLARY BLOOD GLUCOSE            Urinalysis Basic - ( 19 Sep 2021 01:25 )    Color: Yellow / Appearance: Clear / S.023 / pH: x  Gluc: x / Ketone: Negative  / Bili: Negative / Urobili: Negative   Blood: x / Protein: 30 mg/dL / Nitrite: Positive   Leuk Esterase: Negative / RBC: 1 /hpf / WBC 2 /HPF   Sq Epi: x / Non Sq Epi: 1 /hpf / Bacteria: Few        MEDICATIONS  (STANDING):  apixaban 5 milliGRAM(s) Oral every 12 hours  atorvastatin 10 milliGRAM(s) Oral at bedtime  cefepime   IVPB 1000 milliGRAM(s) IV Intermittent every 12 hours  cholecalciferol 1000 Unit(s) Oral daily  cyanocobalamin 1000 MICROGram(s) Oral daily  diltiazem    milliGRAM(s) Oral daily  donepezil 10 milliGRAM(s) Oral at bedtime  ferrous    sulfate 325 milliGRAM(s) Oral daily  folic acid 1 milliGRAM(s) Oral daily  memantine 10 milliGRAM(s) Oral two times a day  pantoprazole    Tablet 40 milliGRAM(s) Oral before breakfast    MEDICATIONS  (PRN):  acetaminophen   Tablet .. 650 milliGRAM(s) Oral every 6 hours PRN Temp greater or equal to 38.5C (101.3F), Mild Pain (1 - 3)  melatonin 3 milliGRAM(s) Oral at bedtime PRN Insomnia  senna 2 Tablet(s) Oral at bedtime PRN Constipation      Care Discussed with Consultants/Other Providers [ ] YES  [ ] NO
Patient is a 81y old  Female who presents with a chief complaint of generalized weakness x 1 day (20 Sep 2021 19:49)      INTERVAL HPI/OVERNIGHT EVENTS: clinically better , baseline dementia   T(C): 36.6 (09-21-21 @ 11:13), Max: 36.7 (09-21-21 @ 04:22)  HR: 79 (09-21-21 @ 11:13) (72 - 89)  BP: 130/64 (09-21-21 @ 11:13) (120/68 - 135/75)  RR: 18 (09-21-21 @ 11:13) (18 - 18)  SpO2: 97% (09-21-21 @ 11:13) (94% - 99%)  Wt(kg): --  I&O's Summary    20 Sep 2021 07:01  -  21 Sep 2021 07:00  --------------------------------------------------------  IN: 990 mL / OUT: 1000 mL / NET: -10 mL    21 Sep 2021 07:01  -  21 Sep 2021 19:41  --------------------------------------------------------  IN: 530 mL / OUT: 0 mL / NET: 530 mL        PAST MEDICAL & SURGICAL HISTORY:  Arthritis    Hypertension    High cholesterol    Afib    Osteoporosis    Compression fracture of T12 vertebra        SOCIAL HISTORY  Alcohol:  Tobacco:  Illicit substance use:    FAMILY HISTORY:    REVIEW OF SYSTEMS:  CONSTITUTIONAL: No fever, weight loss, or fatigue  EYES: No eye pain, visual disturbances, or discharge  ENMT:  No difficulty hearing, tinnitus, vertigo; No sinus or throat pain  NECK: No pain or stiffness  RESPIRATORY: No cough, wheezing, chills or hemoptysis; No shortness of breath  CARDIOVASCULAR: No chest pain, palpitations, dizziness, or leg swelling  GASTROINTESTINAL: No abdominal or epigastric pain. No nausea, vomiting, or hematemesis; No diarrhea or constipation. No melena or hematochezia.  GENITOURINARY: No dysuria, frequency, hematuria, or incontinence  NEUROLOGICAL: No headaches, memory loss, loss of strength, numbness, or tremors  SKIN: No itching, burning, rashes, or lesions   LYMPH NODES: No enlarged glands  ENDOCRINE: No heat or cold intolerance; No hair loss  MUSCULOSKELETAL: No joint pain or swelling; No muscle, back, or extremity pain  PSYCHIATRIC: No depression, anxiety, mood swings, or difficulty sleeping  HEME/LYMPH: No easy bruising, or bleeding gums  ALLERY AND IMMUNOLOGIC: No hives or eczema    RADIOLOGY & ADDITIONAL TESTS:    Imaging Personally Reviewed:  [ ] YES  [ ] NO    Consultant(s) Notes Reviewed:  [ ] YES  [ ] NO    PHYSICAL EXAM:  GENERAL: NAD, well-groomed, well-developed  HEAD:  Atraumatic, Normocephalic  EYES: EOMI, PERRLA, conjunctiva and sclera clear  ENMT: No tonsillar erythema, exudates, or enlargement; Moist mucous membranes, Good dentition, No lesions  NECK: Supple, No JVD, Normal thyroid  NERVOUS SYSTEM:  Alert & Oriented X3, Good concentration; Motor Strength 5/5 B/L upper and lower extremities; DTRs 2+ intact and symmetric  CHEST/LUNG: Clear to percussion bilaterally; No rales, rhonchi, wheezing, or rubs  HEART: Regular rate and rhythm; No murmurs, rubs, or gallops  ABDOMEN: Soft, Nontender, Nondistended; Bowel sounds present  EXTREMITIES:  2+ Peripheral Pulses, No clubbing, cyanosis, or edema  LYMPH: No lymphadenopathy noted  SKIN: No rashes or lesions    LABS:                        9.7    8.53  )-----------( 215      ( 21 Sep 2021 06:50 )             30.7     09-21    136  |  104  |  15  ----------------------------<  176<H>  4.0   |  21<L>  |  0.89    Ca    8.2<L>      21 Sep 2021 06:50          CAPILLARY BLOOD GLUCOSE                MEDICATIONS  (STANDING):  apixaban 5 milliGRAM(s) Oral every 12 hours  atorvastatin 10 milliGRAM(s) Oral at bedtime  cefepime   IVPB 1000 milliGRAM(s) IV Intermittent every 12 hours  cholecalciferol 1000 Unit(s) Oral daily  cyanocobalamin 1000 MICROGram(s) Oral daily  diltiazem    milliGRAM(s) Oral daily  donepezil 10 milliGRAM(s) Oral at bedtime  ferrous    sulfate 325 milliGRAM(s) Oral daily  folic acid 1 milliGRAM(s) Oral daily  memantine 10 milliGRAM(s) Oral two times a day  pantoprazole    Tablet 40 milliGRAM(s) Oral before breakfast    MEDICATIONS  (PRN):  acetaminophen   Tablet .. 650 milliGRAM(s) Oral every 6 hours PRN Temp greater or equal to 38.5C (101.3F), Mild Pain (1 - 3)  melatonin 3 milliGRAM(s) Oral at bedtime PRN Insomnia  senna 2 Tablet(s) Oral at bedtime PRN Constipation      Care Discussed with Consultants/Other Providers [ ] YES  [ ] NO

## 2021-09-21 NOTE — PROGRESS NOTE ADULT - ASSESSMENT
81F  w/pmh   afib, HTN, HLD, osteoporosis, Dementia, recently admitted for syncope 8/22-8/25 , p/w generalized weakness  in setting of UTI 
81F  w/pmh   afib, HTN, HLD, osteoporosis, Dementia, recently admitted for syncope 8/22-8/25 , p/w generalized weakness  in setting of UTI

## 2021-09-21 NOTE — PROGRESS NOTE ADULT - PROBLEM SELECTOR PLAN 1
+ UA ,  findings c/w cystitis on CT , recently tx w/ Levaquin , listed penicillin allergy , however treated with cefepime in ED and well tolerated , will therefore continue   - cefepime   - f/u urine cultures
urine c/s neg   Blood c/s neg   d/c abx

## 2021-09-21 NOTE — PROGRESS NOTE ADULT - PROBLEM SELECTOR PLAN 2
febrile , tachycardic , and leukocytosis , initial lactate 4.2 , improved with IV fluid and ABX , presumed urinary source , CXR clear , CT w/o acute intraabdominal source except for findings c/w cystitis   - continue cefepime   - Add 1G vancomycin x 1 dose , since recently hospitalized   - f/u urine and blood cultures
all c/s neg   afebrile   wbc nml  sepsis ruled out

## 2021-09-22 ENCOUNTER — TRANSCRIPTION ENCOUNTER (OUTPATIENT)
Age: 81
End: 2021-09-22

## 2021-09-22 VITALS
TEMPERATURE: 98 F | DIASTOLIC BLOOD PRESSURE: 57 MMHG | OXYGEN SATURATION: 97 % | RESPIRATION RATE: 18 BRPM | SYSTOLIC BLOOD PRESSURE: 117 MMHG | HEART RATE: 88 BPM

## 2021-09-22 LAB
ANION GAP SERPL CALC-SCNC: 14 MMOL/L — SIGNIFICANT CHANGE UP (ref 5–17)
BUN SERPL-MCNC: 18 MG/DL — SIGNIFICANT CHANGE UP (ref 7–23)
CALCIUM SERPL-MCNC: 8.3 MG/DL — LOW (ref 8.4–10.5)
CHLORIDE SERPL-SCNC: 104 MMOL/L — SIGNIFICANT CHANGE UP (ref 96–108)
CO2 SERPL-SCNC: 20 MMOL/L — LOW (ref 22–31)
CREAT SERPL-MCNC: 0.97 MG/DL — SIGNIFICANT CHANGE UP (ref 0.5–1.3)
GLUCOSE SERPL-MCNC: 139 MG/DL — HIGH (ref 70–99)
HCT VFR BLD CALC: 30.3 % — LOW (ref 34.5–45)
HGB BLD-MCNC: 9.9 G/DL — LOW (ref 11.5–15.5)
MCHC RBC-ENTMCNC: 31.1 PG — SIGNIFICANT CHANGE UP (ref 27–34)
MCHC RBC-ENTMCNC: 32.7 GM/DL — SIGNIFICANT CHANGE UP (ref 32–36)
MCV RBC AUTO: 95.3 FL — SIGNIFICANT CHANGE UP (ref 80–100)
NRBC # BLD: 0 /100 WBCS — SIGNIFICANT CHANGE UP (ref 0–0)
PLATELET # BLD AUTO: 300 K/UL — SIGNIFICANT CHANGE UP (ref 150–400)
POTASSIUM SERPL-MCNC: 4.1 MMOL/L — SIGNIFICANT CHANGE UP (ref 3.5–5.3)
POTASSIUM SERPL-SCNC: 4.1 MMOL/L — SIGNIFICANT CHANGE UP (ref 3.5–5.3)
RBC # BLD: 3.18 M/UL — LOW (ref 3.8–5.2)
RBC # FLD: 14.1 % — SIGNIFICANT CHANGE UP (ref 10.3–14.5)
SODIUM SERPL-SCNC: 138 MMOL/L — SIGNIFICANT CHANGE UP (ref 135–145)
WBC # BLD: 8.34 K/UL — SIGNIFICANT CHANGE UP (ref 3.8–10.5)
WBC # FLD AUTO: 8.34 K/UL — SIGNIFICANT CHANGE UP (ref 3.8–10.5)

## 2021-09-22 PROCEDURE — 85018 HEMOGLOBIN: CPT

## 2021-09-22 PROCEDURE — 99285 EMERGENCY DEPT VISIT HI MDM: CPT

## 2021-09-22 PROCEDURE — 84132 ASSAY OF SERUM POTASSIUM: CPT

## 2021-09-22 PROCEDURE — 85730 THROMBOPLASTIN TIME PARTIAL: CPT

## 2021-09-22 PROCEDURE — 87086 URINE CULTURE/COLONY COUNT: CPT

## 2021-09-22 PROCEDURE — 87040 BLOOD CULTURE FOR BACTERIA: CPT

## 2021-09-22 PROCEDURE — 86769 SARS-COV-2 COVID-19 ANTIBODY: CPT

## 2021-09-22 PROCEDURE — 80162 ASSAY OF DIGOXIN TOTAL: CPT

## 2021-09-22 PROCEDURE — 85610 PROTHROMBIN TIME: CPT

## 2021-09-22 PROCEDURE — 81001 URINALYSIS AUTO W/SCOPE: CPT

## 2021-09-22 PROCEDURE — 82803 BLOOD GASES ANY COMBINATION: CPT

## 2021-09-22 PROCEDURE — 80053 COMPREHEN METABOLIC PANEL: CPT

## 2021-09-22 PROCEDURE — 82435 ASSAY OF BLOOD CHLORIDE: CPT

## 2021-09-22 PROCEDURE — 85014 HEMATOCRIT: CPT

## 2021-09-22 PROCEDURE — 84145 PROCALCITONIN (PCT): CPT

## 2021-09-22 PROCEDURE — 90715 TDAP VACCINE 7 YRS/> IM: CPT

## 2021-09-22 PROCEDURE — 82330 ASSAY OF CALCIUM: CPT

## 2021-09-22 PROCEDURE — 0225U NFCT DS DNA&RNA 21 SARSCOV2: CPT

## 2021-09-22 PROCEDURE — 85025 COMPLETE CBC W/AUTO DIFF WBC: CPT

## 2021-09-22 PROCEDURE — 71045 X-RAY EXAM CHEST 1 VIEW: CPT

## 2021-09-22 PROCEDURE — 74177 CT ABD & PELVIS W/CONTRAST: CPT | Mod: MA

## 2021-09-22 PROCEDURE — 82947 ASSAY GLUCOSE BLOOD QUANT: CPT

## 2021-09-22 PROCEDURE — 84295 ASSAY OF SERUM SODIUM: CPT

## 2021-09-22 PROCEDURE — 83605 ASSAY OF LACTIC ACID: CPT

## 2021-09-22 PROCEDURE — 96374 THER/PROPH/DIAG INJ IV PUSH: CPT

## 2021-09-22 PROCEDURE — 80048 BASIC METABOLIC PNL TOTAL CA: CPT

## 2021-09-22 PROCEDURE — 97161 PT EVAL LOW COMPLEX 20 MIN: CPT

## 2021-09-22 PROCEDURE — 85027 COMPLETE CBC AUTOMATED: CPT

## 2021-09-22 PROCEDURE — 90471 IMMUNIZATION ADMIN: CPT

## 2021-09-22 RX ORDER — FOLIC ACID 0.8 MG
1 TABLET ORAL
Qty: 0 | Refills: 0 | DISCHARGE

## 2021-09-22 RX ORDER — PREGABALIN 225 MG/1
0 CAPSULE ORAL
Qty: 0 | Refills: 0 | DISCHARGE

## 2021-09-22 RX ORDER — PANTOPRAZOLE SODIUM 20 MG/1
1 TABLET, DELAYED RELEASE ORAL
Qty: 30 | Refills: 0
Start: 2021-09-22 | End: 2021-10-21

## 2021-09-22 RX ORDER — FUROSEMIDE 40 MG
1 TABLET ORAL
Qty: 0 | Refills: 0 | DISCHARGE

## 2021-09-22 RX ORDER — FOLIC ACID 0.8 MG
1 TABLET ORAL
Qty: 30 | Refills: 0
Start: 2021-09-22 | End: 2021-10-21

## 2021-09-22 RX ORDER — CHOLECALCIFEROL (VITAMIN D3) 125 MCG
0 CAPSULE ORAL
Qty: 0 | Refills: 0 | DISCHARGE

## 2021-09-22 RX ORDER — CHOLECALCIFEROL (VITAMIN D3) 125 MCG
1000 CAPSULE ORAL
Qty: 0 | Refills: 0 | DISCHARGE
Start: 2021-09-22

## 2021-09-22 RX ORDER — PREGABALIN 225 MG/1
1 CAPSULE ORAL
Qty: 0 | Refills: 0 | DISCHARGE
Start: 2021-09-22

## 2021-09-22 RX ORDER — SENNA PLUS 8.6 MG/1
1 TABLET ORAL
Qty: 0 | Refills: 0 | DISCHARGE

## 2021-09-22 RX ORDER — SENNA PLUS 8.6 MG/1
2 TABLET ORAL
Qty: 0 | Refills: 0 | DISCHARGE
Start: 2021-09-22

## 2021-09-22 RX ADMIN — APIXABAN 5 MILLIGRAM(S): 2.5 TABLET, FILM COATED ORAL at 05:40

## 2021-09-22 RX ADMIN — MEMANTINE HYDROCHLORIDE 10 MILLIGRAM(S): 10 TABLET ORAL at 05:40

## 2021-09-22 RX ADMIN — Medication 325 MILLIGRAM(S): at 11:44

## 2021-09-22 RX ADMIN — Medication 180 MILLIGRAM(S): at 05:40

## 2021-09-22 RX ADMIN — Medication 1000 UNIT(S): at 11:44

## 2021-09-22 RX ADMIN — Medication 1 MILLIGRAM(S): at 11:44

## 2021-09-22 RX ADMIN — PREGABALIN 1000 MICROGRAM(S): 225 CAPSULE ORAL at 11:44

## 2021-09-22 RX ADMIN — PANTOPRAZOLE SODIUM 40 MILLIGRAM(S): 20 TABLET, DELAYED RELEASE ORAL at 05:40

## 2021-09-22 NOTE — DISCHARGE NOTE PROVIDER - NSDCCPCAREPLAN_GEN_ALL_CORE_FT
PRINCIPAL DISCHARGE DIAGNOSIS  Diagnosis: Sepsis, unspecified organism  Assessment and Plan of Treatment: Take all antibiotics as ordered.  Call you Health care provider upon arrival home to make a one week follow up appointment.  If you develop fever, chills, malaise, or change in mental status call your Health Care Provider or go to the Emergency Department.  Nutrition is important, eat small frequent meals to help ensure you get adequate calories.  Do not stay in bed all day!  Increase your activity daily as tolerated.      SECONDARY DISCHARGE DIAGNOSES  Diagnosis: UTI (urinary tract infection)  Assessment and Plan of Treatment: HOME CARE INSTRUCTIONS  f you were prescribed antibiotics, take them exactly as your caregiver instructs you. Finish the medication even if you feel better after you have only taken some of the medication.  Drink enough water and fluids to keep your urine clear or pale yellow.  Avoid caffeine, tea, and carbonated beverages. They tend to irritate your bladder.  Empty your bladder often. Avoid holding urine for long periods of time.  After a bowel movement, women should cleanse from front to back. Use each tissue only once.  SEEK MEDICAL CARE IF:  You have back pain.  You develop a fever.  Your symptoms do not begin to resolve within 3 days.  SEEK IMMEDIATE MEDICAL CARE IF:  You have severe back pain or lower abdominal pain.  You develop chills.  You have nausea or vomiting.  You have continued burning or discomfort with urination.    Diagnosis: Chronic atrial fibrillation  Assessment and Plan of Treatment: Atrial fibrillation is the most common heart rhythm problem.  The condition puts you at risk for has stroke and heart attack  It helps if you control your blood pressure, not drink more than 1-2 alcohol drinks per day, cut down on caffeine, getting treatment for over active thyroid gland, and get regular exercise  Call your doctor if you feel your heart racing or beating unusually, chest tightness or pain, lightheaded, faint, shortness of breath especially with exercise  It is important to take your heart medication as prescribed  You may be on anticoagulation which is very important to take as directed - you may need blood work to monitor drug levels

## 2021-09-22 NOTE — DISCHARGE NOTE PROVIDER - NSDCFUADDAPPT_GEN_ALL_CORE_FT
APPTS ARE READY TO BE MADE: [ x] YES    Best Family or Patient Contact (if needed):    Additional Information about above appointments (if needed):    1: PCP follow up post discharge   2:   3:     Other comments or requests:    APPTS ARE READY TO BE MADE: [ x] YES    Best Family or Patient Contact (if needed):    Additional Information about above appointments (if needed):    1: PCP follow up post discharge   2:   3:   Patient provided with scheduling information	  	Patient was provided with (doctors name and information) and advised to call to schedule follow up within specified time frame.    Other comments or requests:

## 2021-09-22 NOTE — DISCHARGE NOTE PROVIDER - NSDCHHHOMEBOUND_GEN_ALL_CORE
Problem: Occupational Therapy Goal  Goal: Occupational Therapy Goal  Goals set 10/9 to be addressed for 7 days with expiration date, 10/16:  Patient will increase functional independence with ADLs by performing:  Patient will demonstrate supine -sit with modified independence.   Not met  Patient will demonstrate stand pivot transfers with CGA.   Not met  Patient will demonstrate grooming while standing with CGA.   Not met  Patient will demonstrate upper body dressing with SBA while seated EOB.   Not met  Patient will demonstrate lower body dressing with min assist while seated EOB.   Not met  Patient will demonstrate toileting with CGA.   Not met  Patient will demonstrate bathing while seated EOB with CGA.   Not met  Patient's family / caregiver will demonstrate independence and safety with assisting patient with self-care skills and functional mobility.     Not met  Patient and/or patient's family will verbalize understanding of stroke prevention guidelines, personal risk factors and stroke warning signs via teachback method.  Not met         OT evaluation completed.  LEOBARDO Iqbal  10/9/2017           Fall risk

## 2021-09-22 NOTE — DISCHARGE NOTE NURSING/CASE MANAGEMENT/SOCIAL WORK - NSDCVIVACCINE_GEN_ALL_CORE_FT
Tdap; 19-Sep-2021 00:19; Kan Kwong (RN); [employer]; [id number] (Exp. Date: 10-May-2023); IntraMuscular; Deltoid Right.; 0.5 milliLiter(s); VIS (VIS Published: 09-May-2013, VIS Presented: 19-Sep-2021);

## 2021-09-22 NOTE — DISCHARGE NOTE PROVIDER - HOSPITAL COURSE
81F  w/pmh   afib, HTN, HLD, osteoporosis, Dementia, recently admitted for syncope 8/22-8/25 , p/w generalized weakness  in setting of UTI      Problem/Plan - 1:  ·  Problem: Acute UTI.   ·  Plan: urine c/s neg   Blood c/s neg   d/c abx.     Problem/Plan - 2:  ·  Problem: Sepsis.   ·  Plan: all c/s neg   afebrile   wbc nml  sepsis ruled out.     Problem/Plan - 3:  ·  Problem: Chronic atrial fibrillation.   ·  Plan: - diltiazem   - Eliquis.     Problem/Plan - 4:  ·  Problem: Dementia.   ·  Plan: - Donepezil   - memantine.     Problem/Plan - 5:  ·  Problem: Medication management.   ·  Plan: dispo: plan for PT eval and d/c planning in am.

## 2021-09-22 NOTE — DISCHARGE NOTE PROVIDER - NSDCMRMEDTOKEN_GEN_ALL_CORE_FT
alendronate 70 mg oral tablet: 1 tab(s) orally once a week  apixaban 5 mg oral tablet: 1 tab(s) orally every 12 hours  cholecalciferol oral tablet: 1000 unit(s) orally once a day  cyanocobalamin 1000 mcg oral tablet: 1 tab(s) orally once a day  DilTIAZem (Eqv-Cardizem CD) 180 mg/24 hours oral capsule, extended release: 1 cap(s) orally once a day  docusate sodium 100 mg oral capsule: 1 cap(s) orally once a day (in the evening), As Needed  donepezil 10 mg oral tablet: 1 tab(s) orally once a day (at bedtime)  ferrous sulfate 324 mg (65 mg elemental iron) oral delayed release tablet: 1 tab(s) orally once a day  folic acid 1 mg oral tablet: 1 tab(s) orally once a day  Lasix 20 mg oral tablet: 1 tab(s) orally 4 times a week sun,tue,thur,sat  lovastatin 20 mg oral tablet: 1 tab(s) orally once a day (at bedtime)  memantine 10 mg oral tablet: 1 tab(s) orally 2 times a day  pantoprazole 40 mg oral delayed release tablet: 1 tab(s) orally once a day (before a meal)  senna oral tablet: 2 tab(s) orally once a day (at bedtime), As needed, Constipation  Xyzal 5 mg oral tablet: 1 tab(s) orally once a day, As Needed - for allergy symptoms   alendronate 70 mg oral tablet: 1 tab(s) orally once a week  apixaban 5 mg oral tablet: 1 tab(s) orally every 12 hours  cholecalciferol oral tablet: 1000 unit(s) orally once a day  cyanocobalamin 1000 mcg oral tablet: 1 tab(s) orally once a day  DilTIAZem (Eqv-Cardizem CD) 180 mg/24 hours oral capsule, extended release: 1 cap(s) orally once a day  docusate sodium 100 mg oral capsule: 1 cap(s) orally once a day (in the evening), As Needed  donepezil 10 mg oral tablet: 1 tab(s) orally once a day (at bedtime)  ferrous sulfate 324 mg (65 mg elemental iron) oral delayed release tablet: 1 tab(s) orally once a day  folic acid 1 mg oral tablet: 1 tab(s) orally once a day  lovastatin 20 mg oral tablet: 1 tab(s) orally once a day (at bedtime)  memantine 10 mg oral tablet: 1 tab(s) orally 2 times a day  pantoprazole 40 mg oral delayed release tablet: 1 tab(s) orally once a day (before a meal)  senna oral tablet: 2 tab(s) orally once a day (at bedtime), As needed, Constipation  Xyzal 5 mg oral tablet: 1 tab(s) orally once a day, As Needed - for allergy symptoms

## 2021-09-22 NOTE — DISCHARGE NOTE PROVIDER - CARE PROVIDER_API CALL
JACLYN, PETER  Geriatric Medicine-Internal Medicine  [address] [place] [address]  Phone: [phone]  Fax: [phone]  Follow Up Time: 2 weeks

## 2021-09-22 NOTE — DISCHARGE NOTE NURSING/CASE MANAGEMENT/SOCIAL WORK - PATIENT PORTAL LINK FT
You can access the FollowMyHealth Patient Portal offered by [facility] by registering at the following website: [email or web address]. By joining [facility]’s FollowMyHealth portal, you will also be able to view your health information using other applications (apps) compatible with our system.

## 2021-09-22 NOTE — DISCHARGE NOTE NURSING/CASE MANAGEMENT/SOCIAL WORK - NSDCPNINST_GEN_ALL_CORE
call md for any discomforts felt-- skin tear on right shin-- cleanse with normal saline--apply adaptic dressing--dsd --destiny -- skin care-- incontinent care-- apply barrier cream to perineal area--bilateral buttocks and sacral area-- turn and position frequently while in bed and do not sit the whole day

## 2021-09-24 LAB
CULTURE RESULTS: SIGNIFICANT CHANGE UP
CULTURE RESULTS: SIGNIFICANT CHANGE UP
SPECIMEN SOURCE: SIGNIFICANT CHANGE UP
SPECIMEN SOURCE: SIGNIFICANT CHANGE UP

## 2021-12-27 ENCOUNTER — INPATIENT (INPATIENT)
Facility: HOSPITAL | Age: 81
LOS: 6 days | Discharge: NURSING, REHAB OR LONG-TERM CARE | DRG: 871 | End: 2022-01-03
Attending: INTERNAL MEDICINE | Admitting: INTERNAL MEDICINE
Payer: MEDICARE

## 2021-12-27 VITALS
DIASTOLIC BLOOD PRESSURE: 78 MMHG | RESPIRATION RATE: 28 BRPM | SYSTOLIC BLOOD PRESSURE: 116 MMHG | WEIGHT: 130.07 LBS | OXYGEN SATURATION: 95 % | HEIGHT: 66.5 IN | TEMPERATURE: 98 F | HEART RATE: 110 BPM

## 2021-12-27 DIAGNOSIS — A41.9 SEPSIS, UNSPECIFIED ORGANISM: ICD-10-CM

## 2021-12-27 DIAGNOSIS — I10 ESSENTIAL (PRIMARY) HYPERTENSION: ICD-10-CM

## 2021-12-27 DIAGNOSIS — S22.080A WEDGE COMPRESSION FRACTURE OF T11-T12 VERTEBRA, INITIAL ENCOUNTER FOR CLOSED FRACTURE: Chronic | ICD-10-CM

## 2021-12-27 DIAGNOSIS — N39.0 URINARY TRACT INFECTION, SITE NOT SPECIFIED: ICD-10-CM

## 2021-12-27 DIAGNOSIS — F03.90 UNSPECIFIED DEMENTIA, UNSPECIFIED SEVERITY, WITHOUT BEHAVIORAL DISTURBANCE, PSYCHOTIC DISTURBANCE, MOOD DISTURBANCE, AND ANXIETY: ICD-10-CM

## 2021-12-27 DIAGNOSIS — I48.91 UNSPECIFIED ATRIAL FIBRILLATION: ICD-10-CM

## 2021-12-27 LAB
ALBUMIN SERPL ELPH-MCNC: 2.9 G/DL — LOW (ref 3.3–5)
ALP SERPL-CCNC: 77 U/L — SIGNIFICANT CHANGE UP (ref 40–120)
ALT FLD-CCNC: 15 U/L — SIGNIFICANT CHANGE UP (ref 10–45)
ANION GAP SERPL CALC-SCNC: 13 MMOL/L — SIGNIFICANT CHANGE UP (ref 5–17)
APPEARANCE UR: ABNORMAL
APTT BLD: 28.8 SEC — SIGNIFICANT CHANGE UP (ref 27.5–35.5)
AST SERPL-CCNC: 18 U/L — SIGNIFICANT CHANGE UP (ref 10–40)
BACTERIA # UR AUTO: ABNORMAL
BASE EXCESS BLDV CALC-SCNC: -0.5 MMOL/L — SIGNIFICANT CHANGE UP (ref -2–2)
BASOPHILS # BLD AUTO: 0.04 K/UL — SIGNIFICANT CHANGE UP (ref 0–0.2)
BASOPHILS NFR BLD AUTO: 0.3 % — SIGNIFICANT CHANGE UP (ref 0–2)
BILIRUB SERPL-MCNC: 0.3 MG/DL — SIGNIFICANT CHANGE UP (ref 0.2–1.2)
BILIRUB UR-MCNC: NEGATIVE — SIGNIFICANT CHANGE UP
BUN SERPL-MCNC: 18 MG/DL — SIGNIFICANT CHANGE UP (ref 7–23)
CA-I SERPL-SCNC: 1.17 MMOL/L — SIGNIFICANT CHANGE UP (ref 1.15–1.33)
CALCIUM SERPL-MCNC: 8.5 MG/DL — SIGNIFICANT CHANGE UP (ref 8.4–10.5)
CHLORIDE BLDV-SCNC: 110 MMOL/L — HIGH (ref 96–108)
CHLORIDE SERPL-SCNC: 108 MMOL/L — SIGNIFICANT CHANGE UP (ref 96–108)
CO2 BLDV-SCNC: 25 MMOL/L — SIGNIFICANT CHANGE UP (ref 22–26)
CO2 SERPL-SCNC: 19 MMOL/L — LOW (ref 22–31)
COLOR SPEC: YELLOW — SIGNIFICANT CHANGE UP
CREAT SERPL-MCNC: 0.93 MG/DL — SIGNIFICANT CHANGE UP (ref 0.5–1.3)
DIFF PNL FLD: NEGATIVE — SIGNIFICANT CHANGE UP
EOSINOPHIL # BLD AUTO: 0.05 K/UL — SIGNIFICANT CHANGE UP (ref 0–0.5)
EOSINOPHIL NFR BLD AUTO: 0.4 % — SIGNIFICANT CHANGE UP (ref 0–6)
EPI CELLS # UR: 6 /HPF — HIGH
GAS PNL BLDV: 139 MMOL/L — SIGNIFICANT CHANGE UP (ref 136–145)
GAS PNL BLDV: SIGNIFICANT CHANGE UP
GAS PNL BLDV: SIGNIFICANT CHANGE UP
GLUCOSE BLDV-MCNC: 176 MG/DL — HIGH (ref 70–99)
GLUCOSE SERPL-MCNC: 171 MG/DL — HIGH (ref 70–99)
GLUCOSE UR QL: NEGATIVE — SIGNIFICANT CHANGE UP
HCO3 BLDV-SCNC: 24 MMOL/L — SIGNIFICANT CHANGE UP (ref 22–29)
HCT VFR BLD CALC: 34 % — LOW (ref 34.5–45)
HCT VFR BLDA CALC: 41 % — SIGNIFICANT CHANGE UP (ref 34.5–46.5)
HGB BLD CALC-MCNC: 13.6 G/DL — SIGNIFICANT CHANGE UP (ref 11.7–16.1)
HGB BLD-MCNC: 10.4 G/DL — LOW (ref 11.5–15.5)
HYALINE CASTS # UR AUTO: 1 /LPF — SIGNIFICANT CHANGE UP (ref 0–2)
IMM GRANULOCYTES NFR BLD AUTO: 0.9 % — SIGNIFICANT CHANGE UP (ref 0–1.5)
INR BLD: 1.84 RATIO — HIGH (ref 0.88–1.16)
KETONES UR-MCNC: NEGATIVE — SIGNIFICANT CHANGE UP
LACTATE BLDV-MCNC: 2.4 MMOL/L — HIGH (ref 0.7–2)
LEUKOCYTE ESTERASE UR-ACNC: ABNORMAL
LYMPHOCYTES # BLD AUTO: 0.98 K/UL — LOW (ref 1–3.3)
LYMPHOCYTES # BLD AUTO: 7.5 % — LOW (ref 13–44)
MCHC RBC-ENTMCNC: 29.1 PG — SIGNIFICANT CHANGE UP (ref 27–34)
MCHC RBC-ENTMCNC: 30.6 GM/DL — LOW (ref 32–36)
MCV RBC AUTO: 95 FL — SIGNIFICANT CHANGE UP (ref 80–100)
MONOCYTES # BLD AUTO: 1.23 K/UL — HIGH (ref 0–0.9)
MONOCYTES NFR BLD AUTO: 9.4 % — SIGNIFICANT CHANGE UP (ref 2–14)
NEUTROPHILS # BLD AUTO: 10.69 K/UL — HIGH (ref 1.8–7.4)
NEUTROPHILS NFR BLD AUTO: 81.5 % — HIGH (ref 43–77)
NITRITE UR-MCNC: POSITIVE
NRBC # BLD: 0 /100 WBCS — SIGNIFICANT CHANGE UP (ref 0–0)
PCO2 BLDV: 39 MMHG — SIGNIFICANT CHANGE UP (ref 39–42)
PH BLDV: 7.4 — SIGNIFICANT CHANGE UP (ref 7.32–7.43)
PH UR: 6 — SIGNIFICANT CHANGE UP (ref 5–8)
PLATELET # BLD AUTO: 443 K/UL — HIGH (ref 150–400)
PO2 BLDV: 51 MMHG — HIGH (ref 25–45)
POTASSIUM BLDV-SCNC: 4 MMOL/L — SIGNIFICANT CHANGE UP (ref 3.5–5.1)
POTASSIUM SERPL-MCNC: 4.1 MMOL/L — SIGNIFICANT CHANGE UP (ref 3.5–5.3)
POTASSIUM SERPL-SCNC: 4.1 MMOL/L — SIGNIFICANT CHANGE UP (ref 3.5–5.3)
PROT SERPL-MCNC: 7.5 G/DL — SIGNIFICANT CHANGE UP (ref 6–8.3)
PROT UR-MCNC: 100 — SIGNIFICANT CHANGE UP
PROTHROM AB SERPL-ACNC: 21.4 SEC — HIGH (ref 10.6–13.6)
RBC # BLD: 3.58 M/UL — LOW (ref 3.8–5.2)
RBC # FLD: 16.5 % — HIGH (ref 10.3–14.5)
RBC CASTS # UR COMP ASSIST: 2 /HPF — SIGNIFICANT CHANGE UP (ref 0–4)
SAO2 % BLDV: 85.1 % — SIGNIFICANT CHANGE UP (ref 67–88)
SARS-COV-2 RNA SPEC QL NAA+PROBE: SIGNIFICANT CHANGE UP
SODIUM SERPL-SCNC: 140 MMOL/L — SIGNIFICANT CHANGE UP (ref 135–145)
SP GR SPEC: 1.03 — HIGH (ref 1.01–1.02)
UROBILINOGEN FLD QL: ABNORMAL
WBC # BLD: 13.11 K/UL — HIGH (ref 3.8–10.5)
WBC # FLD AUTO: 13.11 K/UL — HIGH (ref 3.8–10.5)
WBC UR QL: 205 /HPF — HIGH (ref 0–5)

## 2021-12-27 PROCEDURE — 99285 EMERGENCY DEPT VISIT HI MDM: CPT | Mod: GC

## 2021-12-27 PROCEDURE — 71045 X-RAY EXAM CHEST 1 VIEW: CPT | Mod: 26

## 2021-12-27 PROCEDURE — 93010 ELECTROCARDIOGRAM REPORT: CPT

## 2021-12-27 PROCEDURE — 99223 1ST HOSP IP/OBS HIGH 75: CPT

## 2021-12-27 RX ORDER — ATORVASTATIN CALCIUM 80 MG/1
10 TABLET, FILM COATED ORAL AT BEDTIME
Refills: 0 | Status: DISCONTINUED | OUTPATIENT
Start: 2021-12-27 | End: 2022-01-03

## 2021-12-27 RX ORDER — CEFTRIAXONE 500 MG/1
1000 INJECTION, POWDER, FOR SOLUTION INTRAMUSCULAR; INTRAVENOUS ONCE
Refills: 0 | Status: COMPLETED | OUTPATIENT
Start: 2021-12-27 | End: 2021-12-27

## 2021-12-27 RX ORDER — SENNA PLUS 8.6 MG/1
2 TABLET ORAL AT BEDTIME
Refills: 0 | Status: DISCONTINUED | OUTPATIENT
Start: 2021-12-27 | End: 2022-01-03

## 2021-12-27 RX ORDER — SODIUM CHLORIDE 9 MG/ML
1750 INJECTION INTRAMUSCULAR; INTRAVENOUS; SUBCUTANEOUS ONCE
Refills: 0 | Status: COMPLETED | OUTPATIENT
Start: 2021-12-27 | End: 2021-12-27

## 2021-12-27 RX ORDER — ACETAMINOPHEN 500 MG
650 TABLET ORAL EVERY 6 HOURS
Refills: 0 | Status: DISCONTINUED | OUTPATIENT
Start: 2021-12-27 | End: 2022-01-03

## 2021-12-27 RX ORDER — CEFTRIAXONE 500 MG/1
1000 INJECTION, POWDER, FOR SOLUTION INTRAMUSCULAR; INTRAVENOUS EVERY 24 HOURS
Refills: 0 | Status: COMPLETED | OUTPATIENT
Start: 2021-12-28 | End: 2022-01-01

## 2021-12-27 RX ORDER — DILTIAZEM HCL 120 MG
180 CAPSULE, EXT RELEASE 24 HR ORAL DAILY
Refills: 0 | Status: DISCONTINUED | OUTPATIENT
Start: 2021-12-27 | End: 2022-01-03

## 2021-12-27 RX ORDER — FOLIC ACID 0.8 MG
1 TABLET ORAL DAILY
Refills: 0 | Status: DISCONTINUED | OUTPATIENT
Start: 2021-12-27 | End: 2022-01-03

## 2021-12-27 RX ORDER — LANOLIN ALCOHOL/MO/W.PET/CERES
3 CREAM (GRAM) TOPICAL AT BEDTIME
Refills: 0 | Status: DISCONTINUED | OUTPATIENT
Start: 2021-12-27 | End: 2022-01-03

## 2021-12-27 RX ORDER — PREGABALIN 225 MG/1
1000 CAPSULE ORAL DAILY
Refills: 0 | Status: DISCONTINUED | OUTPATIENT
Start: 2021-12-27 | End: 2022-01-03

## 2021-12-27 RX ORDER — LORATADINE 10 MG/1
10 TABLET ORAL DAILY
Refills: 0 | Status: DISCONTINUED | OUTPATIENT
Start: 2021-12-27 | End: 2022-01-03

## 2021-12-27 RX ORDER — MEMANTINE HYDROCHLORIDE 10 MG/1
10 TABLET ORAL
Refills: 0 | Status: DISCONTINUED | OUTPATIENT
Start: 2021-12-27 | End: 2022-01-03

## 2021-12-27 RX ORDER — APIXABAN 2.5 MG/1
2.5 TABLET, FILM COATED ORAL EVERY 12 HOURS
Refills: 0 | Status: DISCONTINUED | OUTPATIENT
Start: 2021-12-27 | End: 2021-12-27

## 2021-12-27 RX ORDER — ONDANSETRON 8 MG/1
4 TABLET, FILM COATED ORAL EVERY 8 HOURS
Refills: 0 | Status: DISCONTINUED | OUTPATIENT
Start: 2021-12-27 | End: 2022-01-03

## 2021-12-27 RX ORDER — PANTOPRAZOLE SODIUM 20 MG/1
40 TABLET, DELAYED RELEASE ORAL
Refills: 0 | Status: DISCONTINUED | OUTPATIENT
Start: 2021-12-27 | End: 2022-01-03

## 2021-12-27 RX ORDER — APIXABAN 2.5 MG/1
2.5 TABLET, FILM COATED ORAL EVERY 12 HOURS
Refills: 0 | Status: DISCONTINUED | OUTPATIENT
Start: 2021-12-27 | End: 2022-01-03

## 2021-12-27 RX ORDER — DONEPEZIL HYDROCHLORIDE 10 MG/1
10 TABLET, FILM COATED ORAL AT BEDTIME
Refills: 0 | Status: DISCONTINUED | OUTPATIENT
Start: 2021-12-27 | End: 2022-01-03

## 2021-12-27 RX ORDER — CHOLECALCIFEROL (VITAMIN D3) 125 MCG
1000 CAPSULE ORAL DAILY
Refills: 0 | Status: DISCONTINUED | OUTPATIENT
Start: 2021-12-27 | End: 2022-01-03

## 2021-12-27 RX ADMIN — ATORVASTATIN CALCIUM 10 MILLIGRAM(S): 80 TABLET, FILM COATED ORAL at 21:03

## 2021-12-27 RX ADMIN — DONEPEZIL HYDROCHLORIDE 10 MILLIGRAM(S): 10 TABLET, FILM COATED ORAL at 21:03

## 2021-12-27 RX ADMIN — APIXABAN 2.5 MILLIGRAM(S): 2.5 TABLET, FILM COATED ORAL at 18:01

## 2021-12-27 RX ADMIN — MEMANTINE HYDROCHLORIDE 10 MILLIGRAM(S): 10 TABLET ORAL at 18:01

## 2021-12-27 RX ADMIN — SODIUM CHLORIDE 1750 MILLILITER(S): 9 INJECTION INTRAMUSCULAR; INTRAVENOUS; SUBCUTANEOUS at 14:49

## 2021-12-27 RX ADMIN — CEFTRIAXONE 100 MILLIGRAM(S): 500 INJECTION, POWDER, FOR SOLUTION INTRAMUSCULAR; INTRAVENOUS at 14:50

## 2021-12-27 NOTE — ED ADULT NURSE NOTE - OBJECTIVE STATEMENT
80y/o female aaox3 h/o dementia, afib on Eliquis, frequents UTI  presents to the ED via EMS from home c/o urinary symptoms, As per EMS pt was Dx w/ UTI last Tuesday, taking ABx ( don't know which one) but pt worsening on UTI  symptoms as per EMS, On examinations pt states " I am OK' Denies fever, chills, n/v, weakness, abd pain, diarrhea/constipation, numbness/tingling, urinary symptoms ,  in no respiratory distress, no CP, Safety and comfort measures initiated- bed placed in lowest position and side rails raised. Pt oriented to call bell system.

## 2021-12-27 NOTE — H&P ADULT - HISTORY OF PRESENT ILLNESS
80 yo F pmhx dementia afib htn hld frequent UTI with recent admission p/w urinary sx. Hx provided by son on phone. Reports mother had been having cloudy urine and complaining of dysuria. Sent urine to doctor last Wednesday who gave bactram. Also reports slower and weaker than baseline, and odd behavior similar to prior UTIs. Pt denies fevers/chills, urinary symptoms, no aches/pains, no sob/cough, no cp/palpitations. ros otherwise negative. Per son, patient's medications have not changed since last hospital visit.

## 2021-12-27 NOTE — H&P ADULT - GENITOURINARY COMMENTS
What Type Of Note Output Would You Prefer (Optional)?: Standard Output How Severe Is Your Skin Lesion?: mild Has Your Skin Lesion Been Treated?: not been treated Is This A New Presentation, Or A Follow-Up?: Skin Lesion no suprapubic tenderness

## 2021-12-27 NOTE — H&P ADULT - PROBLEM SELECTOR PLAN 1
2/2 UTI  - ceftriaxeon 1gm iv q24 hrs  - f/u blood/urine cultures, has hx of pansensitive ecoli in past  - encourage PO intake

## 2021-12-27 NOTE — ED PROVIDER NOTE - CLINICAL SUMMARY MEDICAL DECISION MAKING FREE TEXT BOX
82 yo F frequent UTI recent + urine given bactram from pcp decline in mental status with continued dysuria. VSS benign exam. Concern for UTI vs electrolyte abnormality vs other source of infection. Will get labs UA VBG CXR cultures. Will give fluids and abx. given failure of out pt abx. likely admit

## 2021-12-27 NOTE — H&P ADULT - NSHPLABSRESULTS_GEN_ALL_CORE
.  LABS:                         10.4   13.11 )-----------( 443      ( 27 Dec 2021 11:56 )             34.0         140  |  108  |  18  ----------------------------<  171<H>  4.1   |  19<L>  |  0.93    Ca    8.5      27 Dec 2021 11:56    TPro  7.5  /  Alb  2.9<L>  /  TBili  0.3  /  DBili  x   /  AST  18  /  ALT  15  /  AlkPhos  77      PT/INR - ( 27 Dec 2021 11:56 )   PT: 21.4 sec;   INR: 1.84 ratio         PTT - ( 27 Dec 2021 11:56 )  PTT:28.8 sec  Urinalysis Basic - ( 27 Dec 2021 12:06 )    Color: Yellow / Appearance: Slightly Turbid / S.027 / pH: x  Gluc: x / Ketone: Negative  / Bili: Negative / Urobili: 2 mg/dL   Blood: x / Protein: 100 / Nitrite: Positive   Leuk Esterase: Large / RBC: 2 /hpf /  /HPF   Sq Epi: x / Non Sq Epi: 6 /hpf / Bacteria: Many            RADIOLOGY, EKG & ADDITIONAL TESTS: Reviewed.   EKG personally reviewed: rapid afib

## 2021-12-27 NOTE — ED PROVIDER NOTE - CHIEF COMPLAINT
The patient is a 81y Female complaining of  The patient is a 81y Female complaining of urinary symptoms

## 2021-12-27 NOTE — ED PROVIDER NOTE - ATTENDING CONTRIBUTION TO CARE
Patient presenting for confusion and fevers with fall at home last night, diagnosed with UTI last week, on Bactrim but having worsening symptoms.  No traumatic injuries on exam, tachycardic on arrival to Emergency Department, found to have +UA despite bactrim at home with elevated WBC count and lactate, clinically not in severe sepsis, will start IV antibiotics for failure of outpatient management of UTI and admit.

## 2021-12-27 NOTE — H&P ADULT - ASSESSMENT
81f hx HTN, afib on eliquis, frequent UTI's, dementia presenting with severe sepsis and subacute toxic-metabolic encephalopathy 2/2 likely recurrent UTI

## 2021-12-27 NOTE — H&P ADULT - PROBLEM SELECTOR PLAN 4
continue aricept/memantine  PT evaluation    dispo: likely home pending clinical progress AND PT eval    discussed with floor NP and Son (the third)

## 2021-12-27 NOTE — ED PROVIDER NOTE - OBJECTIVE STATEMENT
82 yo F pmhx dementia afib htn hld frequent UTI with recent admission p/w urinary sx. Hx provided by son on phone. Reports mother had been having cloudy urine and complaining of dysuria. Sent urine to doctor last Wednesday who gave bactram. Did not seem to help with symptoms. He also reports she has been acting off last few days similar to her previous UTI. Pt does not have any complaints denies f/c ha n/v cp sob abd pain.

## 2021-12-27 NOTE — ED ADULT NURSE NOTE - NSIMPLEMENTINTERV_GEN_ALL_ED
Implemented All Fall Risk Interventions:  [place] to call system. Call bell, personal items and telephone within reach. Instruct patient to call for assistance. Room bathroom lighting operational. Non-slip footwear when patient is off stretcher. Physically safe environment: no spills, clutter or unnecessary equipment. Stretcher in lowest position, wheels locked, appropriate side rails in place. Provide visual cue, wrist band, yellow gown, etc. Monitor gait and stability. Monitor for mental status changes and reorient to person, place, and time. Review medications for side effects contributing to fall risk. Reinforce activity limits and safety measures with patient and family.

## 2021-12-27 NOTE — PATIENT PROFILE ADULT - FALL HARM RISK - HARM RISK INTERVENTIONS
Assistance with ambulation/Assistance OOB with selected safe patient handling equipment/Communicate Risk of Fall with Harm to all staff/Discuss with provider need for PT consult/Monitor gait and stability/Provide patient with walking aids - walker, cane, crutches/Reinforce activity limits and safety measures with patient and family/Sit up slowly, dangle for a short time, stand at bedside before walking/Tailored Fall Risk Interventions/Visual Cue: Yellow wristband and red socks/Bed in lowest position, wheels locked, appropriate side rails in place/Call bell, personal items and telephone in reach/Instruct patient to call for assistance before getting out of bed or chair/Non-slip footwear when patient is out of bed/[place] to call system/Physically safe environment - no spills, clutter or unnecessary equipment/Purposeful Proactive Rounding/Room/bathroom lighting operational, light cord in reach

## 2021-12-28 LAB
ALBUMIN SERPL ELPH-MCNC: 2.6 G/DL — LOW (ref 3.3–5)
ALP SERPL-CCNC: 68 U/L — SIGNIFICANT CHANGE UP (ref 40–120)
ALT FLD-CCNC: 17 U/L — SIGNIFICANT CHANGE UP (ref 10–45)
ANION GAP SERPL CALC-SCNC: 11 MMOL/L — SIGNIFICANT CHANGE UP (ref 5–17)
AST SERPL-CCNC: 18 U/L — SIGNIFICANT CHANGE UP (ref 10–40)
BASOPHILS # BLD AUTO: 0.04 K/UL — SIGNIFICANT CHANGE UP (ref 0–0.2)
BASOPHILS NFR BLD AUTO: 0.4 % — SIGNIFICANT CHANGE UP (ref 0–2)
BILIRUB SERPL-MCNC: 0.3 MG/DL — SIGNIFICANT CHANGE UP (ref 0.2–1.2)
BUN SERPL-MCNC: 14 MG/DL — SIGNIFICANT CHANGE UP (ref 7–23)
CALCIUM SERPL-MCNC: 8.3 MG/DL — LOW (ref 8.4–10.5)
CHLORIDE SERPL-SCNC: 110 MMOL/L — HIGH (ref 96–108)
CO2 SERPL-SCNC: 20 MMOL/L — LOW (ref 22–31)
CREAT SERPL-MCNC: 0.8 MG/DL — SIGNIFICANT CHANGE UP (ref 0.5–1.3)
EOSINOPHIL # BLD AUTO: 0.26 K/UL — SIGNIFICANT CHANGE UP (ref 0–0.5)
EOSINOPHIL NFR BLD AUTO: 2.6 % — SIGNIFICANT CHANGE UP (ref 0–6)
GLUCOSE SERPL-MCNC: 118 MG/DL — HIGH (ref 70–99)
HCT VFR BLD CALC: 29.7 % — LOW (ref 34.5–45)
HGB BLD-MCNC: 9.1 G/DL — LOW (ref 11.5–15.5)
IMM GRANULOCYTES NFR BLD AUTO: 1.1 % — SIGNIFICANT CHANGE UP (ref 0–1.5)
LACTATE SERPL-SCNC: 0.9 MMOL/L — SIGNIFICANT CHANGE UP (ref 0.7–2)
LYMPHOCYTES # BLD AUTO: 1.34 K/UL — SIGNIFICANT CHANGE UP (ref 1–3.3)
LYMPHOCYTES # BLD AUTO: 13.2 % — SIGNIFICANT CHANGE UP (ref 13–44)
MCHC RBC-ENTMCNC: 29.4 PG — SIGNIFICANT CHANGE UP (ref 27–34)
MCHC RBC-ENTMCNC: 30.6 GM/DL — LOW (ref 32–36)
MCV RBC AUTO: 96.1 FL — SIGNIFICANT CHANGE UP (ref 80–100)
MONOCYTES # BLD AUTO: 0.94 K/UL — HIGH (ref 0–0.9)
MONOCYTES NFR BLD AUTO: 9.2 % — SIGNIFICANT CHANGE UP (ref 2–14)
NEUTROPHILS # BLD AUTO: 7.5 K/UL — HIGH (ref 1.8–7.4)
NEUTROPHILS NFR BLD AUTO: 73.5 % — SIGNIFICANT CHANGE UP (ref 43–77)
NRBC # BLD: 0 /100 WBCS — SIGNIFICANT CHANGE UP (ref 0–0)
PLATELET # BLD AUTO: 397 K/UL — SIGNIFICANT CHANGE UP (ref 150–400)
POTASSIUM SERPL-MCNC: 4.2 MMOL/L — SIGNIFICANT CHANGE UP (ref 3.5–5.3)
POTASSIUM SERPL-SCNC: 4.2 MMOL/L — SIGNIFICANT CHANGE UP (ref 3.5–5.3)
PROT SERPL-MCNC: 6.8 G/DL — SIGNIFICANT CHANGE UP (ref 6–8.3)
RBC # BLD: 3.09 M/UL — LOW (ref 3.8–5.2)
RBC # FLD: 16.6 % — HIGH (ref 10.3–14.5)
SODIUM SERPL-SCNC: 141 MMOL/L — SIGNIFICANT CHANGE UP (ref 135–145)
WBC # BLD: 10.19 K/UL — SIGNIFICANT CHANGE UP (ref 3.8–10.5)
WBC # FLD AUTO: 10.19 K/UL — SIGNIFICANT CHANGE UP (ref 3.8–10.5)

## 2021-12-28 RX ADMIN — PANTOPRAZOLE SODIUM 40 MILLIGRAM(S): 20 TABLET, DELAYED RELEASE ORAL at 06:00

## 2021-12-28 RX ADMIN — Medication 180 MILLIGRAM(S): at 06:01

## 2021-12-28 RX ADMIN — MEMANTINE HYDROCHLORIDE 10 MILLIGRAM(S): 10 TABLET ORAL at 17:28

## 2021-12-28 RX ADMIN — DONEPEZIL HYDROCHLORIDE 10 MILLIGRAM(S): 10 TABLET, FILM COATED ORAL at 22:01

## 2021-12-28 RX ADMIN — PREGABALIN 1000 MICROGRAM(S): 225 CAPSULE ORAL at 12:13

## 2021-12-28 RX ADMIN — LORATADINE 10 MILLIGRAM(S): 10 TABLET ORAL at 12:12

## 2021-12-28 RX ADMIN — APIXABAN 2.5 MILLIGRAM(S): 2.5 TABLET, FILM COATED ORAL at 06:00

## 2021-12-28 RX ADMIN — Medication 1 MILLIGRAM(S): at 12:12

## 2021-12-28 RX ADMIN — ATORVASTATIN CALCIUM 10 MILLIGRAM(S): 80 TABLET, FILM COATED ORAL at 22:01

## 2021-12-28 RX ADMIN — Medication 1000 UNIT(S): at 12:12

## 2021-12-28 RX ADMIN — APIXABAN 2.5 MILLIGRAM(S): 2.5 TABLET, FILM COATED ORAL at 17:28

## 2021-12-28 RX ADMIN — CEFTRIAXONE 100 MILLIGRAM(S): 500 INJECTION, POWDER, FOR SOLUTION INTRAMUSCULAR; INTRAVENOUS at 16:02

## 2021-12-28 RX ADMIN — MEMANTINE HYDROCHLORIDE 10 MILLIGRAM(S): 10 TABLET ORAL at 06:00

## 2021-12-28 NOTE — PHYSICAL THERAPY INITIAL EVALUATION ADULT - ADDITIONAL COMMENTS
Pt reports living in a private home with her son and son's fievaristoe. Pt owns a chairlift to get upstairs. PTA, pt required assistance with ADL's and mobility. Owns a rolling walker and straight cane. Pt reports living in a private home with her son and son's fievaristoe. Pt owns a chairlift to get upstairs. PTA, pt required assistance with ADL's and mobility. Owns a rolling walker and straight cane. Has HHA x3 days a week for x4 hours a day.

## 2021-12-28 NOTE — PHYSICAL THERAPY INITIAL EVALUATION ADULT - PERTINENT HX OF CURRENT PROBLEM, REHAB EVAL
Pt is an 82 y/o F with PMH of dementia, HTN, frequent UTI with recent admission p/w urinary symptoms. Son reported mother had been having cloudy urine and complaining of dysuria. Sent urine to doctor last Wednesday who gave bactram. Also reports slower and weaker than baseline, and odd behavior similar to prior UTIs.

## 2021-12-28 NOTE — PROGRESS NOTE ADULT - SUBJECTIVE AND OBJECTIVE BOX
Patient is a 81y old  Female who presents with a chief complaint of WEakness (27 Dec 2021 16:32)      INTERVAL HPI/OVERNIGHT EVENTS: doing fair   T(C): 36.8 (21 @ 20:51), Max: 36.8 (21 @ 12:30)  HR: 92 (21 @ 20:51) (84 - 94)  BP: 136/80 (21 @ 20:51) (118/72 - 136/80)  RR: 18 (21 @ 20:51) (18 - 18)  SpO2: 96% (21 @ 20:51) (96% - 98%)  Wt(kg): --  I&O's Summary    28 Dec 2021 07:01  -  29 Dec 2021 03:00  --------------------------------------------------------  IN: 740 mL / OUT: 100 mL / NET: 640 mL        PAST MEDICAL & SURGICAL HISTORY:  Arthritis    Hypertension    High cholesterol    Afib    Osteoporosis    Compression fracture of T12 vertebra        SOCIAL HISTORY  Alcohol:  Tobacco:  Illicit substance use:    FAMILY HISTORY:    REVIEW OF SYSTEMS:  CONSTITUTIONAL: No fever, weight loss, or fatigue  EYES: No eye pain, visual disturbances, or discharge  ENMT:  No difficulty hearing, tinnitus, vertigo; No sinus or throat pain  NECK: No pain or stiffness  RESPIRATORY: No cough, wheezing, chills or hemoptysis; No shortness of breath  CARDIOVASCULAR: No chest pain, palpitations, dizziness, or leg swelling  GASTROINTESTINAL: No abdominal or epigastric pain. No nausea, vomiting, or hematemesis; No diarrhea or constipation. No melena or hematochezia.  GENITOURINARY: No dysuria, frequency, hematuria, or incontinence  NEUROLOGICAL: No headaches, memory loss, loss of strength, numbness, or tremors  SKIN: No itching, burning, rashes, or lesions   LYMPH NODES: No enlarged glands  ENDOCRINE: No heat or cold intolerance; No hair loss  MUSCULOSKELETAL: No joint pain or swelling; No muscle, back, or extremity pain  PSYCHIATRIC: No depression, anxiety, mood swings, or difficulty sleeping  HEME/LYMPH: No easy bruising, or bleeding gums  ALLERY AND IMMUNOLOGIC: No hives or eczema    RADIOLOGY & ADDITIONAL TESTS:    Imaging Personally Reviewed:  [ ] YES  [ ] NO    Consultant(s) Notes Reviewed:  [ ] YES  [ ] NO    PHYSICAL EXAM:  GENERAL: NAD, well-groomed, well-developed  HEAD:  Atraumatic, Normocephalic  EYES: EOMI, PERRLA, conjunctiva and sclera clear  ENMT: No tonsillar erythema, exudates, or enlargement; Moist mucous membranes, Good dentition, No lesions  NECK: Supple, No JVD, Normal thyroid  NERVOUS SYSTEM:  Alert & Oriented X3, Good concentration; Motor Strength 5/5 B/L upper and lower extremities; DTRs 2+ intact and symmetric  CHEST/LUNG: Clear to percussion bilaterally; No rales, rhonchi, wheezing, or rubs  HEART: Regular rate and rhythm; No murmurs, rubs, or gallops  ABDOMEN: Soft, Nontender, Nondistended; Bowel sounds present  EXTREMITIES:  2+ Peripheral Pulses, No clubbing, cyanosis, or edema  LYMPH: No lymphadenopathy noted  SKIN: No rashes or lesions    LABS:                        9.1    10.19 )-----------( 397      ( 28 Dec 2021 07:42 )             29.7     12-    141  |  110<H>  |  14  ----------------------------<  118<H>  4.2   |  20<L>  |  0.80    Ca    8.3<L>      28 Dec 2021 07:42    TPro  6.8  /  Alb  2.6<L>  /  TBili  0.3  /  DBili  x   /  AST  18  /  ALT  17  /  AlkPhos  68  12-28    PT/INR - ( 27 Dec 2021 11:56 )   PT: 21.4 sec;   INR: 1.84 ratio         PTT - ( 27 Dec 2021 11:56 )  PTT:28.8 sec  Urinalysis Basic - ( 27 Dec 2021 12:06 )    Color: Yellow / Appearance: Slightly Turbid / S.027 / pH: x  Gluc: x / Ketone: Negative  / Bili: Negative / Urobili: 2 mg/dL   Blood: x / Protein: 100 / Nitrite: Positive   Leuk Esterase: Large / RBC: 2 /hpf /  /HPF   Sq Epi: x / Non Sq Epi: 6 /hpf / Bacteria: Many      CAPILLARY BLOOD GLUCOSE            Urinalysis Basic - ( 27 Dec 2021 12:06 )    Color: Yellow / Appearance: Slightly Turbid / S.027 / pH: x  Gluc: x / Ketone: Negative  / Bili: Negative / Urobili: 2 mg/dL   Blood: x / Protein: 100 / Nitrite: Positive   Leuk Esterase: Large / RBC: 2 /hpf /  /HPF   Sq Epi: x / Non Sq Epi: 6 /hpf / Bacteria: Many        MEDICATIONS  (STANDING):  apixaban 2.5 milliGRAM(s) Oral every 12 hours  atorvastatin 10 milliGRAM(s) Oral at bedtime  cefTRIAXone   IVPB 1000 milliGRAM(s) IV Intermittent every 24 hours  cholecalciferol 1000 Unit(s) Oral daily  cyanocobalamin 1000 MICROGram(s) Oral daily  diltiazem    milliGRAM(s) Oral daily  donepezil 10 milliGRAM(s) Oral at bedtime  folic acid 1 milliGRAM(s) Oral daily  loratadine 10 milliGRAM(s) Oral daily  memantine 10 milliGRAM(s) Oral two times a day  pantoprazole    Tablet 40 milliGRAM(s) Oral before breakfast    MEDICATIONS  (PRN):  acetaminophen     Tablet .. 650 milliGRAM(s) Oral every 6 hours PRN Temp greater or equal to 38C (100.4F), Mild Pain (1 - 3)  aluminum hydroxide/magnesium hydroxide/simethicone Suspension 30 milliLiter(s) Oral every 4 hours PRN Dyspepsia  melatonin 3 milliGRAM(s) Oral at bedtime PRN Insomnia  ondansetron Injectable 4 milliGRAM(s) IV Push every 8 hours PRN Nausea and/or Vomiting  senna 2 Tablet(s) Oral at bedtime PRN Constipation      Care Discussed with Consultants/Other Providers [ ] YES  [ ] NO

## 2021-12-29 LAB
HCT VFR BLD CALC: 28.8 % — LOW (ref 34.5–45)
HGB BLD-MCNC: 9 G/DL — LOW (ref 11.5–15.5)
MCHC RBC-ENTMCNC: 29.5 PG — SIGNIFICANT CHANGE UP (ref 27–34)
MCHC RBC-ENTMCNC: 31.3 GM/DL — LOW (ref 32–36)
MCV RBC AUTO: 94.4 FL — SIGNIFICANT CHANGE UP (ref 80–100)
NRBC # BLD: 0 /100 WBCS — SIGNIFICANT CHANGE UP (ref 0–0)
PLATELET # BLD AUTO: 383 K/UL — SIGNIFICANT CHANGE UP (ref 150–400)
RBC # BLD: 3.05 M/UL — LOW (ref 3.8–5.2)
RBC # FLD: 16 % — HIGH (ref 10.3–14.5)
WBC # BLD: 10.07 K/UL — SIGNIFICANT CHANGE UP (ref 3.8–10.5)
WBC # FLD AUTO: 10.07 K/UL — SIGNIFICANT CHANGE UP (ref 3.8–10.5)

## 2021-12-29 RX ADMIN — APIXABAN 2.5 MILLIGRAM(S): 2.5 TABLET, FILM COATED ORAL at 17:05

## 2021-12-29 RX ADMIN — CEFTRIAXONE 100 MILLIGRAM(S): 500 INJECTION, POWDER, FOR SOLUTION INTRAMUSCULAR; INTRAVENOUS at 14:14

## 2021-12-29 RX ADMIN — Medication 1 MILLIGRAM(S): at 11:38

## 2021-12-29 RX ADMIN — Medication 180 MILLIGRAM(S): at 05:33

## 2021-12-29 RX ADMIN — MEMANTINE HYDROCHLORIDE 10 MILLIGRAM(S): 10 TABLET ORAL at 17:05

## 2021-12-29 RX ADMIN — PANTOPRAZOLE SODIUM 40 MILLIGRAM(S): 20 TABLET, DELAYED RELEASE ORAL at 05:34

## 2021-12-29 RX ADMIN — ATORVASTATIN CALCIUM 10 MILLIGRAM(S): 80 TABLET, FILM COATED ORAL at 21:30

## 2021-12-29 RX ADMIN — APIXABAN 2.5 MILLIGRAM(S): 2.5 TABLET, FILM COATED ORAL at 05:34

## 2021-12-29 RX ADMIN — Medication 1000 UNIT(S): at 11:38

## 2021-12-29 RX ADMIN — PREGABALIN 1000 MICROGRAM(S): 225 CAPSULE ORAL at 11:38

## 2021-12-29 RX ADMIN — LORATADINE 10 MILLIGRAM(S): 10 TABLET ORAL at 11:38

## 2021-12-29 RX ADMIN — MEMANTINE HYDROCHLORIDE 10 MILLIGRAM(S): 10 TABLET ORAL at 05:33

## 2021-12-29 RX ADMIN — DONEPEZIL HYDROCHLORIDE 10 MILLIGRAM(S): 10 TABLET, FILM COATED ORAL at 21:29

## 2021-12-29 NOTE — CONSULT NOTE ADULT - SUBJECTIVE AND OBJECTIVE BOX
Date of Service :   12-29-21 @ 21:16  CHIEF COMPLAINT:Patient is a 81y old  Female who presents with a chief complaint of Weakness (28 Dec 2021 21:00)      HISTORY OF PRESENT ILLNESS:HPI:   82 yo F pmhx dementia afib htn hld frequent UTI with recent admission p/w urinary sx. Hx provided by son on phone. Reports mother had been having cloudy urine and complaining of dysuria. Sent urine to doctor last Wednesday who gave bactram. Also reports slower and weaker than baseline, and odd behavior similar to prior UTIs. Pt denies fevers/chills, urinary symptoms, no aches/pains, no sob/cough, no cp/palpitations. ros otherwise negative. Per son, patient's medications have not changed since last hospital visit. (27 Dec 2021 16:32)      PAST MEDICAL & SURGICAL HISTORY:  Arthritis    Hypertension    High cholesterol    Afib    Osteoporosis    Compression fracture of T12 vertebra            MEDICATIONS:  apixaban 2.5 milliGRAM(s) Oral every 12 hours  diltiazem    milliGRAM(s) Oral daily    cefTRIAXone   IVPB 1000 milliGRAM(s) IV Intermittent every 24 hours    loratadine 10 milliGRAM(s) Oral daily    acetaminophen     Tablet .. 650 milliGRAM(s) Oral every 6 hours PRN  donepezil 10 milliGRAM(s) Oral at bedtime  melatonin 3 milliGRAM(s) Oral at bedtime PRN  memantine 10 milliGRAM(s) Oral two times a day  ondansetron Injectable 4 milliGRAM(s) IV Push every 8 hours PRN    aluminum hydroxide/magnesium hydroxide/simethicone Suspension 30 milliLiter(s) Oral every 4 hours PRN  pantoprazole    Tablet 40 milliGRAM(s) Oral before breakfast  senna 2 Tablet(s) Oral at bedtime PRN    atorvastatin 10 milliGRAM(s) Oral at bedtime    cholecalciferol 1000 Unit(s) Oral daily  cyanocobalamin 1000 MICROGram(s) Oral daily  folic acid 1 milliGRAM(s) Oral daily      FAMILY HISTORY:  No pertinent family history in first degree relatives        Non-contributory    SOCIAL HISTORY:    [ ] Tobacco  [ ] Drugs  [ ] Alcohol    Allergies    penicillin (Unknown)    Intolerances    	    REVIEW OF SYSTEMS:  CONSTITUTIONAL: No fever  EYES: No eye pain, visual disturbances, or discharge  ENMT:  No difficulty hearing, tinnitus  NECK: No pain or stiffness  RESPIRATORY: No cough, wheezing,  CARDIOVASCULAR: No chest pain, palpitations, passing out, dizziness, or leg swelling  GASTROINTESTINAL:  No nausea, vomiting, diarrhea or constipation. No melena.  GENITOURINARY: No dysuria, hematuria  NEUROLOGICAL: No stroke like symptoms  SKIN: No burning or lesions   ENDOCRINE: No heat or cold intolerance  MUSCULOSKELETAL: No joint pain or swelling  PSYCHIATRIC: No  anxiety, mood swings  HEME/LYMPH: No bleeding gums  ALLERGY AND IMMUNOLOGIC: No hives or eczema	    All other ROS negative    PHYSICAL EXAM:  T(C): 36.4 (12-29-21 @ 20:47), Max: 36.6 (12-29-21 @ 04:16)  HR: 90 (12-29-21 @ 20:47) (83 - 90)  BP: 115/72 (12-29-21 @ 20:47) (114/63 - 129/65)  RR: 19 (12-29-21 @ 20:47) (18 - 19)  SpO2: 96% (12-29-21 @ 20:47) (96% - 98%)  Wt(kg): --  I&O's Summary    28 Dec 2021 07:01  -  29 Dec 2021 07:00  --------------------------------------------------------  IN: 840 mL / OUT: 250 mL / NET: 590 mL    29 Dec 2021 07:01  -  29 Dec 2021 21:16  --------------------------------------------------------  IN: 1100 mL / OUT: 400 mL / NET: 700 mL        Appearance: Normal	  HEENT:   Normal oral mucosa, EOMI	  Cardiovascular:  S1 S2, No JVD,    Respiratory: Lungs clear to auscultation	  Psychiatry: Alert  Gastrointestinal:  Soft, Non-tender, + BS	  Skin: No rashes   Neurologic: Non-focal  Extremities:  No edema  Vascular: Peripheral pulses palpable    	    	  	  CARDIAC MARKERS:  Labs personally reviewed by me                                  9.0    10.07 )-----------( 383      ( 29 Dec 2021 06:42 )             28.8     12-28    141  |  110<H>  |  14  ----------------------------<  118<H>  4.2   |  20<L>  |  0.80    Ca    8.3<L>      28 Dec 2021 07:42    TPro  6.8  /  Alb  2.6<L>  /  TBili  0.3  /  DBili  x   /  AST  18  /  ALT  17  /  AlkPhos  68  12-28          EKG: Personally reviewed by me - Afib 106   Radiology: Personally reviewed by me -   < from: Xray Chest 1 View- PORTABLE-Urgent (12.27.21 @ 11:58) >  Clear lungs.    --- End of Report ---    < end of copied text >      Assessment /Plan:   81f hx HTN, afib on eliquis, frequent UTI's, dementia presenting with severe sepsis and subacute toxic-metabolic encephalopathy 2/2 recurrent UTI    Problem/Plan - 1:  ·  Problem: Afib.   -  c/w home diltiazem with hold parameters  - on eliquis  for AC, decreased to 2.5mg po bid per criteria   - rate controlled 's     Problem/Plan - 2:  ·  Problem: Hypertension.   - c/w home diltiazem and  with hold parameters  - BP stable     Problem/Plan - 3:  ·  Problem: Severe sepsis  2/2 UTI  - on IV abx   - blood cx NTD   - UA positive, urine cx + E. coli   - encourage PO intake.  - monitor CBC and vit signs     Problem/Plan - 4:  ·  Problem: HLD   - c/w statin      Problem/Plan - 5:  ·  Problem: DVT ppx on Eliquis for AC         Matt Mccarty FNP-BC   Gustavo Graf,  [facility]  Cardiovascular Medicine  [address] [place] [address], [address]  Office [phone]  Cell [phone]

## 2021-12-29 NOTE — PROGRESS NOTE ADULT - SUBJECTIVE AND OBJECTIVE BOX
Patient is a 81y old  Female who presents with a chief complaint of WEakness (29 Dec 2021 21:09)      INTERVAL HPI/OVERNIGHT EVENTS:  T(C): 36.4 (12-29-21 @ 20:47), Max: 36.6 (12-29-21 @ 04:16)  HR: 90 (12-29-21 @ 20:47) (83 - 90)  BP: 115/72 (12-29-21 @ 20:47) (114/63 - 129/65)  RR: 19 (12-29-21 @ 20:47) (18 - 19)  SpO2: 96% (12-29-21 @ 20:47) (96% - 98%)  Wt(kg): --  I&O's Summary    28 Dec 2021 07:01  -  29 Dec 2021 07:00  --------------------------------------------------------  IN: 840 mL / OUT: 250 mL / NET: 590 mL    29 Dec 2021 07:01  -  29 Dec 2021 23:34  --------------------------------------------------------  IN: 1100 mL / OUT: 1100 mL / NET: 0 mL        PAST MEDICAL & SURGICAL HISTORY:  Arthritis    Hypertension    High cholesterol    Afib    Osteoporosis    Compression fracture of T12 vertebra        SOCIAL HISTORY  Alcohol:  Tobacco:  Illicit substance use:    FAMILY HISTORY:    REVIEW OF SYSTEMS:  CONSTITUTIONAL: No fever, weight loss, or fatigue  EYES: No eye pain, visual disturbances, or discharge  ENMT:  No difficulty hearing, tinnitus, vertigo; No sinus or throat pain  NECK: No pain or stiffness  RESPIRATORY: No cough, wheezing, chills or hemoptysis; No shortness of breath  CARDIOVASCULAR: No chest pain, palpitations, dizziness, or leg swelling  GASTROINTESTINAL: No abdominal or epigastric pain. No nausea, vomiting, or hematemesis; No diarrhea or constipation. No melena or hematochezia.  GENITOURINARY: No dysuria, frequency, hematuria, or incontinence  NEUROLOGICAL: No headaches, memory loss, loss of strength, numbness, or tremors  SKIN: No itching, burning, rashes, or lesions   LYMPH NODES: No enlarged glands  ENDOCRINE: No heat or cold intolerance; No hair loss  MUSCULOSKELETAL: No joint pain or swelling; No muscle, back, or extremity pain  PSYCHIATRIC: No depression, anxiety, mood swings, or difficulty sleeping  HEME/LYMPH: No easy bruising, or bleeding gums  ALLERY AND IMMUNOLOGIC: No hives or eczema    RADIOLOGY & ADDITIONAL TESTS:    Imaging Personally Reviewed:  [ ] YES  [ ] NO    Consultant(s) Notes Reviewed:  [ ] YES  [ ] NO    PHYSICAL EXAM:  GENERAL: NAD, well-groomed, well-developed  HEAD:  Atraumatic, Normocephalic  EYES: EOMI, PERRLA, conjunctiva and sclera clear  ENMT: No tonsillar erythema, exudates, or enlargement; Moist mucous membranes, Good dentition, No lesions  NECK: Supple, No JVD, Normal thyroid  NERVOUS SYSTEM:  Alert & Oriented X3, Good concentration; Motor Strength 5/5 B/L upper and lower extremities; DTRs 2+ intact and symmetric  CHEST/LUNG: Clear to percussion bilaterally; No rales, rhonchi, wheezing, or rubs  HEART: Regular rate and rhythm; No murmurs, rubs, or gallops  ABDOMEN: Soft, Nontender, Nondistended; Bowel sounds present  EXTREMITIES:  2+ Peripheral Pulses, No clubbing, cyanosis, or edema  LYMPH: No lymphadenopathy noted  SKIN: No rashes or lesions    LABS:                        9.0    10.07 )-----------( 383      ( 29 Dec 2021 06:42 )             28.8     12-28    141  |  110<H>  |  14  ----------------------------<  118<H>  4.2   |  20<L>  |  0.80    Ca    8.3<L>      28 Dec 2021 07:42    TPro  6.8  /  Alb  2.6<L>  /  TBili  0.3  /  DBili  x   /  AST  18  /  ALT  17  /  AlkPhos  68  12-28        CAPILLARY BLOOD GLUCOSE                MEDICATIONS  (STANDING):  apixaban 2.5 milliGRAM(s) Oral every 12 hours  atorvastatin 10 milliGRAM(s) Oral at bedtime  cefTRIAXone   IVPB 1000 milliGRAM(s) IV Intermittent every 24 hours  cholecalciferol 1000 Unit(s) Oral daily  cyanocobalamin 1000 MICROGram(s) Oral daily  diltiazem    milliGRAM(s) Oral daily  donepezil 10 milliGRAM(s) Oral at bedtime  folic acid 1 milliGRAM(s) Oral daily  loratadine 10 milliGRAM(s) Oral daily  memantine 10 milliGRAM(s) Oral two times a day  pantoprazole    Tablet 40 milliGRAM(s) Oral before breakfast    MEDICATIONS  (PRN):  acetaminophen     Tablet .. 650 milliGRAM(s) Oral every 6 hours PRN Temp greater or equal to 38C (100.4F), Mild Pain (1 - 3)  aluminum hydroxide/magnesium hydroxide/simethicone Suspension 30 milliLiter(s) Oral every 4 hours PRN Dyspepsia  melatonin 3 milliGRAM(s) Oral at bedtime PRN Insomnia  ondansetron Injectable 4 milliGRAM(s) IV Push every 8 hours PRN Nausea and/or Vomiting  senna 2 Tablet(s) Oral at bedtime PRN Constipation      Care Discussed with Consultants/Other Providers [ ] YES  [ ] NO Patient is a 81y old  Female who presents with a chief complaint of WEakness (29 Dec 2021 21:09)      INTERVAL HPI/OVERNIGHT EVENTS: seen and examined   T(C): 36.4 (12-29-21 @ 20:47), Max: 36.6 (12-29-21 @ 04:16)  HR: 90 (12-29-21 @ 20:47) (83 - 90)  BP: 115/72 (12-29-21 @ 20:47) (114/63 - 129/65)  RR: 19 (12-29-21 @ 20:47) (18 - 19)  SpO2: 96% (12-29-21 @ 20:47) (96% - 98%)  Wt(kg): --  I&O's Summary    28 Dec 2021 07:01  -  29 Dec 2021 07:00  --------------------------------------------------------  IN: 840 mL / OUT: 250 mL / NET: 590 mL    29 Dec 2021 07:01  -  29 Dec 2021 23:34  --------------------------------------------------------  IN: 1100 mL / OUT: 1100 mL / NET: 0 mL        PAST MEDICAL & SURGICAL HISTORY:  Arthritis    Hypertension    High cholesterol    Afib    Osteoporosis    Compression fracture of T12 vertebra        SOCIAL HISTORY  Alcohol:  Tobacco:  Illicit substance use:    FAMILY HISTORY:    REVIEW OF SYSTEMS:  CONSTITUTIONAL: No fever, weight loss, or fatigue  EYES: No eye pain, visual disturbances, or discharge  ENMT:  No difficulty hearing, tinnitus, vertigo; No sinus or throat pain  NECK: No pain or stiffness  RESPIRATORY: No cough, wheezing, chills or hemoptysis; No shortness of breath  CARDIOVASCULAR: No chest pain, palpitations, dizziness, or leg swelling  GASTROINTESTINAL: No abdominal or epigastric pain. No nausea, vomiting, or hematemesis; No diarrhea or constipation. No melena or hematochezia.  GENITOURINARY: No dysuria, frequency, hematuria, or incontinence  NEUROLOGICAL: No headaches, memory loss, loss of strength, numbness, or tremors  SKIN: No itching, burning, rashes, or lesions   LYMPH NODES: No enlarged glands  ENDOCRINE: No heat or cold intolerance; No hair loss  MUSCULOSKELETAL: No joint pain or swelling; No muscle, back, or extremity pain  PSYCHIATRIC: No depression, anxiety, mood swings, or difficulty sleeping  HEME/LYMPH: No easy bruising, or bleeding gums  ALLERY AND IMMUNOLOGIC: No hives or eczema    RADIOLOGY & ADDITIONAL TESTS:    Imaging Personally Reviewed:  [ ] YES  [ ] NO    Consultant(s) Notes Reviewed:  [ ] YES  [ ] NO    PHYSICAL EXAM:  GENERAL: NAD, well-groomed, well-developed  HEAD:  Atraumatic, Normocephalic  EYES: EOMI, PERRLA, conjunctiva and sclera clear  ENMT: No tonsillar erythema, exudates, or enlargement; Moist mucous membranes, Good dentition, No lesions  NECK: Supple, No JVD, Normal thyroid  NERVOUS SYSTEM:  Alert & Oriented X3, Good concentration; Motor Strength 5/5 B/L upper and lower extremities; DTRs 2+ intact and symmetric  CHEST/LUNG: Clear to percussion bilaterally; No rales, rhonchi, wheezing, or rubs  HEART: Regular rate and rhythm; No murmurs, rubs, or gallops  ABDOMEN: Soft, Nontender, Nondistended; Bowel sounds present  EXTREMITIES:  2+ Peripheral Pulses, No clubbing, cyanosis, or edema  LYMPH: No lymphadenopathy noted  SKIN: No rashes or lesions    LABS:                        9.0    10.07 )-----------( 383      ( 29 Dec 2021 06:42 )             28.8     12-28    141  |  110<H>  |  14  ----------------------------<  118<H>  4.2   |  20<L>  |  0.80    Ca    8.3<L>      28 Dec 2021 07:42    TPro  6.8  /  Alb  2.6<L>  /  TBili  0.3  /  DBili  x   /  AST  18  /  ALT  17  /  AlkPhos  68  12-28        CAPILLARY BLOOD GLUCOSE                MEDICATIONS  (STANDING):  apixaban 2.5 milliGRAM(s) Oral every 12 hours  atorvastatin 10 milliGRAM(s) Oral at bedtime  cefTRIAXone   IVPB 1000 milliGRAM(s) IV Intermittent every 24 hours  cholecalciferol 1000 Unit(s) Oral daily  cyanocobalamin 1000 MICROGram(s) Oral daily  diltiazem    milliGRAM(s) Oral daily  donepezil 10 milliGRAM(s) Oral at bedtime  folic acid 1 milliGRAM(s) Oral daily  loratadine 10 milliGRAM(s) Oral daily  memantine 10 milliGRAM(s) Oral two times a day  pantoprazole    Tablet 40 milliGRAM(s) Oral before breakfast    MEDICATIONS  (PRN):  acetaminophen     Tablet .. 650 milliGRAM(s) Oral every 6 hours PRN Temp greater or equal to 38C (100.4F), Mild Pain (1 - 3)  aluminum hydroxide/magnesium hydroxide/simethicone Suspension 30 milliLiter(s) Oral every 4 hours PRN Dyspepsia  melatonin 3 milliGRAM(s) Oral at bedtime PRN Insomnia  ondansetron Injectable 4 milliGRAM(s) IV Push every 8 hours PRN Nausea and/or Vomiting  senna 2 Tablet(s) Oral at bedtime PRN Constipation      Care Discussed with Consultants/Other Providers [ ] YES  [ ] NO

## 2021-12-30 ENCOUNTER — TRANSCRIPTION ENCOUNTER (OUTPATIENT)
Age: 81
End: 2021-12-30

## 2021-12-30 LAB
-  AMIKACIN: SIGNIFICANT CHANGE UP
-  AMOXICILLIN/CLAVULANIC ACID: SIGNIFICANT CHANGE UP
-  AMPICILLIN/SULBACTAM: SIGNIFICANT CHANGE UP
-  AMPICILLIN: SIGNIFICANT CHANGE UP
-  AZTREONAM: SIGNIFICANT CHANGE UP
-  CEFAZOLIN: SIGNIFICANT CHANGE UP
-  CEFEPIME: SIGNIFICANT CHANGE UP
-  CEFOXITIN: SIGNIFICANT CHANGE UP
-  CEFTRIAXONE: SIGNIFICANT CHANGE UP
-  CIPROFLOXACIN: SIGNIFICANT CHANGE UP
-  ERTAPENEM: SIGNIFICANT CHANGE UP
-  GENTAMICIN: SIGNIFICANT CHANGE UP
-  IMIPENEM: SIGNIFICANT CHANGE UP
-  LEVOFLOXACIN: SIGNIFICANT CHANGE UP
-  MEROPENEM: SIGNIFICANT CHANGE UP
-  NITROFURANTOIN: SIGNIFICANT CHANGE UP
-  PIPERACILLIN/TAZOBACTAM: SIGNIFICANT CHANGE UP
-  TIGECYCLINE: SIGNIFICANT CHANGE UP
-  TOBRAMYCIN: SIGNIFICANT CHANGE UP
-  TRIMETHOPRIM/SULFAMETHOXAZOLE: SIGNIFICANT CHANGE UP
ANION GAP SERPL CALC-SCNC: 13 MMOL/L — SIGNIFICANT CHANGE UP (ref 5–17)
BUN SERPL-MCNC: 13 MG/DL — SIGNIFICANT CHANGE UP (ref 7–23)
CALCIUM SERPL-MCNC: 9.1 MG/DL — SIGNIFICANT CHANGE UP (ref 8.4–10.5)
CHLORIDE SERPL-SCNC: 101 MMOL/L — SIGNIFICANT CHANGE UP (ref 96–108)
CO2 SERPL-SCNC: 22 MMOL/L — SIGNIFICANT CHANGE UP (ref 22–31)
CREAT SERPL-MCNC: 1.01 MG/DL — SIGNIFICANT CHANGE UP (ref 0.5–1.3)
CULTURE RESULTS: SIGNIFICANT CHANGE UP
GLUCOSE SERPL-MCNC: 113 MG/DL — HIGH (ref 70–99)
HCT VFR BLD CALC: 29.5 % — LOW (ref 34.5–45)
HGB BLD-MCNC: 9.3 G/DL — LOW (ref 11.5–15.5)
MCHC RBC-ENTMCNC: 29.3 PG — SIGNIFICANT CHANGE UP (ref 27–34)
MCHC RBC-ENTMCNC: 31.5 GM/DL — LOW (ref 32–36)
MCV RBC AUTO: 93.1 FL — SIGNIFICANT CHANGE UP (ref 80–100)
METHOD TYPE: SIGNIFICANT CHANGE UP
NRBC # BLD: 0 /100 WBCS — SIGNIFICANT CHANGE UP (ref 0–0)
ORGANISM # SPEC MICROSCOPIC CNT: SIGNIFICANT CHANGE UP
ORGANISM # SPEC MICROSCOPIC CNT: SIGNIFICANT CHANGE UP
PLATELET # BLD AUTO: 419 K/UL — HIGH (ref 150–400)
POTASSIUM SERPL-MCNC: 4.1 MMOL/L — SIGNIFICANT CHANGE UP (ref 3.5–5.3)
POTASSIUM SERPL-SCNC: 4.1 MMOL/L — SIGNIFICANT CHANGE UP (ref 3.5–5.3)
RBC # BLD: 3.17 M/UL — LOW (ref 3.8–5.2)
RBC # FLD: 15.5 % — HIGH (ref 10.3–14.5)
SODIUM SERPL-SCNC: 136 MMOL/L — SIGNIFICANT CHANGE UP (ref 135–145)
SPECIMEN SOURCE: SIGNIFICANT CHANGE UP
WBC # BLD: 9.97 K/UL — SIGNIFICANT CHANGE UP (ref 3.8–10.5)
WBC # FLD AUTO: 9.97 K/UL — SIGNIFICANT CHANGE UP (ref 3.8–10.5)

## 2021-12-30 RX ADMIN — ATORVASTATIN CALCIUM 10 MILLIGRAM(S): 80 TABLET, FILM COATED ORAL at 22:04

## 2021-12-30 RX ADMIN — APIXABAN 2.5 MILLIGRAM(S): 2.5 TABLET, FILM COATED ORAL at 17:11

## 2021-12-30 RX ADMIN — LORATADINE 10 MILLIGRAM(S): 10 TABLET ORAL at 11:15

## 2021-12-30 RX ADMIN — APIXABAN 2.5 MILLIGRAM(S): 2.5 TABLET, FILM COATED ORAL at 05:59

## 2021-12-30 RX ADMIN — DONEPEZIL HYDROCHLORIDE 10 MILLIGRAM(S): 10 TABLET, FILM COATED ORAL at 22:04

## 2021-12-30 RX ADMIN — PANTOPRAZOLE SODIUM 40 MILLIGRAM(S): 20 TABLET, DELAYED RELEASE ORAL at 05:59

## 2021-12-30 RX ADMIN — PREGABALIN 1000 MICROGRAM(S): 225 CAPSULE ORAL at 11:15

## 2021-12-30 RX ADMIN — Medication 180 MILLIGRAM(S): at 05:59

## 2021-12-30 RX ADMIN — MEMANTINE HYDROCHLORIDE 10 MILLIGRAM(S): 10 TABLET ORAL at 17:11

## 2021-12-30 RX ADMIN — Medication 1 MILLIGRAM(S): at 11:15

## 2021-12-30 RX ADMIN — MEMANTINE HYDROCHLORIDE 10 MILLIGRAM(S): 10 TABLET ORAL at 05:59

## 2021-12-30 RX ADMIN — CEFTRIAXONE 100 MILLIGRAM(S): 500 INJECTION, POWDER, FOR SOLUTION INTRAMUSCULAR; INTRAVENOUS at 14:04

## 2021-12-30 RX ADMIN — Medication 1000 UNIT(S): at 11:15

## 2021-12-30 NOTE — DISCHARGE NOTE PROVIDER - CARE PROVIDER_API CALL
Gustavo Graf (DO)  Cardiovascular Disease; Internal Medicine; Nuclear Cardiology  [address] [place] [address], [place] [address]  Phone: [phone]  Fax: [phone]  Follow Up Time:

## 2021-12-30 NOTE — DISCHARGE NOTE PROVIDER - NSDCMRMEDTOKEN_GEN_ALL_CORE_FT
alendronate 70 mg oral tablet: 1 tab(s) orally once a week  apixaban 5 mg oral tablet: 1 tab(s) orally every 12 hours  cholecalciferol oral tablet: 1000 unit(s) orally once a day  cyanocobalamin 1000 mcg oral tablet: 1 tab(s) orally once a day  DilTIAZem (Eqv-Cardizem CD) 180 mg/24 hours oral capsule, extended release: 1 cap(s) orally once a day  docusate sodium 100 mg oral capsule: 1 cap(s) orally once a day (in the evening), As Needed  donepezil 10 mg oral tablet: 1 tab(s) orally once a day (at bedtime)  ferrous sulfate 324 mg (65 mg elemental iron) oral delayed release tablet: 1 tab(s) orally once a day  folic acid 1 mg oral tablet: 1 tab(s) orally once a day  lovastatin 20 mg oral tablet: 1 tab(s) orally once a day (at bedtime)  memantine 10 mg oral tablet: 1 tab(s) orally 2 times a day  pantoprazole 40 mg oral delayed release tablet: 1 tab(s) orally once a day (before a meal)  senna oral tablet: 2 tab(s) orally once a day (at bedtime), As needed, Constipation  Xyzal 5 mg oral tablet: 1 tab(s) orally once a day, As Needed - for allergy symptoms   alendronate 70 mg oral tablet: 1 tab(s) orally once a week  aluminum hydroxide-magnesium hydroxide 200 mg-200 mg/5 mL oral suspension: 30 milliliter(s) orally every 4 hours, As needed, Dyspepsia  apixaban 5 mg oral tablet: 1 tab(s) orally every 12 hours  cholecalciferol oral tablet: 1000 unit(s) orally once a day  cyanocobalamin 1000 mcg oral tablet: 1 tab(s) orally once a day  DilTIAZem (Eqv-Cardizem CD) 180 mg/24 hours oral capsule, extended release: 1 cap(s) orally once a day  donepezil 10 mg oral tablet: 1 tab(s) orally once a day (at bedtime)  folic acid 1 mg oral tablet: 1 tab(s) orally once a day  loratadine 10 mg oral tablet: 1 tab(s) orally once a day  lovastatin 20 mg oral tablet: 1 tab(s) orally once a day (at bedtime)  memantine 10 mg oral tablet: 1 tab(s) orally 2 times a day  pantoprazole 40 mg oral delayed release tablet: 1 tab(s) orally once a day (before a meal)  senna oral tablet: 2 tab(s) orally once a day (at bedtime), As needed, Constipation  Xyzal 5 mg oral tablet: 1 tab(s) orally once a day, As Needed - for allergy symptoms   aluminum hydroxide-magnesium hydroxide 200 mg-200 mg/5 mL oral suspension: 30 milliliter(s) orally every 4 hours, As needed, Dyspepsia  apixaban 2.5 mg oral tablet: 1 tab(s) orally every 12 hours  cholecalciferol oral tablet: 1000 unit(s) orally once a day  cyanocobalamin 1000 mcg oral tablet: 1 tab(s) orally once a day  DilTIAZem (Eqv-Cardizem CD) 180 mg/24 hours oral capsule, extended release: 1 cap(s) orally once a day  donepezil 10 mg oral tablet: 1 tab(s) orally once a day (at bedtime)  folic acid 1 mg oral tablet: 1 tab(s) orally once a day  loratadine 10 mg oral tablet: 1 tab(s) orally once a day  lovastatin 20 mg oral tablet: 1 tab(s) orally once a day (at bedtime)  memantine 10 mg oral tablet: 1 tab(s) orally 2 times a day  pantoprazole 40 mg oral delayed release tablet: 1 tab(s) orally once a day (before a meal)  senna oral tablet: 2 tab(s) orally once a day (at bedtime), As needed, Constipation

## 2021-12-30 NOTE — PROGRESS NOTE ADULT - SUBJECTIVE AND OBJECTIVE BOX
Date of Service   12-30-21 @ 13:46    Patient is a 81y old  Female who presents with a chief complaint of WEakness (30 Dec 2021 12:41)      INTERVAL HISTORY: pt feels ok, pleasantly confused, has no complaints   REVIEW OF SYSTEMS:   CONSTITUTIONAL: No weakness  EYES/ENT: No visual changes; No throat pain  Neck: No pain or stiffness  Respiratory: No cough, wheezing, No shortness of breath  CARDIOVASCULAR: no chest pain or palpitations  GASTROINTESTINAL: No abdominal pain, no nausea, vomiting or hematemesis  GENITOURINARY: No dysuria, frequency or hematuria  NEUROLOGICAL: No stroke like symptoms  SKIN: No rashes    	  MEDICATIONS:  diltiazem    milliGRAM(s) Oral daily        PHYSICAL EXAM:  T(C): 36.4 (12-30-21 @ 11:52), Max: 36.4 (12-29-21 @ 20:47)  HR: 63 (12-30-21 @ 11:52) (63 - 90)  BP: 120/78 (12-30-21 @ 11:52) (115/72 - 120/78)  RR: 18 (12-30-21 @ 11:52) (18 - 19)  SpO2: 98% (12-30-21 @ 11:52) (96% - 98%)  Wt(kg): --  I&O's Summary    29 Dec 2021 07:01  -  30 Dec 2021 07:00  --------------------------------------------------------  IN: 1100 mL / OUT: 1100 mL / NET: 0 mL    30 Dec 2021 07:01  -  30 Dec 2021 13:46  --------------------------------------------------------  IN: 480 mL / OUT: 500 mL / NET: -20 mL          Appearance: In no distress	  HEENT:    PERRL, EOMI	  Cardiovascular:  S1 S2, No JVD  Respiratory: Lungs clear to auscultation	  Gastrointestinal:  Soft, Non-tender, + BS	  Vascularature:  No edema of LE  Psychiatric: Appropriate affect   Neuro: no acute focal deficits                               9.3    9.97  )-----------( 419      ( 30 Dec 2021 07:34 )             29.5     12-30    136  |  101  |  13  ----------------------------<  113<H>  4.1   |  22  |  1.01    Ca    9.1      30 Dec 2021 07:34          Labs personally reviewed      ASSESSMENT/PLAN: 	  81f hx HTN, afib on eliquis, frequent UTI's, dementia presenting with severe sepsis and subacute toxic-metabolic encephalopathy 2/2 recurrent UTI    Problem/Plan - 1:  ·  Problem: Afib.   -  c/w home diltiazem with hold parameters  - on eliquis  for AC, decreased to 2.5mg po bid per criteria   - rate controlled 's     Problem/Plan - 2:  ·  Problem: Hypertension.   - c/w home diltiazem and  with hold parameters  - BP stable     Problem/Plan - 3:  ·  Problem: Severe sepsis  2/2 UTI  - on IV abx   - blood cx NTD   - UA positive, urine cx + E. coli   - encourage PO intake.  - monitor CBC and vit signs     Problem/Plan - 4:  ·  Problem: HLD   - c/w statin      Problem/Plan - 5:  ·  Problem: DVT ppx on Eliquis for AC     d/ planning           Matt Mccarty [facility]   Gustavo Graf DO [facility]  Cardiovascular Medicine  [address] [place] [address], [address]  Office: [phone]  Cell: [phone] English

## 2021-12-30 NOTE — DISCHARGE NOTE PROVIDER - NSDCCPCAREPLAN_GEN_ALL_CORE_FT
PRINCIPAL DISCHARGE DIAGNOSIS  Diagnosis: Acute UTI  Assessment and Plan of Treatment: HOME CARE INSTRUCTIONS  Drink enough water and fluids to keep your urine clear or pale yellow.  Avoid caffeine, tea, and carbonated beverages. They tend to irritate your bladder.  Empty your bladder often. Avoid holding urine for long periods of time.  Empty your bladder before and after sexual intercourse.  After a bowel movement, women should cleanse from front to back. Use each tissue only once.  SEEK MEDICAL CARE IF:  You have back pain.  You develop a fever.  Your symptoms do not begin to resolve within 3 days.  SEEK IMMEDIATE MEDICAL CARE IF:  You have severe back pain or lower abdominal pain.  You develop chills.  You have nausea or vomiting.  You have continued burning or discomfort with urination.        SECONDARY DISCHARGE DIAGNOSES  Diagnosis: HTN (hypertension)  Assessment and Plan of Treatment: Follow up with your medical doctor to establish long term blood pressure treatment goals.      Diagnosis: Atrial fibrillation  Assessment and Plan of Treatment: Atrial fibrillation is the most common heart rhythm problem.  The condition puts you at risk for has stroke and heart attack  It helps if you control your blood pressure, not drink more than 1-2 alcohol drinks per day, cut down on caffeine, getting treatment for over active thyroid gland, and get regular exercise  Call your doctor if you feel your heart racing or beating unusually, chest tightness or pain, lightheaded, faint, shortness of breath especially with exercise  It is important to take your heart medication as prescribed  You may be on anticoagulation which is very important to take as directed - you may need blood work to monitor drug levels      Diagnosis: Dementia  Assessment and Plan of Treatment: SEEK MEDICAL CARE IF:  New behavioral problems start such as moodiness, aggressiveness, or seeing things that are not there (hallucinations).  Any new problem with brain function happens. This includes problems with balance, speech, or falling a lot.  Problems with swallowing develop.  Any symptoms of other illness happen.  Small changes or worsening in any aspect of brain function can be a sign that the illness is getting worse. It can also be a sign of another medical illness such as infection. Seeing a caregiver right away is important.  SEEK IMMEDIATE MEDICAL CARE IF:  A fever develops.  New or worsened confusion develops.  New or worsened sleepiness develops.  Staying awake becomes hard to do.      Diagnosis: Anemia of chronic disease  Assessment and Plan of Treatment: Follow up with PMD for managment

## 2021-12-30 NOTE — DISCHARGE NOTE PROVIDER - HOSPITAL COURSE
81f hx HTN, afib on eliquis, frequent UTI's, dementia presenting with severe sepsis and subacute toxic-metabolic encephalopathy 2/2 likely recurrent UTI.     Problem/Plan - 1:  ·  Problem: Severe sepsis.   ·  Plan: 2/2 UTI  - ceftriaxeon 1gm iv q24 hrs  urine c/s : growing ecoli : sensitive to rocephine.     Problem/Plan - 2:  ·  Problem: Afib.   ·  Plan: continue diltriazem with hold parameters  decrease eliquis to 2.5mg po bid.     Problem/Plan - 3:  ·  Problem: Hypertension.   ·  Plan: stable, resume home antihypertensives with hold parameters as sepsis is improving.     Problem/Plan - 4:  ·  Problem: Dementia.   ·  Plan: continue aricept/memantine  PT evaluation.       Additional Information:  Additional Information: dispo: today 5 th day of rocephine , can be d/c abx after today and plan for d/c

## 2021-12-30 NOTE — PROGRESS NOTE ADULT - SUBJECTIVE AND OBJECTIVE BOX
Patient is a 81y old  Female who presents with a chief complaint of WEakness (30 Dec 2021 13:45)      INTERVAL HPI/OVERNIGHT EVENTS: seen and examine d  T(C): 36.6 (12-30-21 @ 21:23), Max: 36.6 (12-30-21 @ 21:23)  HR: 82 (12-30-21 @ 21:23) (63 - 82)  BP: 131/77 (12-30-21 @ 21:23) (120/73 - 131/77)  RR: 18 (12-30-21 @ 21:23) (18 - 19)  SpO2: 96% (12-30-21 @ 21:23) (96% - 98%)  Wt(kg): --  I&O's Summary    29 Dec 2021 07:01  -  30 Dec 2021 07:00  --------------------------------------------------------  IN: 1100 mL / OUT: 1100 mL / NET: 0 mL    30 Dec 2021 07:01  -  31 Dec 2021 04:05  --------------------------------------------------------  IN: 1280 mL / OUT: 1350 mL / NET: -70 mL        PAST MEDICAL & SURGICAL HISTORY:  Arthritis    Hypertension    High cholesterol    Afib    Osteoporosis    Compression fracture of T12 vertebra        SOCIAL HISTORY  Alcohol:  Tobacco:  Illicit substance use:    FAMILY HISTORY:    REVIEW OF SYSTEMS:  CONSTITUTIONAL: No fever, weight loss, or fatigue  EYES: No eye pain, visual disturbances, or discharge  ENMT:  No difficulty hearing, tinnitus, vertigo; No sinus or throat pain  NECK: No pain or stiffness  RESPIRATORY: No cough, wheezing, chills or hemoptysis; No shortness of breath  CARDIOVASCULAR: No chest pain, palpitations, dizziness, or leg swelling  GASTROINTESTINAL: No abdominal or epigastric pain. No nausea, vomiting, or hematemesis; No diarrhea or constipation. No melena or hematochezia.  GENITOURINARY: No dysuria, frequency, hematuria, or incontinence  NEUROLOGICAL: No headaches, memory loss, loss of strength, numbness, or tremors  SKIN: No itching, burning, rashes, or lesions   LYMPH NODES: No enlarged glands  ENDOCRINE: No heat or cold intolerance; No hair loss  MUSCULOSKELETAL: No joint pain or swelling; No muscle, back, or extremity pain  PSYCHIATRIC: No depression, anxiety, mood swings, or difficulty sleeping  HEME/LYMPH: No easy bruising, or bleeding gums  ALLERY AND IMMUNOLOGIC: No hives or eczema    RADIOLOGY & ADDITIONAL TESTS:    Imaging Personally Reviewed:  [ ] YES  [ ] NO    Consultant(s) Notes Reviewed:  [ ] YES  [ ] NO    PHYSICAL EXAM:  GENERAL: NAD, well-groomed, well-developed  HEAD:  Atraumatic, Normocephalic  EYES: EOMI, PERRLA, conjunctiva and sclera clear  ENMT: No tonsillar erythema, exudates, or enlargement; Moist mucous membranes, Good dentition, No lesions  NECK: Supple, No JVD, Normal thyroid  NERVOUS SYSTEM:  Alert & Oriented X3, Good concentration; Motor Strength 5/5 B/L upper and lower extremities; DTRs 2+ intact and symmetric  CHEST/LUNG: Clear to percussion bilaterally; No rales, rhonchi, wheezing, or rubs  HEART: Regular rate and rhythm; No murmurs, rubs, or gallops  ABDOMEN: Soft, Nontender, Nondistended; Bowel sounds present  EXTREMITIES:  2+ Peripheral Pulses, No clubbing, cyanosis, or edema  LYMPH: No lymphadenopathy noted  SKIN: No rashes or lesions    LABS:                        9.3    9.97  )-----------( 419      ( 30 Dec 2021 07:34 )             29.5     12-30    136  |  101  |  13  ----------------------------<  113<H>  4.1   |  22  |  1.01    Ca    9.1      30 Dec 2021 07:34          CAPILLARY BLOOD GLUCOSE                MEDICATIONS  (STANDING):  apixaban 2.5 milliGRAM(s) Oral every 12 hours  atorvastatin 10 milliGRAM(s) Oral at bedtime  cefTRIAXone   IVPB 1000 milliGRAM(s) IV Intermittent every 24 hours  cholecalciferol 1000 Unit(s) Oral daily  cyanocobalamin 1000 MICROGram(s) Oral daily  diltiazem    milliGRAM(s) Oral daily  donepezil 10 milliGRAM(s) Oral at bedtime  folic acid 1 milliGRAM(s) Oral daily  loratadine 10 milliGRAM(s) Oral daily  memantine 10 milliGRAM(s) Oral two times a day  pantoprazole    Tablet 40 milliGRAM(s) Oral before breakfast    MEDICATIONS  (PRN):  acetaminophen     Tablet .. 650 milliGRAM(s) Oral every 6 hours PRN Temp greater or equal to 38C (100.4F), Mild Pain (1 - 3)  aluminum hydroxide/magnesium hydroxide/simethicone Suspension 30 milliLiter(s) Oral every 4 hours PRN Dyspepsia  melatonin 3 milliGRAM(s) Oral at bedtime PRN Insomnia  ondansetron Injectable 4 milliGRAM(s) IV Push every 8 hours PRN Nausea and/or Vomiting  senna 2 Tablet(s) Oral at bedtime PRN Constipation      Care Discussed with Consultants/Other Providers [ ] YES  [ ] NO

## 2021-12-31 LAB
ANION GAP SERPL CALC-SCNC: 12 MMOL/L — SIGNIFICANT CHANGE UP (ref 5–17)
BUN SERPL-MCNC: 11 MG/DL — SIGNIFICANT CHANGE UP (ref 7–23)
CALCIUM SERPL-MCNC: 8.8 MG/DL — SIGNIFICANT CHANGE UP (ref 8.4–10.5)
CHLORIDE SERPL-SCNC: 102 MMOL/L — SIGNIFICANT CHANGE UP (ref 96–108)
CO2 SERPL-SCNC: 23 MMOL/L — SIGNIFICANT CHANGE UP (ref 22–31)
CREAT SERPL-MCNC: 0.85 MG/DL — SIGNIFICANT CHANGE UP (ref 0.5–1.3)
GLUCOSE SERPL-MCNC: 116 MG/DL — HIGH (ref 70–99)
POTASSIUM SERPL-MCNC: 3.8 MMOL/L — SIGNIFICANT CHANGE UP (ref 3.5–5.3)
POTASSIUM SERPL-SCNC: 3.8 MMOL/L — SIGNIFICANT CHANGE UP (ref 3.5–5.3)
SODIUM SERPL-SCNC: 137 MMOL/L — SIGNIFICANT CHANGE UP (ref 135–145)

## 2021-12-31 RX ADMIN — Medication 1 MILLIGRAM(S): at 11:34

## 2021-12-31 RX ADMIN — CEFTRIAXONE 100 MILLIGRAM(S): 500 INJECTION, POWDER, FOR SOLUTION INTRAMUSCULAR; INTRAVENOUS at 15:47

## 2021-12-31 RX ADMIN — APIXABAN 2.5 MILLIGRAM(S): 2.5 TABLET, FILM COATED ORAL at 05:22

## 2021-12-31 RX ADMIN — MEMANTINE HYDROCHLORIDE 10 MILLIGRAM(S): 10 TABLET ORAL at 17:21

## 2021-12-31 RX ADMIN — DONEPEZIL HYDROCHLORIDE 10 MILLIGRAM(S): 10 TABLET, FILM COATED ORAL at 22:25

## 2021-12-31 RX ADMIN — Medication 180 MILLIGRAM(S): at 05:23

## 2021-12-31 RX ADMIN — PANTOPRAZOLE SODIUM 40 MILLIGRAM(S): 20 TABLET, DELAYED RELEASE ORAL at 05:23

## 2021-12-31 RX ADMIN — PREGABALIN 1000 MICROGRAM(S): 225 CAPSULE ORAL at 11:34

## 2021-12-31 RX ADMIN — Medication 1000 UNIT(S): at 11:34

## 2021-12-31 RX ADMIN — MEMANTINE HYDROCHLORIDE 10 MILLIGRAM(S): 10 TABLET ORAL at 05:22

## 2021-12-31 RX ADMIN — ATORVASTATIN CALCIUM 10 MILLIGRAM(S): 80 TABLET, FILM COATED ORAL at 22:25

## 2021-12-31 RX ADMIN — LORATADINE 10 MILLIGRAM(S): 10 TABLET ORAL at 11:34

## 2021-12-31 RX ADMIN — APIXABAN 2.5 MILLIGRAM(S): 2.5 TABLET, FILM COATED ORAL at 17:21

## 2021-12-31 NOTE — PHYSICAL THERAPY INITIAL EVALUATION ADULT - DISCHARGE DISPOSITION, PT EVAL
Michael DAILEY (PGY-3): Patient has PMD follow-up. Discussed strict return precautions and follow-up instructions. Patient is agreeable with plan, addressed all questions and concerns at this time.
outpatient PT/home/assist from family which she was receiving prior to admission

## 2021-12-31 NOTE — PROGRESS NOTE ADULT - SUBJECTIVE AND OBJECTIVE BOX
Patient is a 81y old  Female who presents with a chief complaint of WEakness (31 Dec 2021 13:32)      INTERVAL HPI/OVERNIGHT EVENTS: doing well , no c/o     T(C): 36.7 (12-31-21 @ 16:38), Max: 36.7 (12-31-21 @ 16:38)  HR: 89 (12-31-21 @ 16:38) (81 - 89)  BP: 137/73 (12-31-21 @ 16:38) (123/59 - 137/73)  RR: 18 (12-31-21 @ 16:38) (18 - 19)  SpO2: 95% (12-31-21 @ 16:38) (95% - 98%)  Wt(kg): --  I&O's Summary    30 Dec 2021 07:01  -  31 Dec 2021 07:00  --------------------------------------------------------  IN: 1280 mL / OUT: 1350 mL / NET: -70 mL    31 Dec 2021 07:01  -  31 Dec 2021 20:24  --------------------------------------------------------  IN: 290 mL / OUT: 1350 mL / NET: -1060 mL        PAST MEDICAL & SURGICAL HISTORY:  Arthritis    Hypertension    High cholesterol    Afib    Osteoporosis    Compression fracture of T12 vertebra        SOCIAL HISTORY  Alcohol:  Tobacco:  Illicit substance use:    FAMILY HISTORY:    REVIEW OF SYSTEMS:  CONSTITUTIONAL: No fever, weight loss, or fatigue  EYES: No eye pain, visual disturbances, or discharge  ENMT:  No difficulty hearing, tinnitus, vertigo; No sinus or throat pain  NECK: No pain or stiffness  RESPIRATORY: No cough, wheezing, chills or hemoptysis; No shortness of breath  CARDIOVASCULAR: No chest pain, palpitations, dizziness, or leg swelling  GASTROINTESTINAL: No abdominal or epigastric pain. No nausea, vomiting, or hematemesis; No diarrhea or constipation. No melena or hematochezia.  GENITOURINARY: No dysuria, frequency, hematuria, or incontinence  NEUROLOGICAL: No headaches, memory loss, loss of strength, numbness, or tremors  SKIN: No itching, burning, rashes, or lesions   LYMPH NODES: No enlarged glands  ENDOCRINE: No heat or cold intolerance; No hair loss  MUSCULOSKELETAL: No joint pain or swelling; No muscle, back, or extremity pain  PSYCHIATRIC: No depression, anxiety, mood swings, or difficulty sleeping  HEME/LYMPH: No easy bruising, or bleeding gums  ALLERY AND IMMUNOLOGIC: No hives or eczema    RADIOLOGY & ADDITIONAL TESTS:    Imaging Personally Reviewed:  [ ] YES  [ ] NO    Consultant(s) Notes Reviewed:  [ ] YES  [ ] NO    PHYSICAL EXAM:  GENERAL: NAD, well-groomed, well-developed  HEAD:  Atraumatic, Normocephalic  EYES: EOMI, PERRLA, conjunctiva and sclera clear  ENMT: No tonsillar erythema, exudates, or enlargement; Moist mucous membranes, Good dentition, No lesions  NECK: Supple, No JVD, Normal thyroid  NERVOUS SYSTEM:  Alert & Oriented X3, Good concentration; Motor Strength 5/5 B/L upper and lower extremities; DTRs 2+ intact and symmetric  CHEST/LUNG: Clear to percussion bilaterally; No rales, rhonchi, wheezing, or rubs  HEART: Regular rate and rhythm; No murmurs, rubs, or gallops  ABDOMEN: Soft, Nontender, Nondistended; Bowel sounds present  EXTREMITIES:  2+ Peripheral Pulses, No clubbing, cyanosis, or edema  LYMPH: No lymphadenopathy noted  SKIN: No rashes or lesions    LABS:                        9.3    9.97  )-----------( 419      ( 30 Dec 2021 07:34 )             29.5     12-31    137  |  102  |  11  ----------------------------<  116<H>  3.8   |  23  |  0.85    Ca    8.8      31 Dec 2021 07:23          CAPILLARY BLOOD GLUCOSE                MEDICATIONS  (STANDING):  apixaban 2.5 milliGRAM(s) Oral every 12 hours  atorvastatin 10 milliGRAM(s) Oral at bedtime  cefTRIAXone   IVPB 1000 milliGRAM(s) IV Intermittent every 24 hours  cholecalciferol 1000 Unit(s) Oral daily  cyanocobalamin 1000 MICROGram(s) Oral daily  diltiazem    milliGRAM(s) Oral daily  donepezil 10 milliGRAM(s) Oral at bedtime  folic acid 1 milliGRAM(s) Oral daily  loratadine 10 milliGRAM(s) Oral daily  memantine 10 milliGRAM(s) Oral two times a day  pantoprazole    Tablet 40 milliGRAM(s) Oral before breakfast    MEDICATIONS  (PRN):  acetaminophen     Tablet .. 650 milliGRAM(s) Oral every 6 hours PRN Temp greater or equal to 38C (100.4F), Mild Pain (1 - 3)  aluminum hydroxide/magnesium hydroxide/simethicone Suspension 30 milliLiter(s) Oral every 4 hours PRN Dyspepsia  melatonin 3 milliGRAM(s) Oral at bedtime PRN Insomnia  ondansetron Injectable 4 milliGRAM(s) IV Push every 8 hours PRN Nausea and/or Vomiting  senna 2 Tablet(s) Oral at bedtime PRN Constipation      Care Discussed with Consultants/Other Providers [ ] YES  [ ] NO

## 2021-12-31 NOTE — PROGRESS NOTE ADULT - SUBJECTIVE AND OBJECTIVE BOX
Date of Service   12-31-21 @ 13:34    Patient is a 81y old  Female who presents with a chief complaint of Weakness (30 Dec 2021 19:04)      INTERVAL HISTORY: pt feels well   REVIEW OF SYSTEMS:   CONSTITUTIONAL: No weakness  EYES/ENT: No visual changes; No throat pain  Neck: No pain or stiffness  Respiratory: No cough, wheezing, No shortness of breath  CARDIOVASCULAR: no chest pain or palpitations  GASTROINTESTINAL: No abdominal pain, no nausea, vomiting or hematemesis  GENITOURINARY: No dysuria, frequency or hematuria  NEUROLOGICAL: No stroke like symptoms  SKIN: No rashes    	  MEDICATIONS:  diltiazem    milliGRAM(s) Oral daily        PHYSICAL EXAM:  T(C): 36.4 (12-31-21 @ 12:23), Max: 36.6 (12-30-21 @ 21:23)  HR: 81 (12-31-21 @ 12:23) (81 - 83)  BP: 123/71 (12-31-21 @ 12:23) (123/59 - 131/77)  RR: 18 (12-31-21 @ 12:23) (18 - 19)  SpO2: 98% (12-31-21 @ 12:23) (96% - 98%)  Wt(kg): --  I&O's Summary    30 Dec 2021 07:01  -  31 Dec 2021 07:00  --------------------------------------------------------  IN: 1280 mL / OUT: 1350 mL / NET: -70 mL    31 Dec 2021 07:01  -  31 Dec 2021 13:34  --------------------------------------------------------  IN: 240 mL / OUT: 600 mL / NET: -360 mL          Appearance: In no distress	  HEENT:    PERRL, EOMI	  Cardiovascular:  S1 S2, No JVD  Respiratory: Lungs clear to auscultation	  Gastrointestinal:  Soft, Non-tender, + BS	  Vascularature:  No edema of LE  Psychiatric: Appropriate affect   Neuro: no acute focal deficits                               9.3    9.97  )-----------( 419      ( 30 Dec 2021 07:34 )             29.5     12-31    137  |  102  |  11  ----------------------------<  116<H>  3.8   |  23  |  0.85    Ca    8.8      31 Dec 2021 07:23          Labs personally reviewed      ASSESSMENT/PLAN: 	  81f hx HTN, afib on eliquis, frequent UTI's, dementia presenting with severe sepsis and subacute toxic-metabolic encephalopathy 2/2 recurrent UTI    Problem/Plan - 1:  ·  Problem: Afib.   -  c/w home diltiazem with hold parameters  - on eliquis  for AC, decreased to 2.5mg po bid per criteria   - rate controlled 60-80s     Problem/Plan - 2:  ·  Problem: Hypertension.   - c/w home diltiazem and  with hold parameters  - BP stable     Problem/Plan - 3:  ·  Problem: Severe sepsis  2/2 UTI  - on IV abx   - blood cx NTD   - UA positive, urine cx + E. coli   - encourage PO intake.  - monitor CBC and vital signs     Problem/Plan - 4:  ·  Problem: HLD   - c/w statin      Problem/Plan - 5:  ·  Problem: DVT ppx on Eliquis for AC     d/c planning home       Matt Mccarty [facility]   Gustavo Graf DO [facility]  Cardiovascular Medicine  [address] [place] [address], [address]  Office: [phone]  Cell: [phone]

## 2022-01-01 LAB
ANION GAP SERPL CALC-SCNC: 13 MMOL/L — SIGNIFICANT CHANGE UP (ref 5–17)
BUN SERPL-MCNC: 14 MG/DL — SIGNIFICANT CHANGE UP (ref 7–23)
CALCIUM SERPL-MCNC: 8.6 MG/DL — SIGNIFICANT CHANGE UP (ref 8.4–10.5)
CHLORIDE SERPL-SCNC: 101 MMOL/L — SIGNIFICANT CHANGE UP (ref 96–108)
CO2 SERPL-SCNC: 23 MMOL/L — SIGNIFICANT CHANGE UP (ref 22–31)
CREAT SERPL-MCNC: 0.79 MG/DL — SIGNIFICANT CHANGE UP (ref 0.5–1.3)
CULTURE RESULTS: SIGNIFICANT CHANGE UP
CULTURE RESULTS: SIGNIFICANT CHANGE UP
GLUCOSE SERPL-MCNC: 281 MG/DL — HIGH (ref 70–99)
HCT VFR BLD CALC: 31.3 % — LOW (ref 34.5–45)
HGB BLD-MCNC: 9.7 G/DL — LOW (ref 11.5–15.5)
MCHC RBC-ENTMCNC: 29 PG — SIGNIFICANT CHANGE UP (ref 27–34)
MCHC RBC-ENTMCNC: 31 GM/DL — LOW (ref 32–36)
MCV RBC AUTO: 93.7 FL — SIGNIFICANT CHANGE UP (ref 80–100)
NRBC # BLD: 0 /100 WBCS — SIGNIFICANT CHANGE UP (ref 0–0)
PLATELET # BLD AUTO: 462 K/UL — HIGH (ref 150–400)
POTASSIUM SERPL-MCNC: 4.2 MMOL/L — SIGNIFICANT CHANGE UP (ref 3.5–5.3)
POTASSIUM SERPL-SCNC: 4.2 MMOL/L — SIGNIFICANT CHANGE UP (ref 3.5–5.3)
RBC # BLD: 3.34 M/UL — LOW (ref 3.8–5.2)
RBC # FLD: 15.3 % — HIGH (ref 10.3–14.5)
SODIUM SERPL-SCNC: 137 MMOL/L — SIGNIFICANT CHANGE UP (ref 135–145)
SPECIMEN SOURCE: SIGNIFICANT CHANGE UP
SPECIMEN SOURCE: SIGNIFICANT CHANGE UP
WBC # BLD: 8.26 K/UL — SIGNIFICANT CHANGE UP (ref 3.8–10.5)
WBC # FLD AUTO: 8.26 K/UL — SIGNIFICANT CHANGE UP (ref 3.8–10.5)

## 2022-01-01 RX ADMIN — PREGABALIN 1000 MICROGRAM(S): 225 CAPSULE ORAL at 12:08

## 2022-01-01 RX ADMIN — Medication 1000 UNIT(S): at 12:08

## 2022-01-01 RX ADMIN — APIXABAN 2.5 MILLIGRAM(S): 2.5 TABLET, FILM COATED ORAL at 17:06

## 2022-01-01 RX ADMIN — CEFTRIAXONE 100 MILLIGRAM(S): 500 INJECTION, POWDER, FOR SOLUTION INTRAMUSCULAR; INTRAVENOUS at 15:36

## 2022-01-01 RX ADMIN — MEMANTINE HYDROCHLORIDE 10 MILLIGRAM(S): 10 TABLET ORAL at 05:59

## 2022-01-01 RX ADMIN — PANTOPRAZOLE SODIUM 40 MILLIGRAM(S): 20 TABLET, DELAYED RELEASE ORAL at 06:00

## 2022-01-01 RX ADMIN — LORATADINE 10 MILLIGRAM(S): 10 TABLET ORAL at 12:08

## 2022-01-01 RX ADMIN — APIXABAN 2.5 MILLIGRAM(S): 2.5 TABLET, FILM COATED ORAL at 06:00

## 2022-01-01 RX ADMIN — Medication 180 MILLIGRAM(S): at 05:59

## 2022-01-01 RX ADMIN — DONEPEZIL HYDROCHLORIDE 10 MILLIGRAM(S): 10 TABLET, FILM COATED ORAL at 22:23

## 2022-01-01 RX ADMIN — MEMANTINE HYDROCHLORIDE 10 MILLIGRAM(S): 10 TABLET ORAL at 17:06

## 2022-01-01 RX ADMIN — Medication 1 MILLIGRAM(S): at 12:08

## 2022-01-01 RX ADMIN — ATORVASTATIN CALCIUM 10 MILLIGRAM(S): 80 TABLET, FILM COATED ORAL at 22:23

## 2022-01-01 NOTE — PROGRESS NOTE ADULT - SUBJECTIVE AND OBJECTIVE BOX
Date of Service:  01-01-22 @ 12:18    Patient is a 81y old  Female who presents with a chief complaint of Weakness (31 Dec 2021 20:24)      INTERVAL HISTORY: feels ok      REVIEW OF SYSTEMS:   CONSTITUTIONAL: generalized weakness  EYES/ENT: No visual changes; No throat pain  Neck: No pain or stiffness  Respiratory: No cough, wheezing, No shortness of breath  CARDIOVASCULAR: no chest pain or palpitations  GASTROINTESTINAL: No abdominal pain, no nausea, vomiting or hematemesis  GENITOURINARY: No dysuria, frequency or hematuria  NEUROLOGICAL: No stroke like symptoms  SKIN: No rashes    	  MEDICATIONS:  diltiazem    milliGRAM(s) Oral daily        PHYSICAL EXAM:  T(C): 36.6 (01-01-22 @ 05:00), Max: 36.7 (12-31-21 @ 16:38)  HR: 89 (01-01-22 @ 05:00) (80 - 89)  BP: 142/83 (01-01-22 @ 05:00) (116/74 - 142/83)  RR: 18 (01-01-22 @ 05:00) (18 - 18)  SpO2: 94% (01-01-22 @ 05:00) (94% - 98%)  Wt(kg): --  I&O's Summary    31 Dec 2021 07:01  -  01 Jan 2022 07:00  --------------------------------------------------------  IN: 290 mL / OUT: 1350 mL / NET: -1060 mL          Appearance: In no distress	  HEENT:    PERRL, EOMI	  Cardiovascular:  S1 S2, No JVD  Respiratory: Lungs clear to auscultation	  Gastrointestinal:  Soft, Non-tender, + BS	  Vascularature:  No edema of LE  Psychiatric: Appropriate affect   Neuro: no acute focal deficits                               9.7    8.26  )-----------( 462      ( 01 Jan 2022 11:51 )             31.3     12-31    137  |  102  |  11  ----------------------------<  116<H>  3.8   |  23  |  0.85    Ca    8.8      31 Dec 2021 07:23          Labs personally reviewed      ASSESSMENT/PLAN: 	    81f hx HTN, afib on eliquis, frequent UTI's, dementia presenting with severe sepsis and subacute toxic-metabolic encephalopathy 2/2 recurrent UTI     Problem/Plan - 1:  ·  Problem: Afib.   -  c/w home diltiazem with hold parameters  - on eliquis  for AC, decreased to 2.5mg po bid per criteria   - rate controlled 60-80s      Problem/Plan - 2:  ·  Problem: Hypertension.   - c/w home diltiazem and  with hold parameters  - BP stable      Problem/Plan - 3:  ·  Problem: Severe sepsis  2/2 UTI  - on IV abx   - blood cx NTD   - UA positive, urine cx + E. coli   - encourage PO intake.  - monitor CBC and vital signs      Problem/Plan - 4:  ·  Problem: HLD   - c/w statin       Problem/Plan - 5:  ·  Problem: DVT ppx on Eliquis for AC     d/c planning home       Mary Soares MSN, FNP-BC, AGACNP-BC, ELIZABETH  Gustavo Graf, DO [facility]  Cardiovascular Medicine  [address] [place] [address], [address]  Office: [phone]  Cell: [phone] Date of Service:  01-01-22 @ 12:18    Patient is a 81y old  Female who presents with a chief complaint of Weakness (31 Dec 2021 20:24)      INTERVAL HISTORY: feels ok      REVIEW OF SYSTEMS:   CONSTITUTIONAL: generalized weakness  EYES/ENT: No visual changes; No throat pain  Neck: No pain or stiffness  Respiratory: No cough, wheezing, No shortness of breath  CARDIOVASCULAR: no chest pain or palpitations  GASTROINTESTINAL: No abdominal pain, no nausea, vomiting or hematemesis  GENITOURINARY: No dysuria, frequency or hematuria  NEUROLOGICAL: No stroke like symptoms  SKIN: No rashes    	  MEDICATIONS:  diltiazem    milliGRAM(s) Oral daily        PHYSICAL EXAM:  T(C): 36.6 (01-01-22 @ 05:00), Max: 36.7 (12-31-21 @ 16:38)  HR: 89 (01-01-22 @ 05:00) (80 - 89)  BP: 142/83 (01-01-22 @ 05:00) (116/74 - 142/83)  RR: 18 (01-01-22 @ 05:00) (18 - 18)  SpO2: 94% (01-01-22 @ 05:00) (94% - 98%)  Wt(kg): --  I&O's Summary    31 Dec 2021 07:01  -  01 Jan 2022 07:00  --------------------------------------------------------  IN: 290 mL / OUT: 1350 mL / NET: -1060 mL          Appearance: In no distress	  HEENT:    PERRL, EOMI	  Cardiovascular:  S1 S2, No JVD  Respiratory: Lungs clear to auscultation	  Gastrointestinal:  Soft, Non-tender, + BS	  Vascularature:  No edema of LE  Psychiatric: Appropriate affect   Neuro: no acute focal deficits                               9.7    8.26  )-----------( 462      ( 01 Jan 2022 11:51 )             31.3     12-31    137  |  102  |  11  ----------------------------<  116<H>  3.8   |  23  |  0.85    Ca    8.8      31 Dec 2021 07:23          Labs personally reviewed      ASSESSMENT/PLAN: 	    81f hx HTN, afib on eliquis, frequent UTI's, dementia presenting with severe sepsis and subacute toxic-metabolic encephalopathy 2/2 recurrent UTI     Problem/Plan - 1:  ·  Problem: Afib.   -  c/w home diltiazem with hold parameters  - on eliquis  for AC, decreased to 2.5mg po bid per criteria   - rate controlled 60-80s      Problem/Plan - 2:  ·  Problem: Hypertension.   - c/w home diltiazem and  with hold parameters  - BP stable      Problem/Plan - 3:  ·  Problem: Severe sepsis  2/2 UTI  - on IV abx   - blood cx NTD   - UA positive, urine cx + E. coli   - encourage PO intake.  - monitor CBC and vital signs      Problem/Plan - 4:  ·  Problem: HLD   - c/w statin       Problem/Plan - 5:  ·  Problem: DVT ppx on Eliquis for AC     d/c planning home       Mary Soares MSN, FNP-BC   Gustavo Graf DO [facility]  Cardiovascular Medicine  [address] [place] [address], [address]  Office: [phone]  Cell: [phone]

## 2022-01-01 NOTE — PROGRESS NOTE ADULT - SUBJECTIVE AND OBJECTIVE BOX
Patient is a 81y old  Female who presents with a chief complaint of WEakness (01 Jan 2022 12:17)      INTERVAL HPI/OVERNIGHT EVENTS: too weak , now son wants rehab   T(C): 36.5 (01-01-22 @ 20:24), Max: 36.7 (01-01-22 @ 14:05)  HR: 84 (01-01-22 @ 20:24) (80 - 89)  BP: 107/66 (01-01-22 @ 20:24) (107/66 - 142/83)  RR: 18 (01-01-22 @ 20:24) (18 - 18)  SpO2: 95% (01-01-22 @ 20:24) (94% - 95%)  Wt(kg): --  I&O's Summary    31 Dec 2021 07:01  -  01 Jan 2022 07:00  --------------------------------------------------------  IN: 290 mL / OUT: 1350 mL / NET: -1060 mL    01 Jan 2022 07:01  -  01 Jan 2022 23:26  --------------------------------------------------------  IN: 0 mL / OUT: 0 mL / NET: 0 mL        PAST MEDICAL & SURGICAL HISTORY:  Arthritis    Hypertension    High cholesterol    Afib    Osteoporosis    Compression fracture of T12 vertebra        SOCIAL HISTORY  Alcohol:  Tobacco:  Illicit substance use:    FAMILY HISTORY:    REVIEW OF SYSTEMS:  CONSTITUTIONAL: No fever, weight loss, or fatigue  EYES: No eye pain, visual disturbances, or discharge  ENMT:  No difficulty hearing, tinnitus, vertigo; No sinus or throat pain  NECK: No pain or stiffness  RESPIRATORY: No cough, wheezing, chills or hemoptysis; No shortness of breath  CARDIOVASCULAR: No chest pain, palpitations, dizziness, or leg swelling  GASTROINTESTINAL: No abdominal or epigastric pain. No nausea, vomiting, or hematemesis; No diarrhea or constipation. No melena or hematochezia.  GENITOURINARY: No dysuria, frequency, hematuria, or incontinence  NEUROLOGICAL: No headaches, memory loss, loss of strength, numbness, or tremors  SKIN: No itching, burning, rashes, or lesions   LYMPH NODES: No enlarged glands  ENDOCRINE: No heat or cold intolerance; No hair loss  MUSCULOSKELETAL: No joint pain or swelling; No muscle, back, or extremity pain  PSYCHIATRIC: No depression, anxiety, mood swings, or difficulty sleeping  HEME/LYMPH: No easy bruising, or bleeding gums  ALLERY AND IMMUNOLOGIC: No hives or eczema    RADIOLOGY & ADDITIONAL TESTS:    Imaging Personally Reviewed:  [ ] YES  [ ] NO    Consultant(s) Notes Reviewed:  [ ] YES  [ ] NO    PHYSICAL EXAM:  GENERAL: NAD, well-groomed, well-developed  HEAD:  Atraumatic, Normocephalic  EYES: EOMI, PERRLA, conjunctiva and sclera clear  ENMT: No tonsillar erythema, exudates, or enlargement; Moist mucous membranes, Good dentition, No lesions  NECK: Supple, No JVD, Normal thyroid  NERVOUS SYSTEM:  Alert & Oriented X3, Good concentration; Motor Strength 5/5 B/L upper and lower extremities; DTRs 2+ intact and symmetric  CHEST/LUNG: Clear to percussion bilaterally; No rales, rhonchi, wheezing, or rubs  HEART: Regular rate and rhythm; No murmurs, rubs, or gallops  ABDOMEN: Soft, Nontender, Nondistended; Bowel sounds present  EXTREMITIES:  2+ Peripheral Pulses, No clubbing, cyanosis, or edema  LYMPH: No lymphadenopathy noted  SKIN: No rashes or lesions    LABS:                        9.7    8.26  )-----------( 462      ( 01 Jan 2022 11:51 )             31.3     01-01    137  |  101  |  14  ----------------------------<  281<H>  4.2   |  23  |  0.79    Ca    8.6      01 Jan 2022 11:51          CAPILLARY BLOOD GLUCOSE                MEDICATIONS  (STANDING):  apixaban 2.5 milliGRAM(s) Oral every 12 hours  atorvastatin 10 milliGRAM(s) Oral at bedtime  cholecalciferol 1000 Unit(s) Oral daily  cyanocobalamin 1000 MICROGram(s) Oral daily  diltiazem    milliGRAM(s) Oral daily  donepezil 10 milliGRAM(s) Oral at bedtime  folic acid 1 milliGRAM(s) Oral daily  loratadine 10 milliGRAM(s) Oral daily  memantine 10 milliGRAM(s) Oral two times a day  pantoprazole    Tablet 40 milliGRAM(s) Oral before breakfast    MEDICATIONS  (PRN):  acetaminophen     Tablet .. 650 milliGRAM(s) Oral every 6 hours PRN Temp greater or equal to 38C (100.4F), Mild Pain (1 - 3)  aluminum hydroxide/magnesium hydroxide/simethicone Suspension 30 milliLiter(s) Oral every 4 hours PRN Dyspepsia  melatonin 3 milliGRAM(s) Oral at bedtime PRN Insomnia  ondansetron Injectable 4 milliGRAM(s) IV Push every 8 hours PRN Nausea and/or Vomiting  senna 2 Tablet(s) Oral at bedtime PRN Constipation      Care Discussed with Consultants/Other Providers [ ] YES  [ ] NO

## 2022-01-02 LAB — SARS-COV-2 RNA SPEC QL NAA+PROBE: SIGNIFICANT CHANGE UP

## 2022-01-02 RX ADMIN — MEMANTINE HYDROCHLORIDE 10 MILLIGRAM(S): 10 TABLET ORAL at 17:23

## 2022-01-02 RX ADMIN — MEMANTINE HYDROCHLORIDE 10 MILLIGRAM(S): 10 TABLET ORAL at 05:56

## 2022-01-02 RX ADMIN — Medication 1000 UNIT(S): at 12:12

## 2022-01-02 RX ADMIN — APIXABAN 2.5 MILLIGRAM(S): 2.5 TABLET, FILM COATED ORAL at 17:23

## 2022-01-02 RX ADMIN — Medication 180 MILLIGRAM(S): at 05:56

## 2022-01-02 RX ADMIN — Medication 1 MILLIGRAM(S): at 12:12

## 2022-01-02 RX ADMIN — DONEPEZIL HYDROCHLORIDE 10 MILLIGRAM(S): 10 TABLET, FILM COATED ORAL at 21:15

## 2022-01-02 RX ADMIN — ATORVASTATIN CALCIUM 10 MILLIGRAM(S): 80 TABLET, FILM COATED ORAL at 21:15

## 2022-01-02 RX ADMIN — PREGABALIN 1000 MICROGRAM(S): 225 CAPSULE ORAL at 12:12

## 2022-01-02 RX ADMIN — LORATADINE 10 MILLIGRAM(S): 10 TABLET ORAL at 12:12

## 2022-01-02 RX ADMIN — PANTOPRAZOLE SODIUM 40 MILLIGRAM(S): 20 TABLET, DELAYED RELEASE ORAL at 05:56

## 2022-01-02 RX ADMIN — APIXABAN 2.5 MILLIGRAM(S): 2.5 TABLET, FILM COATED ORAL at 05:56

## 2022-01-02 NOTE — PROGRESS NOTE ADULT - NSPROGADDITIONALINFOA_GEN_ALL_CORE
dispo: PT andi , d/c planning to rehab
dispo: PT andi , d/c planning to rehab
dispo: d/c abx from [facility] to be d/c home
dispo: today 5 th day of rocephine , can be d/c abx after today and plan for d/c

## 2022-01-02 NOTE — PROGRESS NOTE ADULT - PROBLEM SELECTOR PLAN 4
continue aricept/memantine  PT evaluation

## 2022-01-02 NOTE — PROGRESS NOTE ADULT - SUBJECTIVE AND OBJECTIVE BOX
Patient is a 81y old  Female who presents with a chief complaint of WEakness (02 Jan 2022 12:54)      INTERVAL HPI/OVERNIGHT EVENTS: doing fair   T(C): 36.5 (01-02-22 @ 14:36), Max: 36.7 (01-02-22 @ 04:30)  HR: 81 (01-02-22 @ 14:36) (81 - 88)  BP: 114/72 (01-02-22 @ 14:36) (107/66 - 137/59)  RR: 18 (01-02-22 @ 14:36) (18 - 18)  SpO2: 97% (01-02-22 @ 14:36) (95% - 97%)  Wt(kg): --  I&O's Summary    01 Jan 2022 07:01  -  02 Jan 2022 07:00  --------------------------------------------------------  IN: 0 mL / OUT: 0 mL / NET: 0 mL        PAST MEDICAL & SURGICAL HISTORY:  Arthritis    Hypertension    High cholesterol    Afib    Osteoporosis    Compression fracture of T12 vertebra        SOCIAL HISTORY  Alcohol:  Tobacco:  Illicit substance use:    FAMILY HISTORY:    REVIEW OF SYSTEMS:  CONSTITUTIONAL: No fever, weight loss, or fatigue  EYES: No eye pain, visual disturbances, or discharge  ENMT:  No difficulty hearing, tinnitus, vertigo; No sinus or throat pain  NECK: No pain or stiffness  RESPIRATORY: No cough, wheezing, chills or hemoptysis; No shortness of breath  CARDIOVASCULAR: No chest pain, palpitations, dizziness, or leg swelling  GASTROINTESTINAL: No abdominal or epigastric pain. No nausea, vomiting, or hematemesis; No diarrhea or constipation. No melena or hematochezia.  GENITOURINARY: No dysuria, frequency, hematuria, or incontinence  NEUROLOGICAL: No headaches, memory loss, loss of strength, numbness, or tremors  SKIN: No itching, burning, rashes, or lesions   LYMPH NODES: No enlarged glands  ENDOCRINE: No heat or cold intolerance; No hair loss  MUSCULOSKELETAL: No joint pain or swelling; No muscle, back, or extremity pain  PSYCHIATRIC: No depression, anxiety, mood swings, or difficulty sleeping  HEME/LYMPH: No easy bruising, or bleeding gums  ALLERY AND IMMUNOLOGIC: No hives or eczema    RADIOLOGY & ADDITIONAL TESTS:    Imaging Personally Reviewed:  [ ] YES  [ ] NO    Consultant(s) Notes Reviewed:  [ ] YES  [ ] NO    PHYSICAL EXAM:  GENERAL: NAD, well-groomed, well-developed  HEAD:  Atraumatic, Normocephalic  EYES: EOMI, PERRLA, conjunctiva and sclera clear  ENMT: No tonsillar erythema, exudates, or enlargement; Moist mucous membranes, Good dentition, No lesions  NECK: Supple, No JVD, Normal thyroid  NERVOUS SYSTEM:  Alert & Oriented X3, Good concentration; Motor Strength 5/5 B/L upper and lower extremities; DTRs 2+ intact and symmetric  CHEST/LUNG: Clear to percussion bilaterally; No rales, rhonchi, wheezing, or rubs  HEART: Regular rate and rhythm; No murmurs, rubs, or gallops  ABDOMEN: Soft, Nontender, Nondistended; Bowel sounds present  EXTREMITIES:  2+ Peripheral Pulses, No clubbing, cyanosis, or edema  LYMPH: No lymphadenopathy noted  SKIN: No rashes or lesions    LABS:                        9.7    8.26  )-----------( 462      ( 01 Jan 2022 11:51 )             31.3     01-01    137  |  101  |  14  ----------------------------<  281<H>  4.2   |  23  |  0.79    Ca    8.6      01 Jan 2022 11:51          CAPILLARY BLOOD GLUCOSE                MEDICATIONS  (STANDING):  apixaban 2.5 milliGRAM(s) Oral every 12 hours  atorvastatin 10 milliGRAM(s) Oral at bedtime  cholecalciferol 1000 Unit(s) Oral daily  cyanocobalamin 1000 MICROGram(s) Oral daily  diltiazem    milliGRAM(s) Oral daily  donepezil 10 milliGRAM(s) Oral at bedtime  folic acid 1 milliGRAM(s) Oral daily  loratadine 10 milliGRAM(s) Oral daily  memantine 10 milliGRAM(s) Oral two times a day  pantoprazole    Tablet 40 milliGRAM(s) Oral before breakfast    MEDICATIONS  (PRN):  acetaminophen     Tablet .. 650 milliGRAM(s) Oral every 6 hours PRN Temp greater or equal to 38C (100.4F), Mild Pain (1 - 3)  aluminum hydroxide/magnesium hydroxide/simethicone Suspension 30 milliLiter(s) Oral every 4 hours PRN Dyspepsia  melatonin 3 milliGRAM(s) Oral at bedtime PRN Insomnia  ondansetron Injectable 4 milliGRAM(s) IV Push every 8 hours PRN Nausea and/or Vomiting  senna 2 Tablet(s) Oral at bedtime PRN Constipation      Care Discussed with Consultants/Other Providers [ ] YES  [ ] NO

## 2022-01-02 NOTE — PROGRESS NOTE ADULT - SUBJECTIVE AND OBJECTIVE BOX
Date of Service:  01-02-22 @ 12:55    Patient is a 81y old  Female who presents with a chief complaint of WEakness (01 Jan 2022 19:26)      INTERVAL HISTORY: feels well      REVIEW OF SYSTEMS:   CONSTITUTIONAL: generalized weakness  EYES/ENT: No visual changes; No throat pain  Neck: No pain or stiffness  Respiratory: No cough, + wheezing, No shortness of breath  CARDIOVASCULAR: no chest pain or palpitations  GASTROINTESTINAL: No abdominal pain, no nausea, vomiting or hematemesis  GENITOURINARY: No dysuria, frequency or hematuria  NEUROLOGICAL: No stroke like symptoms  SKIN: No rashes    	  MEDICATIONS:  diltiazem    milliGRAM(s) Oral daily        PHYSICAL EXAM:  T(C): 36.7 (01-02-22 @ 04:30), Max: 36.7 (01-01-22 @ 14:05)  HR: 86 (01-02-22 @ 09:54) (80 - 88)  BP: 123/72 (01-02-22 @ 09:54) (107/66 - 137/59)  RR: 18 (01-02-22 @ 04:30) (18 - 18)  SpO2: 97% (01-02-22 @ 09:54) (95% - 97%)  Wt(kg): --  I&O's Summary    01 Jan 2022 07:01  -  02 Jan 2022 07:00  --------------------------------------------------------  IN: 0 mL / OUT: 0 mL / NET: 0 mL          Appearance: In no distress	  HEENT:    PERRL, EOMI	  Cardiovascular:  S1 S2, No JVD  Respiratory: expiratory wheezing throughout all lung fields	  Gastrointestinal:  Soft, Non-tender, + BS	  Vascularature:  No edema of LE  Psychiatric: Appropriate affect   Neuro: no acute focal deficits                               9.7    8.26  )-----------( 462      ( 01 Jan 2022 11:51 )             31.3     01-01    137  |  101  |  14  ----------------------------<  281<H>  4.2   |  23  |  0.79    Ca    8.6      01 Jan 2022 11:51          Labs personally reviewed      ASSESSMENT/PLAN: 	    81f hx HTN, afib on eliquis, frequent UTI's, dementia presenting with severe sepsis and subacute toxic-metabolic encephalopathy 2/2 recurrent UTI     Problem/Plan - 1:  ·  Problem: Afib.   -  c/w home diltiazem with hold parameters  - on eliquis  for AC, decreased to 2.5mg po bid per criteria   - rate controlled 60-80s      Problem/Plan - 2:  ·  Problem: Hypertension.   - c/w home diltiazem and  with hold parameters  - BP stable      Problem/Plan - 3:  ·  Problem: Severe sepsis  2/2 UTI  - on IV abx   - blood cx NTD   - UA positive, urine cx + E. coli   - encourage PO intake.  - monitor CBC and vital signs      Problem/Plan - 4:  ·  Problem: HLD   - c/w statin       Problem/Plan - 5:  ·  Problem: DVT ppx on Eliquis for AC     d/c planning home       Mary Soares MSN, FNP-BC, AGACNP-BC, ELIZABETH  Gustavo Graf, DO [facility]  Cardiovascular Medicine  [address] [place] [address], [address]  Office: [phone]  Cell: [phone] Date of Service:  01-02-22 @ 12:55    Patient is a 81y old  Female who presents with a chief complaint of WEakness (01 Jan 2022 19:26)      INTERVAL HISTORY: feels well      REVIEW OF SYSTEMS:   CONSTITUTIONAL: generalized weakness  EYES/ENT: No visual changes; No throat pain  Neck: No pain or stiffness  Respiratory: No cough, + wheezing, No shortness of breath  CARDIOVASCULAR: no chest pain or palpitations  GASTROINTESTINAL: No abdominal pain, no nausea, vomiting or hematemesis  GENITOURINARY: No dysuria, frequency or hematuria  NEUROLOGICAL: No stroke like symptoms  SKIN: No rashes    	  MEDICATIONS:  diltiazem    milliGRAM(s) Oral daily        PHYSICAL EXAM:  T(C): 36.7 (01-02-22 @ 04:30), Max: 36.7 (01-01-22 @ 14:05)  HR: 86 (01-02-22 @ 09:54) (80 - 88)  BP: 123/72 (01-02-22 @ 09:54) (107/66 - 137/59)  RR: 18 (01-02-22 @ 04:30) (18 - 18)  SpO2: 97% (01-02-22 @ 09:54) (95% - 97%)  Wt(kg): --  I&O's Summary    01 Jan 2022 07:01  -  02 Jan 2022 07:00  --------------------------------------------------------  IN: 0 mL / OUT: 0 mL / NET: 0 mL          Appearance: In no distress	  HEENT:    PERRL, EOMI	  Cardiovascular:  S1 S2, No JVD  Respiratory: expiratory wheezing throughout all lung fields	  Gastrointestinal:  Soft, Non-tender, + BS	  Vascularature:  No edema of LE  Psychiatric: Appropriate affect   Neuro: no acute focal deficits                               9.7    8.26  )-----------( 462      ( 01 Jan 2022 11:51 )             31.3     01-01    137  |  101  |  14  ----------------------------<  281<H>  4.2   |  23  |  0.79    Ca    8.6      01 Jan 2022 11:51          Labs personally reviewed      ASSESSMENT/PLAN: 	    81f hx HTN, afib on eliquis, frequent UTI's, dementia presenting with severe sepsis and subacute toxic-metabolic encephalopathy 2/2 recurrent UTI     Problem/Plan - 1:  ·  Problem: Afib.   -  c/w home diltiazem with hold parameters  - on eliquis  for AC, decreased to 2.5mg po bid per criteria   - rate controlled 60-80s      Problem/Plan - 2:  ·  Problem: Hypertension.   - c/w home diltiazem and  with hold parameters  - BP stable      Problem/Plan - 3:  ·  Problem: Severe sepsis  2/2 UTI  - on IV abx   - blood cx NTD   - UA positive, urine cx + E. coli   - encourage PO intake.  - monitor CBC and vital signs      Problem/Plan - 4:  ·  Problem: HLD   - c/w statin       Problem/Plan - 5:  ·  Problem: DVT ppx on Eliquis for AC     d/c planning home       Mary Soares MSN, FNP-BC   Gutsavo Graf DO [facility]  Cardiovascular Medicine  [address] [place] [address], [address]  Office: [phone]  Cell: [phone]

## 2022-01-02 NOTE — PROGRESS NOTE ADULT - ATTENDING COMMENTS
Pt care and plan discussed and reviewed with NP. Plan as outlined above edited by me to reflect our discussion.

## 2022-01-02 NOTE — PROGRESS NOTE ADULT - PROBLEM SELECTOR PLAN 2
continue diltriazem with hold parameters  decrease eliquis to 2.5mg po bid

## 2022-01-02 NOTE — PROGRESS NOTE ADULT - PROBLEM SELECTOR PLAN 1
2/2 UTI  - ceftriaxeon 1gm iv q24 hrs  urine c/s : growing ecoli : sensitive to rocephine
2/2 UTI  - ceftriaxeon 1gm iv q24 hrs  - f/u blood/urine cultures, has hx of pansensitive ecoli in past  - encourage PO intake
2/2 UTI  - ceftriaxeon 1gm iv q24 hrs  urine c/s : growing ecoli : sensitive to rocephine
2/2 UTI  - ceftriaxeon 1gm iv q24 hrs  urine c/s : growing ecoli : awaiting sensitivity

## 2022-01-03 ENCOUNTER — TRANSCRIPTION ENCOUNTER (OUTPATIENT)
Age: 82
End: 2022-01-03

## 2022-01-03 VITALS
RESPIRATION RATE: 16 BRPM | OXYGEN SATURATION: 96 % | DIASTOLIC BLOOD PRESSURE: 80 MMHG | TEMPERATURE: 97 F | SYSTOLIC BLOOD PRESSURE: 130 MMHG | HEART RATE: 86 BPM

## 2022-01-03 LAB
HCT VFR BLD CALC: 31 % — LOW (ref 34.5–45)
HGB BLD-MCNC: 9.9 G/DL — LOW (ref 11.5–15.5)
MCHC RBC-ENTMCNC: 29.5 PG — SIGNIFICANT CHANGE UP (ref 27–34)
MCHC RBC-ENTMCNC: 31.9 GM/DL — LOW (ref 32–36)
MCV RBC AUTO: 92.3 FL — SIGNIFICANT CHANGE UP (ref 80–100)
NRBC # BLD: 0 /100 WBCS — SIGNIFICANT CHANGE UP (ref 0–0)
PLATELET # BLD AUTO: 431 K/UL — HIGH (ref 150–400)
RBC # BLD: 3.36 M/UL — LOW (ref 3.8–5.2)
RBC # FLD: 15.8 % — HIGH (ref 10.3–14.5)
WBC # BLD: 9.65 K/UL — SIGNIFICANT CHANGE UP (ref 3.8–10.5)
WBC # FLD AUTO: 9.65 K/UL — SIGNIFICANT CHANGE UP (ref 3.8–10.5)

## 2022-01-03 PROCEDURE — 97161 PT EVAL LOW COMPLEX 20 MIN: CPT

## 2022-01-03 PROCEDURE — 97116 GAIT TRAINING THERAPY: CPT

## 2022-01-03 PROCEDURE — 81001 URINALYSIS AUTO W/SCOPE: CPT

## 2022-01-03 PROCEDURE — 71045 X-RAY EXAM CHEST 1 VIEW: CPT

## 2022-01-03 PROCEDURE — 82330 ASSAY OF CALCIUM: CPT

## 2022-01-03 PROCEDURE — 85025 COMPLETE CBC W/AUTO DIFF WBC: CPT

## 2022-01-03 PROCEDURE — 85018 HEMOGLOBIN: CPT

## 2022-01-03 PROCEDURE — 87077 CULTURE AEROBIC IDENTIFY: CPT

## 2022-01-03 PROCEDURE — 82947 ASSAY GLUCOSE BLOOD QUANT: CPT

## 2022-01-03 PROCEDURE — 87086 URINE CULTURE/COLONY COUNT: CPT

## 2022-01-03 PROCEDURE — 82435 ASSAY OF BLOOD CHLORIDE: CPT

## 2022-01-03 PROCEDURE — 36415 COLL VENOUS BLD VENIPUNCTURE: CPT

## 2022-01-03 PROCEDURE — 84295 ASSAY OF SERUM SODIUM: CPT

## 2022-01-03 PROCEDURE — U0005: CPT

## 2022-01-03 PROCEDURE — 93005 ELECTROCARDIOGRAM TRACING: CPT

## 2022-01-03 PROCEDURE — 85027 COMPLETE CBC AUTOMATED: CPT

## 2022-01-03 PROCEDURE — 97530 THERAPEUTIC ACTIVITIES: CPT

## 2022-01-03 PROCEDURE — 87186 SC STD MICRODIL/AGAR DIL: CPT

## 2022-01-03 PROCEDURE — 85730 THROMBOPLASTIN TIME PARTIAL: CPT

## 2022-01-03 PROCEDURE — 84132 ASSAY OF SERUM POTASSIUM: CPT

## 2022-01-03 PROCEDURE — 85610 PROTHROMBIN TIME: CPT

## 2022-01-03 PROCEDURE — 97110 THERAPEUTIC EXERCISES: CPT

## 2022-01-03 PROCEDURE — 87040 BLOOD CULTURE FOR BACTERIA: CPT

## 2022-01-03 PROCEDURE — 80053 COMPREHEN METABOLIC PANEL: CPT

## 2022-01-03 PROCEDURE — 80048 BASIC METABOLIC PNL TOTAL CA: CPT

## 2022-01-03 PROCEDURE — 99285 EMERGENCY DEPT VISIT HI MDM: CPT

## 2022-01-03 PROCEDURE — 83605 ASSAY OF LACTIC ACID: CPT

## 2022-01-03 PROCEDURE — 82803 BLOOD GASES ANY COMBINATION: CPT

## 2022-01-03 PROCEDURE — U0003: CPT

## 2022-01-03 PROCEDURE — 85014 HEMATOCRIT: CPT

## 2022-01-03 RX ORDER — LORATADINE 10 MG/1
1 TABLET ORAL
Qty: 0 | Refills: 0 | DISCHARGE
Start: 2022-01-03

## 2022-01-03 RX ORDER — SENNA PLUS 8.6 MG/1
2 TABLET ORAL
Qty: 0 | Refills: 0 | DISCHARGE
Start: 2022-01-03

## 2022-01-03 RX ORDER — ALENDRONATE SODIUM 70 MG/1
1 TABLET ORAL
Qty: 0 | Refills: 0 | DISCHARGE

## 2022-01-03 RX ORDER — LEVOCETIRIZINE DIHYDROCHLORIDE 0.5 MG/ML
1 SOLUTION ORAL
Qty: 0 | Refills: 0 | DISCHARGE

## 2022-01-03 RX ORDER — APIXABAN 2.5 MG/1
1 TABLET, FILM COATED ORAL
Qty: 0 | Refills: 0 | DISCHARGE
Start: 2022-01-03

## 2022-01-03 RX ORDER — FERROUS SULFATE 325(65) MG
1 TABLET ORAL
Qty: 0 | Refills: 0 | DISCHARGE

## 2022-01-03 RX ADMIN — PREGABALIN 1000 MICROGRAM(S): 225 CAPSULE ORAL at 11:16

## 2022-01-03 RX ADMIN — LORATADINE 10 MILLIGRAM(S): 10 TABLET ORAL at 11:16

## 2022-01-03 RX ADMIN — APIXABAN 2.5 MILLIGRAM(S): 2.5 TABLET, FILM COATED ORAL at 05:59

## 2022-01-03 RX ADMIN — Medication 1 MILLIGRAM(S): at 11:16

## 2022-01-03 RX ADMIN — Medication 1000 UNIT(S): at 11:16

## 2022-01-03 RX ADMIN — PANTOPRAZOLE SODIUM 40 MILLIGRAM(S): 20 TABLET, DELAYED RELEASE ORAL at 05:59

## 2022-01-03 RX ADMIN — MEMANTINE HYDROCHLORIDE 10 MILLIGRAM(S): 10 TABLET ORAL at 06:00

## 2022-01-03 NOTE — PROGRESS NOTE ADULT - SUBJECTIVE AND OBJECTIVE BOX
DATE OF SERVICE: 01-03-22      Patient is a 81y old  Female who presents with a chief complaint of WEakness (02 Jan 2022 19:45)      INTERVAL HISTORY: feels ok    	  MEDICATIONS:  diltiazem    milliGRAM(s) Oral daily        PHYSICAL EXAM:  T(C): 36.3 (01-03-22 @ 17:32), Max: 36.4 (01-03-22 @ 04:55)  HR: 86 (01-03-22 @ 17:32) (85 - 86)  BP: 130/80 (01-03-22 @ 17:32) (107/64 - 130/80)  RR: 16 (01-03-22 @ 17:32) (16 - 18)  SpO2: 96% (01-03-22 @ 17:32) (95% - 96%)  Wt(kg): --  I&O's Summary    03 Jan 2022 07:01  -  03 Jan 2022 22:28  --------------------------------------------------------  IN: 800 mL / OUT: 1100 mL / NET: -300 mL          Appearance: In no distress	  HEENT:    PERRL, EOMI	  Cardiovascular:  S1 S2, No JVD  Respiratory: Lungs clear to auscultation	  Gastrointestinal:  Soft, Non-tender, + BS	  Vascularature:  No edema of LE  Psychiatric: Appropriate affect   Neuro: no acute focal deficits                               9.9    9.65  )-----------( 431      ( 03 Jan 2022 06:46 )             31.0           Labs personally reviewed      ASSESSMENT/PLAN: 	    81f hx HTN, afib on eliquis, frequent UTI's, dementia presenting with severe sepsis and subacute toxic-metabolic encephalopathy 2/2 recurrent UTI     Problem/Plan - 1:  ·  Problem: Afib.   -  c/w home diltiazem with hold parameters  - on eliquis  for AC, decreased to 2.5mg po bid per criteria   - rate controlled 60-80s      Problem/Plan - 2:  ·  Problem: Hypertension.   - c/w home diltiazem and  with hold parameters  - BP stable      Problem/Plan - 3:  ·  Problem: Severe sepsis  2/2 UTI  - on IV abx   - blood cx NTD   - UA positive, urine cx + E. coli   - encourage PO intake.  - monitor CBC and vital signs      Problem/Plan - 4:  ·  Problem: HLD   - c/w statin       Problem/Plan - 5:  ·  Problem: DVT ppx on Eliquis for AC     d/c planning home         Gustavo Graf DO [facility]  Cardiovascular Medicine  [address] [facility], [address]  Office: [phone]  Cell: [phone]

## 2022-01-03 NOTE — DISCHARGE NOTE NURSING/CASE MANAGEMENT/SOCIAL WORK - PATIENT PORTAL LINK FT
You can access the FollowMyHealth Patient Portal offered by [facility] by registering at the following website: [email or web address]. By joining [employer]’s FollowMyHealth portal, you will also be able to view your health information using other applications (apps) compatible with our system.

## 2022-01-03 NOTE — PROGRESS NOTE ADULT - PROVIDER SPECIALTY LIST ADULT
Cardiology
Internal Medicine
Internal Medicine
Orthopedics   Ira Imam Day 78 y o  female MRN: [id number]  Unit/Bed#: X ray      Chief Complaint:   right hip pain    HPI:   78 y  o female ambulates with walker status post fall from standing complaining of right hip pain and inability to bear weight  Pain is well localized to the hip and is made worse with motion or contact to the area  Denies numbness or tingling  Pain improves with rest  No other complaints at this time  Denies f/c/n/v/sob/cp/ha    Review Of Systems:   · Skin: Normal  · Neuro: See HPI  · Musculoskeletal: See HPI  · 14 point review of systems negative except as stated above     Past Medical History:   Past Medical History:   Diagnosis Date    CAD (coronary artery disease)     s/p HERNAN 6/2016    CHF (congestive heart failure) (HCC)     Diabetes mellitus (HCC)     non-insulin depdenent    GERD (gastroesophageal reflux disease)     History of TIA (transient ischemic attack)     no residual deficits    Hyperlipidemia     Hypertension     Hypoxia     on home O2 QHS    Meniere disease     Pacemaker     CHB-Biotronik in 2/2015    Severe aortic stenosis        Past Surgical History:   Past Surgical History:   Procedure Laterality Date    APPENDECTOMY      BACK SURGERY      CHOLECYSTECTOMY      HYSTERECTOMY      AL EGD TRANSORAL BIOPSY SINGLE/MULTIPLE N/A 11/9/2016    Procedure: ESOPHAGOGASTRODUODENOSCOPY (EGD); Surgeon: Joseph Calabrese MD;  Location: [facility]; Service: Gastroenterology    AL REPLACE AORTIC VALVE OPENFEMORAL ARTERY APPROACH N/A 7/26/2016    Procedure: TRANSFEMORAL TAVR WITH 23MM LAROSE LILY S3 VALVE; JOSE ALFREDO ;  Surgeon: Karlee Ibrahim DO;  Location: [facility];  Service: Cardiac Surgery    SMALL INTESTINE SURGERY      TOTAL KNEE ARTHROPLASTY      VARICOSE VEIN SURGERY      VENTRAL HERNIA REPAIR         Family History:  Family history reviewed and non-contributory  History reviewed  No pertinent family history      Social History:  Social History     Social History
 Marital status:      Spouse name: N/A    Number of children: N/A    Years of education: N/A     Social History Main Topics    Smoking status: Never Smoker    Smokeless tobacco: Never Used    Alcohol use No    Drug use: No    Sexual activity: Not Asked     Other Topics Concern    None     Social History Narrative    None       Allergies: Allergies   Allergen Reactions    Advil [Ibuprofen] GI Intolerance           Labs:    0  Lab Value Date/Time   HCT 40 0 01/02/2018 0005   HCT 38 0 07/18/2017 1456   HCT 40 8 06/01/2017 1313   HCT 36 8 12/01/2015 0915   HCT 38 6 07/09/2015 1516   HCT 35 1 02/25/2015 1841   HGB 13 0 01/02/2018 0005   HGB 11 9 07/18/2017 1456   HGB 12 7 06/01/2017 1313   HGB 11 7 12/01/2015 0915   HGB 12 0 07/09/2015 1516   HGB 10 5 (L) 02/25/2015 1841   INR 0 94 07/21/2016 1030   INR 1 10 02/20/2015 0618   WBC 15 91 (H) 01/02/2018 0005   WBC 12 83 (H) 07/18/2017 1456   WBC 13 06 (H) 06/01/2017 1313   WBC 10 63 (H) 12/01/2015 0915   WBC 14 31 (H) 07/09/2015 1516   WBC 9 22 02/25/2015 1841   ESR 33 (H) 12/22/2016 1432   CRP 9 5 (H) 12/22/2016 1432       Meds:  No current facility-administered medications for this encounter  Current Outpatient Prescriptions:     acetaminophen (TYLENOL) 325 mg tablet, Take 2 tablets (650 mg total) by mouth every 6 (six) hours as needed for mild pain , Disp: 30 tablet, Rfl: 0    ascorbic acid (VITAMIN C) 500 mg tablet, Take 500 mg by mouth daily  , Disp: , Rfl:     aspirin 81 mg chewable tablet, Chew 81 mg daily  , Disp: , Rfl:     atorvastatin (LIPITOR) 20 mg tablet, Take 1 tablet (20 mg total) by mouth daily at bedtime  , Disp: 30 tablet, Rfl: 2    Bisacodyl (DULCOLAX PO), Take 1 capsule by mouth , Disp: , Rfl:     Calcium Carbonate-Vitamin D (CALTRATE COLON HEALTH PO), Take 1 tablet by mouth daily  , Disp: , Rfl:     clopidogrel (PLAVIX) 75 mg tablet, Take 75 mg by mouth daily  , Disp: , Rfl:     furosemide (LASIX) 40 mg tablet, Take 40 mg
Cardiology
by mouth daily, Disp: , Rfl:     levothyroxine 50 mcg tablet, Take 50 mcg by mouth daily, Disp: , Rfl:     meclizine (ANTIVERT) 50 MG tablet, Take 12 5 mg by mouth 3 (three) times a day as needed for dizziness  , Disp: , Rfl:     metFORMIN (GLUCOPHAGE) 500 mg tablet, Take 500 mg by mouth 2 (two) times a day with meals  , Disp: , Rfl:     metoprolol tartrate (LOPRESSOR) 50 mg tablet, Take 1 tablet (50 mg total) by mouth 2 (two) times a day , Disp: 60 tablet, Rfl: 2    Multiple Vitamins-Minerals (OCUVITE ADULT 50+ PO), Take 1 tablet by mouth daily  , Disp: , Rfl:     pantoprazole (PROTONIX) 40 mg tablet, Take 40 mg by mouth daily  , Disp: , Rfl:     polyethylene glycol (MIRALAX) powder, Take 17 g by mouth daily  , Disp: , Rfl:     potassium chloride (K-DUR,KLOR-CON) 20 mEq tablet, Take 20 mEq by mouth daily, Disp: , Rfl:     promethazine (PHENERGAN) 25 mg tablet, Take 25 mg by mouth daily  , Disp: , Rfl:     traMADol (ULTRAM) 50 mg tablet, Take 50 mg by mouth every 6 (six) hours as needed for moderate pain, Disp: , Rfl:     VITAMIN D, CHOLECALCIFEROL, PO, Take 1 tablet by mouth , Disp: , Rfl:     Blood Culture:   No results found for: BLOODCX    Wound Culture:   No results found for: WOUNDCULT    Ins and Outs:  No intake/output data recorded  Physical Exam:   /63   Pulse 62   Temp 98 9 °F (37 2 °C) (Oral)   Resp (!) 27   Ht 5' 2" (1 575 m)   Wt 84 4 kg (186 lb)   SpO2 93%   BMI 34 02 kg/m²   Gen: Alert and oriented to person, place, time  HEENT: EOMI, eyes clear, moist mucus membranes, hearing intact  Respiratory: Bilateral chest rise  No audible wheezing found  Cardiovascular: Regular Rate and Rhythm  Abdomen: soft nontender/nondistended  Musculoskeletal: right lower extremity  · Skin intact, limb shortened and externally rotated  · Tender to palpation over hip  · Positive log roll  · Sensation intact L1-S1  · Positive ankle dorsi/plantar flexion, EHL/FHL  · 2+ DP pulse    Radiology:    I
personally reviewed the films  X-rays right hip shows Intertrochanteric femur fracture    _*_*_*_*_*_*_*_*_*_*_*_*_*_*_*_*_*_*_*_*_*_*_*_*_*_*_*_*_*_*_*_*_*_*_*_*_*_*_*_*_*    Assessment:  78 y  o female status post fall from standing with rightIntertrochanteric femur fracture    Plan:   · Non weight bearing right lower extremity  · Analgesics for pain  · NPO at midnight  · Deal Catheter insertion  · Medicine consult for all medical management and preoperative risk stratification  · To OR for IM nail femur fracture of Intertrochanteric femur fracture  · Dispo: Ortho will follow      Ger Cedillo MD
Internal Medicine

## 2022-01-03 NOTE — DISCHARGE NOTE NURSING/CASE MANAGEMENT/SOCIAL WORK - NSDCVIVACCINE_GEN_ALL_CORE_FT
Tdap; 19-Sep-2021 00:19; Kan Kwong (RN); [employer]; [id number] (Exp. Date: 10-May-2023); IntraMuscular; Deltoid Right.; 0.5 milliLiter(s); VIS (VIS Published: 09-May-2013, VIS Presented: 19-Sep-2021);

## 2022-02-02 NOTE — PATIENT PROFILE ADULT - VISION (WITH CORRECTIVE LENSES IF THE PATIENT USUALLY WEARS THEM):
Not only appropriate but indicated for this condition.   Normal vision: sees adequately in most situations; can see medication labels, newsprint

## 2022-04-05 NOTE — ED ADULT NURSE NOTE - PATIENT DISCHARGE SIGNATURE
14-Jun-2017 Cheiloplasty (Complex) Text: A decision was made to reconstruct the defect with a  cheiloplasty.  The defect was undermined extensively.  Additional obicularis oris muscle was excised with a 15 blade scalpel.  The defect was converted into a full thickness wedge to facilite a better cosmetic result.  Small vessels were then tied off with 5-0 monocyrl. The obicularis oris, superficial fascia, adipose and dermis were then reapproximated.  After the deeper layers were approximated the epidermis was reapproximated with particular care given to realign the vermilion border.

## 2022-12-11 ENCOUNTER — INPATIENT (INPATIENT)
Facility: HOSPITAL | Age: 82
LOS: 3 days | Discharge: NURSING, REHAB OR LONG-TERM CARE | DRG: 194 | End: 2022-12-15
Attending: INTERNAL MEDICINE | Admitting: INTERNAL MEDICINE
Payer: COMMERCIAL

## 2022-12-11 VITALS
RESPIRATION RATE: 17 BRPM | SYSTOLIC BLOOD PRESSURE: 166 MMHG | HEART RATE: 85 BPM | OXYGEN SATURATION: 98 % | TEMPERATURE: 98 F | HEIGHT: 60 IN | WEIGHT: 149.91 LBS | DIASTOLIC BLOOD PRESSURE: 80 MMHG

## 2022-12-11 DIAGNOSIS — S22.080A WEDGE COMPRESSION FRACTURE OF T11-T12 VERTEBRA, INITIAL ENCOUNTER FOR CLOSED FRACTURE: Chronic | ICD-10-CM

## 2022-12-11 DIAGNOSIS — R07.89 OTHER CHEST PAIN: ICD-10-CM

## 2022-12-11 LAB
ALBUMIN SERPL ELPH-MCNC: 4.4 G/DL — SIGNIFICANT CHANGE UP (ref 3.3–5)
ALP SERPL-CCNC: 81 U/L — SIGNIFICANT CHANGE UP (ref 40–120)
ALT FLD-CCNC: 13 U/L — SIGNIFICANT CHANGE UP (ref 10–45)
ANION GAP SERPL CALC-SCNC: 19 MMOL/L — HIGH (ref 5–17)
AST SERPL-CCNC: 15 U/L — SIGNIFICANT CHANGE UP (ref 10–40)
BASOPHILS # BLD AUTO: 0.04 K/UL — SIGNIFICANT CHANGE UP (ref 0–0.2)
BASOPHILS NFR BLD AUTO: 0.3 % — SIGNIFICANT CHANGE UP (ref 0–2)
BILIRUB SERPL-MCNC: 0.5 MG/DL — SIGNIFICANT CHANGE UP (ref 0.2–1.2)
BUN SERPL-MCNC: 22 MG/DL — SIGNIFICANT CHANGE UP (ref 7–23)
CALCIUM SERPL-MCNC: 9.7 MG/DL — SIGNIFICANT CHANGE UP (ref 8.4–10.5)
CHLORIDE SERPL-SCNC: 101 MMOL/L — SIGNIFICANT CHANGE UP (ref 96–108)
CO2 SERPL-SCNC: 23 MMOL/L — SIGNIFICANT CHANGE UP (ref 22–31)
CREAT SERPL-MCNC: 0.97 MG/DL — SIGNIFICANT CHANGE UP (ref 0.5–1.3)
EGFR: 58 ML/MIN/1.73M2 — LOW
EOSINOPHIL # BLD AUTO: 0.01 K/UL — SIGNIFICANT CHANGE UP (ref 0–0.5)
EOSINOPHIL NFR BLD AUTO: 0.1 % — SIGNIFICANT CHANGE UP (ref 0–6)
FLUAV AG NPH QL: SIGNIFICANT CHANGE UP
FLUBV AG NPH QL: SIGNIFICANT CHANGE UP
GLUCOSE SERPL-MCNC: 129 MG/DL — HIGH (ref 70–99)
HCT VFR BLD CALC: 45.7 % — HIGH (ref 34.5–45)
HGB BLD-MCNC: 14.5 G/DL — SIGNIFICANT CHANGE UP (ref 11.5–15.5)
IMM GRANULOCYTES NFR BLD AUTO: 0.7 % — SIGNIFICANT CHANGE UP (ref 0–0.9)
LYMPHOCYTES # BLD AUTO: 1.05 K/UL — SIGNIFICANT CHANGE UP (ref 1–3.3)
LYMPHOCYTES # BLD AUTO: 7.6 % — LOW (ref 13–44)
MCHC RBC-ENTMCNC: 31.5 PG — SIGNIFICANT CHANGE UP (ref 27–34)
MCHC RBC-ENTMCNC: 31.7 GM/DL — LOW (ref 32–36)
MCV RBC AUTO: 99.3 FL — SIGNIFICANT CHANGE UP (ref 80–100)
MONOCYTES # BLD AUTO: 0.81 K/UL — SIGNIFICANT CHANGE UP (ref 0–0.9)
MONOCYTES NFR BLD AUTO: 5.9 % — SIGNIFICANT CHANGE UP (ref 2–14)
NEUTROPHILS # BLD AUTO: 11.73 K/UL — HIGH (ref 1.8–7.4)
NEUTROPHILS NFR BLD AUTO: 85.4 % — HIGH (ref 43–77)
NRBC # BLD: 0 /100 WBCS — SIGNIFICANT CHANGE UP (ref 0–0)
PLATELET # BLD AUTO: 354 K/UL — SIGNIFICANT CHANGE UP (ref 150–400)
POTASSIUM SERPL-MCNC: 3.7 MMOL/L — SIGNIFICANT CHANGE UP (ref 3.5–5.3)
POTASSIUM SERPL-SCNC: 3.7 MMOL/L — SIGNIFICANT CHANGE UP (ref 3.5–5.3)
PROT SERPL-MCNC: 8.5 G/DL — HIGH (ref 6–8.3)
RBC # BLD: 4.6 M/UL — SIGNIFICANT CHANGE UP (ref 3.8–5.2)
RBC # FLD: 13.6 % — SIGNIFICANT CHANGE UP (ref 10.3–14.5)
RSV RNA NPH QL NAA+NON-PROBE: SIGNIFICANT CHANGE UP
SARS-COV-2 RNA SPEC QL NAA+PROBE: SIGNIFICANT CHANGE UP
SODIUM SERPL-SCNC: 143 MMOL/L — SIGNIFICANT CHANGE UP (ref 135–145)
TROPONIN T, HIGH SENSITIVITY RESULT: 15 NG/L — SIGNIFICANT CHANGE UP (ref 0–51)
WBC # BLD: 13.74 K/UL — HIGH (ref 3.8–10.5)
WBC # FLD AUTO: 13.74 K/UL — HIGH (ref 3.8–10.5)

## 2022-12-11 PROCEDURE — 99497 ADVNCD CARE PLAN 30 MIN: CPT | Mod: 25

## 2022-12-11 PROCEDURE — 71275 CT ANGIOGRAPHY CHEST: CPT | Mod: 26,MA

## 2022-12-11 PROCEDURE — 99284 EMERGENCY DEPT VISIT MOD MDM: CPT

## 2022-12-11 PROCEDURE — 71045 X-RAY EXAM CHEST 1 VIEW: CPT | Mod: 26

## 2022-12-11 PROCEDURE — 99223 1ST HOSP IP/OBS HIGH 75: CPT

## 2022-12-11 PROCEDURE — 93010 ELECTROCARDIOGRAM REPORT: CPT

## 2022-12-11 PROCEDURE — 99285 EMERGENCY DEPT VISIT HI MDM: CPT

## 2022-12-11 RX ORDER — SODIUM CHLORIDE 9 MG/ML
500 INJECTION INTRAMUSCULAR; INTRAVENOUS; SUBCUTANEOUS ONCE
Refills: 0 | Status: COMPLETED | OUTPATIENT
Start: 2022-12-11 | End: 2022-12-11

## 2022-12-11 RX ORDER — ACETAMINOPHEN 500 MG
650 TABLET ORAL EVERY 6 HOURS
Refills: 0 | Status: DISCONTINUED | OUTPATIENT
Start: 2022-12-11 | End: 2022-12-15

## 2022-12-11 RX ADMIN — SODIUM CHLORIDE 500 MILLILITER(S): 9 INJECTION INTRAMUSCULAR; INTRAVENOUS; SUBCUTANEOUS at 17:16

## 2022-12-11 NOTE — H&P ADULT - ADDITIONAL PE
T(F): 98 (11 Dec 2022 15:45), Max: 98 (11 Dec 2022 15:45)  HR: 92 (11 Dec 2022 15:45) (85 - 92)  BP: 156/92 (11 Dec 2022 15:45) (156/92 - 166/80)  RR: 16 (11 Dec 2022 15:45) (16 - 17)  SpO2: 100% (11 Dec 2022 15:45) (98% - 100%)room air

## 2022-12-11 NOTE — H&P ADULT - PROBLEM SELECTOR PLAN 1
leukocytosis 14, CXR/CT chest findings of LLL peribronchial opacity with worsening productive cough and + sick contact  - will treat with Levaquin for bronchitis with suspected CAP (unsure reaction to PCN), side effects of Levaquin including tendonitis discussed with patient and son  - will continue with Mucinex, will start Duoneb ATC Q8 for wheezing and will monitor clinically  - will hold Lasix 20mg po 3x wk due to decrease PO intake

## 2022-12-11 NOTE — ED PROVIDER NOTE - PROGRESS NOTE DETAILS
Rika West- spoke to Dr. Graf for admission, will admit to Dr. Felix Roe, he will let Dr. Roe know. Family and patient updated.

## 2022-12-11 NOTE — H&P ADULT - NSICDXPASTMEDICALHX_GEN_ALL_CORE_FT
PAST MEDICAL HISTORY:  Afib     Arthritis     High cholesterol     Hypertension     Osteoporosis      PAST MEDICAL HISTORY:  Afib     Arthritis     Dementia     Frequent UTI     High cholesterol     Hypertension     Osteoporosis

## 2022-12-11 NOTE — ED PROVIDER NOTE - OBJECTIVE STATEMENT
82-year-old female with history of A. fib on Eliquis, dementia, hypertension, hyperlipidemia, frequent UTIs presenting for persistent spitting up of clear phlegm.  Patient reports chest congestion.  Denies shortness of breath, fevers, chills, nausea, vomiting.  Patient arrives with son who lives with her and states that she has been in her usual state of health for the last several days but he became concerned today when she was constantly spitting and clearing her throat.

## 2022-12-11 NOTE — H&P ADULT - CONVERSATION DETAILS
I personally spent 16 min face-to-face time with patient and additional 10 min with son, Asa III/HCP on the phone in discussion of diagnosis, treatment plans/options and advance directives including but not limited to DNR/DNI.  Patient is DNR/DNI,  MOLST form completed and placed in chart.  All questions answered.

## 2022-12-11 NOTE — H&P ADULT - NSHPADDITIONALINFOADULT_GEN_ALL_CORE
d/w Dr. Roe who requested for initial evaluation/H&P and will assume care of this patient in am of 12/12/22.

## 2022-12-11 NOTE — ED ADULT NURSE NOTE - NSIMPLEMENTINTERV_GEN_ALL_ED
Implemented All Fall Risk Interventions:  [place] to call system. Call bell, personal items and telephone within reach. Instruct patient to call for assistance. Room bathroom lighting operational. Non-slip footwear when patient is off stretcher. Physically safe environment: no spills, clutter or unnecessary equipment. Stretcher in lowest position, wheels locked, appropriate side rails in place. Provide visual cue, wrist band, yellow gown, etc. Monitor gait and stability. Monitor for mental status changes and reorient to person, place, and time. Review medications for side effects contributing to fall risk. Reinforce activity limits and safety measures with patient and family.

## 2022-12-11 NOTE — H&P ADULT - ASSESSMENT
83 y/o F Hx Afib on Eliquis, HTN, HLD, dementia, OP p/w 83 y/o F Hx Afib on Eliquis, HTN, HLD, dementia, OP p/w worsening productive cough a/w CAP

## 2022-12-11 NOTE — H&P ADULT - NSHPSOCIALHISTORY_GEN_ALL_CORE
, used to work for a telephone company, currently lives with son and his wife  never a smoker but second hand smoker from , no alcohol  recent decline in ambulation due to gen weakness, no assist device

## 2022-12-11 NOTE — ED PROVIDER NOTE - PHYSICAL EXAMINATION
Gen: NAD, AAOx3, kyphotic, non-toxic appearing  HEENT: NCAT, normal conjunctiva, dry oral mucosa   Lung: speaking in full sentences, good aeration bilaterally, lungs CTA b/l  CV: regular rate and rhythm. cap refill <2x. peripheral pulses 2+bilaterally   Abd: soft, ND, NT  MSK: no visible deformities  Neuro: No focal deficits  Skin: Intact  Psych: normal affect

## 2022-12-11 NOTE — ED PROVIDER NOTE - CLINICAL SUMMARY MEDICAL DECISION MAKING FREE TEXT BOX
82-year-old female history of A. fib on Eliquis, increasing immobility presenting with spitting up clear phlegm.  No infectious symptoms.  Nontoxic-appearing, clear lungs, heart sounds normal, dry oral mucosa.  Will assess for pneumonia versus viral syndrome.  Plan for labs, chest x-ray, gentle hydration and reassess.

## 2022-12-11 NOTE — ED PROVIDER NOTE - ATTENDING CONTRIBUTION TO CARE
I performed a face to face history and physical exam of the patient and discussed their management with the resident. I reviewed the resident's note and agree with the documented findings and plan of care.     81 y/o female with hx of Afib on Eloquis, dementia, HTN, HLD, frequent UTIs, immobile (just gets out of bed to chair), BIBA for frequent "spitting", clear phlegm, no cough, pt states she feels something in her chest, denies SOB, no fever, no N/V, no abdominal pain, no leg pain, no leg swelling, pt denies dysuria, on exam with blood tinged sputum on tissue though son states it is from her lipstick(pt with no lipstick on right now), pulse ox fluctuating from 94-97%, lungs cta b/l, cardiac no m/g/r, abd soft nt/nd, ext -c/c/e, with pt's immobility, questionable blood tinged sputum, hr, questionable chest pain, will r/o PE, will obtain labs, CE, U/A, tele, CTA lung

## 2022-12-11 NOTE — ED ADULT NURSE NOTE - OBJECTIVE STATEMENT
82y female w/ frequent UTI presents to ED w/ cough. As per ems, pt has had a productive cough for the past week. Pt reports no medical history and as per son, pt is healthy aside from frequent UTI's. Pt states she normally ambulates at home w/ no assistance but not recently due to the cough. Son states that pt had surgery 4 years ago on her spine and hasn't ambulated well since then. Pt is frequently spitting up phlegm in the bed but appears well in no distress w/ stable VS. Pt reports chest discomfort when she coughs. Pt denies SOB, headaches, lightheadedness, abdominal pain, n/v/d, gi/gu symtpoms. Pt is not ambulatory in the ED.

## 2022-12-12 DIAGNOSIS — Z29.9 ENCOUNTER FOR PROPHYLACTIC MEASURES, UNSPECIFIED: ICD-10-CM

## 2022-12-12 DIAGNOSIS — E78.5 HYPERLIPIDEMIA, UNSPECIFIED: ICD-10-CM

## 2022-12-12 DIAGNOSIS — J18.9 PNEUMONIA, UNSPECIFIED ORGANISM: ICD-10-CM

## 2022-12-12 DIAGNOSIS — R53.1 WEAKNESS: ICD-10-CM

## 2022-12-12 DIAGNOSIS — F03.90 UNSPECIFIED DEMENTIA, UNSPECIFIED SEVERITY, WITHOUT BEHAVIORAL DISTURBANCE, PSYCHOTIC DISTURBANCE, MOOD DISTURBANCE, AND ANXIETY: ICD-10-CM

## 2022-12-12 DIAGNOSIS — I48.20 CHRONIC ATRIAL FIBRILLATION, UNSPECIFIED: ICD-10-CM

## 2022-12-12 LAB
ANION GAP SERPL CALC-SCNC: 13 MMOL/L — SIGNIFICANT CHANGE UP (ref 5–17)
BASOPHILS # BLD AUTO: 0.04 K/UL — SIGNIFICANT CHANGE UP (ref 0–0.2)
BASOPHILS NFR BLD AUTO: 0.3 % — SIGNIFICANT CHANGE UP (ref 0–2)
BUN SERPL-MCNC: 16 MG/DL — SIGNIFICANT CHANGE UP (ref 7–23)
CALCIUM SERPL-MCNC: 8.9 MG/DL — SIGNIFICANT CHANGE UP (ref 8.4–10.5)
CHLORIDE SERPL-SCNC: 106 MMOL/L — SIGNIFICANT CHANGE UP (ref 96–108)
CO2 SERPL-SCNC: 26 MMOL/L — SIGNIFICANT CHANGE UP (ref 22–31)
CREAT SERPL-MCNC: 0.81 MG/DL — SIGNIFICANT CHANGE UP (ref 0.5–1.3)
DIGOXIN SERPL-MCNC: <0.5 NG/ML — LOW (ref 0.8–2)
EGFR: 72 ML/MIN/1.73M2 — SIGNIFICANT CHANGE UP
EOSINOPHIL # BLD AUTO: 0.03 K/UL — SIGNIFICANT CHANGE UP (ref 0–0.5)
EOSINOPHIL NFR BLD AUTO: 0.2 % — SIGNIFICANT CHANGE UP (ref 0–6)
GLUCOSE SERPL-MCNC: 108 MG/DL — HIGH (ref 70–99)
HCT VFR BLD CALC: 39.3 % — SIGNIFICANT CHANGE UP (ref 34.5–45)
HGB BLD-MCNC: 12.6 G/DL — SIGNIFICANT CHANGE UP (ref 11.5–15.5)
IMM GRANULOCYTES NFR BLD AUTO: 0.5 % — SIGNIFICANT CHANGE UP (ref 0–0.9)
LYMPHOCYTES # BLD AUTO: 1.47 K/UL — SIGNIFICANT CHANGE UP (ref 1–3.3)
LYMPHOCYTES # BLD AUTO: 10.7 % — LOW (ref 13–44)
MCHC RBC-ENTMCNC: 31.3 PG — SIGNIFICANT CHANGE UP (ref 27–34)
MCHC RBC-ENTMCNC: 32.1 GM/DL — SIGNIFICANT CHANGE UP (ref 32–36)
MCV RBC AUTO: 97.5 FL — SIGNIFICANT CHANGE UP (ref 80–100)
MONOCYTES # BLD AUTO: 1.12 K/UL — HIGH (ref 0–0.9)
MONOCYTES NFR BLD AUTO: 8.1 % — SIGNIFICANT CHANGE UP (ref 2–14)
NEUTROPHILS # BLD AUTO: 11.04 K/UL — HIGH (ref 1.8–7.4)
NEUTROPHILS NFR BLD AUTO: 80.2 % — HIGH (ref 43–77)
NRBC # BLD: 0 /100 WBCS — SIGNIFICANT CHANGE UP (ref 0–0)
PLATELET # BLD AUTO: 334 K/UL — SIGNIFICANT CHANGE UP (ref 150–400)
POTASSIUM SERPL-MCNC: 3.7 MMOL/L — SIGNIFICANT CHANGE UP (ref 3.5–5.3)
POTASSIUM SERPL-SCNC: 3.7 MMOL/L — SIGNIFICANT CHANGE UP (ref 3.5–5.3)
RBC # BLD: 4.03 M/UL — SIGNIFICANT CHANGE UP (ref 3.8–5.2)
RBC # FLD: 13.8 % — SIGNIFICANT CHANGE UP (ref 10.3–14.5)
SODIUM SERPL-SCNC: 145 MMOL/L — SIGNIFICANT CHANGE UP (ref 135–145)
WBC # BLD: 13.77 K/UL — HIGH (ref 3.8–10.5)
WBC # FLD AUTO: 13.77 K/UL — HIGH (ref 3.8–10.5)

## 2022-12-12 RX ORDER — DILTIAZEM HCL 120 MG
180 CAPSULE, EXT RELEASE 24 HR ORAL DAILY
Refills: 0 | Status: DISCONTINUED | OUTPATIENT
Start: 2022-12-12 | End: 2022-12-15

## 2022-12-12 RX ORDER — APIXABAN 2.5 MG/1
2.5 TABLET, FILM COATED ORAL
Refills: 0 | Status: DISCONTINUED | OUTPATIENT
Start: 2022-12-12 | End: 2022-12-15

## 2022-12-12 RX ORDER — IPRATROPIUM/ALBUTEROL SULFATE 18-103MCG
3 AEROSOL WITH ADAPTER (GRAM) INHALATION EVERY 8 HOURS
Refills: 0 | Status: COMPLETED | OUTPATIENT
Start: 2022-12-12 | End: 2022-12-14

## 2022-12-12 RX ORDER — PREGABALIN 225 MG/1
1000 CAPSULE ORAL DAILY
Refills: 0 | Status: DISCONTINUED | OUTPATIENT
Start: 2022-12-12 | End: 2022-12-15

## 2022-12-12 RX ORDER — FERROUS SULFATE 325(65) MG
325 TABLET ORAL DAILY
Refills: 0 | Status: DISCONTINUED | OUTPATIENT
Start: 2022-12-12 | End: 2022-12-15

## 2022-12-12 RX ORDER — DIGOXIN 250 MCG
1 TABLET ORAL
Qty: 0 | Refills: 0 | DISCHARGE

## 2022-12-12 RX ORDER — DIGOXIN 250 MCG
125 TABLET ORAL
Refills: 0 | Status: DISCONTINUED | OUTPATIENT
Start: 2022-12-12 | End: 2022-12-15

## 2022-12-12 RX ORDER — DONEPEZIL HYDROCHLORIDE 10 MG/1
10 TABLET, FILM COATED ORAL AT BEDTIME
Refills: 0 | Status: DISCONTINUED | OUTPATIENT
Start: 2022-12-12 | End: 2022-12-15

## 2022-12-12 RX ORDER — FOLIC ACID 0.8 MG
1 TABLET ORAL DAILY
Refills: 0 | Status: DISCONTINUED | OUTPATIENT
Start: 2022-12-12 | End: 2022-12-15

## 2022-12-12 RX ORDER — ASCORBIC ACID 60 MG
500 TABLET,CHEWABLE ORAL DAILY
Refills: 0 | Status: DISCONTINUED | OUTPATIENT
Start: 2022-12-12 | End: 2022-12-15

## 2022-12-12 RX ORDER — MEMANTINE HYDROCHLORIDE 10 MG/1
10 TABLET ORAL
Refills: 0 | Status: DISCONTINUED | OUTPATIENT
Start: 2022-12-12 | End: 2022-12-15

## 2022-12-12 RX ADMIN — MEMANTINE HYDROCHLORIDE 10 MILLIGRAM(S): 10 TABLET ORAL at 17:23

## 2022-12-12 RX ADMIN — APIXABAN 2.5 MILLIGRAM(S): 2.5 TABLET, FILM COATED ORAL at 17:23

## 2022-12-12 RX ADMIN — DONEPEZIL HYDROCHLORIDE 10 MILLIGRAM(S): 10 TABLET, FILM COATED ORAL at 22:56

## 2022-12-12 RX ADMIN — Medication 3 MILLILITER(S): at 22:56

## 2022-12-12 RX ADMIN — Medication 325 MILLIGRAM(S): at 12:33

## 2022-12-12 RX ADMIN — Medication 1 MILLIGRAM(S): at 12:34

## 2022-12-12 RX ADMIN — Medication 600 MILLIGRAM(S): at 17:24

## 2022-12-12 RX ADMIN — Medication 125 MICROGRAM(S): at 10:12

## 2022-12-12 RX ADMIN — Medication 500 MILLIGRAM(S): at 12:34

## 2022-12-12 RX ADMIN — PREGABALIN 1000 MICROGRAM(S): 225 CAPSULE ORAL at 12:34

## 2022-12-12 RX ADMIN — Medication 600 MILLIGRAM(S): at 05:38

## 2022-12-12 RX ADMIN — Medication 180 MILLIGRAM(S): at 05:39

## 2022-12-12 RX ADMIN — MEMANTINE HYDROCHLORIDE 10 MILLIGRAM(S): 10 TABLET ORAL at 05:39

## 2022-12-12 RX ADMIN — Medication 3 MILLILITER(S): at 05:39

## 2022-12-12 RX ADMIN — APIXABAN 2.5 MILLIGRAM(S): 2.5 TABLET, FILM COATED ORAL at 05:39

## 2022-12-12 NOTE — PROGRESS NOTE ADULT - SUBJECTIVE AND OBJECTIVE BOX
Patient is a 82y old  Female who presents with a chief complaint of worsening productive cough (12 Dec 2022 16:12)      INTERVAL HPI/OVERNIGHT EVENTS:  T(C): 37.1 (12-13-22 @ 04:48), Max: 37.1 (12-13-22 @ 04:48)  HR: 87 (12-13-22 @ 04:48) (79 - 99)  BP: 115/73 (12-13-22 @ 04:48) (115/73 - 154/65)  RR: 19 (12-13-22 @ 04:48) (16 - 19)  SpO2: 95% (12-13-22 @ 04:48) (94% - 96%)  Wt(kg): --  I&O's Summary      PAST MEDICAL & SURGICAL HISTORY:  Arthritis      Hypertension      High cholesterol      Afib      Osteoporosis      Frequent UTI      Dementia      Compression fracture of T12 vertebra          SOCIAL HISTORY  Alcohol:  Tobacco:  Illicit substance use:    FAMILY HISTORY:    REVIEW OF SYSTEMS:  CONSTITUTIONAL: No fever, weight loss, or fatigue  EYES: No eye pain, visual disturbances, or discharge  ENMT:  No difficulty hearing, tinnitus, vertigo; No sinus or throat pain  NECK: No pain or stiffness  RESPIRATORY: No cough, wheezing, chills or hemoptysis; No shortness of breath  CARDIOVASCULAR: No chest pain, palpitations, dizziness, or leg swelling  GASTROINTESTINAL: No abdominal or epigastric pain. No nausea, vomiting, or hematemesis; No diarrhea or constipation. No melena or hematochezia.  GENITOURINARY: No dysuria, frequency, hematuria, or incontinence  NEUROLOGICAL: No headaches, memory loss, loss of strength, numbness, or tremors  SKIN: No itching, burning, rashes, or lesions   LYMPH NODES: No enlarged glands  ENDOCRINE: No heat or cold intolerance; No hair loss  MUSCULOSKELETAL: No joint pain or swelling; No muscle, back, or extremity pain  PSYCHIATRIC: No depression, anxiety, mood swings, or difficulty sleeping  HEME/LYMPH: No easy bruising, or bleeding gums  ALLERY AND IMMUNOLOGIC: No hives or eczema    RADIOLOGY & ADDITIONAL TESTS:    Imaging Personally Reviewed:  [ ] YES  [ ] NO    Consultant(s) Notes Reviewed:  [ ] YES  [ ] NO    PHYSICAL EXAM:  GENERAL: NAD, well-groomed, well-developed  HEAD:  Atraumatic, Normocephalic  EYES: EOMI, PERRLA, conjunctiva and sclera clear  ENMT: No tonsillar erythema, exudates, or enlargement; Moist mucous membranes, Good dentition, No lesions  NECK: Supple, No JVD, Normal thyroid  NERVOUS SYSTEM:  Alert & Oriented X3, Good concentration; Motor Strength 5/5 B/L upper and lower extremities; DTRs 2+ intact and symmetric  CHEST/LUNG: Clear to percussion bilaterally; No rales, rhonchi, wheezing, or rubs  HEART: Regular rate and rhythm; No murmurs, rubs, or gallops  ABDOMEN: Soft, Nontender, Nondistended; Bowel sounds present  EXTREMITIES:  2+ Peripheral Pulses, No clubbing, cyanosis, or edema  LYMPH: No lymphadenopathy noted  SKIN: No rashes or lesions    LABS:                        12.6   13.77 )-----------( 334      ( 12 Dec 2022 06:54 )             39.3     12-12    145  |  106  |  16  ----------------------------<  108<H>  3.7   |  26  |  0.81    Ca    8.9      12 Dec 2022 06:54    TPro  8.5<H>  /  Alb  4.4  /  TBili  0.5  /  DBili  x   /  AST  15  /  ALT  13  /  AlkPhos  81  12-11        CAPILLARY BLOOD GLUCOSE                MEDICATIONS  (STANDING):  albuterol/ipratropium for Nebulization 3 milliLiter(s) Nebulizer every 8 hours  apixaban 2.5 milliGRAM(s) Oral two times a day  ascorbic acid 500 milliGRAM(s) Oral daily  cyanocobalamin 1000 MICROGram(s) Oral daily  digoxin     Tablet 125 MICROGram(s) Oral <User Schedule>  diltiazem    milliGRAM(s) Oral daily  donepezil 10 milliGRAM(s) Oral at bedtime  ferrous    sulfate 325 milliGRAM(s) Oral daily  folic acid 1 milliGRAM(s) Oral daily  guaiFENesin  milliGRAM(s) Oral every 12 hours  levoFLOXacin IVPB 250 milliGRAM(s) IV Intermittent every 24 hours  memantine 10 milliGRAM(s) Oral two times a day    MEDICATIONS  (PRN):  acetaminophen     Tablet .. 650 milliGRAM(s) Oral every 6 hours PRN Temp greater or equal to 38C (100.4F), Mild Pain (1 - 3)      Care Discussed with Consultants/Other Providers [ ] YES  [ ] NO

## 2022-12-12 NOTE — PHYSICAL THERAPY INITIAL EVALUATION ADULT - ADDITIONAL COMMENTS
History obtained from pt, confirmed by conversation with pt's son Delroy on the phone. Pt lives with son and his family in private house. Stays in her room on the second floor. Has limited mobility- can transfer to chair and commode at bedside independently, but does not ambulate. Has had 3-4 falls in the last year. There is a chair lift to the second floor, but only 10 stairs until landing, then there is 3 stairs with no lift. There is a tub bench, but pt does not use bathroom for bathing. Has HHA 5 hrs/day. Owns rolling walker, cane, commode, tub bench, and wheelchair (for emergencies, does not use in house.) As per son, because of her limited mobility, pt has not been motivated to increase her activity level.

## 2022-12-12 NOTE — CONSULT NOTE ADULT - SUBJECTIVE AND OBJECTIVE BOX
DATE OF SERVICE: 12-12-22 @ 16:13    CHIEF COMPLAINT:Patient is a 82y old  Female who presents with a chief complaint of worsening productive cough (11 Dec 2022 22:30)      HISTORY OF PRESENT ILLNESS:  81 y/o F Hx A. fib on Eliquis, dementia, hypertension, hyperlipidemia, frequent UTIs, osteoporosis p/w worsening cough.  Hx obtained from patient and collateral from son, Delroy the third who lives with her.  Patient had URI symptoms (chest congestion and cough) for the past 5 days with clear phlegm, taking mucinex without improvement, got significantly worse today with persistent cough and clear and sometimes brownish maroon color phlegm, decrease PO intake, and weakness.  Denes shortness of breath, fevers, chills, nausea, vomiting. + sick contact from son with URI during Thanksgiving.      PAST MEDICAL & SURGICAL HISTORY:  Arthritis      Hypertension      High cholesterol      Afib      Osteoporosis      Frequent UTI      Dementia      Compression fracture of T12 vertebra              MEDICATIONS:  apixaban 2.5 milliGRAM(s) Oral two times a day  digoxin     Tablet 125 MICROGram(s) Oral <User Schedule>  diltiazem    milliGRAM(s) Oral daily    levoFLOXacin IVPB 250 milliGRAM(s) IV Intermittent every 24 hours    albuterol/ipratropium for Nebulization 3 milliLiter(s) Nebulizer every 8 hours  guaiFENesin  milliGRAM(s) Oral every 12 hours    acetaminophen     Tablet .. 650 milliGRAM(s) Oral every 6 hours PRN  donepezil 10 milliGRAM(s) Oral at bedtime  memantine 10 milliGRAM(s) Oral two times a day        ascorbic acid 500 milliGRAM(s) Oral daily  cyanocobalamin 1000 MICROGram(s) Oral daily  ferrous    sulfate 325 milliGRAM(s) Oral daily  folic acid 1 milliGRAM(s) Oral daily      FAMILY HISTORY:  No pertinent family history in first degree relatives        Non-contributory    SOCIAL HISTORY:    [- ] Tobacco  [- ] Drugs  [- ] Alcohol    Allergies    penicillin (Unknown)    Intolerances    	    REVIEW OF SYSTEMS:   CONSTITUTIONAL: No fever  EYES: No eye pain, visual disturbances, or discharge  ENMT:  No difficulty hearing, tinnitus  NECK: No pain or stiffness  RESPIRATORY: + cough, wheezing,  CARDIOVASCULAR: No chest pain, palpitations, passing out, dizziness, or leg swelling  GASTROINTESTINAL:  No nausea, vomiting, diarrhea or constipation. No melena.  GENITOURINARY: No dysuria, hematuria  NEUROLOGICAL: No stroke like symptoms  SKIN: No burning or lesions   ENDOCRINE: No heat or cold intolerance  MUSCULOSKELETAL: No joint pain or swelling  PSYCHIATRIC: No  anxiety, mood swings  HEME/LYMPH: No bleeding gums  ALLERGY AND IMMUNOLOGIC: No hives or eczema	    All other ROS negative    PHYSICAL EXAM:  T(C): 36.7 (12-12-22 @ 15:31), Max: 36.8 (12-12-22 @ 07:15)  HR: 84 (12-12-22 @ 15:31) (79 - 99)  BP: 142/78 (12-12-22 @ 15:31) (116/58 - 154/65)  RR: 18 (12-12-22 @ 15:31) (15 - 18)  SpO2: 95% (12-12-22 @ 15:31) (94% - 97%)  Wt(kg): --  I&O's Summary      Appearance: Normal	  HEENT:   Normal oral mucosa, EOMI	  Cardiovascular:  S1 S2, No JVD,    Respiratory: Lungs clear to auscultation	  Psychiatry: Alert  Gastrointestinal:  Soft, Non-tender, + BS	  Skin: No rashes   Neurologic: Non-focal  Extremities:  No edema  Vascular: Peripheral pulses palpable    	    	  	  CARDIAC MARKERS:  Labs personally reviewed by me                                  12.6   13.77 )-----------( 334      ( 12 Dec 2022 06:54 )             39.3     12-12    145  |  106  |  16  ----------------------------<  108<H>  3.7   |  26  |  0.81    Ca    8.9      12 Dec 2022 06:54    TPro  8.5<H>  /  Alb  4.4  /  TBili  0.5  /  DBili  x   /  AST  15  /  ALT  13  /  AlkPhos  81  12-11          EKG: Personally reviewed by me - AF with  bpm  Radiology: Personally reviewed by me -     < from: Xray Chest 1 View- PORTABLE-Urgent (Xray Chest 1 View- PORTABLE-Urgent .) (12.11.22 @ 17:49) >  Left heart border retrocardiac opacity.  No pleural effusion or pneumothorax.  Cardiomediastinal silhouette size is within normal limits.  No acute osseous abnormality. Partially imaged thoracolumbar spinal   fusion hardware    IMPRESSION:  Opacity behind the left retrocardiac border. Correlate with pending chest   CT.    < end of copied text >  < from: CT Angio Chest PE Protocol w/ IV Cont (12.11.22 @ 18:05) >  1. No acute pulmonary embolus.  2. New peribronchial nodular opacities in left lower lobe, probably   inflammatory/infectious, possibly small airway disease.  3. Large hiatal hernia.    < end of copied text >  < from: Transthoracic Echocardiogram (08.25.21 @ 10:07) >  EF (Peterson Rule): 72 %Doppler Peak Velocity (m/sec):  AoV=1.6  ------------------------------------------------------------------------  Observations:  Mitral Valve: Mitral annular calcification, otherwise  normal mitral valve. Minimal mitral regurgitation.  Aortic Valve/Aorta: Calcified trileaflet aortic valve with  normal opening. Peak transaortic valve gradient equals 10  mm Hg, mean transaortic valve gradient equals 5 mm Hg,  aortic valve velocity time integral equals 31 cm. Peak left  ventricular outflow tract gradient equals 6 mm Hg, mean  gradient is equal to 3 mm Hg, LVOT velocity time integral  equals 23 cm.  Aortic Root: 2.9 cm.  Left Atrium: Normal left atrium.  LA volume index = 23  cc/m2.  Left Ventricle: Normal left ventricular systolic function.  No segmental wall motion abnormalities. Normal left  ventricular internal dimensions and wall thicknesses.  Normal diastolic function  Right Heart: Normal right atrium. Normal right ventricular  size and function. Normal tricuspid valve. Minimal  tricuspid regurgitation. Normal pulmonic valve.  Mild-moderate pulmonic regurgitation.  Pericardium/Pleura: Normal pericardium with trace  pericardial effusion. Pericardial fat pad noted.  Hemodynamic: Estimated right atrial pressure is 8 mm Hg.  Estimated right ventricular systolic pressure equals 27 mm  Hg, assuming right atrial pressure equals 8 mm Hg,  consistent with normal pulmonary pressures.  ------------------------------------------------------------------------  Conclusions:  1. Normal left ventricular systolic function. No segmental  wall motion abnormalities.  2. Normal right ventricular size and function.  3. Normal tricuspid valve. Minimal tricuspid regurgitation.  *** Compared with echocardiogram of 9/21/2020, no  significant changes noted.    < end of copied text >      Assessment /Plan:     81 y/o F Hx A. fib on Eliquis, dementia, hypertension, hyperlipidemia, frequent UTIs, osteoporosis p/w worsening cough.  Hx obtained from patient and collateral from son, Delroy the third who lives with her.  Patient had URI symptoms (chest congestion and cough) for the past 5 days with clear phlegm, taking mucinex without improvement, got significantly worse today with persistent cough and clear and sometimes brownish maroon color phlegm, decrease PO intake, and weakness.  Denes shortness of breath, fevers, chills, nausea, vomiting. + sick contact from son with URI during Thanksgiving. Followed by OP Cardiologist Dr. Tompkins but has not been seen in >1 year.     1. Chronic Atrial Fibrillation  - AF with RVR upon presentation but now rate controlled  - Dig level wnl  - c/w home meds: apixaban 2.5mg PO BID, Diltiazem 180mg PO daily, digoxin 125mcg PO daily    2. Hypertension  - BP currently well controlled  - Cont home meds: Diltiazem 180mg PO daily    3. HLD  - Resume lovastatin upon DC    4. Diastolic Heart Failure  - TTE from 2021 shows normal LV systolic function, normal RV, mild TR  - Appears euvolemic  - Home lasix on hold d/t acute infection  - Will cont to assess volume status. Ok to cont to hold diuresis.    5. Community Acquired Pneumonia  - p/w cough, URI symptoms  - CT Chest shows new peribronchial nodular opacities in left lower lobe, probably inflammatory/infectious, possibly small airway disease  - c/w levaquin, duonebs      Differential diagnosis and plan of care discussed with patient after the evaluation. Counseling on diet, nutritional counseling, weight management, exercise and medication compliance was done.   Advanced care planning/advanced directives discussed with patient/family. DNR status including forceful chest compressions to attempt to restart the heart, ventilator support/artificial breathing, electric shock, artificial nutrition, health care proxy, Molst form all discussed with pt. Pt wishes to consider. More than fifteen minutes spent on discussing advanced directives.     Chica Khalil [facility]  Gustavo Graf DO [facility]  Cardiovascular Medicine  [address] [place] [address] Dr, [address]  Available for call or text via [employer]  Office [phone]   DATE OF SERVICE: 12-12-22 @ 16:13    CHIEF COMPLAINT:Patient is a 82y old  Female who presents with a chief complaint of worsening productive cough (11 Dec 2022 22:30)      HISTORY OF PRESENT ILLNESS:  81 y/o F Hx A. fib on Eliquis, dementia, hypertension, hyperlipidemia, frequent UTIs, osteoporosis p/w worsening cough.  Hx obtained from patient and collateral from son, Delroy the third who lives with her.  Patient had URI symptoms (chest congestion and cough) for the past 5 days with clear phlegm, taking mucinex without improvement, got significantly worse today with persistent cough and clear and sometimes brownish maroon color phlegm, decrease PO intake, and weakness.  Denes shortness of breath, fevers, chills, nausea, vomiting. + sick contact from son with URI during Thanksgiving.      PAST MEDICAL & SURGICAL HISTORY:  Arthritis      Hypertension      High cholesterol      Afib      Osteoporosis      Frequent UTI      Dementia      Compression fracture of T12 vertebra              MEDICATIONS:  apixaban 2.5 milliGRAM(s) Oral two times a day  digoxin     Tablet 125 MICROGram(s) Oral <User Schedule>  diltiazem    milliGRAM(s) Oral daily    levoFLOXacin IVPB 250 milliGRAM(s) IV Intermittent every 24 hours    albuterol/ipratropium for Nebulization 3 milliLiter(s) Nebulizer every 8 hours  guaiFENesin  milliGRAM(s) Oral every 12 hours    acetaminophen     Tablet .. 650 milliGRAM(s) Oral every 6 hours PRN  donepezil 10 milliGRAM(s) Oral at bedtime  memantine 10 milliGRAM(s) Oral two times a day        ascorbic acid 500 milliGRAM(s) Oral daily  cyanocobalamin 1000 MICROGram(s) Oral daily  ferrous    sulfate 325 milliGRAM(s) Oral daily  folic acid 1 milliGRAM(s) Oral daily      FAMILY HISTORY:  No pertinent family history in first degree relatives        Non-contributory    SOCIAL HISTORY:    [- ] Tobacco  [- ] Drugs  [- ] Alcohol    Allergies    penicillin (Unknown)    Intolerances    	    REVIEW OF SYSTEMS:   CONSTITUTIONAL: No fever  EYES: No eye pain, visual disturbances, or discharge  ENMT:  No difficulty hearing, tinnitus  NECK: No pain or stiffness  RESPIRATORY: + cough, wheezing,  CARDIOVASCULAR: No chest pain, palpitations, passing out, dizziness, or leg swelling  GASTROINTESTINAL:  No nausea, vomiting, diarrhea or constipation. No melena.  GENITOURINARY: No dysuria, hematuria  NEUROLOGICAL: No stroke like symptoms  SKIN: No burning or lesions   ENDOCRINE: No heat or cold intolerance  MUSCULOSKELETAL: No joint pain or swelling  PSYCHIATRIC: No  anxiety, mood swings  HEME/LYMPH: No bleeding gums  ALLERGY AND IMMUNOLOGIC: No hives or eczema	    All other ROS negative    PHYSICAL EXAM:  T(C): 36.7 (12-12-22 @ 15:31), Max: 36.8 (12-12-22 @ 07:15)  HR: 84 (12-12-22 @ 15:31) (79 - 99)  BP: 142/78 (12-12-22 @ 15:31) (116/58 - 154/65)  RR: 18 (12-12-22 @ 15:31) (15 - 18)  SpO2: 95% (12-12-22 @ 15:31) (94% - 97%)  Wt(kg): --  I&O's Summary      Appearance: Normal	  HEENT:   Normal oral mucosa, EOMI	  Cardiovascular:  S1 S2, No JVD,    Respiratory: Lungs clear to auscultation	  Psychiatry: Alert  Gastrointestinal:  Soft, Non-tender, + BS	  Skin: No rashes   Neurologic: Non-focal  Extremities:  No edema  Vascular: Peripheral pulses palpable    	    	  	  CARDIAC MARKERS:  Labs personally reviewed by me                                  12.6   13.77 )-----------( 334      ( 12 Dec 2022 06:54 )             39.3     12-12    145  |  106  |  16  ----------------------------<  108<H>  3.7   |  26  |  0.81    Ca    8.9      12 Dec 2022 06:54    TPro  8.5<H>  /  Alb  4.4  /  TBili  0.5  /  DBili  x   /  AST  15  /  ALT  13  /  AlkPhos  81  12-11          EKG: Personally reviewed by me - AF with  bpm  Radiology: Personally reviewed by me -     < from: Xray Chest 1 View- PORTABLE-Urgent (Xray Chest 1 View- PORTABLE-Urgent .) (12.11.22 @ 17:49) >  Left heart border retrocardiac opacity.  No pleural effusion or pneumothorax.  Cardiomediastinal silhouette size is within normal limits.  No acute osseous abnormality. Partially imaged thoracolumbar spinal   fusion hardware    IMPRESSION:  Opacity behind the left retrocardiac border. Correlate with pending chest   CT.    < end of copied text >  < from: CT Angio Chest PE Protocol w/ IV Cont (12.11.22 @ 18:05) >  1. No acute pulmonary embolus.  2. New peribronchial nodular opacities in left lower lobe, probably   inflammatory/infectious, possibly small airway disease.  3. Large hiatal hernia.    < end of copied text >  < from: Transthoracic Echocardiogram (08.25.21 @ 10:07) >  EF (Peterson Rule): 72 %Doppler Peak Velocity (m/sec):  AoV=1.6  ------------------------------------------------------------------------  Observations:  Mitral Valve: Mitral annular calcification, otherwise  normal mitral valve. Minimal mitral regurgitation.  Aortic Valve/Aorta: Calcified trileaflet aortic valve with  normal opening. Peak transaortic valve gradient equals 10  mm Hg, mean transaortic valve gradient equals 5 mm Hg,  aortic valve velocity time integral equals 31 cm. Peak left  ventricular outflow tract gradient equals 6 mm Hg, mean  gradient is equal to 3 mm Hg, LVOT velocity time integral  equals 23 cm.  Aortic Root: 2.9 cm.  Left Atrium: Normal left atrium.  LA volume index = 23  cc/m2.  Left Ventricle: Normal left ventricular systolic function.  No segmental wall motion abnormalities. Normal left  ventricular internal dimensions and wall thicknesses.  Normal diastolic function  Right Heart: Normal right atrium. Normal right ventricular  size and function. Normal tricuspid valve. Minimal  tricuspid regurgitation. Normal pulmonic valve.  Mild-moderate pulmonic regurgitation.  Pericardium/Pleura: Normal pericardium with trace  pericardial effusion. Pericardial fat pad noted.  Hemodynamic: Estimated right atrial pressure is 8 mm Hg.  Estimated right ventricular systolic pressure equals 27 mm  Hg, assuming right atrial pressure equals 8 mm Hg,  consistent with normal pulmonary pressures.  ------------------------------------------------------------------------  Conclusions:  1. Normal left ventricular systolic function. No segmental  wall motion abnormalities.  2. Normal right ventricular size and function.  3. Normal tricuspid valve. Minimal tricuspid regurgitation.  *** Compared with echocardiogram of 9/21/2020, no  significant changes noted.    < end of copied text >      Assessment /Plan:     81 y/o F Hx A. fib on Eliquis, dementia, hypertension, hyperlipidemia, frequent UTIs, osteoporosis p/w worsening cough.  Hx obtained from patient and collateral from son, Delroy the third who lives with her.  Patient had URI symptoms (chest congestion and cough) for the past 5 days with clear phlegm, taking mucinex without improvement, got significantly worse today with persistent cough and clear and sometimes brownish maroon color phlegm, decrease PO intake, and weakness.  Denes shortness of breath, fevers, chills, nausea, vomiting. + sick contact from son with URI during Thanksgiving. Followed by OP Cardiologist Dr. Tompkins but has not been seen in >1 year.     1. Chronic Atrial Fibrillation  - AF with RVR upon presentation but now rate controlled  - Dig level wnl  - c/w home meds: apixaban 2.5mg PO BID, Diltiazem 180mg PO daily, digoxin 125mcg PO daily    2. Hypertension  - BP currently well controlled  - Cont home meds: Diltiazem 180mg PO daily    3. HLD  - Resume lovastatin upon DC    4. Diastolic Heart Failure  - TTE from 2021 shows normal LV systolic function, normal RV, mild TR  - Appears euvolemic  - Home lasix on hold d/t acute infection  - Will cont to assess volume status. Ok to cont to hold diuresis.    5. Community Acquired Pneumonia  - p/w cough, URI symptoms  - CT Chest shows new peribronchial nodular opacities in left lower lobe, probably inflammatory/infectious, possibly small airway disease  - c/w levaquin, duonebs            Differential diagnosis and plan of care discussed with patient after the evaluation. Counseling on diet, nutritional counseling, weight management, exercise and medication compliance was done.   Advanced care planning/advanced directives discussed with patient/family. DNR status including forceful chest compressions to attempt to restart the heart, ventilator support/artificial breathing, electric shock, artificial nutrition, health care proxy, Molst form all discussed with pt. Pt wishes to consider. More than fifteen minutes spent on discussing advanced directives.         Chica Khalil [facility]  Gustavo Graf DO [facility]  Cardiovascular Medicine  [address] [place] [address] Dr, [address]  Available for call or text via [employer]  Office [phone]

## 2022-12-12 NOTE — PHYSICAL THERAPY INITIAL EVALUATION ADULT - PERTINENT HX OF CURRENT PROBLEM, REHAB EVAL
82 y/oF admitted 12/11 with CAP. URI symptoms including chest congestion and cough for the past 5 days with clear phlegm, taking mucinex without improvement, got significantly worse on day of admission with persistent cough and clear and sometimes brownish maroon color phlegm, decrease PO intake, and weakness. +sick contact from son with URI during Thanksgiving. PMH A. fib on Eliquis, dementia, hypertension, hyperlipidemia, frequent UTIs, osteoporosis

## 2022-12-12 NOTE — PHYSICAL THERAPY INITIAL EVALUATION ADULT - PERTINENT HX OF CURRENT PROBLEM, REHAB EVAL
82 y/oF admitted 12/11 with CAP. URI symptoms including chest congestion and courgh for the past 5 days with clear phlegm, taking mucinex without improvement, got significantly worse on day of admission with persistent cough and clear and sometimes brownish maroon color phlegm, decrease PO intake, and weakness. +sick contact from son with URI during Thanksgiving. PMH A. fib on Eliquis, dementia, hypertension, hyperlipidemia, frequent UTIs, osteoporosis

## 2022-12-12 NOTE — PHYSICAL THERAPY INITIAL EVALUATION ADULT - RANGE OF MOTION EXAMINATION, REHAB EVAL
flexed posture/trunk/bilateral upper extremity ROM was WFL (within functional limits)/bilateral lower extremity ROM was WFL (within functional limits)

## 2022-12-12 NOTE — ED ADULT NURSE REASSESSMENT NOTE - NS ED NURSE REASSESS COMMENT FT1
Report received from Rachna. Pt repositioned and resting comfortably in stretcher. A&Ox4. VSS. NAD noted. Pt pending inpatient bed assignment. Plan of care discussed. Safety and comfort measures maintained.

## 2022-12-13 ENCOUNTER — TRANSCRIPTION ENCOUNTER (OUTPATIENT)
Age: 82
End: 2022-12-13

## 2022-12-13 LAB
HCT VFR BLD CALC: 43.5 % — SIGNIFICANT CHANGE UP (ref 34.5–45)
HGB BLD-MCNC: 13.8 G/DL — SIGNIFICANT CHANGE UP (ref 11.5–15.5)
MCHC RBC-ENTMCNC: 31.7 GM/DL — LOW (ref 32–36)
MCHC RBC-ENTMCNC: 31.7 PG — SIGNIFICANT CHANGE UP (ref 27–34)
MCV RBC AUTO: 99.8 FL — SIGNIFICANT CHANGE UP (ref 80–100)
NRBC # BLD: 0 /100 WBCS — SIGNIFICANT CHANGE UP (ref 0–0)
PLATELET # BLD AUTO: 314 K/UL — SIGNIFICANT CHANGE UP (ref 150–400)
RBC # BLD: 4.36 M/UL — SIGNIFICANT CHANGE UP (ref 3.8–5.2)
RBC # FLD: 13.6 % — SIGNIFICANT CHANGE UP (ref 10.3–14.5)
WBC # BLD: 14.53 K/UL — HIGH (ref 3.8–10.5)
WBC # FLD AUTO: 14.53 K/UL — HIGH (ref 3.8–10.5)

## 2022-12-13 RX ORDER — PANTOPRAZOLE SODIUM 20 MG/1
40 TABLET, DELAYED RELEASE ORAL
Refills: 0 | Status: DISCONTINUED | OUTPATIENT
Start: 2022-12-13 | End: 2022-12-15

## 2022-12-13 RX ADMIN — MEMANTINE HYDROCHLORIDE 10 MILLIGRAM(S): 10 TABLET ORAL at 18:31

## 2022-12-13 RX ADMIN — Medication 600 MILLIGRAM(S): at 18:31

## 2022-12-13 RX ADMIN — Medication 3 MILLILITER(S): at 05:46

## 2022-12-13 RX ADMIN — APIXABAN 2.5 MILLIGRAM(S): 2.5 TABLET, FILM COATED ORAL at 05:46

## 2022-12-13 RX ADMIN — Medication 500 MILLIGRAM(S): at 12:15

## 2022-12-13 RX ADMIN — PREGABALIN 1000 MICROGRAM(S): 225 CAPSULE ORAL at 12:16

## 2022-12-13 RX ADMIN — APIXABAN 2.5 MILLIGRAM(S): 2.5 TABLET, FILM COATED ORAL at 18:31

## 2022-12-13 RX ADMIN — Medication 3 MILLILITER(S): at 22:53

## 2022-12-13 RX ADMIN — DONEPEZIL HYDROCHLORIDE 10 MILLIGRAM(S): 10 TABLET, FILM COATED ORAL at 22:53

## 2022-12-13 RX ADMIN — Medication 180 MILLIGRAM(S): at 06:29

## 2022-12-13 RX ADMIN — Medication 600 MILLIGRAM(S): at 06:29

## 2022-12-13 RX ADMIN — Medication 325 MILLIGRAM(S): at 12:16

## 2022-12-13 RX ADMIN — Medication 1 MILLIGRAM(S): at 12:16

## 2022-12-13 RX ADMIN — MEMANTINE HYDROCHLORIDE 10 MILLIGRAM(S): 10 TABLET ORAL at 05:46

## 2022-12-13 NOTE — DISCHARGE NOTE PROVIDER - CARE PROVIDER_API CALL
Gustavo Graf (DO)  Cardiovascular Disease; Internal Medicine; Nuclear Cardiology  [address], [address]  [place] [address]  Phone: [phone]  Fax: [phone]  Follow Up Time:     Alex Patel)  Critical Care Medicine; Internal Medicine; Pulmonary Disease  [address] [place] [address]  Phone: [phone]  Fax: [phone]  Follow Up Time: 2 weeks

## 2022-12-13 NOTE — DISCHARGE NOTE PROVIDER - DISCHARGE DATE
15-Dec-2022 Topical Sulfur Applications Counseling: Topical Sulfur Counseling: Patient counseled that this medication may cause skin irritation or allergic reactions.  In the event of skin irritation, the patient was advised to reduce the amount of the drug applied or use it less frequently.   The patient verbalized understanding of the proper use and possible adverse effects of topical sulfur application.  All of the patient's questions and concerns were addressed.

## 2022-12-13 NOTE — DISCHARGE NOTE PROVIDER - NSDCMRMEDTOKEN_GEN_ALL_CORE_FT
apixaban 2.5 mg oral tablet: 1 tab(s) orally every 12 hours  cyanocobalamin 1000 mcg oral tablet: 1 tab(s) orally once a day  DilTIAZem (Eqv-Cardizem CD) 180 mg/24 hours oral capsule, extended release: 1 cap(s) orally once a day  donepezil 10 mg oral tablet: 1 tab(s) orally once a day (at bedtime)  ferrous sulfate 325 mg (65 mg elemental iron) oral tablet: orally once a day  folic acid 1 mg oral tablet: 1 tab(s) orally once a day  Fosamax 70 mg oral tablet: 1 tab(s) orally once a week  Hiprex 1 g oral tablet: 1 tab(s) orally 2 times a day  Lanoxin 125 mcg (0.125 mg) oral tablet: 1 tab(s) orally 3x a week, MWF  Lasix 20 mg oral tablet: 3 to 4 times a week, Tue, Thur, Sat  lovastatin 20 mg oral tablet: 1 tab(s) orally once a day (at bedtime)  memantine 10 mg oral tablet: 1 tab(s) orally 2 times a day  Mucinex 600 mg oral tablet, extended release: 1 tab(s) orally every 12 hours  Vitamin C 500 mg oral tablet, chewable: 1 tab(s) orally once a day   apixaban 2.5 mg oral tablet: 1 tab(s) orally every 12 hours  cyanocobalamin 1000 mcg oral tablet: 1 tab(s) orally once a day  DilTIAZem (Eqv-Cardizem CD) 180 mg/24 hours oral capsule, extended release: 1 cap(s) orally once a day  donepezil 10 mg oral tablet: 1 tab(s) orally once a day (at bedtime)  ferrous sulfate 325 mg (65 mg elemental iron) oral tablet: orally once a day  folic acid 1 mg oral tablet: 1 tab(s) orally once a day  Fosamax 70 mg oral tablet: 1 tab(s) orally once a week  Hiprex 1 g oral tablet: 1 tab(s) orally 2 times a day  Lanoxin 125 mcg (0.125 mg) oral tablet: 1 tab(s) orally 3x a week, MWF  Lasix 20 mg oral tablet: 3 to 4 times a week, Hermane, Thpato, Sat  lovastatin 20 mg oral tablet: 1 tab(s) orally once a day (at bedtime)  memantine 10 mg oral tablet: 1 tab(s) orally 2 times a day  Mucinex 600 mg oral tablet, extended release: 1 tab(s) orally every 12 hours  pantoprazole 40 mg oral delayed release tablet: 1 tab(s) orally once a day (before a meal)  Vitamin C 500 mg oral tablet, chewable: 1 tab(s) orally once a day   apixaban 2.5 mg oral tablet: 1 tab(s) orally every 12 hours  cyanocobalamin 1000 mcg oral tablet: 1 tab(s) orally once a day  DilTIAZem (Eqv-Cardizem CD) 180 mg/24 hours oral capsule, extended release: 1 cap(s) orally once a day  donepezil 10 mg oral tablet: 1 tab(s) orally once a day (at bedtime)  ferrous sulfate 325 mg (65 mg elemental iron) oral tablet: orally once a day  folic acid 1 mg oral tablet: 1 tab(s) orally once a day  Fosamax 70 mg oral tablet: 1 tab(s) orally once a week  Hiprex 1 g oral tablet: 1 tab(s) orally 2 times a day  Lanoxin 125 mcg (0.125 mg) oral tablet: 1 tab(s) orally 3x a week, MWF  Lasix 20 mg oral tablet: 1 tab(s) orally 3 times a week   Levaquin 250 mg oral tablet: 1 tab(s) orally every 24 hours UNTIL 12/18/22 (to complete 7 day course)  lovastatin 20 mg oral tablet: 1 tab(s) orally once a day (at bedtime)  memantine 10 mg oral tablet: 1 tab(s) orally 2 times a day  Mucinex 600 mg oral tablet, extended release: 1 tab(s) orally every 12 hours  pantoprazole 40 mg oral delayed release tablet: 1 tab(s) orally once a day (before a meal)  Vitamin C 500 mg oral tablet, chewable: 1 tab(s) orally once a day

## 2022-12-13 NOTE — CONSULT NOTE ADULT - SUBJECTIVE AND OBJECTIVE BOX
12-13-22 @ 13:13    Patient is a 82y old  Female who presents with a chief complaint of worsening productive cough (12 Dec 2022 19:28)      HPI:  83 y/o F Hx A. fib on Eliquis, dementia, hypertension, hyperlipidemia, frequent UTIs, osteoporosis p/w worsening cough.  Hx obtained from patient and collateral from son, Delroy the third who lives with her.  Patient had URI symptoms (chest congestion and cough) for the past 5 days with clear phlegm, taking mucinex without improvement, got significantly worse today with persistent cough and clear and sometimes brownish maroon color phlegm, decrease PO intake, and weakness.  Denes shortness of breath, fevers, chills, nausea, vomiting. + sick contact from son with URI during Thanksgiving (11 Dec 2022 22:30)     to me pt says she came in here for increasing production of  phlegm  :      ?FOLLOWING PRESENT  [x ] Hx of PE/DVT, [x ] Hx COPD, [x ] Hx of Asthma, [xy] Hx of Hospitalization, [ x]  Hx of BiPAP/CPAP use, [ x] Hx of LEX    Allergies    penicillin (Unknown)    Intolerances        PAST MEDICAL & SURGICAL HISTORY:  Arthritis      Hypertension      High cholesterol      Afib      Osteoporosis      Frequent UTI      Dementia      Compression fracture of T12 vertebra          FAMILY HISTORY:  No pertinent family history in first degree relatives        Social History: [ x ] TOBACCO                  [ x ] ETOH                                 [ x ] IVDA/DRUGS    REVIEW OF SYSTEMS      General:	x    Skin/Breast:x  	  Ophthalmologic:x  	  ENMT:	x    Respiratory and Thorax:  sob,  more production of phlegm   and cough   	  Cardiovascular:	x    Gastrointestinal:	x    Genitourinary:	x    Musculoskeletal:	x    Neurological:	demntia    Psychiatric:	x    Hematology/Lymphatics:	x    Endocrine:	x    Allergic/Immunologic:	x    MEDICATIONS  (STANDING):  albuterol/ipratropium for Nebulization 3 milliLiter(s) Nebulizer every 8 hours  apixaban 2.5 milliGRAM(s) Oral two times a day  ascorbic acid 500 milliGRAM(s) Oral daily  cyanocobalamin 1000 MICROGram(s) Oral daily  digoxin     Tablet 125 MICROGram(s) Oral <User Schedule>  diltiazem    milliGRAM(s) Oral daily  donepezil 10 milliGRAM(s) Oral at bedtime  ferrous    sulfate 325 milliGRAM(s) Oral daily  folic acid 1 milliGRAM(s) Oral daily  guaiFENesin  milliGRAM(s) Oral every 12 hours  levoFLOXacin IVPB 250 milliGRAM(s) IV Intermittent every 24 hours  memantine 10 milliGRAM(s) Oral two times a day    MEDICATIONS  (PRN):  acetaminophen     Tablet .. 650 milliGRAM(s) Oral every 6 hours PRN Temp greater or equal to 38C (100.4F), Mild Pain (1 - 3)       Vital Signs Last 24 Hrs  T(C): 36.9 (13 Dec 2022 11:43), Max: 37.1 (13 Dec 2022 04:48)  T(F): 98.4 (13 Dec 2022 11:43), Max: 98.7 (13 Dec 2022 04:48)  HR: 77 (13 Dec 2022 11:43) (77 - 99)  BP: 103/65 (13 Dec 2022 11:43) (103/64 - 154/65)  BP(mean): --  RR: 16 (13 Dec 2022 11:43) (16 - 19)  SpO2: 94% (13 Dec 2022 11:43) (94% - 96%)    Parameters below as of 13 Dec 2022 11:43  Patient On (Oxygen Delivery Method): room air    Orthostatic VS          I&O's Summary    13 Dec 2022 07:01  -  13 Dec 2022 13:13  --------------------------------------------------------  IN: 118 mL / OUT: 0 mL / NET: 118 mL        Physical Exam:   GENERAL: NAD, well-groomed, well-developed  HEENT: DOC/   Atraumatic, Normocephalic  ENMT: No tonsillar erythema, exudates, or enlargement; Moist mucous membranes, Good dentition, No lesions  NECK: Supple, No JVD, Normal thyroid  CHEST/LUNG:no wheezing:  : cracekls at left bases  CVS: Regular rate and rhythm; No murmurs, rubs, or gallops  GI: : Soft, Nontender, Nondistended; Bowel sounds present  NERVOUS SYSTEM:  Alert & awake and responds to questions  EXTREMITIES:  2+ Peripheral Pulses, No clubbing, cyanosis, or edema  LYMPH: No lymphadenopathy noted  SKIN: No rashes or lesions  ENDOCRINOLOGY: No Thyromegaly  PSYCH:demnted     Labs:                              12.6   13.77 )-----------( 334      ( 12 Dec 2022 06:54 )             39.3                         14.5   13.74 )-----------( 354      ( 11 Dec 2022 17:23 )             45.7     12-12    145  |  106  |  16  ----------------------------<  108<H>  3.7   |  26  |  0.81  12-11    143  |  101  |  22  ----------------------------<  129<H>  3.7   |  23  |  0.97    Ca    8.9      12 Dec 2022 06:54  Ca    9.7      11 Dec 2022 17:23    TPro  8.5<H>  /  Alb  4.4  /  TBili  0.5  /  DBili  x   /  AST  15  /  ALT  13  /  AlkPhos  81  12-11    CAPILLARY BLOOD GLUCOSE        LIVER FUNCTIONS - ( 11 Dec 2022 17:23 )  Alb: 4.4 g/dL / Pro: 8.5 g/dL / ALK PHOS: 81 U/L / ALT: 13 U/L / AST: 15 U/L / GGT: x               D DImer      Studies  Chest X-RAY  CT SCAN Chest   CT Abdomen  Venous Dopplers: LE:   Others        rad< from: CT Angio Chest PE Protocol w/ IV Cont (12.11.22 @ 18:05) >  CHEST WALL AND LOWER NECK: Within normal limits.  VISUALIZED UPPER ABDOMEN: Cholelithiasis. Left hepatic cyst. Left renal   cortical cyst.  BONES: Change severe compression fracture T12 with unchanged retropulsed   osseous fragment and kyphotic deformity, post posterior spinal fusion.    IMPRESSION:    1. No acute pulmonary embolus.  2. New peribronchial nodular opacities in left lower lobe, probably   inflammatory/infectious, possibly small airway disease.  3. Large hiatal hernia.    --- End of Report ---          ISRAEL TENA MD; Attending Radiologist  This document has been electronically signed.  ISRAEL TENA MD; Attending Radiologist  This document has been electronically signed. Dec 11 2022  6:22PM    < end of copied text >

## 2022-12-13 NOTE — DISCHARGE NOTE PROVIDER - NSDCFUADDAPPT_GEN_ALL_CORE_FT
APPTS ARE READY TO BE MADE: [x] YES    Best Family or Patient Contact (if needed):    Additional Information about above appointments (if needed):    1:   2:   3:     Other comments or requests:    APPTS ARE READY TO BE MADE: [x] YES    Best Family or Patient Contact (if needed):    Additional Information about above appointments (if needed):    1:   2:   3:     Other comments or requests:   Patient is being discharged to rehab. Caregiver will arrange follow up.

## 2022-12-13 NOTE — DISCHARGE NOTE PROVIDER - NSDCCPCAREPLAN_GEN_ALL_CORE_FT
PRINCIPAL DISCHARGE DIAGNOSIS  Diagnosis: CAP (community acquired pneumonia)  Assessment and Plan of Treatment: Presented with worsening cough, URI symptoms  - RVP is negative  - CT Chest shows new peribronchial nodular opacities in left lower lobe, probably inflammatory/infectious, possibly small airway disease  - Treated with Levaquin  - Pulmonary consulted for pneumonia in left lower lobe but also has large hiatal hernia:  possibly aspirating?   - Evaluated by speech and swallow, recommending regular diet now and consider MBS as outpatient if concern for aspiration.   - Add PPI    - Elevated wbc now down trended but no fever, Discharge on oral levaquin x 7 days until 12/18/22      SECONDARY DISCHARGE DIAGNOSES  Diagnosis: Hiatal hernia  Assessment and Plan of Treatment: CT showed large hiatal hernia - Risk for aspiration.  - Evaluated by speech and swallow, recommending regular diet now and consider MBS as outpatient if concern for aspiration.   - Added PPI    Maintain upright posture during/after eating for 30 mins; position upright (90 degrees); small sips/bites    Diagnosis: Chronic diastolic heart failure  Assessment and Plan of Treatment: Echo from 2021 shows normal LV systolic function, normal RV, mild TR  - Appears euvolemic  - Home lasix on held due to acute infection  - Resume home dose Lasix for discharge per cardiology       Diagnosis: Chronic atrial fibrillation  Assessment and Plan of Treatment: Rate controlled   - Dig level wnl  - Continue home meds: apixaban 2.5mg PO BID, Diltiazem 180mg PO daily, digoxin 125mcg PO daily      Diagnosis: Hypertension  Assessment and Plan of Treatment: - BP currently well controlled  - Cont home meds: Diltiazem 180mg PO daily    Diagnosis: HLD (hyperlipidemia)  Assessment and Plan of Treatment: - Resume lovastatin upon DC      Diagnosis: Dementia  Assessment and Plan of Treatment: - Supportive care

## 2022-12-13 NOTE — PROGRESS NOTE ADULT - SUBJECTIVE AND OBJECTIVE BOX
Patient is a 82y old  Female who presents with a chief complaint of worsening productive cough (13 Dec 2022 23:32)      INTERVAL HPI/OVERNIGHT EVENTS:  T(C): 36.4 (12-13-22 @ 20:00), Max: 37.1 (12-13-22 @ 04:48)  HR: 78 (12-13-22 @ 20:00) (77 - 87)  BP: 122/71 (12-13-22 @ 20:00) (103/64 - 122/71)  RR: 18 (12-13-22 @ 20:00) (16 - 19)  SpO2: 95% (12-13-22 @ 20:00) (94% - 95%)  Wt(kg): --  I&O's Summary    13 Dec 2022 07:01  -  13 Dec 2022 23:43  --------------------------------------------------------  IN: 118 mL / OUT: 0 mL / NET: 118 mL        PAST MEDICAL & SURGICAL HISTORY:  Arthritis      Hypertension      High cholesterol      Afib      Osteoporosis      Frequent UTI      Dementia      Compression fracture of T12 vertebra          SOCIAL HISTORY  Alcohol:  Tobacco:  Illicit substance use:    FAMILY HISTORY:    REVIEW OF SYSTEMS:  CONSTITUTIONAL: No fever, weight loss, or fatigue  EYES: No eye pain, visual disturbances, or discharge  ENMT:  No difficulty hearing, tinnitus, vertigo; No sinus or throat pain  NECK: No pain or stiffness  RESPIRATORY: No cough, wheezing, chills or hemoptysis; No shortness of breath  CARDIOVASCULAR: No chest pain, palpitations, dizziness, or leg swelling  GASTROINTESTINAL: No abdominal or epigastric pain. No nausea, vomiting, or hematemesis; No diarrhea or constipation. No melena or hematochezia.  GENITOURINARY: No dysuria, frequency, hematuria, or incontinence  NEUROLOGICAL: No headaches, memory loss, loss of strength, numbness, or tremors  SKIN: No itching, burning, rashes, or lesions   LYMPH NODES: No enlarged glands  ENDOCRINE: No heat or cold intolerance; No hair loss  MUSCULOSKELETAL: No joint pain or swelling; No muscle, back, or extremity pain  PSYCHIATRIC: No depression, anxiety, mood swings, or difficulty sleeping  HEME/LYMPH: No easy bruising, or bleeding gums  ALLERY AND IMMUNOLOGIC: No hives or eczema    RADIOLOGY & ADDITIONAL TESTS:    Imaging Personally Reviewed:  [ ] YES  [ ] NO    Consultant(s) Notes Reviewed:  [ ] YES  [ ] NO    PHYSICAL EXAM:  GENERAL: NAD, well-groomed, well-developed  HEAD:  Atraumatic, Normocephalic  EYES: EOMI, PERRLA, conjunctiva and sclera clear  ENMT: No tonsillar erythema, exudates, or enlargement; Moist mucous membranes, Good dentition, No lesions  NECK: Supple, No JVD, Normal thyroid  NERVOUS SYSTEM:  Alert & Oriented X3, Good concentration; Motor Strength 5/5 B/L upper and lower extremities; DTRs 2+ intact and symmetric  CHEST/LUNG: Clear to percussion bilaterally; No rales, rhonchi, wheezing, or rubs  HEART: Regular rate and rhythm; No murmurs, rubs, or gallops  ABDOMEN: Soft, Nontender, Nondistended; Bowel sounds present  EXTREMITIES:  2+ Peripheral Pulses, No clubbing, cyanosis, or edema  LYMPH: No lymphadenopathy noted  SKIN: No rashes or lesions    LABS:                        13.8   14.53 )-----------( 314      ( 13 Dec 2022 19:05 )             43.5     12-12    145  |  106  |  16  ----------------------------<  108<H>  3.7   |  26  |  0.81    Ca    8.9      12 Dec 2022 06:54          CAPILLARY BLOOD GLUCOSE                MEDICATIONS  (STANDING):  albuterol/ipratropium for Nebulization 3 milliLiter(s) Nebulizer every 8 hours  apixaban 2.5 milliGRAM(s) Oral two times a day  ascorbic acid 500 milliGRAM(s) Oral daily  cyanocobalamin 1000 MICROGram(s) Oral daily  digoxin     Tablet 125 MICROGram(s) Oral <User Schedule>  diltiazem    milliGRAM(s) Oral daily  donepezil 10 milliGRAM(s) Oral at bedtime  ferrous    sulfate 325 milliGRAM(s) Oral daily  folic acid 1 milliGRAM(s) Oral daily  guaiFENesin  milliGRAM(s) Oral every 12 hours  levoFLOXacin IVPB 250 milliGRAM(s) IV Intermittent every 24 hours  memantine 10 milliGRAM(s) Oral two times a day  pantoprazole    Tablet 40 milliGRAM(s) Oral before breakfast    MEDICATIONS  (PRN):  acetaminophen     Tablet .. 650 milliGRAM(s) Oral every 6 hours PRN Temp greater or equal to 38C (100.4F), Mild Pain (1 - 3)      Care Discussed with Consultants/Other Providers [ ] YES  [ ] NO Patient is a 82y old  Female who presents with a chief complaint of worsening productive cough (13 Dec 2022 23:32)      INTERVAL HPI/OVERNIGHT EVENTS: looking good , feeling better , afebrile , no SOB     T(C): 36.4 (12-13-22 @ 20:00), Max: 37.1 (12-13-22 @ 04:48)  HR: 78 (12-13-22 @ 20:00) (77 - 87)  BP: 122/71 (12-13-22 @ 20:00) (103/64 - 122/71)  RR: 18 (12-13-22 @ 20:00) (16 - 19)  SpO2: 95% (12-13-22 @ 20:00) (94% - 95%)  Wt(kg): --  I&O's Summary    13 Dec 2022 07:01  -  13 Dec 2022 23:43  --------------------------------------------------------  IN: 118 mL / OUT: 0 mL / NET: 118 mL        PAST MEDICAL & SURGICAL HISTORY:  Arthritis      Hypertension      High cholesterol      Afib      Osteoporosis      Frequent UTI      Dementia      Compression fracture of T12 vertebra          SOCIAL HISTORY  Alcohol:  Tobacco:  Illicit substance use:    FAMILY HISTORY:    REVIEW OF SYSTEMS:  CONSTITUTIONAL: No fever, weight loss, or fatigue  EYES: No eye pain, visual disturbances, or discharge  ENMT:  No difficulty hearing, tinnitus, vertigo; No sinus or throat pain  NECK: No pain or stiffness  RESPIRATORY: No cough, wheezing, chills or hemoptysis; No shortness of breath  CARDIOVASCULAR: No chest pain, palpitations, dizziness, or leg swelling  GASTROINTESTINAL: No abdominal or epigastric pain. No nausea, vomiting, or hematemesis; No diarrhea or constipation. No melena or hematochezia.  GENITOURINARY: No dysuria, frequency, hematuria, or incontinence  NEUROLOGICAL: No headaches, memory loss, loss of strength, numbness, or tremors  SKIN: No itching, burning, rashes, or lesions   LYMPH NODES: No enlarged glands  ENDOCRINE: No heat or cold intolerance; No hair loss  MUSCULOSKELETAL: No joint pain or swelling; No muscle, back, or extremity pain  PSYCHIATRIC: No depression, anxiety, mood swings, or difficulty sleeping  HEME/LYMPH: No easy bruising, or bleeding gums  ALLERY AND IMMUNOLOGIC: No hives or eczema    RADIOLOGY & ADDITIONAL TESTS:    Imaging Personally Reviewed:  [ ] YES  [ ] NO    Consultant(s) Notes Reviewed:  [ ] YES  [ ] NO    PHYSICAL EXAM:  GENERAL: NAD, well-groomed, well-developed  HEAD:  Atraumatic, Normocephalic  EYES: EOMI, PERRLA, conjunctiva and sclera clear  ENMT: No tonsillar erythema, exudates, or enlargement; Moist mucous membranes, Good dentition, No lesions  NECK: Supple, No JVD, Normal thyroid  NERVOUS SYSTEM:  Alert & Oriented X3, Good concentration; Motor Strength 5/5 B/L upper and lower extremities; DTRs 2+ intact and symmetric  CHEST/LUNG: Clear to percussion bilaterally; No rales, rhonchi, wheezing, or rubs  HEART: Regular rate and rhythm; No murmurs, rubs, or gallops  ABDOMEN: Soft, Nontender, Nondistended; Bowel sounds present  EXTREMITIES:  2+ Peripheral Pulses, No clubbing, cyanosis, or edema  LYMPH: No lymphadenopathy noted  SKIN: No rashes or lesions    LABS:                        13.8   14.53 )-----------( 314      ( 13 Dec 2022 19:05 )             43.5     12-12    145  |  106  |  16  ----------------------------<  108<H>  3.7   |  26  |  0.81    Ca    8.9      12 Dec 2022 06:54          CAPILLARY BLOOD GLUCOSE                MEDICATIONS  (STANDING):  albuterol/ipratropium for Nebulization 3 milliLiter(s) Nebulizer every 8 hours  apixaban 2.5 milliGRAM(s) Oral two times a day  ascorbic acid 500 milliGRAM(s) Oral daily  cyanocobalamin 1000 MICROGram(s) Oral daily  digoxin     Tablet 125 MICROGram(s) Oral <User Schedule>  diltiazem    milliGRAM(s) Oral daily  donepezil 10 milliGRAM(s) Oral at bedtime  ferrous    sulfate 325 milliGRAM(s) Oral daily  folic acid 1 milliGRAM(s) Oral daily  guaiFENesin  milliGRAM(s) Oral every 12 hours  levoFLOXacin IVPB 250 milliGRAM(s) IV Intermittent every 24 hours  memantine 10 milliGRAM(s) Oral two times a day  pantoprazole    Tablet 40 milliGRAM(s) Oral before breakfast    MEDICATIONS  (PRN):  acetaminophen     Tablet .. 650 milliGRAM(s) Oral every 6 hours PRN Temp greater or equal to 38C (100.4F), Mild Pain (1 - 3)      Care Discussed with Consultants/Other Providers [ ] YES  [ ] NO

## 2022-12-13 NOTE — DISCHARGE NOTE PROVIDER - HOSPITAL COURSE
81 y/o F Hx A. fib on Eliquis, dementia, hypertension, hyperlipidemia, frequent UTIs, osteoporosis p/w worsening cough.  Hx obtained from patient and collateral from son, Delroy the third who lives with her.  Patient had URI symptoms (chest congestion and cough) for the past 5 days with clear phlegm, taking mucinex without improvement, got significantly worse today with persistent cough and clear and sometimes brownish maroon color phlegm, decrease PO intake, and weakness.  Denies shortness of breath, fevers, chills, nausea, vomiting. + sick contact from son with URI during Thanksgiving. Followed by OP Cardiologist Dr. Tompkins but has not been seen in >1 year.     1. Chronic Atrial Fibrillation  now rate controlled   - Dig level wnl  c/w present meds   cardio following     2. Hypertension  - BP currently well controlled  - Cont home meds: Diltiazem 180mg PO daily    3. HLD  - Resume lovastatin upon DC    4. Diastolic Heart Failure  - TTE from 2021 shows normal LV systolic function, normal RV, mild TR  - Appears euvolemic  - Home lasix on hold d/t acute infection  - Will cont to assess volume status. Ok to cont to hold diuresis.    5. Community Acquired Pneumonia  - p/w cough, URI symptoms  - CT Chest shows new peribronchial nodular opacities in left lower lobe, probably inflammatory/infectious, possibly small airway disease  - c/w levaquin, duonebs  -will consult pul Dr. xie    83 y/o F Hx A. fib on Eliquis, dementia, hypertension, hyperlipidemia, frequent UTIs, osteoporosis p/w worsening cough.  Patient had URI symptoms (chest congestion and cough) for the past 5 days with clear phlegm, taking mucinex without improvement, and got significantly worse  with persistent cough and clear and sometimes brownish maroon color phlegm, decrease PO intake, and weakness.  Denies shortness of breath, fevers, chills, nausea, vomiting. + sick contact from son with URI during Thanksgiving. Followed by OP Cardiologist Dr. Tompkins but has not been seen in >1 year.     1. Community Acquired Pneumonia  - p/w cough, URI symptoms  - RVP is negative  - CT Chest shows new peribronchial nodular opacities in left lower lobe, probably inflammatory/infectious, possibly small airway disease  - Treated with Levaquin, mahionebs  - Pulmonary consulted for pneumonia in left lower lobe but also has large hiatal hernia:  possibly aspirating?   - Evaluated by speech and swallow, recommending regular diet now and consider MBS as outpatient if concern for aspiration.   - Add PPI    - Elevated wbc now down trended but no fever, Discharge on oral levaquin x 7 days       2. Chronic Atrial Fibrillation  now rate controlled   - Dig level wnl  - Continue home meds: apixaban 2.5mg PO BID, Diltiazem 180mg PO daily, digoxin 125mcg PO daily    3. Diastolic Heart Failure  - TTE from 2021 shows normal LV systolic function, normal RV, mild TR  - Appears euvolemic  - Home lasix on held d/t acute infection  - Resume home dose Lasix for discharge per cardiology     4. Hypertension  - BP currently well controlled  - Cont home meds: Diltiazem 180mg PO daily    5. HLD  - Resume lovastatin upon DC     6. Dementia   - Supportive care           83 y/o F Hx A. fib on Eliquis, dementia, hypertension, hyperlipidemia, frequent UTIs, osteoporosis p/w worsening cough.  Patient had URI symptoms (chest congestion and cough) for the past 5 days with clear phlegm, taking mucinex without improvement, and got significantly worse  with persistent cough and clear and sometimes brownish maroon color phlegm, decrease PO intake, and weakness.  Denies shortness of breath, fevers, chills, nausea, vomiting. + sick contact from son with URI during Thanksgiving. Followed by OP Cardiologist Dr. Tompkins but has not been seen in >1 year.     1. Community Acquired Pneumonia  - p/w cough, URI symptoms  - RVP is negative  - CT Chest shows new peribronchial nodular opacities in left lower lobe, probably inflammatory/infectious, possibly small airway disease  - Treated with Levaquin, duonebs  - Pulmonary consulted for pneumonia in left lower lobe but also has large hiatal hernia:  possibly aspirating?   - Evaluated by speech and swallow, recommending regular diet now and consider MBS as outpatient if concern for aspiration.   - Add PPI    - Elevated wbc now down trended but no fever, Discharge on oral levaquin x 7 days       2. Chronic Atrial Fibrillation  now rate controlled   - Dig level wnl  - Continue home meds: apixaban 2.5mg PO BID, Diltiazem 180mg PO daily, digoxin 125mcg PO daily    3. Diastolic Heart Failure  - TTE from 2021 shows normal LV systolic function, normal RV, mild TR  - Appears euvolemic  - Home lasix on held d/t acute infection  - Resume home dose Lasix for discharge per cardiology     4. Hypertension  - BP currently well controlled  - Cont home meds: Diltiazem 180mg PO daily    5. HLD  - Resume lovastatin upon DC     6. Dementia   - Supportive care    Medically cleared by Dr. Acevedo to discharge patient to [facility] on Levaquin to complete 7 day course until 12/18/22 and resume Lasix 3 x week

## 2022-12-14 LAB
HCT VFR BLD CALC: 42.1 % — SIGNIFICANT CHANGE UP (ref 34.5–45)
HGB BLD-MCNC: 13.5 G/DL — SIGNIFICANT CHANGE UP (ref 11.5–15.5)
MCHC RBC-ENTMCNC: 31.4 PG — SIGNIFICANT CHANGE UP (ref 27–34)
MCHC RBC-ENTMCNC: 32.1 GM/DL — SIGNIFICANT CHANGE UP (ref 32–36)
MCV RBC AUTO: 97.9 FL — SIGNIFICANT CHANGE UP (ref 80–100)
NRBC # BLD: 0 /100 WBCS — SIGNIFICANT CHANGE UP (ref 0–0)
PLATELET # BLD AUTO: 294 K/UL — SIGNIFICANT CHANGE UP (ref 150–400)
RAPID RVP RESULT: SIGNIFICANT CHANGE UP
RBC # BLD: 4.3 M/UL — SIGNIFICANT CHANGE UP (ref 3.8–5.2)
RBC # FLD: 13.8 % — SIGNIFICANT CHANGE UP (ref 10.3–14.5)
SARS-COV-2 RNA SPEC QL NAA+PROBE: SIGNIFICANT CHANGE UP
WBC # BLD: 11.39 K/UL — HIGH (ref 3.8–10.5)
WBC # FLD AUTO: 11.39 K/UL — HIGH (ref 3.8–10.5)

## 2022-12-14 RX ADMIN — Medication 180 MILLIGRAM(S): at 05:16

## 2022-12-14 RX ADMIN — DONEPEZIL HYDROCHLORIDE 10 MILLIGRAM(S): 10 TABLET, FILM COATED ORAL at 21:36

## 2022-12-14 RX ADMIN — APIXABAN 2.5 MILLIGRAM(S): 2.5 TABLET, FILM COATED ORAL at 05:15

## 2022-12-14 RX ADMIN — Medication 600 MILLIGRAM(S): at 05:16

## 2022-12-14 RX ADMIN — PREGABALIN 1000 MICROGRAM(S): 225 CAPSULE ORAL at 11:17

## 2022-12-14 RX ADMIN — MEMANTINE HYDROCHLORIDE 10 MILLIGRAM(S): 10 TABLET ORAL at 05:16

## 2022-12-14 RX ADMIN — Medication 600 MILLIGRAM(S): at 17:36

## 2022-12-14 RX ADMIN — Medication 3 MILLILITER(S): at 13:32

## 2022-12-14 RX ADMIN — Medication 325 MILLIGRAM(S): at 11:17

## 2022-12-14 RX ADMIN — Medication 3 MILLILITER(S): at 21:35

## 2022-12-14 RX ADMIN — Medication 3 MILLILITER(S): at 05:15

## 2022-12-14 RX ADMIN — Medication 500 MILLIGRAM(S): at 11:16

## 2022-12-14 RX ADMIN — MEMANTINE HYDROCHLORIDE 10 MILLIGRAM(S): 10 TABLET ORAL at 17:37

## 2022-12-14 RX ADMIN — PANTOPRAZOLE SODIUM 40 MILLIGRAM(S): 20 TABLET, DELAYED RELEASE ORAL at 05:15

## 2022-12-14 RX ADMIN — Medication 125 MICROGRAM(S): at 08:23

## 2022-12-14 RX ADMIN — Medication 1 MILLIGRAM(S): at 11:17

## 2022-12-14 RX ADMIN — APIXABAN 2.5 MILLIGRAM(S): 2.5 TABLET, FILM COATED ORAL at 17:36

## 2022-12-14 NOTE — PROGRESS NOTE ADULT - SUBJECTIVE AND OBJECTIVE BOX
DATE OF SERVICE: 12-14-22 @ 10:06    Patient is a 82y old  Female who presents with a chief complaint of worsening productive cough (13 Dec 2022 23:32)      INTERVAL HISTORY: no complaints     REVIEW OF SYSTEMS:  CONSTITUTIONAL: No weakness  EYES/ENT: No visual changes;  No throat pain   NECK: No pain or stiffness  RESPIRATORY: No cough, wheezing; No shortness of breath  CARDIOVASCULAR: No chest pain or palpitations  GASTROINTESTINAL: No abdominal  pain. No nausea, vomiting, or hematemesis  GENITOURINARY: No dysuria, frequency or hematuria  NEUROLOGICAL: No stroke like symptoms  SKIN: No rashes    	  MEDICATIONS:  digoxin     Tablet 125 MICROGram(s) Oral <User Schedule>  diltiazem    milliGRAM(s) Oral daily        PHYSICAL EXAM:  T(C): 36.5 (12-14-22 @ 05:08), Max: 36.9 (12-13-22 @ 11:43)  HR: 85 (12-14-22 @ 05:08) (77 - 85)  BP: 104/69 (12-14-22 @ 05:08) (103/65 - 122/71)  RR: 18 (12-14-22 @ 05:08) (16 - 18)  SpO2: 94% (12-14-22 @ 05:08) (94% - 95%)  Wt(kg): --  I&O's Summary    13 Dec 2022 07:01  -  14 Dec 2022 07:00  --------------------------------------------------------  IN: 118 mL / OUT: 0 mL / NET: 118 mL      Height (cm): 154.9 (12-13 @ 19:38)  Weight (kg): 63 (12-13 @ 19:38)  BMI (kg/m2): 26.3 (12-13 @ 19:38)  BSA (m2): 1.62 (12-13 @ 19:38)    Appearance: In no distress	  HEENT:    PERRL, EOMI	  Cardiovascular:  S1 S2, No JVD  Respiratory: Lungs clear to auscultation	  Gastrointestinal:  Soft, Non-tender, + BS	  Vascularature:  No edema of LE  Psychiatric: Appropriate affect   Neuro: no acute focal deficits                               13.5   11.39 )-----------( 294      ( 14 Dec 2022 07:19 )             42.1               Labs personally reviewed      ASSESSMENT/PLAN: 	  81 y/o F Hx A. fib on Eliquis, dementia, hypertension, hyperlipidemia, frequent UTIs, osteoporosis p/w worsening cough.  Hx obtained from patient and collateral from son, Delroy the third who lives with her.  Patient had URI symptoms (chest congestion and cough) for the past 5 days with clear phlegm, taking mucinex without improvement, got significantly worse today with persistent cough and clear and sometimes brownish maroon color phlegm, decrease PO intake, and weakness.  Denes shortness of breath, fevers, chills, nausea, vomiting. + sick contact from son with URI during Thanksgiving. Followed by OP Cardiologist Dr. Tompkins but has not been seen in >1 year.     1. Chronic Atrial Fibrillation  - AF with RVR upon presentation but now rate controlled  - Dig level wnl  - c/w home meds: apixaban 2.5mg PO BID, Diltiazem 180mg PO daily, digoxin 125mcg PO daily    2. Hypertension  - BP currently well controlled  - Cont home meds: Diltiazem 180mg PO daily    3. HLD  - Resume lovastatin upon DC    4. Diastolic Heart Failure  - TTE from 2021 shows normal LV systolic function, normal RV, mild TR  - Appears euvolemic  - Home lasix on hold d/t acute infection  - Resume home dose Lasix for discharge     5. Community Acquired Pneumonia  - p/w cough, URI symptoms  - CT Chest shows new peribronchial nodular opacities in left lower lobe, probably inflammatory/infectious, possibly small airway disease  - c/w jazmyne hannon PA-C Omid Javdan, DO [facility]  Cardiovascular Medicine  [address], [address]  Office: [phone]  Available via call/text on [employer] Teams

## 2022-12-14 NOTE — PATIENT PROFILE ADULT - FUNCTIONAL ASSESSMENT - BASIC MOBILITY 6.
2-calculated by average/Not able to assess (calculate score using [facility] averaging method)

## 2022-12-14 NOTE — PATIENT PROFILE ADULT - FALL HARM RISK - HARM RISK INTERVENTIONS

## 2022-12-14 NOTE — PROGRESS NOTE ADULT - SUBJECTIVE AND OBJECTIVE BOX
Date of Service: 12-14-22 @ 14:47    Patient is a 82y old  Female who presents with a chief complaint of worsening productive cough (14 Dec 2022 10:06)      Any change in ROS: Seems to be doing  ok : no sob:  no cough       MEDICATIONS  (STANDING):  albuterol/ipratropium for Nebulization 3 milliLiter(s) Nebulizer every 8 hours  apixaban 2.5 milliGRAM(s) Oral two times a day  ascorbic acid 500 milliGRAM(s) Oral daily  cyanocobalamin 1000 MICROGram(s) Oral daily  digoxin     Tablet 125 MICROGram(s) Oral <User Schedule>  diltiazem    milliGRAM(s) Oral daily  donepezil 10 milliGRAM(s) Oral at bedtime  ferrous    sulfate 325 milliGRAM(s) Oral daily  folic acid 1 milliGRAM(s) Oral daily  guaiFENesin  milliGRAM(s) Oral every 12 hours  levoFLOXacin IVPB 250 milliGRAM(s) IV Intermittent every 24 hours  memantine 10 milliGRAM(s) Oral two times a day  pantoprazole    Tablet 40 milliGRAM(s) Oral before breakfast    MEDICATIONS  (PRN):  acetaminophen     Tablet .. 650 milliGRAM(s) Oral every 6 hours PRN Temp greater or equal to 38C (100.4F), Mild Pain (1 - 3)    Vital Signs Last 24 Hrs  T(C): 37 (14 Dec 2022 14:16), Max: 37 (14 Dec 2022 14:16)  T(F): 98.6 (14 Dec 2022 14:16), Max: 98.6 (14 Dec 2022 14:16)  HR: 81 (14 Dec 2022 14:16) (78 - 85)  BP: 122/70 (14 Dec 2022 14:16) (104/69 - 122/71)  BP(mean): --  RR: 18 (14 Dec 2022 14:16) (18 - 18)  SpO2: 93% (14 Dec 2022 14:16) (93% - 95%)    Parameters below as of 14 Dec 2022 14:16  Patient On (Oxygen Delivery Method): room air        I&O's Summary    13 Dec 2022 07:01  -  14 Dec 2022 07:00  --------------------------------------------------------  IN: 118 mL / OUT: 0 mL / NET: 118 mL          Physical Exam:   GENERAL: NAD, well-groomed, well-developed  HEENT: DOC/   Atraumatic, Normocephalic  ENMT: No tonsillar erythema, exudates, or enlargement; Moist mucous membranes, Good dentition, No lesions  NECK: Supple, No JVD, Normal thyroid  CHEST/LUNG: Clear to auscultaion  CVS: Regular rate and rhythm; No murmurs, rubs, or gallops  GI: : Soft, Nontender, Nondistended; Bowel sounds present  NERVOUS SYSTEM:  Alert & Oriented X3  EXTREMITIES:  - edema  LYMPH: No lymphadenopathy noted  SKIN: No rashes or lesions  ENDOCRINOLOGY: No Thyromegaly  PSYCH: Appropriate    Labs:                              13.5   11.39 )-----------( 294      ( 14 Dec 2022 07:19 )             42.1                         13.8   14.53 )-----------( 314      ( 13 Dec 2022 19:05 )             43.5                         12.6   13.77 )-----------( 334      ( 12 Dec 2022 06:54 )             39.3                         14.5   13.74 )-----------( 354      ( 11 Dec 2022 17:23 )             45.7     12-12    145  |  106  |  16  ----------------------------<  108<H>  3.7   |  26  |  0.81  12-11    143  |  101  |  22  ----------------------------<  129<H>  3.7   |  23  |  0.97      TPro  8.5<H>  /  Alb  4.4  /  TBili  0.5  /  DBili  x   /  AST  15  /  ALT  13  /  AlkPhos  81  12-11    CAPILLARY BLOOD GLUCOSE        rad< from: CT Angio Chest PE Protocol w/ IV Cont (12.11.22 @ 18:05) >  PLEURA: No effusion  MEDIASTINUM AND JOVANNY: No adenopathy. Large hiatal hernia. Esophageal   dilatation with secretions.  HEART: Mild cardiomegaly. No pericardial effusion.  CHEST WALL AND LOWER NECK: Within normal limits.  VISUALIZED UPPER ABDOMEN: Cholelithiasis. Left hepatic cyst. Left renal   cortical cyst.  BONES: Change severe compression fracture T12 with unchanged retropulsed   osseous fragment and kyphotic deformity, post posterior spinal fusion.    IMPRESSION:    1. No acute pulmonary embolus.  2. New peribronchial nodular opacities in left lower lobe, probably   inflammatory/infectious, possibly small airway disease.  3. Large hiatal hernia.    --- End of Report ---          ISRAEL TENA MD; Attending Radiologist  This document has been electronically signed.  ISRAEL TENA MD; Attending Radiologist  This document has been electronically signed. Dec 11 2022  6:22PM    < end of copied text >  < from: Xray Chest 1 View- PORTABLE-Urgent (Xray Chest 1 View- PORTABLE-Urgent .) (12.11.22 @ 17:49) >  Left heart border retrocardiac opacity.  No pleural effusion or pneumothorax.  Cardiomediastinal silhouette size is within normal limits.  No acute osseous abnormality. Partially imaged thoracolumbar spinal   fusion hardware    IMPRESSION:  Opacity behind the left retrocardiac border. Correlate with pending chest   CT.    --- End of Report ---           NEHEMIAS ROBERT MD; Resident Radiologist  This document has been electronically signed.  GEOVANNA ALVARADO MD; Attending Radiologist  This document has been electronically signed. Dec 12 2022  9:48AM    < end of copied text >                RECENT CULTURES:        RESPIRATORY CULTURES:          Studies  Chest X-RAY  CT SCAN Chest   Venous Dopplers: LE:   CT Abdomen  Others

## 2022-12-14 NOTE — PROGRESS NOTE ADULT - SUBJECTIVE AND OBJECTIVE BOX
Patient is a 82y old  Female who presents with a chief complaint of worsening productive cough (14 Dec 2022 14:47)      INTERVAL HPI/OVERNIGHT EVENTS: seen and examined , coughing +  T(C): 36.7 (12-14-22 @ 20:00), Max: 37 (12-14-22 @ 14:16)  HR: 96 (12-14-22 @ 20:00) (81 - 96)  BP: 135/79 (12-14-22 @ 20:00) (104/69 - 135/79)  RR: 18 (12-14-22 @ 20:00) (18 - 18)  SpO2: 98% (12-14-22 @ 20:00) (93% - 98%)  Wt(kg): --  I&O's Summary    13 Dec 2022 07:01  -  14 Dec 2022 07:00  --------------------------------------------------------  IN: 118 mL / OUT: 0 mL / NET: 118 mL    14 Dec 2022 07:01  -  14 Dec 2022 23:49  --------------------------------------------------------  IN: 120 mL / OUT: 200 mL / NET: -80 mL        PAST MEDICAL & SURGICAL HISTORY:  Arthritis      Hypertension      High cholesterol      Afib      Osteoporosis      Frequent UTI      Dementia      Compression fracture of T12 vertebra          SOCIAL HISTORY  Alcohol:  Tobacco:  Illicit substance use:    FAMILY HISTORY:    REVIEW OF SYSTEMS:  CONSTITUTIONAL: No fever, weight loss, or fatigue  EYES: No eye pain, visual disturbances, or discharge  ENMT:  No difficulty hearing, tinnitus, vertigo; No sinus or throat pain  NECK: No pain or stiffness  RESPIRATORY: No cough, wheezing, chills or hemoptysis; No shortness of breath  CARDIOVASCULAR: No chest pain, palpitations, dizziness, or leg swelling  GASTROINTESTINAL: No abdominal or epigastric pain. No nausea, vomiting, or hematemesis; No diarrhea or constipation. No melena or hematochezia.  GENITOURINARY: No dysuria, frequency, hematuria, or incontinence  NEUROLOGICAL: No headaches, memory loss, loss of strength, numbness, or tremors  SKIN: No itching, burning, rashes, or lesions   LYMPH NODES: No enlarged glands  ENDOCRINE: No heat or cold intolerance; No hair loss  MUSCULOSKELETAL: No joint pain or swelling; No muscle, back, or extremity pain  PSYCHIATRIC: No depression, anxiety, mood swings, or difficulty sleeping  HEME/LYMPH: No easy bruising, or bleeding gums  ALLERY AND IMMUNOLOGIC: No hives or eczema    RADIOLOGY & ADDITIONAL TESTS:    Imaging Personally Reviewed:  [ ] YES  [ ] NO    Consultant(s) Notes Reviewed:  [ ] YES  [ ] NO    PHYSICAL EXAM:  GENERAL: NAD, well-groomed, well-developed  HEAD:  Atraumatic, Normocephalic  EYES: EOMI, PERRLA, conjunctiva and sclera clear  ENMT: No tonsillar erythema, exudates, or enlargement; Moist mucous membranes, Good dentition, No lesions  NECK: Supple, No JVD, Normal thyroid  NERVOUS SYSTEM:  Alert & Oriented X3, Good concentration; Motor Strength 5/5 B/L upper and lower extremities; DTRs 2+ intact and symmetric  CHEST/LUNG: Clear to percussion bilaterally; No rales, rhonchi, wheezing, or rubs  HEART: Regular rate and rhythm; No murmurs, rubs, or gallops  ABDOMEN: Soft, Nontender, Nondistended; Bowel sounds present  EXTREMITIES:  2+ Peripheral Pulses, No clubbing, cyanosis, or edema  LYMPH: No lymphadenopathy noted  SKIN: No rashes or lesions    LABS:                        13.5   11.39 )-----------( 294      ( 14 Dec 2022 07:19 )             42.1               CAPILLARY BLOOD GLUCOSE                MEDICATIONS  (STANDING):  apixaban 2.5 milliGRAM(s) Oral two times a day  ascorbic acid 500 milliGRAM(s) Oral daily  cyanocobalamin 1000 MICROGram(s) Oral daily  digoxin     Tablet 125 MICROGram(s) Oral <User Schedule>  diltiazem    milliGRAM(s) Oral daily  donepezil 10 milliGRAM(s) Oral at bedtime  ferrous    sulfate 325 milliGRAM(s) Oral daily  folic acid 1 milliGRAM(s) Oral daily  guaiFENesin  milliGRAM(s) Oral every 12 hours  levoFLOXacin IVPB 250 milliGRAM(s) IV Intermittent every 24 hours  memantine 10 milliGRAM(s) Oral two times a day  pantoprazole    Tablet 40 milliGRAM(s) Oral before breakfast    MEDICATIONS  (PRN):  acetaminophen     Tablet .. 650 milliGRAM(s) Oral every 6 hours PRN Temp greater or equal to 38C (100.4F), Mild Pain (1 - 3)      Care Discussed with Consultants/Other Providers [ ] YES  [ ] NO

## 2022-12-15 ENCOUNTER — TRANSCRIPTION ENCOUNTER (OUTPATIENT)
Age: 82
End: 2022-12-15

## 2022-12-15 VITALS
RESPIRATION RATE: 18 BRPM | OXYGEN SATURATION: 97 % | DIASTOLIC BLOOD PRESSURE: 77 MMHG | TEMPERATURE: 98 F | SYSTOLIC BLOOD PRESSURE: 123 MMHG | HEART RATE: 69 BPM

## 2022-12-15 PROBLEM — N39.0 URINARY TRACT INFECTION, SITE NOT SPECIFIED: Chronic | Status: ACTIVE | Noted: 2022-12-12

## 2022-12-15 PROBLEM — F03.90 UNSPECIFIED DEMENTIA, UNSPECIFIED SEVERITY, WITHOUT BEHAVIORAL DISTURBANCE, PSYCHOTIC DISTURBANCE, MOOD DISTURBANCE, AND ANXIETY: Chronic | Status: ACTIVE | Noted: 2022-12-12

## 2022-12-15 LAB
APPEARANCE UR: ABNORMAL
BACTERIA # UR AUTO: ABNORMAL
BILIRUB UR-MCNC: NEGATIVE — SIGNIFICANT CHANGE UP
COD CRY URNS QL: ABNORMAL
COLOR SPEC: YELLOW — SIGNIFICANT CHANGE UP
DIFF PNL FLD: NEGATIVE — SIGNIFICANT CHANGE UP
EPI CELLS # UR: 4 /HPF — SIGNIFICANT CHANGE UP
GLUCOSE UR QL: NEGATIVE — SIGNIFICANT CHANGE UP
HYALINE CASTS # UR AUTO: 1 /LPF — SIGNIFICANT CHANGE UP (ref 0–2)
KETONES UR-MCNC: NEGATIVE — SIGNIFICANT CHANGE UP
LEUKOCYTE ESTERASE UR-ACNC: ABNORMAL
NITRITE UR-MCNC: POSITIVE
PH UR: 6 — SIGNIFICANT CHANGE UP (ref 5–8)
PROT UR-MCNC: ABNORMAL
RBC CASTS # UR COMP ASSIST: 6 /HPF — HIGH (ref 0–4)
SP GR SPEC: 1.04 — HIGH (ref 1.01–1.02)
UROBILINOGEN FLD QL: NEGATIVE — SIGNIFICANT CHANGE UP
WBC UR QL: 14 /HPF — HIGH (ref 0–5)

## 2022-12-15 PROCEDURE — 85027 COMPLETE CBC AUTOMATED: CPT

## 2022-12-15 PROCEDURE — 80162 ASSAY OF DIGOXIN TOTAL: CPT

## 2022-12-15 PROCEDURE — 97162 PT EVAL MOD COMPLEX 30 MIN: CPT

## 2022-12-15 PROCEDURE — 71045 X-RAY EXAM CHEST 1 VIEW: CPT

## 2022-12-15 PROCEDURE — 80053 COMPREHEN METABOLIC PANEL: CPT

## 2022-12-15 PROCEDURE — 71275 CT ANGIOGRAPHY CHEST: CPT | Mod: MA

## 2022-12-15 PROCEDURE — 36415 COLL VENOUS BLD VENIPUNCTURE: CPT

## 2022-12-15 PROCEDURE — 0225U NFCT DS DNA&RNA 21 SARSCOV2: CPT

## 2022-12-15 PROCEDURE — 80048 BASIC METABOLIC PNL TOTAL CA: CPT

## 2022-12-15 PROCEDURE — 99285 EMERGENCY DEPT VISIT HI MDM: CPT

## 2022-12-15 PROCEDURE — 87637 SARSCOV2&INF A&B&RSV AMP PRB: CPT

## 2022-12-15 PROCEDURE — 84484 ASSAY OF TROPONIN QUANT: CPT

## 2022-12-15 PROCEDURE — 92610 EVALUATE SWALLOWING FUNCTION: CPT

## 2022-12-15 PROCEDURE — 94640 AIRWAY INHALATION TREATMENT: CPT

## 2022-12-15 PROCEDURE — 81001 URINALYSIS AUTO W/SCOPE: CPT

## 2022-12-15 PROCEDURE — 85025 COMPLETE CBC W/AUTO DIFF WBC: CPT

## 2022-12-15 RX ORDER — FUROSEMIDE 40 MG
0 TABLET ORAL
Qty: 0 | Refills: 0 | DISCHARGE

## 2022-12-15 RX ORDER — PANTOPRAZOLE SODIUM 20 MG/1
1 TABLET, DELAYED RELEASE ORAL
Qty: 0 | Refills: 0 | DISCHARGE
Start: 2022-12-15

## 2022-12-15 RX ADMIN — Medication 325 MILLIGRAM(S): at 12:33

## 2022-12-15 RX ADMIN — PANTOPRAZOLE SODIUM 40 MILLIGRAM(S): 20 TABLET, DELAYED RELEASE ORAL at 05:25

## 2022-12-15 RX ADMIN — PREGABALIN 1000 MICROGRAM(S): 225 CAPSULE ORAL at 12:33

## 2022-12-15 RX ADMIN — MEMANTINE HYDROCHLORIDE 10 MILLIGRAM(S): 10 TABLET ORAL at 05:25

## 2022-12-15 RX ADMIN — Medication 600 MILLIGRAM(S): at 05:25

## 2022-12-15 RX ADMIN — Medication 1 MILLIGRAM(S): at 12:34

## 2022-12-15 RX ADMIN — Medication 180 MILLIGRAM(S): at 05:25

## 2022-12-15 RX ADMIN — APIXABAN 2.5 MILLIGRAM(S): 2.5 TABLET, FILM COATED ORAL at 05:25

## 2022-12-15 RX ADMIN — Medication 500 MILLIGRAM(S): at 12:33

## 2022-12-15 NOTE — PROGRESS NOTE ADULT - SUBJECTIVE AND OBJECTIVE BOX
DATE OF SERVICE: 12-15-22 @ 12:03    Patient is a 82y old  Female who presents with a chief complaint of worsening productive cough (14 Dec 2022 19:49)      INTERVAL HISTORY: Feels ok.     REVIEW OF SYSTEMS: Limited participant d/t baseline dementia  CONSTITUTIONAL: No weakness  EYES/ENT: No visual changes;  No throat pain   NECK: No pain or stiffness  RESPIRATORY: No cough, wheezing; No shortness of breath  CARDIOVASCULAR: No chest pain or palpitations  GASTROINTESTINAL: No abdominal  pain. No nausea, vomiting, or hematemesis  GENITOURINARY: No dysuria, frequency or hematuria  NEUROLOGICAL: No stroke like symptoms  SKIN: No rashes    TELEMETRY Personally reviewed: SR 80-90  	  MEDICATIONS:  digoxin     Tablet 125 MICROGram(s) Oral <User Schedule>  diltiazem    milliGRAM(s) Oral daily        PHYSICAL EXAM:  T(C): 36.4 (12-15-22 @ 11:35), Max: 37 (12-14-22 @ 14:16)  HR: 78 (12-15-22 @ 11:35) (78 - 96)  BP: 107/66 (12-15-22 @ 11:35) (107/66 - 135/79)  RR: 18 (12-15-22 @ 11:35) (18 - 18)  SpO2: 97% (12-15-22 @ 11:35) (93% - 98%)  Wt(kg): --  I&O's Summary    14 Dec 2022 07:01  -  15 Dec 2022 07:00  --------------------------------------------------------  IN: 120 mL / OUT: 200 mL / NET: -80 mL    15 Dec 2022 07:01  -  15 Dec 2022 12:03  --------------------------------------------------------  IN: 240 mL / OUT: 0 mL / NET: 240 mL          Appearance: In no distress	  HEENT:    PERRL, EOMI	  Cardiovascular:  S1 S2, No JVD  Respiratory: Lungs clear to auscultation	  Gastrointestinal:  Soft, Non-tender, + BS	  Vascularature:  No edema of LE  Psychiatric: Appropriate affect   Neuro: no acute focal deficits                               13.5   11.39 )-----------( 294      ( 14 Dec 2022 07:19 )             42.1               Labs personally reviewed      ASSESSMENT/PLAN: 	    83 y/o F Hx A. fib on Eliquis, dementia, hypertension, hyperlipidemia, frequent UTIs, osteoporosis p/w worsening cough.  Hx obtained from patient and collateral from son, Delroy the third who lives with her.  Patient had URI symptoms (chest congestion and cough) for the past 5 days with clear phlegm, taking mucinex without improvement, got significantly worse today with persistent cough and clear and sometimes brownish maroon color phlegm, decrease PO intake, and weakness.  Denes shortness of breath, fevers, chills, nausea, vomiting. + sick contact from son with URI during Thanksgiving. Followed by OP Cardiologist Dr. Tompkins but has not been seen in >1 year.     1. Chronic Atrial Fibrillation  - AF with RVR upon presentation but now rate controlled  - Dig level wnl  - c/w home meds: apixaban 2.5mg PO BID, Diltiazem 180mg PO daily, digoxin 125mcg PO daily    2. Hypertension  - BP currently well controlled  - Cont home meds: Diltiazem 180mg PO daily    3. HLD  - Resume lovastatin upon DC    4. Diastolic Heart Failure  - TTE from 2021 shows normal LV systolic function, normal RV, mild TR  - Appears euvolemic  - Home lasix on hold d/t acute infection  - Resume home dose Lasix for discharge     5. Community Acquired Pneumonia  - p/w cough, URI symptoms  - CT Chest shows new peribronchial nodular opacities in left lower lobe, probably inflammatory/infectious, possibly small airway disease  - c/w duonebs          Chica Khalil, AG-NP   Gustavo Graf DO [facility]  Cardiovascular Medicine  [address], [address]  Available through call or text on [employer]  Office: [phone]

## 2022-12-15 NOTE — SWALLOW BEDSIDE ASSESSMENT ADULT - SLP PERTINENT HISTORY OF CURRENT PROBLEM
81 y/o F Hx A. fib on Eliquis, dementia, hypertension, hyperlipidemia, frequent UTIs, osteoporosis p/w worsening cough.  Hx obtained from patient and collateral from son, Delroy the third who lives with her.  Patient had URI symptoms (chest congestion and cough) for the past 5 days with clear phlegm, taking mucinex without improvement, got significantly worse today with persistent cough and clear and sometimes brownish maroon color phlegm, decrease PO intake, and weakness.weakness.  Denes shortness of breath, fevers, chills, nausea, vomiting. + sick contact from son with URI during Thanksgiving

## 2022-12-15 NOTE — PROGRESS NOTE ADULT - REASON FOR ADMISSION
worsening productive cough

## 2022-12-15 NOTE — DISCHARGE NOTE NURSING/CASE MANAGEMENT/SOCIAL WORK - NSDCPEFALRISK_GEN_ALL_CORE
For information on Fall & Injury Prevention, visit: [email or web address] OR  [email or web address] OR  https://www.cdc.gov/steadi/patient.html

## 2022-12-15 NOTE — DISCHARGE NOTE NURSING/CASE MANAGEMENT/SOCIAL WORK - NSDCVIVACCINE_GEN_ALL_CORE_FT
Tdap; 19-Sep-2021 00:19; Kan Kwong (RN); [employer]; [id number] (Exp. Date: 10-May-2023); IntraMuscular; Deltoid Right.; 0.5 milliLiter(s); VIS (VIS Published: 09-May-2013, VIS Presented: 19-Sep-2021);

## 2022-12-15 NOTE — PROGRESS NOTE ADULT - SUBJECTIVE AND OBJECTIVE BOX
Date of Service: 12-15-22 @ 12:23    Patient is a 82y old  Female who presents with a chief complaint of worsening productive cough (15 Dec 2022 12:03)      Any change in ROS: Alert and awake:  no obvious resp distress     MEDICATIONS  (STANDING):  apixaban 2.5 milliGRAM(s) Oral two times a day  ascorbic acid 500 milliGRAM(s) Oral daily  cyanocobalamin 1000 MICROGram(s) Oral daily  digoxin     Tablet 125 MICROGram(s) Oral <User Schedule>  diltiazem    milliGRAM(s) Oral daily  donepezil 10 milliGRAM(s) Oral at bedtime  ferrous    sulfate 325 milliGRAM(s) Oral daily  folic acid 1 milliGRAM(s) Oral daily  guaiFENesin  milliGRAM(s) Oral every 12 hours  levoFLOXacin IVPB 250 milliGRAM(s) IV Intermittent every 24 hours  memantine 10 milliGRAM(s) Oral two times a day  pantoprazole    Tablet 40 milliGRAM(s) Oral before breakfast    MEDICATIONS  (PRN):  acetaminophen     Tablet .. 650 milliGRAM(s) Oral every 6 hours PRN Temp greater or equal to 38C (100.4F), Mild Pain (1 - 3)    Vital Signs Last 24 Hrs  T(C): 36.4 (15 Dec 2022 11:35), Max: 37 (14 Dec 2022 14:16)  T(F): 97.5 (15 Dec 2022 11:35), Max: 98.6 (14 Dec 2022 14:16)  HR: 78 (15 Dec 2022 11:35) (78 - 96)  BP: 107/66 (15 Dec 2022 11:35) (107/66 - 135/79)  BP(mean): --  RR: 18 (15 Dec 2022 11:35) (18 - 18)  SpO2: 97% (15 Dec 2022 11:35) (93% - 98%)    Parameters below as of 15 Dec 2022 11:35  Patient On (Oxygen Delivery Method): room air        I&O's Summary    14 Dec 2022 07:01  -  15 Dec 2022 07:00  --------------------------------------------------------  IN: 120 mL / OUT: 200 mL / NET: -80 mL    15 Dec 2022 07:01  -  15 Dec 2022 12:23  --------------------------------------------------------  IN: 240 mL / OUT: 0 mL / NET: 240 mL          Physical Exam:   GENERAL: NAD, well-groomed, well-developed  HEENT: DOC/   Atraumatic, Normocephalic  ENMT: No tonsillar erythema, exudates, or enlargement; Moist mucous membranes, Good dentition, No lesions  NECK: Supple, No JVD, Normal thyroid  CHEST/LUNG: Clear to auscultaion- no wheezing  CVS: Regular rate and rhythm; No murmurs, rubs, or gallops  GI: : Soft, Nontender, Nondistended; Bowel sounds present  NERVOUS SYSTEM:  Alert & Oriented X3  EXTREMITIES:  - edema  LYMPH: No lymphadenopathy noted  SKIN: No rashes or lesions  ENDOCRINOLOGY: No Thyromegaly  PSYCH: Appropriate    Labs:                              13.5   11.39 )-----------( 294      ( 14 Dec 2022 07:19 )             42.1                         13.8   14.53 )-----------( 314      ( 13 Dec 2022 19:05 )             43.5                         12.6   13.77 )-----------( 334      ( 12 Dec 2022 06:54 )             39.3                         14.5   13.74 )-----------( 354      ( 11 Dec 2022 17:23 )             45.7         145  |  106  |  16  ----------------------------<  108<H>  3.7   |  26  |  0.81      143  |  101  |  22  ----------------------------<  129<H>  3.7   |  23  |  0.97      TPro  8.5<H>  /  Alb  4.4  /  TBili  0.5  /  DBili  x   /  AST  15  /  ALT  13  /  AlkPhos  81  12-11    CAPILLARY BLOOD GLUCOSE      rad< from: CT Angio Chest PE Protocol w/ IV Cont (22 @ 18:05) >  CHEST WALL AND LOWER NECK: Within normal limits.  VISUALIZED UPPER ABDOMEN: Cholelithiasis. Left hepatic cyst. Left renal   cortical cyst.  BONES: Change severe compression fracture T12 with unchanged retropulsed   osseous fragment and kyphotic deformity, post posterior spinal fusion.    IMPRESSION:    1. No acute pulmonary embolus.  2. New peribronchial nodular opacities in left lower lobe, probably   inflammatory/infectious, possibly small airway disease.  3. Large hiatal hernia.    --- End of Report ---          ISRAEL TENA MD; Attending Radiologist  This document has been electronically signed.  ISRAEL TENA MD; Attending Radiologist  This document has been electronically signed. Dec 11 2022  6:22PM    < end of copied text >          Urinalysis Basic - ( 15 Dec 2022 06:54 )    Color: Yellow / Appearance: Slightly Turbid / S.038 / pH: x  Gluc: x / Ketone: Negative  / Bili: Negative / Urobili: Negative   Blood: x / Protein: 30 mg/dL / Nitrite: Positive   Leuk Esterase: Large / RBC: 6 /hpf / WBC 14 /HPF   Sq Epi: x / Non Sq Epi: 4 /hpf / Bacteria: Few            RECENT CULTURES:        RESPIRATORY CULTURES:          Studies  Chest X-RAY  CT SCAN Chest   Venous Dopplers: LE:   CT Abdomen  Others

## 2022-12-15 NOTE — PROGRESS NOTE ADULT - ASSESSMENT
Assessment /Plan:     83 y/o F Hx A. fib on Eliquis, dementia, hypertension, hyperlipidemia, frequent UTIs, osteoporosis p/w worsening cough.  Hx obtained from patient and collateral from son, Delroy the third who lives with her.  Patient had URI symptoms (chest congestion and cough) for the past 5 days with clear phlegm, taking mucinex without improvement, got significantly worse today with persistent cough and clear and sometimes brownish maroon color phlegm, decrease PO intake, and weakness.  Denes shortness of breath, fevers, chills, nausea, vomiting. + sick contact from son with URI during Thanksgiving. Followed by OP Cardiologist Dr. Tompkins but has not been seen in >1 year.     1. Chronic Atrial Fibrillation  now rate controlled   - Dig level wnl  c/w present meds   cardio following     2. Hypertension  - BP currently well controlled  - Cont home meds: Diltiazem 180mg PO daily    3. HLD  - Resume lovastatin upon DC    4. Diastolic Heart Failure  - TTE from 2021 shows normal LV systolic function, normal RV, mild TR  - Appears euvolemic  - Home lasix on hold d/t acute infection  - Will cont to assess volume status. Ok to cont to hold diuresis.    5. Community Acquired Pneumonia  - p/w cough, URI symptoms  - CT Chest shows new peribronchial nodular opacities in left lower lobe, probably inflammatory/infectious, possibly small airway disease  - c/w levaquin, duonebs  -will consult pul Dr. anne-marie Martins planning  
Assessment /Plan:     83 y/o F Hx A. fib on Eliquis, dementia, hypertension, hyperlipidemia, frequent UTIs, osteoporosis p/w worsening cough.  Hx obtained from patient and collateral from son, Delroy the third who lives with her.  Patient had URI symptoms (chest congestion and cough) for the past 5 days with clear phlegm, taking mucinex without improvement, got significantly worse today with persistent cough and clear and sometimes brownish maroon color phlegm, decrease PO intake, and weakness.  Denes shortness of breath, fevers, chills, nausea, vomiting. + sick contact from son with URI during Thanksgiving. Followed by OP Cardiologist Dr. Tompkins but has not been seen in >1 year.     1. Chronic Atrial Fibrillation  now rate controlled   - Dig level wnl  c/w present meds   cardio following     2. Hypertension  - BP currently well controlled  - Cont home meds: Diltiazem 180mg PO daily    3. HLD  - Resume lovastatin upon DC    4. Diastolic Heart Failure  - TTE from 2021 shows normal LV systolic function, normal RV, mild TR  - Appears euvolemic  - Home lasix on hold d/t acute infection  - Will cont to assess volume status. Ok to cont to hold diuresis.    5. Community Acquired Pneumonia  - p/w cough, URI symptoms  - CT Chest shows new peribronchial nodular opacities in left lower lobe, probably inflammatory/infectious, possibly small airway disease  - c/w levaquin, duonebs  -will consult pul Dr. xie     dispo: elevated wbc , but no fever , will plan for d/c on oral levaquin x 7 days   stable to be d/c     Dr. ramirez covering me from 12/15 thru 12/22  
Assessment /Plan:     81 y/o F Hx A. fib on Eliquis, dementia, hypertension, hyperlipidemia, frequent UTIs, osteoporosis p/w worsening cough.  Hx obtained from patient and collateral from son, Delroy the third who lives with her.  Patient had URI symptoms (chest congestion and cough) for the past 5 days with clear phlegm, taking mucinex without improvement, got significantly worse today with persistent cough and clear and sometimes brownish maroon color phlegm, decrease PO intake, and weakness.  Denes shortness of breath, fevers, chills, nausea, vomiting. + sick contact from son with URI during Thanksgiving. Followed by OP Cardiologist Dr. Tompkins but has not been seen in >1 year.     1. Chronic Atrial Fibrillation  now rate controlled   - Dig level wnl  c/w present meds   cardio following     2. Hypertension  - BP currently well controlled  - Cont home meds: Diltiazem 180mg PO daily    3. HLD  - Resume lovastatin upon DC    4. Diastolic Heart Failure  - TTE from 2021 shows normal LV systolic function, normal RV, mild TR  - Appears euvolemic  - Home lasix on hold d/t acute infection  - Will cont to assess volume status. Ok to cont to hold diuresis.    5. Community Acquired Pneumonia  - p/w cough, URI symptoms  - CT Chest shows new peribronchial nodular opacities in left lower lobe, probably inflammatory/infectious, possibly small airway disease  - c/w levaquin, duonebs  -will consult pul Dr. xie   
81 y/o F Hx Afib on Eliquis, HTN, HLD, dementia, OP p/w worsening productive cough a/w CAP      Suspected pneumonia:   A fibrillation   HTN:    HLD:   Dementia      12/13:    Suspected pneumonia: ct scan chest reviewed: has some infiltrates in left lower lobe but also has large hiatal hernia:  could she be aspirating? : would recommend speech and swallow: Add PPI  : send all cultures on antibiotics; RVP is negative  A fibrillation  : on AC  HTN:  controlled  HLD: statins  Dementia : supportive care:  [facility]    12/14:  Suspected pneumonia: ct scan chest reviewed: has some infiltrates in left lower lobe but also has large hiatal hernia:  could she be aspirating? : would recommend speech and swallow: Add PPI  : send all cultures on antibiotics; RVP is negative cont antibtiocs for 5- 7 days  A fibrillation  : on AC  HTN:  controlled  HLD: statins  Dementia : supportive care:  [facility]    12/15:  Suspected pneumonia: ct scan chest reviewed: has some infiltrates in left lower lobe but also has large hiatal hernia:  could she be aspirating? : recommend speech and swallow- done ? [place] [facility] [place]  : send all cultures on antibiotics; RVP is negative cont antibtiocs for 5- 7 days  A fibrillation  : on AC  HTN:  controlled  HLD: statins  Dementia : supportive care:  [facility]    
Assessment /Plan:     81 y/o F Hx A. fib on Eliquis, dementia, hypertension, hyperlipidemia, frequent UTIs, osteoporosis p/w worsening cough.  Hx obtained from patient and collateral from son, Delroy the third who lives with her.  Patient had URI symptoms (chest congestion and cough) for the past 5 days with clear phlegm, taking mucinex without improvement, got significantly worse today with persistent cough and clear and sometimes brownish maroon color phlegm, decrease PO intake, and weakness.  Denes shortness of breath, fevers, chills, nausea, vomiting. + sick contact from son with URI during Thanksgiving. Followed by OP Cardiologist Dr. Tompkins but has not been seen in >1 year.     1. Chronic Atrial Fibrillation  now rate controlled   - Dig level wnl  c/w present meds   cardio following     2. Hypertension  - BP currently well controlled  - Cont home meds: Diltiazem 180mg PO daily    3. HLD  - Resume lovastatin upon DC    4. Diastolic Heart Failure  - TTE from 2021 shows normal LV systolic function, normal RV, mild TR  - Appears euvolemic  - Home lasix on hold d/t acute infection  - Will cont to assess volume status. Ok to cont to hold diuresis.    5. Community Acquired Pneumonia  - p/w cough, URI symptoms  - CT Chest shows new peribronchial nodular opacities in left lower lobe, probably inflammatory/infectious, possibly small airway disease  - c/w levaquin, duonebs  -will consult pul Dr. xie     dispo: elevated wbc , but no fever , will plan for d/c on oral levaquin x 7 days   
81 y/o F Hx Afib on Eliquis, HTN, HLD, dementia, OP p/w worsening productive cough a/w CAP      Suspected pneumonia:   A fibrillation   HTN:    HLD:   Dementia      12/13:    Suspected pneumonia: ct scan chest reviewed: has some infiltrates in left lower lobe but also has large hiatal hernia:  could she be aspirating? : would recommend speech and swallow: Add PPI  : send all cultures on antibiotics; RVP is negative  A fibrillation  : on AC  HTN:  controlled  HLD: statins  Dementia : supportive care:   [facility]    12/14:  Suspected pneumonia: ct scan chest reviewed: has some infiltrates in left lower lobe but also has large hiatal hernia:  could she be aspirating? : would recommend speech and swallow: Add PPI  : send all cultures on antibiotics; RVP is negative cont antibtiocs for 5- 7 days  A fibrillation  : on AC  HTN:  controlled  HLD: statins  Dementia : supportive care:  [facility]

## 2022-12-15 NOTE — SWALLOW BEDSIDE ASSESSMENT ADULT - SLP GENERAL OBSERVATIONS
Pt AA+ox4; seated upright in bed; denies dysphagia. Pt AA+Ox4; seated upright in bed; denies dysphagia; denies reflux

## 2022-12-15 NOTE — SWALLOW BEDSIDE ASSESSMENT ADULT - SWALLOW EVAL: DIAGNOSIS
83 yo F admitted with CAP. Patient presents on bedside swallow evaluation with overtly functional oropharyngeal swallow. Oral management is adequate across textures trialed. There are no overt signs of laryngeal penetration/aspiration. Patient is pending dc to [facility] today.  If there is further concern for aspiration, MD may consider MBS for objective assessment. 81 yo F admitted with CAP. Patient presents on bedside swallow evaluation with overtly functional oropharyngeal swallow. Oral management is adequate across textures trialed. There are no overt signs of laryngeal penetration/aspiration. Patient is pending dc to [facility] today.  If there is further concern for aspiration, MD may consider MBS for objective assessment. If there is concern for aspiration due to retrograde flow related to hiatal hernia, MD may consider esophagram and/or GI consult.

## 2022-12-15 NOTE — PROGRESS NOTE ADULT - PROVIDER SPECIALTY LIST ADULT
Internal Medicine
Cardiology
Internal Medicine
Cardiology
Cardiology
Internal Medicine
Internal Medicine
Pulmonology
Pulmonology

## 2022-12-15 NOTE — DISCHARGE NOTE NURSING/CASE MANAGEMENT/SOCIAL WORK - PATIENT PORTAL LINK FT
You can access the FollowMyHealth Patient Portal offered by [facility] by registering at the following website: [email or web address]. By joining [facility]’s FollowMyHealth portal, you will also be able to view your health information using other applications (apps) compatible with our system.

## 2022-12-15 NOTE — SWALLOW BEDSIDE ASSESSMENT ADULT - COMMENTS
Chronic Atrial Fibrillation-now rate controlled; on home meds for Hypertension, controlled; Diastolic Heart Failure- TTE from 2021 shows normal LV systolic function, normal RV, mild TR- Appears euvolemic- Home lasix on hold d/t acute infection-  hold diuresis.  Community Acquired Pneumonia- p/w cough, URI symptoms- CT Chest shows new peribronchial nodular opacities in left lower lobe, probably inflammatory/infectious, possibly small airway disease- c/w levaquin, duonebs  Pulm consulted: "has some infiltrates in left lower lobe but also has large hiatal hernia:  could she be aspirating? : would recommend speech and swallow: Add PPI"

## 2023-03-07 NOTE — PHYSICAL THERAPY INITIAL EVALUATION ADULT - RISK REDUCTION/PREVENTION, PT EVAL
Department of Emergency Medicine  FIRST PROVIDER TRIAGE NOTE             Independent MLP           3/6/23  9:51 PM EST    Date of Encounter: 3/6/23   MRN: [id number]      HPI: Ever Maria is a 69 y.o. female who presents to the ED for Abdominal Pain (LLQ abd pain after dinner, emesis, nausea, -fever, hx ostomy and reversal.)     LLQ pain started after dinner with nausea and vomiting    ROS: Negative for cp, sob, or fever. PE: Gen Appearance/Constitutional: alert  GI: tender to palpation     Initial Plan of Care: All treatment areas with department are currently occupied. Plan to order/Initiate the following while awaiting opening in ED: labs. Initiate Treatment-Testing, Proceed toTreatment Area When Bed Available for ED Attending/MLP to Continue Care. Dr. Hilda Alexandra would like CT abd with contrast. Pre medicate with benadryl and solumedrol. Orders placed.     Electronically signed by Trudi Babinski, APRN - DARYA   DD: 3/6/23       Trudi Babinski, APRN - CNP  03/06/23 2153       Trudi Babinski, APRN - CNP  03/06/23 2200  ATTENDING PROVIDER ATTESTATION:     Supervising Physician, on-site, available for consultation, non-participatory in the evaluation or care of this patient       Merlin Mclaughlin MD  03/06/23 3914
risk factors

## 2023-07-13 NOTE — PATIENT PROFILE ADULT - VISION (WITH CORRECTIVE LENSES IF THE PATIENT USUALLY WEARS THEM):
[Initial Consultation] : an initial pain management consultation Normal vision: sees adequately in most situations; can see medication labels, newsprint

## 2023-08-03 NOTE — PATIENT PROFILE ADULT - NSPROPTRIGHTCAREGIVER_GEN_A_NUR
Interval Progress Note   CT reviewed. CT left foot demonstrate non displaced second metatarsal base fracture. No other fracture or dislocation noted.    CT Right ankle/Right foot demonstrate no acute fracture or dislocation  Plan  WBAT Bilateral Lower extremities  CAM boot to Left Lower extremity  PT/OT declines

## 2023-08-23 NOTE — PROGRESS NOTE ADULT - SUBJECTIVE AND OBJECTIVE BOX
DATE OF SERVICE: 12-13-22 @ 23:32    Patient is a 82y old  Female who presents with a chief complaint of worsening productive cough (13 Dec 2022 16:41)      INTERVAL HISTORY: feels well      	  MEDICATIONS:  digoxin     Tablet 125 MICROGram(s) Oral <User Schedule>  diltiazem    milliGRAM(s) Oral daily        PHYSICAL EXAM:  T(C): 36.4 (12-13-22 @ 20:00), Max: 37.1 (12-13-22 @ 04:48)  HR: 78 (12-13-22 @ 20:00) (77 - 87)  BP: 122/71 (12-13-22 @ 20:00) (103/64 - 122/71)  RR: 18 (12-13-22 @ 20:00) (16 - 19)  SpO2: 95% (12-13-22 @ 20:00) (94% - 95%)  Wt(kg): --  I&O's Summary    13 Dec 2022 07:01  -  13 Dec 2022 23:32  --------------------------------------------------------  IN: 118 mL / OUT: 0 mL / NET: 118 mL      Height (cm): 154.9 (12-13 @ 19:38)  Weight (kg): 63 (12-13 @ 19:38)  BMI (kg/m2): 26.3 (12-13 @ 19:38)  BSA (m2): 1.62 (12-13 @ 19:38)    Appearance: In no distress	  HEENT:    PERRL, EOMI	  Cardiovascular:  S1 S2, No JVD  Respiratory: Lungs clear to auscultation	  Gastrointestinal:  Soft, Non-tender, + BS	  Vascularature:  No edema of LE  Psychiatric: Appropriate affect   Neuro: no acute focal deficits                               13.8   14.53 )-----------( 314      ( 13 Dec 2022 19:05 )             43.5     12-12    145  |  106  |  16  ----------------------------<  108<H>  3.7   |  26  |  0.81    Ca    8.9      12 Dec 2022 06:54          Labs personally reviewed       from: Xray Chest 1 View- PORTABLE-Urgent (Xray Chest 1 View- PORTABLE-Urgent .) (12.11.22 @ 17:49) >  Left heart border retrocardiac opacity.  No pleural effusion or pneumothorax.  Cardiomediastinal silhouette size is within normal limits.  No acute osseous abnormality. Partially imaged thoracolumbar spinal   fusion hardware    IMPRESSION:  Opacity behind the left retrocardiac border. Correlate with pending chest   CT.    < end of copied text >  < from: CT Angio Chest PE Protocol w/ IV Cont (12.11.22 @ 18:05) >  1. No acute pulmonary embolus.  2. New peribronchial nodular opacities in left lower lobe, probably   inflammatory/infectious, possibly small airway disease.  3. Large hiatal hernia.    < end of copied text >  < from: Transthoracic Echocardiogram (08.25.21 @ 10:07) >  EF (Peterson Rule): 72 %Doppler Peak Velocity (m/sec):  AoV=1.6  ------------------------------------------------------------------------  Observations:  Mitral Valve: Mitral annular calcification, otherwise  normal mitral valve. Minimal mitral regurgitation.  Aortic Valve/Aorta: Calcified trileaflet aortic valve with  normal opening. Peak transaortic valve gradient equals 10  mm Hg, mean transaortic valve gradient equals 5 mm Hg,  aortic valve velocity time integral equals 31 cm. Peak left  ventricular outflow tract gradient equals 6 mm Hg, mean  gradient is equal to 3 mm Hg, LVOT velocity time integral  equals 23 cm.  Aortic Root: 2.9 cm.  Left Atrium: Normal left atrium.  LA volume index = 23  cc/m2.  Left Ventricle: Normal left ventricular systolic function.  No segmental wall motion abnormalities. Normal left  ventricular internal dimensions and wall thicknesses.  Normal diastolic function  Right Heart: Normal right atrium. Normal right ventricular  size and function. Normal tricuspid valve. Minimal  tricuspid regurgitation. Normal pulmonic valve.  Mild-moderate pulmonic regurgitation.  Pericardium/Pleura: Normal pericardium with trace  pericardial effusion. Pericardial fat pad noted.  Hemodynamic: Estimated right atrial pressure is 8 mm Hg.  Estimated right ventricular systolic pressure equals 27 mm  Hg, assuming right atrial pressure equals 8 mm Hg,  consistent with normal pulmonary pressures.  ------------------------------------------------------------------------  Conclusions:  1. Normal left ventricular systolic function. No segmental  wall motion abnormalities.  2. Normal right ventricular size and function.  3. Normal tricuspid valve. Minimal tricuspid regurgitation.  *** Compared with echocardiogram of 9/21/2020, no  significant changes noted.    < end of copied text >      Assessment /Plan:     81 y/o F Hx A. fib on Eliquis, dementia, hypertension, hyperlipidemia, frequent UTIs, osteoporosis p/w worsening cough.  Hx obtained from patient and collateral from son, Delroy the third who lives with her.  Patient had URI symptoms (chest congestion and cough) for the past 5 days with clear phlegm, taking mucinex without improvement, got significantly worse today with persistent cough and clear and sometimes brownish maroon color phlegm, decrease PO intake, and weakness.  Denes shortness of breath, fevers, chills, nausea, vomiting. + sick contact from son with URI during Thanksgiving. Followed by OP Cardiologist Dr. Tompkins but has not been seen in >1 year.     1. Chronic Atrial Fibrillation  - AF with RVR upon presentation but now rate controlled  - Dig level wnl  - c/w home meds: apixaban 2.5mg PO BID, Diltiazem 180mg PO daily, digoxin 125mcg PO daily    2. Hypertension  - BP currently well controlled  - Cont home meds: Diltiazem 180mg PO daily    3. HLD  - Resume lovastatin upon DC    4. Diastolic Heart Failure  - TTE from 2021 shows normal LV systolic function, normal RV, mild TR  - Appears euvolemic  - Home lasix on hold d/t acute infection  - Resume home dose Lasix for discharge     5. Community Acquired Pneumonia  - p/w cough, URI symptoms  - CT Chest shows new peribronchial nodular opacities in left lower lobe, probably inflammatory/infectious, possibly small airway disease  - c/w levaquin, duonebs                    Gustavo Javdan, DO [facility]  Cardiovascular Medicine  [address] [place] [address], [address]  Office: [phone]  Available via Text/call on [employer] Teams Home

## 2023-09-19 NOTE — PROGRESS NOTE ADULT - SUBJECTIVE AND OBJECTIVE BOX
80 year old female with pmhx HTN, HLD, Afib on Digoxin and Eliquis, dementia presents to ED after syncopal episode today. Patient reports that she remembers being on he stair lift chair  and then remembers waking up surrounded by family. Per EMS pt was riding down stairs when she suddenly lost consciousness witnesses by son and daughter in law. LOC lasting seconds and pt returned to baseline immediately upon waking. No tongue biting or loss of bowl/bladder function. Pt was buckled in and did not sustain any injury. Otherwise has been feeling well. Denies recent illness, fevers, chills, chest pain, sob, abd pain, n/v/d, urinary symptoms. Patient seen now resting comfortably. Patient with no arrhthymias on the monitor. No new witness syncopal events.  r/O  UTI    MEDICATIONS  (STANDING):  apixaban 5 milliGRAM(s) Oral two times a day  atorvastatin 10 milliGRAM(s) Oral at bedtime  digoxin     Tablet 0.125 milliGRAM(s) Oral <User Schedule>  diltiazem    milliGRAM(s) Oral daily  donepezil 10 milliGRAM(s) Oral at bedtime  folic acid 1 milliGRAM(s) Oral daily  furosemide    Tablet 20 milliGRAM(s) Oral daily  influenza   Vaccine 0.5 milliLiter(s) IntraMuscular once  memantine 10 milliGRAM(s) Oral daily  sodium chloride 0.9%. 1000 milliLiter(s) (50 mL/Hr) IV Continuous <Continuous>    MEDICATIONS  (PRN):        Vital Signs Last 24 Hrs  T(C): 36.9 (23 Sep 2020 19:57), Max: 36.9 (23 Sep 2020 19:57)  T(F): 98.5 (23 Sep 2020 19:57), Max: 98.5 (23 Sep 2020 19:57)  HR: 87 (23 Sep 2020 19:57) (79 - 87)  BP: 115/68 (23 Sep 2020 19:57) (115/68 - 117/69)  BP(mean): --  RR: 18 (23 Sep 2020 19:57) (18 - 18)  SpO2: 95% (23 Sep 2020 19:57) (95% - 97%)      PHYSICAL EXAM:  GENERAL: NAD, well nourished and conversant  HEAD:  Atraumatic  EYES: EOM, PERRLA, conjunctiva pink and sclera white  ENT: No tonsillar erythema, exudates, or enlargement, moist mucous membranes, good dentition, no lesions  NECK: Supple, No JVD, normal thyroid, carotids with normal upstrokes and no bruits  CHEST/LUNG: Clear to auscultation bilaterally, No rales, rhonchi, wheezing, or rubs  HEART: Regular rate and rhythm, No murmurs, rubs, or gallops  ABDOMEN: Soft, nondistended, no masses, guarding, tenderness or rebound, bowel sounds present  EXTREMITIES:  2+ Peripheral Pulses, No clubbing, cyanosis, or edema.   LYMPH: No lymphadenopathy noted  SKIN: No rashes or lesions  NERVOUS SYSTEM:  Alert & Oriented  LABS:        CBC Full  -  ( 22 Sep 2020 06:07 )  WBC Count : 10.18 K/uL  RBC Count : 3.57 M/uL  Hemoglobin : 10.9 g/dL  Hematocrit : 34.4 %  Platelet Count - Automated : 327 K/uL  Mean Cell Volume : 96.4 fl  Mean Cell Hemoglobin : 30.5 pg  Mean Cell Hemoglobin Concentration : 31.7 gm/dL  Auto Neutrophil # : x  Auto Lymphocyte # : x  Auto Monocyte # : x  Auto Eosinophil # : x  Auto Basophil # : x  Auto Neutrophil % : x  Auto Lymphocyte % : x  Auto Monocyte % : x  Auto Eosinophil % : x  Auto Basophil % : x    -    138  |  103  |  13  ----------------------------<  145<H>  3.9   |  25  |  0.91    Ca    9.0      22 Sep 2020 06:06  Phos  3.6     09-20  Mg     2.3     09-20    TPro  7.5  /  Alb  3.8  /  TBili  0.4  /  DBili  x   /  AST  16  /  ALT  8<L>  /  AlkPhos  69  09-20    LIVER FUNCTIONS - ( 20 Sep 2020 15:00 )  Alb: 3.8 g/dL / Pro: 7.5 g/dL / ALK PHOS: 69 U/L / ALT: 8 U/L / AST: 16 U/L / GGT: x           PT/INR - ( 20 Sep 2020 15:00 )   PT: 16.9 sec;   INR: 1.45 ratio         PTT - ( 20 Sep 2020 15:00 )  PTT:27.8 sec  Urinalysis Basic - ( 21 Sep 2020 23:58 )    Color: Yellow / Appearance: Slightly Turbid / S.016 / pH: x  Gluc: x / Ketone: Negative  / Bili: Negative / Urobili: <2 mg/dL   Blood: x / Protein: Trace / Nitrite: Positive   Leuk Esterase: Large / RBC: 1 /HPF / WBC 30 /HPF   Sq Epi: x / Non Sq Epi: 1 /HPF / Bacteria: TNTC      CAPILLARY BLOOD GLUCOSE          RADIOLOGY & ADDITIONAL TESTS:       Medical Necessity Information: It is in your best interest to select a reason for this procedure from the list below. All of these items fulfill various CMS LCD requirements except lesion extends to a margin. Include Z78.9 (Other Specified Conditions Influencing Health Status) As An Associated Diagnosis?: No Medical Necessity Clause: This procedure was medically necessary because the lesion that was treated was: Lab: 6 Lab Facility: 3 Surgeon (Optional): Lebron August PA-C Biopsy Photograph Reviewed: Yes Size Of Lesion In Cm: 1.3 X Size Of Lesion In Cm (Optional): 0 Size Of Margin In Cm: 0.3 Anesthesia Volume In Cc: 5 Eye Clamp Note Details: An eye clamp was used during the procedure. Excision Method: Elliptical Saucerization Depth: dermis and superficial adipose tissue Repair Type: Intermediate Intermediate / Complex Repair - Final Wound Length In Cm: 4 Suturegard Retention Suture: 2-0 Nylon Retention Suture Bite Size: 3 mm Length To Time In Minutes Device Was In Place: 10 Number Of Hemigard Strips Per Side: 1 Undermining Type: Entire Wound Debridement Text: The wound edges were debrided prior to proceeding with the closure to facilitate wound healing. Helical Rim Text: The closure involved the helical rim. Vermilion Border Text: The closure involved the vermilion border. Nostril Rim Text: The closure involved the nostril rim. Retention Suture Text: Retention sutures were placed to support the closure and prevent dehiscence. Suture Removal: 14 days Epidermal Closure Graft Donor Site (Optional): simple interrupted Detail Level: Detailed Excision Depth: adipose tissue Scalpel Size: 15 blade Anesthesia Type: 1% lidocaine with epinephrine Hemostasis: Electrocautery Estimated Blood Loss (Cc): minimal Deep Sutures: 3-0 Monoderm Epidermal Sutures: 4-0 Prolene Wound Care: Petrolatum Dressing: dry sterile dressing Suturegard Intro: Intraoperative tissue expansion was performed, utilizing the SUTUREGARD device, in order to reduce wound tension. Suturegard Body: The suture ends were repeatedly re-tightened and re-clamped to achieve the desired tissue expansion. Hemigard Intro: Due to skin fragility and wound tension, it was decided to use HEMIGARD adhesive retention suture devices to permit a linear closure. The skin was cleaned and dried for a 6cm distance away from the wound. Excessive hair, if present, was removed to allow for adhesion. Hemigard Postcare Instructions: The HEMIGARD strips are to remain completely dry for at least 5-7 days. Positioning (Leave Blank If You Do Not Want): The patient was placed in a comfortable position exposing the surgical site. Complex Repair Preamble Text (Leave Blank If You Do Not Want): Extensive wide undermining was performed. Intermediate Repair Preamble Text (Leave Blank If You Do Not Want): Undermining was performed with blunt dissection. Fusiform Excision Additional Text (Leave Blank If You Do Not Want): The margin was drawn around the clinically apparent lesion.  A fusiform shape was then drawn on the skin incorporating the lesion and margins.  Incisions were then made along these lines to the appropriate tissue plane and the lesion was extirpated. Eliptical Excision Additional Text (Leave Blank If You Do Not Want): The margin was drawn around the clinically apparent lesion.  An elliptical shape was then drawn on the skin incorporating the lesion and margins.  Incisions were then made along these lines to the appropriate tissue plane and the lesion was extirpated. Saucerization Excision Additional Text (Leave Blank If You Do Not Want): The margin was drawn around the clinically apparent lesion.  Incisions were then made along these lines, in a tangential fashion, to the appropriate tissue plane and the lesion was extirpated. Slit Excision Additional Text (Leave Blank If You Do Not Want): A linear line was drawn on the skin overlying the lesion. An incision was made slowly until the lesion was visualized.  Once visualized, the lesion was removed with blunt dissection. Excisional Biopsy Additional Text (Leave Blank If You Do Not Want): The margin was drawn around the clinically apparent lesion. An elliptical shape was then drawn on the skin incorporating the lesion and margins.  Incisions were then made along these lines to the appropriate tissue plane and the lesion was extirpated. Perilesional Excision Additional Text (Leave Blank If You Do Not Want): The margin was drawn around the clinically apparent lesion. Incisions were then made along these lines to the appropriate tissue plane and the lesion was extirpated. Repair Performed By Another Provider Text (Leave Blank If You Do Not Want): After the tissue was excised the defect was repaired by another provider. No Repair - Repaired With Adjacent Surgical Defect Text (Leave Blank If You Do Not Want): After the excision the defect was repaired concurrently with another surgical defect which was in close approximation. Adjacent Tissue Transfer Text: The defect edges were debeveled with a #15 scalpel blade.  Given the location of the defect and the proximity to free margins an adjacent tissue transfer was deemed most appropriate.  Using a sterile surgical marker, an appropriate flap was drawn incorporating the defect and placing the expected incisions within the relaxed skin tension lines where possible.    The area thus outlined was incised deep to adipose tissue with a #15 scalpel blade.  The skin margins were undermined to an appropriate distance in all directions utilizing iris scissors. Advancement Flap (Single) Text: The defect edges were debeveled with a #15 scalpel blade.  Given the location of the defect and the proximity to free margins a single advancement flap was deemed most appropriate.  Using a sterile surgical marker, an appropriate advancement flap was drawn incorporating the defect and placing the expected incisions within the relaxed skin tension lines where possible.    The area thus outlined was incised deep to adipose tissue with a #15 scalpel blade.  The skin margins were undermined to an appropriate distance in all directions utilizing iris scissors. Advancement Flap (Double) Text: The defect edges were debeveled with a #15 scalpel blade.  Given the location of the defect and the proximity to free margins a double advancement flap was deemed most appropriate.  Using a sterile surgical marker, the appropriate advancement flaps were drawn incorporating the defect and placing the expected incisions within the relaxed skin tension lines where possible.    The area thus outlined was incised deep to adipose tissue with a #15 scalpel blade.  The skin margins were undermined to an appropriate distance in all directions utilizing iris scissors. Burow's Advancement Flap Text: The defect edges were debeveled with a #15 scalpel blade.  Given the location of the defect and the proximity to free margins a Burow's advancement flap was deemed most appropriate.  Using a sterile surgical marker, the appropriate advancement flap was drawn incorporating the defect and placing the expected incisions within the relaxed skin tension lines where possible.    The area thus outlined was incised deep to adipose tissue with a #15 scalpel blade.  The skin margins were undermined to an appropriate distance in all directions utilizing iris scissors. Chonodrocutaneous Helical Advancement Flap Text: The defect edges were debeveled with a #15 scalpel blade.  Given the location of the defect and the proximity to free margins a chondrocutaneous helical advancement flap was deemed most appropriate.  Using a sterile surgical marker, the appropriate advancement flap was drawn incorporating the defect and placing the expected incisions within the relaxed skin tension lines where possible.    The area thus outlined was incised deep to adipose tissue with a #15 scalpel blade.  The skin margins were undermined to an appropriate distance in all directions utilizing iris scissors. Crescentic Advancement Flap Text: The defect edges were debeveled with a #15 scalpel blade.  Given the location of the defect and the proximity to free margins a crescentic advancement flap was deemed most appropriate.  Using a sterile surgical marker, the appropriate advancement flap was drawn incorporating the defect and placing the expected incisions within the relaxed skin tension lines where possible.    The area thus outlined was incised deep to adipose tissue with a #15 scalpel blade.  The skin margins were undermined to an appropriate distance in all directions utilizing iris scissors. A-T Advancement Flap Text: The defect edges were debeveled with a #15 scalpel blade.  Given the location of the defect, shape of the defect and the proximity to free margins an A-T advancement flap was deemed most appropriate.  Using a sterile surgical marker, an appropriate advancement flap was drawn incorporating the defect and placing the expected incisions within the relaxed skin tension lines where possible.    The area thus outlined was incised deep to adipose tissue with a #15 scalpel blade.  The skin margins were undermined to an appropriate distance in all directions utilizing iris scissors. O-T Advancement Flap Text: The defect edges were debeveled with a #15 scalpel blade.  Given the location of the defect, shape of the defect and the proximity to free margins an O-T advancement flap was deemed most appropriate.  Using a sterile surgical marker, an appropriate advancement flap was drawn incorporating the defect and placing the expected incisions within the relaxed skin tension lines where possible.    The area thus outlined was incised deep to adipose tissue with a #15 scalpel blade.  The skin margins were undermined to an appropriate distance in all directions utilizing iris scissors. O-L Flap Text: The defect edges were debeveled with a #15 scalpel blade.  Given the location of the defect, shape of the defect and the proximity to free margins an O-L flap was deemed most appropriate.  Using a sterile surgical marker, an appropriate advancement flap was drawn incorporating the defect and placing the expected incisions within the relaxed skin tension lines where possible.    The area thus outlined was incised deep to adipose tissue with a #15 scalpel blade.  The skin margins were undermined to an appropriate distance in all directions utilizing iris scissors. O-Z Flap Text: The defect edges were debeveled with a #15 scalpel blade.  Given the location of the defect, shape of the defect and the proximity to free margins an O-Z flap was deemed most appropriate.  Using a sterile surgical marker, an appropriate transposition flap was drawn incorporating the defect and placing the expected incisions within the relaxed skin tension lines where possible. The area thus outlined was incised deep to adipose tissue with a #15 scalpel blade.  The skin margins were undermined to an appropriate distance in all directions utilizing iris scissors. Double O-Z Flap Text: The defect edges were debeveled with a #15 scalpel blade.  Given the location of the defect, shape of the defect and the proximity to free margins a Double O-Z flap was deemed most appropriate.  Using a sterile surgical marker, an appropriate transposition flap was drawn incorporating the defect and placing the expected incisions within the relaxed skin tension lines where possible. The area thus outlined was incised deep to adipose tissue with a #15 scalpel blade.  The skin margins were undermined to an appropriate distance in all directions utilizing iris scissors. V-Y Flap Text: The defect edges were debeveled with a #15 scalpel blade.  Given the location of the defect, shape of the defect and the proximity to free margins a V-Y flap was deemed most appropriate.  Using a sterile surgical marker, an appropriate advancement flap was drawn incorporating the defect and placing the expected incisions within the relaxed skin tension lines where possible.    The area thus outlined was incised deep to adipose tissue with a #15 scalpel blade.  The skin margins were undermined to an appropriate distance in all directions utilizing iris scissors. Advancement-Rotation Flap Text: The defect edges were debeveled with a #15 scalpel blade.  Given the location of the defect, shape of the defect and the proximity to free margins an advancement-rotation flap was deemed most appropriate.  Using a sterile surgical marker, an appropriate flap was drawn incorporating the defect and placing the expected incisions within the relaxed skin tension lines where possible. The area thus outlined was incised deep to adipose tissue with a #15 scalpel blade.  The skin margins were undermined to an appropriate distance in all directions utilizing iris scissors. Mercedes Flap Text: The defect edges were debeveled with a #15 scalpel blade.  Given the location of the defect, shape of the defect and the proximity to free margins a Mercedes flap was deemed most appropriate.  Using a sterile surgical marker, an appropriate advancement flap was drawn incorporating the defect and placing the expected incisions within the relaxed skin tension lines where possible. The area thus outlined was incised deep to adipose tissue with a #15 scalpel blade.  The skin margins were undermined to an appropriate distance in all directions utilizing iris scissors. Modified Advancement Flap Text: The defect edges were debeveled with a #15 scalpel blade.  Given the location of the defect, shape of the defect and the proximity to free margins a modified advancement flap was deemed most appropriate.  Using a sterile surgical marker, an appropriate advancement flap was drawn incorporating the defect and placing the expected incisions within the relaxed skin tension lines where possible.    The area thus outlined was incised deep to adipose tissue with a #15 scalpel blade.  The skin margins were undermined to an appropriate distance in all directions utilizing iris scissors. Mucosal Advancement Flap Text: Given the location of the defect, shape of the defect and the proximity to free margins a mucosal advancement flap was deemed most appropriate. Incisions were made with a 15 blade scalpel in the appropriate fashion along the cutaneous vermilion border and the mucosal lip. The remaining actinically damaged mucosal tissue was excised.  The mucosal advancement flap was then elevated to the gingival sulcus with care taken to preserve the neurovascular structures and advanced into the primary defect. Care was taken to ensure that precise realignment of the vermilion border was achieved. Peng Advancement Flap Text: The defect edges were debeveled with a #15 scalpel blade.  Given the location of the defect, shape of the defect and the proximity to free margins a Peng advancement flap was deemed most appropriate.  Using a sterile surgical marker, an appropriate advancement flap was drawn incorporating the defect and placing the expected incisions within the relaxed skin tension lines where possible. The area thus outlined was incised deep to adipose tissue with a #15 scalpel blade.  The skin margins were undermined to an appropriate distance in all directions utilizing iris scissors. Hatchet Flap Text: The defect edges were debeveled with a #15 scalpel blade.  Given the location of the defect, shape of the defect and the proximity to free margins a hatchet flap was deemed most appropriate.  Using a sterile surgical marker, an appropriate hatchet flap was drawn incorporating the defect and placing the expected incisions within the relaxed skin tension lines where possible.    The area thus outlined was incised deep to adipose tissue with a #15 scalpel blade.  The skin margins were undermined to an appropriate distance in all directions utilizing iris scissors. Rotation Flap Text: The defect edges were debeveled with a #15 scalpel blade.  Given the location of the defect, shape of the defect and the proximity to free margins a rotation flap was deemed most appropriate.  Using a sterile surgical marker, an appropriate rotation flap was drawn incorporating the defect and placing the expected incisions within the relaxed skin tension lines where possible.    The area thus outlined was incised deep to adipose tissue with a #15 scalpel blade.  The skin margins were undermined to an appropriate distance in all directions utilizing iris scissors. Bilateral Rotation Flap Text: The defect edges were debeveled with a #15 scalpel blade. Given the location of the defect, shape of the defect and the proximity to free margins a bilateral rotation flap was deemed most appropriate. Using a sterile surgical marker, an appropriate rotation flap was drawn incorporating the defect and placing the expected incisions within the relaxed skin tension lines where possible. The area thus outlined was incised deep to adipose tissue with a #15 scalpel blade. The skin margins were undermined to an appropriate distance in all directions utilizing iris scissors. Following this, the designed flap was carried over into the primary defect and sutured into place. Spiral Flap Text: The defect edges were debeveled with a #15 scalpel blade.  Given the location of the defect, shape of the defect and the proximity to free margins a spiral flap was deemed most appropriate.  Using a sterile surgical marker, an appropriate rotation flap was drawn incorporating the defect and placing the expected incisions within the relaxed skin tension lines where possible. The area thus outlined was incised deep to adipose tissue with a #15 scalpel blade.  The skin margins were undermined to an appropriate distance in all directions utilizing iris scissors. Staged Advancement Flap Text: The defect edges were debeveled with a #15 scalpel blade.  Given the location of the defect, shape of the defect and the proximity to free margins a staged advancement flap was deemed most appropriate.  Using a sterile surgical marker, an appropriate advancement flap was drawn incorporating the defect and placing the expected incisions within the relaxed skin tension lines where possible. The area thus outlined was incised deep to adipose tissue with a #15 scalpel blade.  The skin margins were undermined to an appropriate distance in all directions utilizing iris scissors. Star Wedge Flap Text: The defect edges were debeveled with a #15 scalpel blade.  Given the location of the defect, shape of the defect and the proximity to free margins a star wedge flap was deemed most appropriate.  Using a sterile surgical marker, an appropriate rotation flap was drawn incorporating the defect and placing the expected incisions within the relaxed skin tension lines where possible. The area thus outlined was incised deep to adipose tissue with a #15 scalpel blade.  The skin margins were undermined to an appropriate distance in all directions utilizing iris scissors. Transposition Flap Text: The defect edges were debeveled with a #15 scalpel blade.  Given the location of the defect and the proximity to free margins a transposition flap was deemed most appropriate.  Using a sterile surgical marker, an appropriate transposition flap was drawn incorporating the defect.    The area thus outlined was incised deep to adipose tissue with a #15 scalpel blade.  The skin margins were undermined to an appropriate distance in all directions utilizing iris scissors. Muscle Hinge Flap Text: The defect edges were debeveled with a #15 scalpel blade.  Given the size, depth and location of the defect and the proximity to free margins a muscle hinge flap was deemed most appropriate.  Using a sterile surgical marker, an appropriate hinge flap was drawn incorporating the defect. The area thus outlined was incised with a #15 scalpel blade.  The skin margins were undermined to an appropriate distance in all directions utilizing iris scissors. Mustarde Flap Text: The defect edges were debeveled with a #15 scalpel blade.  Given the size, depth and location of the defect and the proximity to free margins a Mustarde flap was deemed most appropriate.  Using a sterile surgical marker, an appropriate flap was drawn incorporating the defect. The area thus outlined was incised with a #15 scalpel blade.  The skin margins were undermined to an appropriate distance in all directions utilizing iris scissors. Nasal Turnover Hinge Flap Text: The defect edges were debeveled with a #15 scalpel blade.  Given the size, depth, location of the defect and the defect being full thickness a nasal turnover hinge flap was deemed most appropriate.  Using a sterile surgical marker, an appropriate hinge flap was drawn incorporating the defect. The area thus outlined was incised with a #15 scalpel blade. The flap was designed to recreate the nasal mucosal lining and the alar rim. The skin margins were undermined to an appropriate distance in all directions utilizing iris scissors. Nasalis-Muscle-Based Myocutaneous Island Pedicle Flap Text: Using a #15 blade, an incision was made around the donor flap to the level of the nasalis muscle. Wide lateral undermining was then performed in both the subcutaneous plane above the nasalis muscle, and in a submuscular plane just above periosteum. This allowed the formation of a free nasalis muscle axial pedicle (based on the angular artery) which was still attached to the actual cutaneous flap, increasing its mobility and vascular viability. Hemostasis was obtained with pinpoint electrocoagulation. The flap was mobilized into position and the pivotal anchor points positioned and stabilized with buried interrupted sutures. Subcutaneous and dermal tissues were closed in a multilayered fashion with sutures. Tissue redundancies were excised, and the epidermal edges were apposed without significant tension and sutured with sutures. Orbicularis Oris Muscle Flap Text: The defect edges were debeveled with a #15 scalpel blade.  Given that the defect affected the competency of the oral sphincter an orbicularis oris muscle flap was deemed most appropriate to restore this competency and normal muscle function.  Using a sterile surgical marker, an appropriate flap was drawn incorporating the defect. The area thus outlined was incised with a #15 scalpel blade. Melolabial Transposition Flap Text: The defect edges were debeveled with a #15 scalpel blade.  Given the location of the defect and the proximity to free margins a melolabial flap was deemed most appropriate.  Using a sterile surgical marker, an appropriate melolabial transposition flap was drawn incorporating the defect.    The area thus outlined was incised deep to adipose tissue with a #15 scalpel blade.  The skin margins were undermined to an appropriate distance in all directions utilizing iris scissors. Rhombic Flap Text: The defect edges were debeveled with a #15 scalpel blade.  Given the location of the defect and the proximity to free margins a rhombic flap was deemed most appropriate.  Using a sterile surgical marker, an appropriate rhombic flap was drawn incorporating the defect.    The area thus outlined was incised deep to adipose tissue with a #15 scalpel blade.  The skin margins were undermined to an appropriate distance in all directions utilizing iris scissors. Rhomboid Transposition Flap Text: The defect edges were debeveled with a #15 scalpel blade.  Given the location of the defect and the proximity to free margins a rhomboid transposition flap was deemed most appropriate.  Using a sterile surgical marker, an appropriate rhomboid flap was drawn incorporating the defect.    The area thus outlined was incised deep to adipose tissue with a #15 scalpel blade.  The skin margins were undermined to an appropriate distance in all directions utilizing iris scissors. Bi-Rhombic Flap Text: The defect edges were debeveled with a #15 scalpel blade.  Given the location of the defect and the proximity to free margins a bi-rhombic flap was deemed most appropriate.  Using a sterile surgical marker, an appropriate rhombic flap was drawn incorporating the defect. The area thus outlined was incised deep to adipose tissue with a #15 scalpel blade.  The skin margins were undermined to an appropriate distance in all directions utilizing iris scissors. Helical Rim Advancement Flap Text: The defect edges were debeveled with a #15 blade scalpel.  Given the location of the defect and the proximity to free margins (helical rim) a double helical rim advancement flap was deemed most appropriate.  Using a sterile surgical marker, the appropriate advancement flaps were drawn incorporating the defect and placing the expected incisions between the helical rim and antihelix where possible.  The area thus outlined was incised through and through with a #15 scalpel blade.  With a skin hook and iris scissors, the flaps were gently and sharply undermined and freed up. Bilateral Helical Rim Advancement Flap Text: The defect edges were debeveled with a #15 blade scalpel.  Given the location of the defect and the proximity to free margins (helical rim) a bilateral helical rim advancement flap was deemed most appropriate.  Using a sterile surgical marker, the appropriate advancement flaps were drawn incorporating the defect and placing the expected incisions between the helical rim and antihelix where possible.  The area thus outlined was incised through and through with a #15 scalpel blade.  With a skin hook and iris scissors, the flaps were gently and sharply undermined and freed up. Ear Star Wedge Flap Text: The defect edges were debeveled with a #15 blade scalpel.  Given the location of the defect and the proximity to free margins (helical rim) an ear star wedge flap was deemed most appropriate.  Using a sterile surgical marker, the appropriate flap was drawn incorporating the defect and placing the expected incisions between the helical rim and antihelix where possible.  The area thus outlined was incised through and through with a #15 scalpel blade. Banner Transposition Flap Text: The defect edges were debeveled with a #15 scalpel blade.  Given the location of the defect and the proximity to free margins a Banner transposition flap was deemed most appropriate.  Using a sterile surgical marker, an appropriate flap drawn around the defect. The area thus outlined was incised deep to adipose tissue with a #15 scalpel blade.  The skin margins were undermined to an appropriate distance in all directions utilizing iris scissors. Bilobed Flap Text: The defect edges were debeveled with a #15 scalpel blade.  Given the location of the defect and the proximity to free margins a bilobe flap was deemed most appropriate.  Using a sterile surgical marker, an appropriate bilobe flap drawn around the defect.    The area thus outlined was incised deep to adipose tissue with a #15 scalpel blade.  The skin margins were undermined to an appropriate distance in all directions utilizing iris scissors. Bilobed Transposition Flap Text: The defect edges were debeveled with a #15 scalpel blade.  Given the location of the defect and the proximity to free margins a bilobed transposition flap was deemed most appropriate.  Using a sterile surgical marker, an appropriate bilobe flap drawn around the defect.    The area thus outlined was incised deep to adipose tissue with a #15 scalpel blade.  The skin margins were undermined to an appropriate distance in all directions utilizing iris scissors. Trilobed Flap Text: The defect edges were debeveled with a #15 scalpel blade.  Given the location of the defect and the proximity to free margins a trilobed flap was deemed most appropriate.  Using a sterile surgical marker, an appropriate trilobed flap drawn around the defect.    The area thus outlined was incised deep to adipose tissue with a #15 scalpel blade.  The skin margins were undermined to an appropriate distance in all directions utilizing iris scissors. Dorsal Nasal Flap Text: The defect edges were debeveled with a #15 scalpel blade.  Given the location of the defect and the proximity to free margins a dorsal nasal flap was deemed most appropriate.  Using a sterile surgical marker, an appropriate dorsal nasal flap was drawn around the defect.    The area thus outlined was incised deep to adipose tissue with a #15 scalpel blade.  The skin margins were undermined to an appropriate distance in all directions utilizing iris scissors. Island Pedicle Flap Text: The defect edges were debeveled with a #15 scalpel blade.  Given the location of the defect, shape of the defect and the proximity to free margins an island pedicle advancement flap was deemed most appropriate.  Using a sterile surgical marker, an appropriate advancement flap was drawn incorporating the defect, outlining the appropriate donor tissue and placing the expected incisions within the relaxed skin tension lines where possible.    The area thus outlined was incised deep to adipose tissue with a #15 scalpel blade.  The skin margins were undermined to an appropriate distance in all directions around the primary defect and laterally outward around the island pedicle utilizing iris scissors.  There was minimal undermining beneath the pedicle flap. Island Pedicle Flap With Canthal Suspension Text: The defect edges were debeveled with a #15 scalpel blade.  Given the location of the defect, shape of the defect and the proximity to free margins an island pedicle advancement flap was deemed most appropriate.  Using a sterile surgical marker, an appropriate advancement flap was drawn incorporating the defect, outlining the appropriate donor tissue and placing the expected incisions within the relaxed skin tension lines where possible. The area thus outlined was incised deep to adipose tissue with a #15 scalpel blade.  The skin margins were undermined to an appropriate distance in all directions around the primary defect and laterally outward around the island pedicle utilizing iris scissors.  There was minimal undermining beneath the pedicle flap. A suspension suture was placed in the canthal tendon to prevent tension and prevent ectropion. Alar Island Pedicle Flap Text: The defect edges were debeveled with a #15 scalpel blade.  Given the location of the defect, shape of the defect and the proximity to the alar rim an island pedicle advancement flap was deemed most appropriate.  Using a sterile surgical marker, an appropriate advancement flap was drawn incorporating the defect, outlining the appropriate donor tissue and placing the expected incisions within the nasal ala running parallel to the alar rim. The area thus outlined was incised with a #15 scalpel blade.  The skin margins were undermined minimally to an appropriate distance in all directions around the primary defect and laterally outward around the island pedicle utilizing iris scissors.  There was minimal undermining beneath the pedicle flap. Double Island Pedicle Flap Text: The defect edges were debeveled with a #15 scalpel blade.  Given the location of the defect, shape of the defect and the proximity to free margins a double island pedicle advancement flap was deemed most appropriate.  Using a sterile surgical marker, an appropriate advancement flap was drawn incorporating the defect, outlining the appropriate donor tissue and placing the expected incisions within the relaxed skin tension lines where possible.    The area thus outlined was incised deep to adipose tissue with a #15 scalpel blade.  The skin margins were undermined to an appropriate distance in all directions around the primary defect and laterally outward around the island pedicle utilizing iris scissors.  There was minimal undermining beneath the pedicle flap. Island Pedicle Flap-Requiring Vessel Identification Text: The defect edges were debeveled with a #15 scalpel blade.  Given the location of the defect, shape of the defect and the proximity to free margins an island pedicle advancement flap was deemed most appropriate.  Using a sterile surgical marker, an appropriate advancement flap was drawn, based on the axial vessel mentioned above, incorporating the defect, outlining the appropriate donor tissue and placing the expected incisions within the relaxed skin tension lines where possible.    The area thus outlined was incised deep to adipose tissue with a #15 scalpel blade.  The skin margins were undermined to an appropriate distance in all directions around the primary defect and laterally outward around the island pedicle utilizing iris scissors.  There was minimal undermining beneath the pedicle flap. Keystone Flap Text: The defect edges were debeveled with a #15 scalpel blade.  Given the location of the defect, shape of the defect a keystone flap was deemed most appropriate.  Using a sterile surgical marker, an appropriate keystone flap was drawn incorporating the defect, outlining the appropriate donor tissue and placing the expected incisions within the relaxed skin tension lines where possible. The area thus outlined was incised deep to adipose tissue with a #15 scalpel blade.  The skin margins were undermined to an appropriate distance in all directions around the primary defect and laterally outward around the flap utilizing iris scissors. O-T Plasty Text: The defect edges were debeveled with a #15 scalpel blade.  Given the location of the defect, shape of the defect and the proximity to free margins an O-T plasty was deemed most appropriate.  Using a sterile surgical marker, an appropriate O-T plasty was drawn incorporating the defect and placing the expected incisions within the relaxed skin tension lines where possible.    The area thus outlined was incised deep to adipose tissue with a #15 scalpel blade.  The skin margins were undermined to an appropriate distance in all directions utilizing iris scissors. O-Z Plasty Text: The defect edges were debeveled with a #15 scalpel blade.  Given the location of the defect, shape of the defect and the proximity to free margins an O-Z plasty (double transposition flap) was deemed most appropriate.  Using a sterile surgical marker, the appropriate transposition flaps were drawn incorporating the defect and placing the expected incisions within the relaxed skin tension lines where possible.    The area thus outlined was incised deep to adipose tissue with a #15 scalpel blade.  The skin margins were undermined to an appropriate distance in all directions utilizing iris scissors.  Hemostasis was achieved with electrocautery.  The flaps were then transposed into place, one clockwise and the other counterclockwise, and anchored with interrupted buried subcutaneous sutures. Double O-Z Plasty Text: The defect edges were debeveled with a #15 scalpel blade.  Given the location of the defect, shape of the defect and the proximity to free margins a Double O-Z plasty (double transposition flap) was deemed most appropriate.  Using a sterile surgical marker, the appropriate transposition flaps were drawn incorporating the defect and placing the expected incisions within the relaxed skin tension lines where possible. The area thus outlined was incised deep to adipose tissue with a #15 scalpel blade.  The skin margins were undermined to an appropriate distance in all directions utilizing iris scissors.  Hemostasis was achieved with electrocautery.  The flaps were then transposed into place, one clockwise and the other counterclockwise, and anchored with interrupted buried subcutaneous sutures. V-Y Plasty Text: The defect edges were debeveled with a #15 scalpel blade.  Given the location of the defect, shape of the defect and the proximity to free margins an V-Y advancement flap was deemed most appropriate.  Using a sterile surgical marker, an appropriate advancement flap was drawn incorporating the defect and placing the expected incisions within the relaxed skin tension lines where possible.    The area thus outlined was incised deep to adipose tissue with a #15 scalpel blade.  The skin margins were undermined to an appropriate distance in all directions utilizing iris scissors. H Plasty Text: Given the location of the defect, shape of the defect and the proximity to free margins a H-plasty was deemed most appropriate for repair.  Using a sterile surgical marker, the appropriate advancement arms of the H-plasty were drawn incorporating the defect and placing the expected incisions within the relaxed skin tension lines where possible. The area thus outlined was incised deep to adipose tissue with a #15 scalpel blade. The skin margins were undermined to an appropriate distance in all directions utilizing iris scissors.  The opposing advancement arms were then advanced into place in opposite direction and anchored with interrupted buried subcutaneous sutures. W Plasty Text: The lesion was extirpated to the level of the fat with a #15 scalpel blade.  Given the location of the defect, shape of the defect and the proximity to free margins a W-plasty was deemed most appropriate for repair.  Using a sterile surgical marker, the appropriate transposition arms of the W-plasty were drawn incorporating the defect and placing the expected incisions within the relaxed skin tension lines where possible.    The area thus outlined was incised deep to adipose tissue with a #15 scalpel blade.  The skin margins were undermined to an appropriate distance in all directions utilizing iris scissors.  The opposing transposition arms were then transposed into place in opposite direction and anchored with interrupted buried subcutaneous sutures. Z Plasty Text: The lesion was extirpated to the level of the fat with a #15 scalpel blade.  Given the location of the defect, shape of the defect and the proximity to free margins a Z-plasty was deemed most appropriate for repair.  Using a sterile surgical marker, the appropriate transposition arms of the Z-plasty were drawn incorporating the defect and placing the expected incisions within the relaxed skin tension lines where possible.    The area thus outlined was incised deep to adipose tissue with a #15 scalpel blade.  The skin margins were undermined to an appropriate distance in all directions utilizing iris scissors.  The opposing transposition arms were then transposed into place in opposite direction and anchored with interrupted buried subcutaneous sutures. Double Z Plasty Text: The lesion was extirpated to the level of the fat with a #15 scalpel blade. Given the location of the defect, shape of the defect and the proximity to free margins a double Z-plasty was deemed most appropriate for repair. Using a sterile surgical marker, the appropriate transposition arms of the double Z-plasty were drawn incorporating the defect and placing the expected incisions within the relaxed skin tension lines where possible. The area thus outlined was incised deep to adipose tissue with a #15 scalpel blade. The skin margins were undermined to an appropriate distance in all directions utilizing iris scissors. The opposing transposition arms were then transposed and carried over into place in opposite direction and anchored with interrupted buried subcutaneous sutures. Zygomaticofacial Flap Text: Given the location of the defect, shape of the defect and the proximity to free margins a zygomaticofacial flap was deemed most appropriate for repair.  Using a sterile surgical marker, the appropriate flap was drawn incorporating the defect and placing the expected incisions within the relaxed skin tension lines where possible. The area thus outlined was incised deep to adipose tissue with a #15 scalpel blade with preservation of a vascular pedicle.  The skin margins were undermined to an appropriate distance in all directions utilizing iris scissors.  The flap was then placed into the defect and anchored with interrupted buried subcutaneous sutures. Cheek Interpolation Flap Text: A decision was made to reconstruct the defect utilizing an interpolation axial flap and a staged reconstruction.  A telfa template was made of the defect.  This telfa template was then used to outline the Cheek Interpolation flap.  The donor area for the pedicle flap was then injected with anesthesia.  The flap was excised through the skin and subcutaneous tissue down to the layer of the underlying musculature.  The interpolation flap was carefully excised within this deep plane to maintain its blood supply.  The edges of the donor site were undermined.   The donor site was closed in a primary fashion.  The pedicle was then rotated into position and sutured.  Once the tube was sutured into place, adequate blood supply was confirmed with blanching and refill.  The pedicle was then wrapped with xeroform gauze and dressed appropriately with a telfa and gauze bandage to ensure continued blood supply and protect the attached pedicle. Cheek-To-Nose Interpolation Flap Text: A decision was made to reconstruct the defect utilizing an interpolation axial flap and a staged reconstruction.  A telfa template was made of the defect.  This telfa template was then used to outline the Cheek-To-Nose Interpolation flap.  The donor area for the pedicle flap was then injected with anesthesia.  The flap was excised through the skin and subcutaneous tissue down to the layer of the underlying musculature.  The interpolation flap was carefully excised within this deep plane to maintain its blood supply.  The edges of the donor site were undermined.   The donor site was closed in a primary fashion.  The pedicle was then rotated into position and sutured.  Once the tube was sutured into place, adequate blood supply was confirmed with blanching and refill.  The pedicle was then wrapped with xeroform gauze and dressed appropriately with a telfa and gauze bandage to ensure continued blood supply and protect the attached pedicle. Interpolation Flap Text: A decision was made to reconstruct the defect utilizing an interpolation axial flap and a staged reconstruction.  A telfa template was made of the defect.  This telfa template was then used to outline the interpolation flap.  The donor area for the pedicle flap was then injected with anesthesia.  The flap was excised through the skin and subcutaneous tissue down to the layer of the underlying musculature.  The interpolation flap was carefully excised within this deep plane to maintain its blood supply.  The edges of the donor site were undermined.   The donor site was closed in a primary fashion.  The pedicle was then rotated into position and sutured.  Once the tube was sutured into place, adequate blood supply was confirmed with blanching and refill.  The pedicle was then wrapped with xeroform gauze and dressed appropriately with a telfa and gauze bandage to ensure continued blood supply and protect the attached pedicle. Melolabial Interpolation Flap Text: A decision was made to reconstruct the defect utilizing an interpolation axial flap and a staged reconstruction.  A telfa template was made of the defect.  This telfa template was then used to outline the melolabial interpolation flap.  The donor area for the pedicle flap was then injected with anesthesia.  The flap was excised through the skin and subcutaneous tissue down to the layer of the underlying musculature.  The pedicle flap was carefully excised within this deep plane to maintain its blood supply.  The edges of the donor site were undermined.   The donor site was closed in a primary fashion.  The pedicle was then rotated into position and sutured.  Once the tube was sutured into place, adequate blood supply was confirmed with blanching and refill.  The pedicle was then wrapped with xeroform gauze and dressed appropriately with a telfa and gauze bandage to ensure continued blood supply and protect the attached pedicle. Mastoid Interpolation Flap Text: A decision was made to reconstruct the defect utilizing an interpolation axial flap and a staged reconstruction.  A telfa template was made of the defect.  This telfa template was then used to outline the mastoid interpolation flap.  The donor area for the pedicle flap was then injected with anesthesia.  The flap was excised through the skin and subcutaneous tissue down to the layer of the underlying musculature.  The pedicle flap was carefully excised within this deep plane to maintain its blood supply.  The edges of the donor site were undermined.   The donor site was closed in a primary fashion.  The pedicle was then rotated into position and sutured.  Once the tube was sutured into place, adequate blood supply was confirmed with blanching and refill.  The pedicle was then wrapped with xeroform gauze and dressed appropriately with a telfa and gauze bandage to ensure continued blood supply and protect the attached pedicle. Posterior Auricular Interpolation Flap Text: A decision was made to reconstruct the defect utilizing an interpolation axial flap and a staged reconstruction.  A telfa template was made of the defect.  This telfa template was then used to outline the posterior auricular interpolation flap.  The donor area for the pedicle flap was then injected with anesthesia.  The flap was excised through the skin and subcutaneous tissue down to the layer of the underlying musculature.  The pedicle flap was carefully excised within this deep plane to maintain its blood supply.  The edges of the donor site were undermined.   The donor site was closed in a primary fashion.  The pedicle was then rotated into position and sutured.  Once the tube was sutured into place, adequate blood supply was confirmed with blanching and refill.  The pedicle was then wrapped with xeroform gauze and dressed appropriately with a telfa and gauze bandage to ensure continued blood supply and protect the attached pedicle. Paramedian Forehead Flap Text: A decision was made to reconstruct the defect utilizing an interpolation axial flap and a staged reconstruction.  A telfa template was made of the defect.  This telfa template was then used to outline the paramedian forehead pedicle flap.  The donor area for the pedicle flap was then injected with anesthesia.  The flap was excised through the skin and subcutaneous tissue down to the layer of the underlying musculature.  The pedicle flap was carefully excised within this deep plane to maintain its blood supply.  The edges of the donor site were undermined.   The donor site was closed in a primary fashion.  The pedicle was then rotated into position and sutured.  Once the tube was sutured into place, adequate blood supply was confirmed with blanching and refill.  The pedicle was then wrapped with xeroform gauze and dressed appropriately with a telfa and gauze bandage to ensure continued blood supply and protect the attached pedicle. Abbe Flap (Upper To Lower Lip) Text: The defect of the lower lip was assessed and measured.  Given the location and size of the defect, an Abbe flap was deemed most appropriate. Using a sterile surgical marker, an appropriate Abbe flap was measured and drawn on the upper lip. Local anesthesia was then infiltrated.  A scalpel was then used to incise the upper lip through and through the skin, vermilion, muscle and mucosa, leaving the flap pedicled on the opposite side.  The flap was then rotated and transferred to the lower lip defect.  The flap was then sutured into place with a three layer technique, closing the orbicularis oris muscle layer with subcutaneous buried sutures, followed by a mucosal layer and an epidermal layer. Abbe Flap (Lower To Upper Lip) Text: The defect of the upper lip was assessed and measured.  Given the location and size of the defect, an Abbe flap was deemed most appropriate. Using a sterile surgical marker, an appropriate Abbe flap was measured and drawn on the lower lip. Local anesthesia was then infiltrated. A scalpel was then used to incise the upper lip through and through the skin, vermilion, muscle and mucosa, leaving the flap pedicled on the opposite side.  The flap was then rotated and transferred to the lower lip defect.  The flap was then sutured into place with a three layer technique, closing the orbicularis oris muscle layer with subcutaneous buried sutures, followed by a mucosal layer and an epidermal layer. Estlander Flap (Upper To Lower Lip) Text: The defect of the lower lip was assessed and measured.  Given the location and size of the defect, an Estlander flap was deemed most appropriate. Using a sterile surgical marker, an appropriate Estlander flap was measured and drawn on the upper lip. Local anesthesia was then infiltrated. A scalpel was then used to incise the lateral aspect of the flap, through skin, muscle and mucosa, leaving the flap pedicled medially.  The flap was then rotated and positioned to fill the lower lip defect.  The flap was then sutured into place with a three layer technique, closing the orbicularis oris muscle layer with subcutaneous buried sutures, followed by a mucosal layer and an epidermal layer. Lip Wedge Excision Repair Text: Given the location of the defect and the proximity to free margins a full thickness wedge repair was deemed most appropriate.  Using a sterile surgical marker, the appropriate repair was drawn incorporating the defect and placing the expected incisions perpendicular to the vermilion border.  The vermilion border was also meticulously outlined to ensure appropriate reapproximation during the repair.  The area thus outlined was incised through and through with a #15 scalpel blade.  The muscularis and dermis were reaproximated with deep sutures following hemostasis. Care was taken to realign the vermilion border before proceeding with the superficial closure.  Once the vermilion was realigned the superfical and mucosal closure was finished. Ftsg Text: The defect edges were debeveled with a #15 scalpel blade.  Given the location of the defect, shape of the defect and the proximity to free margins a full thickness skin graft was deemed most appropriate.  Using a sterile surgical marker, the primary defect shape was transferred to the donor site. The area thus outlined was incised deep to adipose tissue with a #15 scalpel blade.  The harvested graft was then trimmed of adipose tissue until only dermis and epidermis was left.  The skin margins of the secondary defect were undermined to an appropriate distance in all directions utilizing iris scissors.  The secondary defect was closed with interrupted buried subcutaneous sutures.  The skin edges were then re-apposed with running  sutures.  The skin graft was then placed in the primary defect and oriented appropriately. Split-Thickness Skin Graft Text: The defect edges were debeveled with a #15 scalpel blade.  Given the location of the defect, shape of the defect and the proximity to free margins a split thickness skin graft was deemed most appropriate.  Using a sterile surgical marker, the primary defect shape was transferred to the donor site. The split thickness graft was then harvested.  The skin graft was then placed in the primary defect and oriented appropriately. Pinch Graft Text: The defect edges were debeveled with a #15 scalpel blade. Given the location of the defect, shape of the defect and the proximity to free margins a pinch graft was deemed most appropriate. Using a sterile surgical marker, the primary defect shape was transferred to the donor site. The area thus outlined was incised deep to adipose tissue with a #15 scalpel blade.  The harvested graft was then trimmed of adipose tissue until only dermis and epidermis was left. The skin margins of the secondary defect were undermined to an appropriate distance in all directions utilizing iris scissors.  The secondary defect was closed with interrupted buried subcutaneous sutures.  The skin edges were then re-apposed with running  sutures.  The skin graft was then placed in the primary defect and oriented appropriately. Burow's Graft Text: The defect edges were debeveled with a #15 scalpel blade.  Given the location of the defect, shape of the defect, the proximity to free margins and the presence of a standing cone deformity a Burow's skin graft was deemed most appropriate. The standing cone was removed and this tissue was then trimmed to the shape of the primary defect. The adipose tissue was also removed until only dermis and epidermis were left.  The skin margins of the secondary defect were undermined to an appropriate distance in all directions utilizing iris scissors.  The secondary defect was closed with interrupted buried subcutaneous sutures.  The skin edges were then re-apposed with running  sutures.  The skin graft was then placed in the primary defect and oriented appropriately. Cartilage Graft Text: The defect edges were debeveled with a #15 scalpel blade.  Given the location of the defect, shape of the defect, the fact the defect involved a full thickness cartilage defect a cartilage graft was deemed most appropriate.  An appropriate donor site was identified, cleansed, and anesthetized. The cartilage graft was then harvested and transferred to the recipient site, oriented appropriately and then sutured into place.  The secondary defect was then repaired using a primary closure. Composite Graft Text: The defect edges were debeveled with a #15 scalpel blade.  Given the location of the defect, shape of the defect, the proximity to free margins and the fact the defect was full thickness a composite graft was deemed most appropriate.  The defect was outline and then transferred to the donor site.  A full thickness graft was then excised from the donor site. The graft was then placed in the primary defect, oriented appropriately and then sutured into place.  The secondary defect was then repaired using a primary closure. Epidermal Autograft Text: The defect edges were debeveled with a #15 scalpel blade.  Given the location of the defect, shape of the defect and the proximity to free margins an epidermal autograft was deemed most appropriate.  Using a sterile surgical marker, the primary defect shape was transferred to the donor site. The epidermal graft was then harvested.  The skin graft was then placed in the primary defect and oriented appropriately. Dermal Autograft Text: The defect edges were debeveled with a #15 scalpel blade.  Given the location of the defect, shape of the defect and the proximity to free margins a dermal autograft was deemed most appropriate.  Using a sterile surgical marker, the primary defect shape was transferred to the donor site. The area thus outlined was incised deep to adipose tissue with a #15 scalpel blade.  The harvested graft was then trimmed of adipose and epidermal tissue until only dermis was left.  The skin graft was then placed in the primary defect and oriented appropriately. Skin Substitute Text: The defect edges were debeveled with a #15 scalpel blade.  Given the location of the defect, shape of the defect and the proximity to free margins a skin substitute graft was deemed most appropriate.  The graft material was trimmed to fit the size of the defect. The graft was then placed in the primary defect and oriented appropriately. Tissue Cultured Epidermal Autograft Text: The defect edges were debeveled with a #15 scalpel blade.  Given the location of the defect, shape of the defect and the proximity to free margins a tissue cultured epidermal autograft was deemed most appropriate.  The graft was then trimmed to fit the size of the defect.  The graft was then placed in the primary defect and oriented appropriately. Xenograft Text: The defect edges were debeveled with a #15 scalpel blade.  Given the location of the defect, shape of the defect and the proximity to free margins a xenograft was deemed most appropriate.  The graft was then trimmed to fit the size of the defect.  The graft was then placed in the primary defect and oriented appropriately. Purse String (Intermediate) Text: Given the location of the defect and the characteristics of the surrounding skin a purse string intermediate closure was deemed most appropriate.  Undermining was performed circumferentially around the surgical defect.  A purse string suture was then placed and tightened. Purse String (Simple) Text: Given the location of the defect and the characteristics of the surrounding skin a purse string simple closure was deemed most appropriate.  Undermining was performed circumferentially around the surgical defect.  A purse string suture was then placed and tightened. Complex Repair And Single Advancement Flap Text: The defect edges were debeveled with a #15 scalpel blade.  The primary defect was closed partially with a complex linear closure.  Given the location of the remaining defect, shape of the defect and the proximity to free margins a single advancement flap was deemed most appropriate for complete closure of the defect.  Using a sterile surgical marker, an appropriate advancement flap was drawn incorporating the defect and placing the expected incisions within the relaxed skin tension lines where possible.    The area thus outlined was incised deep to adipose tissue with a #15 scalpel blade.  The skin margins were undermined to an appropriate distance in all directions utilizing iris scissors. Complex Repair And Double Advancement Flap Text: The defect edges were debeveled with a #15 scalpel blade.  The primary defect was closed partially with a complex linear closure.  Given the location of the remaining defect, shape of the defect and the proximity to free margins a double advancement flap was deemed most appropriate for complete closure of the defect.  Using a sterile surgical marker, an appropriate advancement flap was drawn incorporating the defect and placing the expected incisions within the relaxed skin tension lines where possible.    The area thus outlined was incised deep to adipose tissue with a #15 scalpel blade.  The skin margins were undermined to an appropriate distance in all directions utilizing iris scissors. Complex Repair And Modified Advancement Flap Text: The defect edges were debeveled with a #15 scalpel blade.  The primary defect was closed partially with a complex linear closure.  Given the location of the remaining defect, shape of the defect and the proximity to free margins a modified advancement flap was deemed most appropriate for complete closure of the defect.  Using a sterile surgical marker, an appropriate advancement flap was drawn incorporating the defect and placing the expected incisions within the relaxed skin tension lines where possible.    The area thus outlined was incised deep to adipose tissue with a #15 scalpel blade.  The skin margins were undermined to an appropriate distance in all directions utilizing iris scissors. Complex Repair And A-T Advancement Flap Text: The defect edges were debeveled with a #15 scalpel blade.  The primary defect was closed partially with a complex linear closure.  Given the location of the remaining defect, shape of the defect and the proximity to free margins an A-T advancement flap was deemed most appropriate for complete closure of the defect.  Using a sterile surgical marker, an appropriate advancement flap was drawn incorporating the defect and placing the expected incisions within the relaxed skin tension lines where possible.    The area thus outlined was incised deep to adipose tissue with a #15 scalpel blade.  The skin margins were undermined to an appropriate distance in all directions utilizing iris scissors. Complex Repair And O-T Advancement Flap Text: The defect edges were debeveled with a #15 scalpel blade.  The primary defect was closed partially with a complex linear closure.  Given the location of the remaining defect, shape of the defect and the proximity to free margins an O-T advancement flap was deemed most appropriate for complete closure of the defect.  Using a sterile surgical marker, an appropriate advancement flap was drawn incorporating the defect and placing the expected incisions within the relaxed skin tension lines where possible.    The area thus outlined was incised deep to adipose tissue with a #15 scalpel blade.  The skin margins were undermined to an appropriate distance in all directions utilizing iris scissors. Complex Repair And O-L Flap Text: The defect edges were debeveled with a #15 scalpel blade.  The primary defect was closed partially with a complex linear closure.  Given the location of the remaining defect, shape of the defect and the proximity to free margins an O-L flap was deemed most appropriate for complete closure of the defect.  Using a sterile surgical marker, an appropriate flap was drawn incorporating the defect and placing the expected incisions within the relaxed skin tension lines where possible.    The area thus outlined was incised deep to adipose tissue with a #15 scalpel blade.  The skin margins were undermined to an appropriate distance in all directions utilizing iris scissors. Complex Repair And Bilobe Flap Text: The defect edges were debeveled with a #15 scalpel blade.  The primary defect was closed partially with a complex linear closure.  Given the location of the remaining defect, shape of the defect and the proximity to free margins a bilobe flap was deemed most appropriate for complete closure of the defect.  Using a sterile surgical marker, an appropriate advancement flap was drawn incorporating the defect and placing the expected incisions within the relaxed skin tension lines where possible.    The area thus outlined was incised deep to adipose tissue with a #15 scalpel blade.  The skin margins were undermined to an appropriate distance in all directions utilizing iris scissors. Complex Repair And Melolabial Flap Text: The defect edges were debeveled with a #15 scalpel blade.  The primary defect was closed partially with a complex linear closure.  Given the location of the remaining defect, shape of the defect and the proximity to free margins a melolabial flap was deemed most appropriate for complete closure of the defect.  Using a sterile surgical marker, an appropriate advancement flap was drawn incorporating the defect and placing the expected incisions within the relaxed skin tension lines where possible.    The area thus outlined was incised deep to adipose tissue with a #15 scalpel blade.  The skin margins were undermined to an appropriate distance in all directions utilizing iris scissors. Complex Repair And Rotation Flap Text: The defect edges were debeveled with a #15 scalpel blade.  The primary defect was closed partially with a complex linear closure.  Given the location of the remaining defect, shape of the defect and the proximity to free margins a rotation flap was deemed most appropriate for complete closure of the defect.  Using a sterile surgical marker, an appropriate advancement flap was drawn incorporating the defect and placing the expected incisions within the relaxed skin tension lines where possible.    The area thus outlined was incised deep to adipose tissue with a #15 scalpel blade.  The skin margins were undermined to an appropriate distance in all directions utilizing iris scissors. Complex Repair And Rhombic Flap Text: The defect edges were debeveled with a #15 scalpel blade.  The primary defect was closed partially with a complex linear closure.  Given the location of the remaining defect, shape of the defect and the proximity to free margins a rhombic flap was deemed most appropriate for complete closure of the defect.  Using a sterile surgical marker, an appropriate advancement flap was drawn incorporating the defect and placing the expected incisions within the relaxed skin tension lines where possible.    The area thus outlined was incised deep to adipose tissue with a #15 scalpel blade.  The skin margins were undermined to an appropriate distance in all directions utilizing iris scissors. Complex Repair And Transposition Flap Text: The defect edges were debeveled with a #15 scalpel blade.  The primary defect was closed partially with a complex linear closure.  Given the location of the remaining defect, shape of the defect and the proximity to free margins a transposition flap was deemed most appropriate for complete closure of the defect.  Using a sterile surgical marker, an appropriate advancement flap was drawn incorporating the defect and placing the expected incisions within the relaxed skin tension lines where possible.    The area thus outlined was incised deep to adipose tissue with a #15 scalpel blade.  The skin margins were undermined to an appropriate distance in all directions utilizing iris scissors. Complex Repair And V-Y Plasty Text: The defect edges were debeveled with a #15 scalpel blade.  The primary defect was closed partially with a complex linear closure.  Given the location of the remaining defect, shape of the defect and the proximity to free margins a V-Y plasty was deemed most appropriate for complete closure of the defect.  Using a sterile surgical marker, an appropriate advancement flap was drawn incorporating the defect and placing the expected incisions within the relaxed skin tension lines where possible.    The area thus outlined was incised deep to adipose tissue with a #15 scalpel blade.  The skin margins were undermined to an appropriate distance in all directions utilizing iris scissors. Complex Repair And M Plasty Text: The defect edges were debeveled with a #15 scalpel blade.  The primary defect was closed partially with a complex linear closure.  Given the location of the remaining defect, shape of the defect and the proximity to free margins an M plasty was deemed most appropriate for complete closure of the defect.  Using a sterile surgical marker, an appropriate advancement flap was drawn incorporating the defect and placing the expected incisions within the relaxed skin tension lines where possible.    The area thus outlined was incised deep to adipose tissue with a #15 scalpel blade.  The skin margins were undermined to an appropriate distance in all directions utilizing iris scissors. Complex Repair And Double M Plasty Text: The defect edges were debeveled with a #15 scalpel blade.  The primary defect was closed partially with a complex linear closure.  Given the location of the remaining defect, shape of the defect and the proximity to free margins a double M plasty was deemed most appropriate for complete closure of the defect.  Using a sterile surgical marker, an appropriate advancement flap was drawn incorporating the defect and placing the expected incisions within the relaxed skin tension lines where possible.    The area thus outlined was incised deep to adipose tissue with a #15 scalpel blade.  The skin margins were undermined to an appropriate distance in all directions utilizing iris scissors. Complex Repair And W Plasty Text: The defect edges were debeveled with a #15 scalpel blade.  The primary defect was closed partially with a complex linear closure.  Given the location of the remaining defect, shape of the defect and the proximity to free margins a W plasty was deemed most appropriate for complete closure of the defect.  Using a sterile surgical marker, an appropriate advancement flap was drawn incorporating the defect and placing the expected incisions within the relaxed skin tension lines where possible.    The area thus outlined was incised deep to adipose tissue with a #15 scalpel blade.  The skin margins were undermined to an appropriate distance in all directions utilizing iris scissors. Complex Repair And Z Plasty Text: The defect edges were debeveled with a #15 scalpel blade.  The primary defect was closed partially with a complex linear closure.  Given the location of the remaining defect, shape of the defect and the proximity to free margins a Z plasty was deemed most appropriate for complete closure of the defect.  Using a sterile surgical marker, an appropriate advancement flap was drawn incorporating the defect and placing the expected incisions within the relaxed skin tension lines where possible.    The area thus outlined was incised deep to adipose tissue with a #15 scalpel blade.  The skin margins were undermined to an appropriate distance in all directions utilizing iris scissors. Complex Repair And Dorsal Nasal Flap Text: The defect edges were debeveled with a #15 scalpel blade.  The primary defect was closed partially with a complex linear closure.  Given the location of the remaining defect, shape of the defect and the proximity to free margins a dorsal nasal flap was deemed most appropriate for complete closure of the defect.  Using a sterile surgical marker, an appropriate flap was drawn incorporating the defect and placing the expected incisions within the relaxed skin tension lines where possible.    The area thus outlined was incised deep to adipose tissue with a #15 scalpel blade.  The skin margins were undermined to an appropriate distance in all directions utilizing iris scissors. Complex Repair And Ftsg Text: The defect edges were debeveled with a #15 scalpel blade.  The primary defect was closed partially with a complex linear closure.  Given the location of the defect, shape of the defect and the proximity to free margins a full thickness skin graft was deemed most appropriate to repair the remaining defect.  The graft was trimmed to fit the size of the remaining defect.  The graft was then placed in the primary defect, oriented appropriately, and sutured into place. Complex Repair And Burow's Graft Text: The defect edges were debeveled with a #15 scalpel blade.  The primary defect was closed partially with a complex linear closure.  Given the location of the defect, shape of the defect, the proximity to free margins and the presence of a standing cone deformity a Burow's graft was deemed most appropriate to repair the remaining defect.  The graft was trimmed to fit the size of the remaining defect.  The graft was then placed in the primary defect, oriented appropriately, and sutured into place. Complex Repair And Split-Thickness Skin Graft Text: The defect edges were debeveled with a #15 scalpel blade.  The primary defect was closed partially with a complex linear closure.  Given the location of the defect, shape of the defect and the proximity to free margins a split thickness skin graft was deemed most appropriate to repair the remaining defect.  The graft was trimmed to fit the size of the remaining defect.  The graft was then placed in the primary defect, oriented appropriately, and sutured into place. Complex Repair And Epidermal Autograft Text: The defect edges were debeveled with a #15 scalpel blade.  The primary defect was closed partially with a complex linear closure.  Given the location of the defect, shape of the defect and the proximity to free margins an epidermal autograft was deemed most appropriate to repair the remaining defect.  The graft was trimmed to fit the size of the remaining defect.  The graft was then placed in the primary defect, oriented appropriately, and sutured into place. Complex Repair And Dermal Autograft Text: The defect edges were debeveled with a #15 scalpel blade.  The primary defect was closed partially with a complex linear closure.  Given the location of the defect, shape of the defect and the proximity to free margins an dermal autograft was deemed most appropriate to repair the remaining defect.  The graft was trimmed to fit the size of the remaining defect.  The graft was then placed in the primary defect, oriented appropriately, and sutured into place. Complex Repair And Tissue Cultured Epidermal Autograft Text: The defect edges were debeveled with a #15 scalpel blade.  The primary defect was closed partially with a complex linear closure.  Given the location of the defect, shape of the defect and the proximity to free margins an tissue cultured epidermal autograft was deemed most appropriate to repair the remaining defect.  The graft was trimmed to fit the size of the remaining defect.  The graft was then placed in the primary defect, oriented appropriately, and sutured into place. Complex Repair And Xenograft Text: The defect edges were debeveled with a #15 scalpel blade.  The primary defect was closed partially with a complex linear closure.  Given the location of the defect, shape of the defect and the proximity to free margins a xenograft was deemed most appropriate to repair the remaining defect.  The graft was trimmed to fit the size of the remaining defect.  The graft was then placed in the primary defect, oriented appropriately, and sutured into place. Complex Repair And Skin Substitute Graft Text: The defect edges were debeveled with a #15 scalpel blade.  The primary defect was closed partially with a complex linear closure.  Given the location of the remaining defect, shape of the defect and the proximity to free margins a skin substitute graft was deemed most appropriate to repair the remaining defect.  The graft was trimmed to fit the size of the remaining defect.  The graft was then placed in the primary defect, oriented appropriately, and sutured into place. Path Notes (To The Dermatopathologist): Please check margins. Tagged at 12. Consent was obtained from the patient. The risks and benefits to therapy were discussed in detail. Specifically, the risks of infection, scarring, bleeding, prolonged wound healing, incomplete removal, allergy to anesthesia, nerve injury and recurrence were addressed. Prior to the procedure, the treatment site was clearly identified and confirmed by the patient. Post-Care Instructions: I reviewed with the patient in detail post-care instructions. Patient is not to engage in any heavy lifting, exercise, or swimming for the next 14 days. Should the patient develop any fevers, chills, bleeding, severe pain patient will contact the office immediately. Where Do You Want The Question To Include Opioid Counseling Located?: Case Summary Tab Billing Type: Third-Party Bill Information: Selecting Yes will display possible errors in your note based on the variables you have selected. This validation is only offered as a suggestion for you. PLEASE NOTE THAT THE VALIDATION TEXT WILL BE REMOVED WHEN YOU FINALIZE YOUR NOTE. IF YOU WANT TO FAX A PRELIMINARY NOTE YOU WILL NEED TO TOGGLE THIS TO 'NO' IF YOU DO NOT WANT IT IN YOUR FAXED NOTE.

## 2024-04-19 ENCOUNTER — INPATIENT (INPATIENT)
Facility: HOSPITAL | Age: 84
LOS: 3 days | Discharge: NURSING, REHAB OR LONG-TERM CARE | DRG: 998 | End: 2024-04-23
Attending: INTERNAL MEDICINE | Admitting: INTERNAL MEDICINE
Payer: COMMERCIAL

## 2024-04-19 VITALS
RESPIRATION RATE: 16 BRPM | OXYGEN SATURATION: 97 % | HEIGHT: 66 IN | DIASTOLIC BLOOD PRESSURE: 75 MMHG | SYSTOLIC BLOOD PRESSURE: 132 MMHG | HEART RATE: 81 BPM | TEMPERATURE: 98 F
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
## 2024-04-19 DIAGNOSIS — S22.080A WEDGE COMPRESSION FRACTURE OF T11-T12 VERTEBRA, INITIAL ENCOUNTER FOR CLOSED FRACTURE: Chronic | ICD-10-CM

## 2024-04-19 LAB
ALBUMIN SERPL ELPH-MCNC: 3.7 G/DL — SIGNIFICANT CHANGE UP (ref 3.3–5)
ALP SERPL-CCNC: 55 U/L — SIGNIFICANT CHANGE UP (ref 40–120)
ALT FLD-CCNC: 8 U/L — LOW (ref 10–45)
ANION GAP SERPL CALC-SCNC: 12 MMOL/L — SIGNIFICANT CHANGE UP (ref 5–17)
APPEARANCE UR: ABNORMAL
AST SERPL-CCNC: 13 U/L — SIGNIFICANT CHANGE UP (ref 10–40)
BACTERIA # UR AUTO: ABNORMAL /HPF
BASOPHILS # BLD AUTO: 0.03 K/UL — SIGNIFICANT CHANGE UP (ref 0–0.2)
BASOPHILS NFR BLD AUTO: 0.3 % — SIGNIFICANT CHANGE UP (ref 0–2)
BILIRUB SERPL-MCNC: 0.2 MG/DL — SIGNIFICANT CHANGE UP (ref 0.2–1.2)
BILIRUB UR-MCNC: NEGATIVE — SIGNIFICANT CHANGE UP
BUN SERPL-MCNC: 18 MG/DL — SIGNIFICANT CHANGE UP (ref 7–23)
CALCIUM SERPL-MCNC: 9.1 MG/DL — SIGNIFICANT CHANGE UP (ref 8.4–10.5)
CAST: 4 /LPF — SIGNIFICANT CHANGE UP (ref 0–4)
CHLORIDE SERPL-SCNC: 105 MMOL/L — SIGNIFICANT CHANGE UP (ref 96–108)
CO2 SERPL-SCNC: 23 MMOL/L — SIGNIFICANT CHANGE UP (ref 22–31)
COLOR SPEC: YELLOW — SIGNIFICANT CHANGE UP
CREAT SERPL-MCNC: 0.69 MG/DL — SIGNIFICANT CHANGE UP (ref 0.5–1.3)
DIFF PNL FLD: ABNORMAL
EGFR: 84 ML/MIN/1.73M2 — SIGNIFICANT CHANGE UP
EOSINOPHIL # BLD AUTO: 0.28 K/UL — SIGNIFICANT CHANGE UP (ref 0–0.5)
EOSINOPHIL NFR BLD AUTO: 3.1 % — SIGNIFICANT CHANGE UP (ref 0–6)
FLUAV AG NPH QL: SIGNIFICANT CHANGE UP
FLUBV AG NPH QL: SIGNIFICANT CHANGE UP
GLUCOSE SERPL-MCNC: 140 MG/DL — HIGH (ref 70–99)
GLUCOSE UR QL: NEGATIVE MG/DL — SIGNIFICANT CHANGE UP
HCT VFR BLD CALC: 34.1 % — LOW (ref 34.5–45)
HGB BLD-MCNC: 10.7 G/DL — LOW (ref 11.5–15.5)
IMM GRANULOCYTES NFR BLD AUTO: 0.6 % — SIGNIFICANT CHANGE UP (ref 0–0.9)
KETONES UR-MCNC: NEGATIVE MG/DL — SIGNIFICANT CHANGE UP
LEUKOCYTE ESTERASE UR-ACNC: ABNORMAL
LYMPHOCYTES # BLD AUTO: 1.15 K/UL — SIGNIFICANT CHANGE UP (ref 1–3.3)
LYMPHOCYTES # BLD AUTO: 12.7 % — LOW (ref 13–44)
MCHC RBC-ENTMCNC: 31.4 GM/DL — LOW (ref 32–36)
MCHC RBC-ENTMCNC: 31.8 PG — SIGNIFICANT CHANGE UP (ref 27–34)
MCV RBC AUTO: 101.5 FL — HIGH (ref 80–100)
MONOCYTES # BLD AUTO: 0.55 K/UL — SIGNIFICANT CHANGE UP (ref 0–0.9)
MONOCYTES NFR BLD AUTO: 6.1 % — SIGNIFICANT CHANGE UP (ref 2–14)
NEUTROPHILS # BLD AUTO: 6.98 K/UL — SIGNIFICANT CHANGE UP (ref 1.8–7.4)
NEUTROPHILS NFR BLD AUTO: 77.2 % — HIGH (ref 43–77)
NITRITE UR-MCNC: POSITIVE
NRBC # BLD: 0 /100 WBCS — SIGNIFICANT CHANGE UP (ref 0–0)
PH UR: 5.5 — SIGNIFICANT CHANGE UP (ref 5–8)
PLATELET # BLD AUTO: 296 K/UL — SIGNIFICANT CHANGE UP (ref 150–400)
POTASSIUM SERPL-MCNC: 4.6 MMOL/L — SIGNIFICANT CHANGE UP (ref 3.5–5.3)
POTASSIUM SERPL-SCNC: 4.6 MMOL/L — SIGNIFICANT CHANGE UP (ref 3.5–5.3)
PROT SERPL-MCNC: 7 G/DL — SIGNIFICANT CHANGE UP (ref 6–8.3)
PROT UR-MCNC: SIGNIFICANT CHANGE UP MG/DL
RBC # BLD: 3.36 M/UL — LOW (ref 3.8–5.2)
RBC # FLD: 13.4 % — SIGNIFICANT CHANGE UP (ref 10.3–14.5)
RBC CASTS # UR COMP ASSIST: 66 /HPF — HIGH (ref 0–4)
REVIEW: SIGNIFICANT CHANGE UP
RSV RNA NPH QL NAA+NON-PROBE: SIGNIFICANT CHANGE UP
SARS-COV-2 RNA SPEC QL NAA+PROBE: SIGNIFICANT CHANGE UP
SODIUM SERPL-SCNC: 140 MMOL/L — SIGNIFICANT CHANGE UP (ref 135–145)
SP GR SPEC: 1.02 — SIGNIFICANT CHANGE UP (ref 1–1.03)
SQUAMOUS # UR AUTO: 14 /HPF — HIGH (ref 0–5)
UROBILINOGEN FLD QL: 0.2 MG/DL — SIGNIFICANT CHANGE UP (ref 0.2–1)
WBC # BLD: 9.04 K/UL — SIGNIFICANT CHANGE UP (ref 3.8–10.5)
WBC # FLD AUTO: 9.04 K/UL — SIGNIFICANT CHANGE UP (ref 3.8–10.5)
WBC UR QL: 17 /HPF — HIGH (ref 0–5)

## 2024-04-19 PROCEDURE — 99285 EMERGENCY DEPT VISIT HI MDM: CPT

## 2024-04-19 PROCEDURE — 73552 X-RAY EXAM OF FEMUR 2/>: CPT | Mod: 26,LT

## 2024-04-19 PROCEDURE — 72192 CT PELVIS W/O DYE: CPT | Mod: 26,MC

## 2024-04-19 PROCEDURE — 72190 X-RAY EXAM OF PELVIS: CPT | Mod: 26

## 2024-04-19 PROCEDURE — 73564 X-RAY EXAM KNEE 4 OR MORE: CPT | Mod: 26,LT

## 2024-04-19 PROCEDURE — 76377 3D RENDER W/INTRP POSTPROCES: CPT | Mod: 26

## 2024-04-19 PROCEDURE — 70450 CT HEAD/BRAIN W/O DYE: CPT | Mod: 26,MC

## 2024-04-19 PROCEDURE — 71045 X-RAY EXAM CHEST 1 VIEW: CPT | Mod: 26

## 2024-04-19 PROCEDURE — 93010 ELECTROCARDIOGRAM REPORT: CPT | Mod: 1L

## 2024-04-19 PROCEDURE — 73502 X-RAY EXAM HIP UNI 2-3 VIEWS: CPT | Mod: 26,LT

## 2024-04-19 RX ORDER — LEVOFLOXACIN 5 MG/ML
1 INJECTION, SOLUTION INTRAVENOUS
Qty: 0 | Refills: 0 | DISCHARGE

## 2024-04-19 RX ORDER — PREGABALIN 225 MG/1
1000 CAPSULE ORAL DAILY
Refills: 0 | Status: DISCONTINUED | OUTPATIENT
Start: 2024-04-19 | End: 2024-04-23

## 2024-04-19 RX ORDER — APIXABAN 2.5 MG/1
2.5 TABLET, FILM COATED ORAL
Refills: 0 | Status: DISCONTINUED | OUTPATIENT
Start: 2024-04-19 | End: 2024-04-23

## 2024-04-19 RX ORDER — DILTIAZEM HCL 120 MG
180 CAPSULE, EXT RELEASE 24 HR ORAL DAILY
Refills: 0 | Status: DISCONTINUED | OUTPATIENT
Start: 2024-04-19 | End: 2024-04-23

## 2024-04-19 RX ORDER — ASCORBIC ACID 60 MG
1 TABLET,CHEWABLE ORAL
Qty: 0 | Refills: 0 | DISCHARGE

## 2024-04-19 RX ORDER — CEFTRIAXONE 500 MG/1
1000 INJECTION, POWDER, FOR SOLUTION INTRAMUSCULAR; INTRAVENOUS EVERY 24 HOURS
Refills: 0 | Status: COMPLETED | OUTPATIENT
Start: 2024-04-19 | End: 2024-04-22

## 2024-04-19 RX ORDER — DONEPEZIL HYDROCHLORIDE 10 MG/1
10 TABLET, FILM COATED ORAL AT BEDTIME
Refills: 0 | Status: DISCONTINUED | OUTPATIENT
Start: 2024-04-19 | End: 2024-04-23

## 2024-04-19 RX ORDER — ATORVASTATIN CALCIUM 80 MG/1
10 TABLET, FILM COATED ORAL AT BEDTIME
Refills: 0 | Status: DISCONTINUED | OUTPATIENT
Start: 2024-04-19 | End: 2024-04-23

## 2024-04-19 RX ORDER — TRAMADOL HYDROCHLORIDE 50 MG/1
25 TABLET ORAL THREE TIMES A DAY
Refills: 0 | Status: DISCONTINUED | OUTPATIENT
Start: 2024-04-19 | End: 2024-04-23

## 2024-04-19 RX ORDER — ACETAMINOPHEN 500 MG
650 TABLET ORAL EVERY 6 HOURS
Refills: 0 | Status: DISCONTINUED | OUTPATIENT
Start: 2024-04-19 | End: 2024-04-23

## 2024-04-19 RX ORDER — DIGOXIN 250 MCG
125 TABLET ORAL DAILY
Refills: 0 | Status: DISCONTINUED | OUTPATIENT
Start: 2024-04-19 | End: 2024-04-23

## 2024-04-19 RX ORDER — FERROUS SULFATE 325(65) MG
0 TABLET ORAL
Qty: 0 | Refills: 0 | DISCHARGE

## 2024-04-19 RX ORDER — CEFTRIAXONE 500 MG/1
1000 INJECTION, POWDER, FOR SOLUTION INTRAMUSCULAR; INTRAVENOUS ONCE
Refills: 0 | Status: COMPLETED | OUTPATIENT
Start: 2024-04-19 | End: 2024-04-19

## 2024-04-19 RX ORDER — FOLIC ACID 0.8 MG
1 TABLET ORAL DAILY
Refills: 0 | Status: DISCONTINUED | OUTPATIENT
Start: 2024-04-19 | End: 2024-04-23

## 2024-04-19 RX ORDER — ACETAMINOPHEN 500 MG
1000 TABLET ORAL ONCE
Refills: 0 | Status: COMPLETED | OUTPATIENT
Start: 2024-04-19 | End: 2024-04-19

## 2024-04-19 RX ADMIN — Medication 400 MILLIGRAM(S): at 12:50

## 2024-04-19 RX ADMIN — DONEPEZIL HYDROCHLORIDE 10 MILLIGRAM(S): 10 TABLET, FILM COATED ORAL at 22:03

## 2024-04-19 RX ADMIN — Medication 650 MILLIGRAM(S): at 23:03

## 2024-04-19 RX ADMIN — Medication 1000 MILLIGRAM(S): at 15:27

## 2024-04-19 RX ADMIN — Medication 1000 MILLIGRAM(S): at 12:00

## 2024-04-19 RX ADMIN — ATORVASTATIN CALCIUM 10 MILLIGRAM(S): 80 TABLET, FILM COATED ORAL at 22:06

## 2024-04-19 RX ADMIN — APIXABAN 2.5 MILLIGRAM(S): 2.5 TABLET, FILM COATED ORAL at 23:46

## 2024-04-19 RX ADMIN — Medication 650 MILLIGRAM(S): at 22:03

## 2024-04-19 RX ADMIN — CEFTRIAXONE 100 MILLIGRAM(S): 500 INJECTION, POWDER, FOR SOLUTION INTRAMUSCULAR; INTRAVENOUS at 15:59

## 2024-04-19 NOTE — ED PROVIDER NOTE - NSICDXPASTMEDICALHX_GEN_ALL_CORE_FT
PAST MEDICAL HISTORY:  Afib     Arthritis     Dementia     Frequent UTI     High cholesterol     Hypertension     Osteoporosis

## 2024-04-19 NOTE — H&P ADULT - HISTORY OF PRESENT ILLNESS
84-year-old female history of A-fib on Eliquis arthritis, dementia, frequent UTIs, HTN, HLD, osteoporosis, compression fracture of T12 status post spinal fusion, ambulatory with a walker at baseline presenting after a fall 1 week ago unable to ambulate with lower extremity pain.  Patient had a witnessed fall when transferring from the bed to the chair by son, patient missed stepped and fell to the floor landing on her left side.  Left knee and hip pain, unable to ambulate even with walker since fall.  Denies LOC.  Denies any recent fever/chills, abdominal pain N/V/D, CP, SOB, dysuria.  All vitals here are stable, patient is very well-appearing, no acute pain or distress, oral mucosa is dry, noted swelling to the left knee with tenderness and decreased range of motion, some left hip tenderness with decreased range of motion as well.  No abrasions lacerations or noted ecchymosis.  Remainder physical exam within normal limits see above for more details.

## 2024-04-19 NOTE — ED PROVIDER NOTE - SECONDARY DIAGNOSIS.
[FreeTextEntry1] : L rib contusion and lumbar sprain. xrays suspcious for L4 fx.\par Consulted with Dr. Johnson.\par LSO provided.\par Recommend continuing heat/ice.\par Diclofenac prn.\par Follow up 2 weeks, consider MRI if pain persists. Difficulty walking

## 2024-04-19 NOTE — ED PROVIDER NOTE - ATTENDING CONTRIBUTION TO CARE
Attending MD Patrick:  I personally have seen and examined this patient.  Resident note reviewed and agree on plan of care and except where noted.  Please see my MDM for further details.

## 2024-04-19 NOTE — ED PROVIDER NOTE - CLINICAL SUMMARY MEDICAL DECISION MAKING FREE TEXT BOX
84-year-old female history of A-fib on Eliquis arthritis, dementia, frequent UTIs, HTN, HLD, osteoporosis, compression fracture of T12 status post spinal fusion, ambulatory with a walker at baseline presenting after a fall 1 week ago unable to ambulate with lower extremity pain.  Patient had a witnessed fall when transferring from the bed to the chair by son, patient missed stepped and fell to the floor landing on her left side.  Left knee and hip pain, unable to ambulate even with walker since fall.  Denies LOC.  Denies any recent fever/chills, abdominal pain N/V/D, CP, SOB, dysuria.  All vitals here are stable, patient is very well-appearing, no acute pain or distress, oral mucosa is dry, noted swelling to the left knee with tenderness and decreased range of motion, some left hip tenderness with decreased range of motion as well.  No abrasions lacerations or noted ecchymosis.  Remainder physical exam within normal limits see above for more details.  Concern for possible fracture however unlikely given lack of pain, possible contusion, looks like underlying osteoarthritis as well, rule out any underlying cause of weakness get considering patient with frequent UTIs will check UA, patient has a mild cough will rule out any URI/PNA.  CT head given patient on Eliquis x-rays, labs, UA, pain medication and reassess likely TBA given inability to ambulate. 84-year-old female history of A-fib on Eliquis arthritis, dementia, frequent UTIs, HTN, HLD, osteoporosis, compression fracture of T12 status post spinal fusion, ambulatory with a walker at baseline presenting after a fall 1 week ago unable to ambulate with lower extremity pain.  Patient had a witnessed fall when transferring from the bed to the chair by son, patient missed stepped and fell to the floor landing on her left side.  Left knee and hip pain, unable to ambulate even with walker since fall.  Denies LOC.  Denies any recent fever/chills, abdominal pain N/V/D, CP, SOB, dysuria.  All vitals here are stable, patient is very well-appearing, no acute pain or distress, oral mucosa is dry, noted swelling to the left knee with tenderness and decreased range of motion, some left hip tenderness with decreased range of motion as well.  No abrasions lacerations or noted ecchymosis.  Remainder physical exam within normal limits see above for more details.  Concern for possible fracture however unlikely given lack of pain, possible contusion, looks like underlying osteoarthritis as well, rule out any underlying cause of weakness get considering patient with frequent UTIs will check UA, patient has a mild cough will rule out any URI/PNA.  CT head given patient on Eliquis x-rays, labs, UA, pain medication and reassess likely TBA given inability to ambulate.    Attending MD Patrick: 84-year-old female history of A-fib on Eliquis arthritis, dementia, frequent UTIs, HTN, HLD, osteoporosis, compression fracture of T12 status post spinal fusion, ambulatory with a walker at baseline presenting after a fall 1 week ago unable to ambulate with lower extremity pain.  Patient had a witnessed fall when transferring from the bed to the chair by son, patient missed stepped and fell to the floor landing on her left side.  Left knee and hip pain, unable to ambulate even with walker since fall.  Will perform trauma workup and likely admission secondary to inability to ambulate.    Attending MD Patrick:  I independently interpreted the patient's CXR  which showed no acute disease.

## 2024-04-19 NOTE — ED PROVIDER NOTE - WR ORDER NAME 3
[facility]    [address] [facility] [address]    Phone:  [phone]           Rolando Ramires   MRN: [id number]    Department:  [facility]   Date of Visit:  1/27/2017           Diagnosis     Diarrhea of presumed infectious origin        You were seen by Rima Castro MD.      Disclaimer     Follow-up Care:  It is your responsibility to arrange for follow-up care with your healthcare provider or as instructed. Call to get an appointment time.           Contact your doctor for follow-up appointment if not already scheduled.     Follow up with [facility].    Specialty:  Urgent Care    Comments:  If symptoms worsen    Contact information    [address] [facility] [address] [phone]      Preventive care and screening     Your blood pressure was 126/73 today. If your blood pressure is higher than 120/80, we recommend follow up with your primary care provider to obtain basic health screening, including reassessment of your blood pressure, within three months.          Medications you received while in the ED through 01/27/2017  2:32 PM     Date/Time Order Dose Route Action    01/27/2017  1:53 PM sodium chloride (NORMAL SALINE) 0.9 % bolus 1,000 mL 1,000 mL Intravenous New Bag    01/27/2017  1:53 PM ondansetron (ZOFRAN) injection 4 mg 4 mg Intravenous Given         What to Do with Your Medications      START taking these medications today unless otherwise stated        Details    metroNIDAZOLE 500 MG tablet   Commonly known as:  FLAGYL        Take 1 tablet by mouth 3 times daily for 10 days.   Authorizing Provider:  Rima Castro       ondansetron 4 MG disintegrating tablet   Commonly known as:  ZOFRAN ODT        Take 1 tablet by mouth every 6 hours.   Authorizing Provider:  Rima Castro       sulfamethoxazole-trimethoprim 800-160 MG per tablet   Commonly known as:  BACTRIM DS        Take 1 tablet by mouth 2 times daily.   Authorizing  Provider:  Rima Castro                             Where to Get Your Medications      These medications were sent to [facility]/pharmacy #[id number] - [place], [place] - [address]  [address] [facility] [address]    Hours:  M-F 8-9,SA 9-6,[place] 9-5 Phone:  [phone]     metroNIDAZOLE 500 MG tablet    ondansetron 4 MG disintegrating tablet    sulfamethoxazole-trimethoprim 800-160 MG per tablet               Procedures and tests performed during your visit     C Difficile by PCR    Enteric Pathogen Culture    Rotavirus Antigen Stool      Procedures     None      Imaging Results     None        Discharge Instructions         Take a probiotic called Culturelle twice daily for one month.      Uncertain Causes of Diarrhea (Adult)    Diarrhea is when stools are loose and watery. This can be caused by:  · Viral infections  · Bacterial infections  · Food poisoning  · Parasites  · Irritable bowel syndrome (IBS)  · Inflammatory bowel diseases such as ulcerative colitis, Crohn's disease, and celiac disease  · Food intolerance, such as to lactose, the sugar found in milk and milk products  · Reaction to medicines like antibiotics, laxatives, cancer drugs, and antacids  Along with diarrhea, you may also have:  · Abdominal pain and cramping  · Nausea and vomiting  · Loss of bowel control  · Fever and chills  · Bloody stools  In some cases, antibiotics may help to treat diarrhea. You may have a stool sample test. This is done to see what is causing your diarrhea, and if antibiotics will help treat it. The results of a stool sample test may take up to 2 days. The healthcare provider may not give you antibiotics until he or she has the stool test results.  Diarrhea can cause dehydration. This is the loss of too much water and other fluids from the body. When this occurs, body fluid must be replaced. This can be done with oral rehydration solutions. Oral rehydration solutions are available at drugstores and grocery stores without a  prescription.  Home care  Follow all instructions given by your healthcare provider. Rest at home for the next 24 hours, or until you feel better. Avoid caffeine, tobacco, and alcohol. These can make diarrhea, cramping, and pain worse.  If taking medicines:  · Don’t take over-the-counter diarrhea or nausea medicines unless your healthcare provider tells you to.  · You may use acetaminophen or NSAID medicines like ibuprofen or naproxen to reduce pain and fever. Don’t use these if you have chronic liver or kidney disease, or ever had a stomach ulcer or gastrointestinal bleeding. Don't use NSAID medicines if you are already taking one for another condition (like arthritis) or are on daily aspirin therapy (such as for heart disease or after a stroke). Talk with your healthcare provider first.  · If antibiotics were prescribed, be sure you take them until they are finished. Don’t stop taking them even when you feel better. Antibiotics must be taken as a full course.  To prevent the spread of illness:  · Remember that washing with soap and water and using alcohol-based  is the best way to prevent the spread of infection.  · Clean the toilet after each use.  · Wash your hands before eating.  · Wash your hands before and after preparing food. Keep in mind that people with diarrhea or vomiting should not prepare food for others.  · Wash your hands after using cutting boards, countertops, and knives that have been in contact with raw foods.  · Wash and then peel fruits and vegetables.  · Keep uncooked meats away from cooked and ready-to-eat foods.  · Use a food thermometer when cooking. Cook poultry to at least 165°F (74°C). Cook ground meat (beef, veal, pork, lamb) to at least 160°F (71°C). Cook fresh beef, veal, lamb, and pork to at least 145°F (63°C).  · Don’t eat raw or undercooked eggs (poached or cami side up), poultry, meat, or unpasteurized milk and juices.  Food and drinks  The main goal while treating  vomiting or diarrhea is to prevent dehydration. This is done by taking small amounts of liquids often.  · Keep in mind that liquids are more important than food right now.  · Drink only small amounts of liquids at a time.  · Don’t force yourself to eat, especially if you are having cramping, vomiting, or diarrhea. Don’t eat large amounts at a time, even if you are hungry.  · If you eat, avoid fatty, greasy, spicy, or fried foods.  · Don’t eat dairy foods or drink milk if you have diarrhea. These can make diarrhea worse.  During the first 24 hours you can try:  · Oral rehydration solutions. Do not use sports drinks. They have too much sugar and not enough electrolytes.  · Soft drinks without caffeine  · Ginger ale  · Water (plain or flavored)  · Decaf tea or coffee  · Clear broth, consommé, or bouillon  · Gelatin, popsicles, or frozen fruit juice bars  The second 24 hours, if you are feeling better, you can add:  · Hot cereal, plain toast, bread, rolls, or crackers  · Plain noodles, rice, mashed potatoes, chicken noodle soup, or rice soup  · Unsweetened canned fruit (no pineapple)  · Bananas  As you recover:  · Limit fat intake to less than 15 grams per day. Don’t eat margarine, butter, oils, mayonnaise, sauces, gravies, fried foods, peanut butter, meat, poultry, or fish.  · Limit fiber. Don’t eat raw or cooked vegetables, fresh fruits except bananas, or bran cereals.  · Limit caffeine and chocolate.  · Limit dairy.  · Don’t use spices or seasonings except salt.  · Go back to your normal diet over time, as you feel better and your symptoms improve.  · If the symptoms come back, go back to a simple diet or clear liquids.  Follow-up care  Follow up with your healthcare provider, or as advised. If a stool sample was taken or cultures were done, call the healthcare provider for the results as instructed.  Call 911  Call 911 if you have any of these symptoms:  · Trouble breathing  · Confusion  · Extreme drowsiness or  trouble walking  · Loss of consciousness  · Rapid heart rate  · Chest pain  · Stiff neck  · Seizure  When to seek medical advice  Call your healthcare provider right away if any of these occur:  · Abdominal pain that gets worse  · Constant lower right abdominal pain  · Continued vomiting and inability to keep liquids down  · Diarrhea more than 5 times a day  · Blood in vomit or stool  · Dark urine or no urine for 8 hours, dry mouth and tongue, tiredness, weakness, or dizziness  · Drowsiness  · New rash  · You don’t get better in 2 to 3 days  · Fever of 100.4°F (38°C) or higher that doesn’t get lower with medicine  © 4230-2683 [employer]. [address] [place] [address], [place] [address]. All rights reserved. This information is not intended as a substitute for professional medical care. Always follow your healthcare professional's instructions.          Discharge References/Attachments     None      Medication Safety: What you need to know     Maintain Security - It is important to keep all medications in a secure location:  Keep out of the reach of children and pets    Consider using a lock box or locked filing cabinet    Place pill bottles in private area such as bedroom or drawer    Don't Share - It is illegal to share your prescription medication, even with family:  The doctor prescribes medications specifically for you and your body    You cannot be sure how the drug may affect others physically or emotionally    It is a criminal offense to share prescriptions    Proper Disposal - It is no longer acceptable to flush or throw away medications:  Recent studies show measurable amounts of medication have been found in drinking water and wildlife due to flushing or throwing away medications    Medication strength changes over time and is not typically safe after one year    Proper disposal removes the medication from your home in a safe way so that others don't have access to it. Use your local drug drop  site.    Your local pharmacy can provide information on medication disposal options in your community. The Department [employer] Drug Enforcement Administration website also has information on safe medication disposal:  www.deadiversion.[facility].gov/drug_disposal/index.html         Xray Knee 4 Views, Left

## 2024-04-19 NOTE — ED ADULT NURSE REASSESSMENT NOTE - NS ED NURSE REASSESS COMMENT FT1
Patient straight cathed for urine using sterile technique. Second RN present to confirm sterility. Explained procedure as it was being done - Pt tolerated procedure well. Sterile specimens collected and sent to lab as ordered. drained approx 200ml of urine. Comfort and safety provided.

## 2024-04-19 NOTE — CONSULT NOTE ADULT - SUBJECTIVE AND OBJECTIVE BOX
Patient is a 84F minimal ambulator with occasional walker use who presents to the [facility] ED status-post mechanical fall with a chief complaint of pain in the buttocks. Patient states that she does not recall falling, or whether or not she hit her head or passed out. Localizing pain to minimal pain the buttocks and lower back, denies radiation of pain elsewhere. Denies any numbness or tingling in the affceted extremity. Denies having any other pain elsewhere. Denies fevers, chills, hadaches, chest pain, shortness of breath, nausea, vomiting, or any additional orthopaedic concerns or complaints at time of current encounter.    VITAL SIGNS:  T(C): 36.4 (04-19-24 @ 21:15), Max: 36.6 (04-19-24 @ 11:29)  HR: 84 (04-19-24 @ 21:15) (79 - 84)  BP: 115/82 (04-19-24 @ 21:15) (115/82 - 132/75)  RR: 18 (04-19-24 @ 21:15) (16 - 18)  SpO2: 95% (04-19-24 @ 21:15) (95% - 97%)    LABS:                        10.7   9.04  )-----------( 296      ( 19 Apr 2024 12:15 )             34.1     04-19    140  |  105  |  18  ----------------------------<  140<H>  4.6   |  23  |  0.69    Ca    9.1      19 Apr 2024 12:15    TPro  7.0  /  Alb  3.7  /  TBili  0.2  /  DBili  x   /  AST  13  /  ALT  8<L>  /  AlkPhos  55  04-19      PHYSICAL EXAM  Gen: NAD, Resting comfortably    RLE:  Skin intact, no erythema or ecchymosis.   No bony tenderness to palpation.    + Q/H/EHL/FHL/TA/GSC.   + SILT L3-S1.   + DP.   No micromotion tenderness.   Compartments soft and compressible.   No calf tenderness.     RLE/LLE:  Skin intact, no erythema or ecchymosis.   No bony tenderness to palpation.    + Q/H/EHL/FHL/TA/GSC.   + SILT L3-S1.   + DP.   AROM/PROM:  No micromotion tenderness.   Compartments soft and compressible.   No calf tenderness.         SECONDARY SURVEY  RUE/LUE: No TTP over bony prominences, SILT, palpable pulses, full/painless range of motion, compartments soft. Partially correctible bilateral knee contractures noted.   Spine: No bony tenderness. No palpable step-offs.     IMAGING:  < from: CT Pelvis Bony Only No Cont (04.19.24 @ 14:53) >    Nondisplaced fracture through the superior right aspect of the S1   vertebral body. The bones are osteopenic. Severe end-stage osteoarthritis   of the bilateral hips with full-thickness joint space loss, subchondral   sclerosis, and osteophyte formation. Mild osteoarthritis of the pubic   symphysis. Mild to moderate osteoarthritis of the bilateral sacroiliac   joints. Moderate degenerative changes of the lower lumbar spine.   Diverticula the colon. Soft tissues are unremarkable. Vascular   calcifications are present.    < end of copied text >      ASSESSMENT:  Patient is a 84y Female with a nondisplaced S1 fracture.     PLAN:  - Recommend sacral Foam Donut for comfort while in bed or seated.   - Pain control.   - Protected Weight Bearing.   - DVT Ppx: Per Primary Team.   - PT/OT.   - No acute orthopaedic surgical intervention indicated at this time.   - Orthopaedically stable for discharge; Will continue to monitor while admitted.   - Recommend follow up with Dr. Hernandez in the outpatient setting. Call office to schedule appointment.  - All questions answered to patient's satisfaction.   - Will discuss with Dr. Hernandez and advise if plan changes.

## 2024-04-19 NOTE — ED PROVIDER NOTE - PROGRESS NOTE DETAILS
Dieudonne Rivera PGY3: Spoke with Ortho Spine, no acute intervention at this time. WIll admit to medicine for UTI management, and PT/OT. Attending MD Funez: No acute spine intervention, admitted to medicine

## 2024-04-19 NOTE — ED PROVIDER NOTE - PHYSICAL EXAMINATION
GENERAL: well appearing in no acute distress, non-toxic appearing  HEAD: normocephalic, atraumatic  HEENT: oral mucosa dry  CARDIAC: regular rate and rhythm, normal S1S2  PULM: normal breath sounds, clear to ascultation bilaterally, no rales, rhonchi, wheezing  GI: Abd soft, nondistended, nontender, no rebound tenderness, no guarding, no rigidity  : no CVA tenderness b/l, no suprapubic tenderness  NEURO: no focal motor or sensory deficits, CN2-12 intact, normal speech, PERRLA, EOMI, AAOx3  MSK: L hip and knee ROM limited, no peripheral edema, no calf tenderness b/l  SKIN: no abrasions, no lacerations

## 2024-04-19 NOTE — H&P ADULT - ASSESSMENT
84-year-old female history of A-fib on Eliquis arthritis, dementia, frequent UTIs, HTN, HLD, osteoporosis, compression fracture of T12 status post spinal fusion, ambulatory with a walker at baseline presenting after a fall 1 week ago unable to ambulate with lower extremity pain.  Patient had a witnessed fall when transferring from the bed to the chair by son, patient missed stepped and fell to the floor landing on her left side.  Left knee and hip pain, unable to ambulate even with walker since fall.  Denies LOC.  Denies any recent fever/chills, abdominal pain N/V/D, CP, SOB, dysuria.  All vitals here are stable, patient is very well-appearing, no acute pain or distress, oral mucosa is dry, noted swelling to the left knee with tenderness and decreased range of motion, some left hip tenderness with decreased range of motion as well.  No abrasions lacerations or noted ecchymosis.  Remainder physical exam within normal limits see above for more details.     mechanical fall  functional paraplegia 2/2 sacral fracture  cont outptn meds  check Urine cx, start CTX x 3 days for UTI, but possibly 2/2 colonization. ptn is asymptomatic  dvt ppx w sc Lovenox

## 2024-04-19 NOTE — ED ADULT NURSE NOTE - OBJECTIVE STATEMENT
84y Female PMHx HTN, HLD, and afib BIBEMS s/p fall 1w ago. As per EMS, daughter called due to L knee swelling s/p witnessed mechanical fall 1w ago. As per family, pt did not hit her head or lose consciousness however is on Eliquis. Pt A&Ox2 at baseline and as per family pt is acting like herself. Pt c/o pain in L knee however otherwise feels in her normal state. Stretcher in lowest position and locked, appropriate side rails up.

## 2024-04-19 NOTE — H&P ADULT - NSHPPHYSICALEXAM_GEN_ALL_CORE
T(C): 36.4 (04-19-24 @ 18:00), Max: 36.6 (04-19-24 @ 11:29)  HR: 83 (04-19-24 @ 18:00) (79 - 83)  BP: 130/66 (04-19-24 @ 18:00) (130/66 - 132/75)  RR: 16 (04-19-24 @ 18:00) (16 - 18)  SpO2: 95% (04-19-24 @ 18:00) (95% - 97%)    PHYSICAL EXAM:  GENERAL: NAD, well-developed  HEAD:  Atraumatic, Normocephalic  EYES: EOMI, PERRLA, conjunctiva and sclera clear  NECK: Supple, No JVD  CHEST/LUNG: Clear to auscultation bilaterally; No wheeze  HEART: Regular rate and rhythm; No murmurs, rubs, or gallops  ABDOMEN: Soft, Nontender, Nondistended; Bowel sounds present  EXTREMITIES:  2+ Peripheral Pulses, No clubbing, cyanosis, or edema  PSYCH: AAOx3  NEUROLOGY: non-focal  SKIN: No rashes or lesions

## 2024-04-19 NOTE — ED PROVIDER NOTE - SHIFT CHANGE DETAILS
Attending MD Funez: TBA 84F reports transfers bed to chair, family reports minimal ambulation w walker, s/p fall, S1 fx, pending spine, TBA

## 2024-04-20 LAB
A1C WITH ESTIMATED AVERAGE GLUCOSE RESULT: 5.8 % — HIGH (ref 4–5.6)
ESTIMATED AVERAGE GLUCOSE: 120 MG/DL — HIGH (ref 68–114)
FOLATE SERPL-MCNC: >20 NG/ML — SIGNIFICANT CHANGE UP
T3 SERPL-MCNC: 90 NG/DL — SIGNIFICANT CHANGE UP (ref 80–200)
T4 AB SER-ACNC: 5.9 UG/DL — SIGNIFICANT CHANGE UP (ref 4.6–12)
TSH SERPL-MCNC: 2.92 UIU/ML — SIGNIFICANT CHANGE UP (ref 0.27–4.2)
VIT B12 SERPL-MCNC: 250 PG/ML — SIGNIFICANT CHANGE UP (ref 232–1245)

## 2024-04-20 RX ORDER — DIGOXIN 250 MCG
1 TABLET ORAL
Qty: 0 | Refills: 0 | DISCHARGE

## 2024-04-20 RX ORDER — ALENDRONATE SODIUM 70 MG/1
1 TABLET ORAL
Refills: 0 | DISCHARGE

## 2024-04-20 RX ORDER — PREGABALIN 225 MG/1
1000 CAPSULE ORAL DAILY
Refills: 0 | Status: COMPLETED | OUTPATIENT
Start: 2024-04-20 | End: 2024-04-22

## 2024-04-20 RX ORDER — METHENAMINE MANDELATE 1 G
1 TABLET ORAL
Qty: 0 | Refills: 0 | DISCHARGE

## 2024-04-20 RX ORDER — DONEPEZIL HYDROCHLORIDE 10 MG/1
1 TABLET, FILM COATED ORAL
Qty: 0 | Refills: 0 | DISCHARGE

## 2024-04-20 RX ORDER — LOVASTATIN 20 MG
1 TABLET ORAL
Qty: 0 | Refills: 0 | DISCHARGE

## 2024-04-20 RX ADMIN — TRAMADOL HYDROCHLORIDE 25 MILLIGRAM(S): 50 TABLET ORAL at 14:31

## 2024-04-20 RX ADMIN — APIXABAN 2.5 MILLIGRAM(S): 2.5 TABLET, FILM COATED ORAL at 06:02

## 2024-04-20 RX ADMIN — ATORVASTATIN CALCIUM 10 MILLIGRAM(S): 80 TABLET, FILM COATED ORAL at 21:49

## 2024-04-20 RX ADMIN — TRAMADOL HYDROCHLORIDE 25 MILLIGRAM(S): 50 TABLET ORAL at 21:56

## 2024-04-20 RX ADMIN — DONEPEZIL HYDROCHLORIDE 10 MILLIGRAM(S): 10 TABLET, FILM COATED ORAL at 21:49

## 2024-04-20 RX ADMIN — TRAMADOL HYDROCHLORIDE 25 MILLIGRAM(S): 50 TABLET ORAL at 15:30

## 2024-04-20 RX ADMIN — Medication 125 MICROGRAM(S): at 06:02

## 2024-04-20 RX ADMIN — Medication 180 MILLIGRAM(S): at 06:02

## 2024-04-20 RX ADMIN — CEFTRIAXONE 100 MILLIGRAM(S): 500 INJECTION, POWDER, FOR SOLUTION INTRAMUSCULAR; INTRAVENOUS at 15:51

## 2024-04-20 RX ADMIN — Medication 1 MILLIGRAM(S): at 11:58

## 2024-04-20 RX ADMIN — APIXABAN 2.5 MILLIGRAM(S): 2.5 TABLET, FILM COATED ORAL at 18:52

## 2024-04-20 RX ADMIN — PREGABALIN 1000 MICROGRAM(S): 225 CAPSULE ORAL at 11:58

## 2024-04-20 NOTE — PHYSICAL THERAPY INITIAL EVALUATION ADULT - PERTINENT HX OF CURRENT PROBLEM, REHAB EVAL
84-year-old female history of A-fib on Eliquis arthritis, dementia, frequent UTIs, HTN, HLD, osteoporosis, compression fracture of T12 status post spinal fusion, ambulatory with a walker at baseline presenting after a fall 1 week ago unable to ambulate with lower extremity pain.  Patient had a witnessed fall when transferring from the bed to the chair by son, patient missed stepped and fell to the floor landing on her left side.  Left knee and hip pain, unable to ambulate even with walker since fall. CTH: No acute pathology,  CTPelvis: 1.  Nondisplaced fracture through the superior right aspect of the S1 vertebral body.   Severe osteoarthritis of bilateral hips. xray L hip/femur, L knee: No hip fractures or dislocations. Intact pelvic and obturator rings and symmetrically aligned and spaced SI joints and pubic symphysis. Advanced bilateral hip osteoarthritis right greater than left. Left knee tricompartment osteoarthritis with patellofemoral and medial tibiofemoral compartment predominance. Per Ortho No Sx intervention,

## 2024-04-20 NOTE — PHYSICAL THERAPY INITIAL EVALUATION ADULT - TRANSFER SAFETY CONCERNS NOTED: SIT/STAND, REHAB EVAL
decreased balance during turns/decreased safety awareness/losing balance/decreased proprioception/decreased sequencing ability/decreased weight-shifting ability

## 2024-04-20 NOTE — PHYSICAL THERAPY INITIAL EVALUATION ADULT - ADDITIONAL COMMENTS
As per the pt, she lives with her son and family in a PH, 2-3STE and 1flight of stairs to bedroom, +HHA for 5days/wk for 5hrs/day who assists her with ADls/mobility. Family assists her the rest and on the weekends. pt has RW at home.

## 2024-04-20 NOTE — PHYSICAL THERAPY INITIAL EVALUATION ADULT - PHYSICAL ASSIST/NONPHYSICAL ASSIST: SIT/STAND, REHAB EVAL
"Chief complaint:   Chief Complaint   Patient presents with   â¢ Back Pain     lower back pain x 2 weeks. Injury 2 weeks ago, when he was helping push a car. Vitals:  Visit Vitals  /80   Pulse 79   Temp 96.8 Â°F (36 Â°C) (Tympanic)   Resp 18   Ht 6' (1.829 m)   Wt 81.1 kg   SpO2 98%   BMI 24.26 kg/mÂ²       HISTORY OF PRESENT ILLNESS     Patient presents with:  Back Pain: lower back pain x 2 weeks. Injury 2 weeks ago, when he was helping push a car. Initially started 40-50 years ago but there was no known trauma or injury at that time  Since then he states every few months it ""acts up on him\"" and lasts for a few months and then usually goes away on its own  This time it started about 2 weeks ago when he was helping to push a friend's car that was stuck in the snow   No falls/direct trauma/injury   It started the day after he helped push her out  It is an achy pain  It radiates into his groin area (which is normal for him)  No numbness/tingling/weakness  He states he thinks he had an MRI done about 10-15 years ago but it was normal- he cannot recall for sure       Back Pain   This is a recurrent problem. The current episode started 1 to 4 weeks ago. The problem occurs constantly. The problem has been gradually worsening since onset. The pain is present in the lumbar spine. The quality of the pain is described as aching. The pain is severe. The pain is the same all the time. The symptoms are aggravated by twisting and position. Stiffness is present all day. Pertinent negatives include no abdominal pain, bladder incontinence, bowel incontinence, chest pain, dysuria, fever, headaches, leg pain, numbness, paresis, paresthesias, pelvic pain, perianal numbness, tingling, weakness or weight loss. Treatments tried: tylneol. The treatment provided no relief.        Other significant problems:  Patient Active Problem List    Diagnosis Date Noted   â¢ Cellulitis of finger of left hand 11/20/2016     Priority: Low     " Resolved. Remnant of healing blister     â¢ Essential hypertension 06/29/2015     Priority: Low   â¢ S/P heart valve repair 06/29/2015     Priority: Low   â¢ Fatigue 06/29/2015     Priority: Low   â¢ Chronic sore throat 06/29/2015     Priority: Low       PAST MEDICAL, FAMILY AND SOCIAL HISTORY     Medications:  Current Outpatient Medications   Medication   â¢ lisinopril (ZESTRIL) 20 MG tablet   â¢ Multiple Vitamins-Minerals (ONE-A-DAY MENS HEALTH FORMULA PO)   â¢ acetaminophen (TYLENOL) 500 MG tablet   â¢ Ascorbic Acid (VITAMIN C) 1000 MG tablet   â¢ fluticasone (FLONASE) 50 MCG/ACT nasal spray     No current facility-administered medications for this visit. Allergies:  ALLERGIES:   Allergen Reactions   â¢ Aspirin Other (See Comments)     Lip swelling   â¢ Clindamycin RASH   â¢ Contrast Media Other (See Comments)     Occurred during heart surgery. â¢ Doxycycline RASH   â¢ Penicillins Other (See Comments)     Lip swelling   â¢ Percocet [Oxycodone-Acetaminophen] RASH and ERYTHEMA       Past Medical  History/Surgeries:  Past Medical History:   Diagnosis Date   â¢ Cholelithiasis    â¢ Degenerative disc disease    â¢ Heart valve disease    â¢ Hypertension        Past Surgical History:   Procedure Laterality Date   â¢ Cardiac surgery     â¢ Cardiac valve replacement     â¢ Hernia repair      bilateral hernia repairs, groin     â¢ Vasectomy         Family History:  Family History   Problem Relation Age of Onset   â¢ Heart Father    â¢ Heart Mother    â¢ Asthma Mother        Social History:  Social History     Tobacco Use   â¢ Smoking status: Never Smoker   â¢ Smokeless tobacco: Never Used   Substance Use Topics   â¢ Alcohol use: No       REVIEW OF SYSTEMS     Review of Systems   Constitutional: Negative. Negative for chills, diaphoresis, fatigue, fever and weight loss. HENT: Negative for ear pain, rhinorrhea and sore throat. Respiratory: Negative. Negative for cough, shortness of breath, wheezing and stridor.     Cardiovascular: Negative. Negative for chest pain, palpitations and leg swelling. Gastrointestinal: Negative for abdominal pain, bowel incontinence, diarrhea and vomiting. Genitourinary: Negative for bladder incontinence, decreased urine volume, dysuria, frequency, pelvic pain, scrotal swelling and testicular pain. Musculoskeletal: Positive for back pain. Negative for neck pain and neck stiffness. Skin: Negative for rash. Neurological: Negative for tingling, weakness, numbness, headaches and paresthesias. PHYSICAL EXAM     Physical Exam   Constitutional: He is oriented to person, place, and time. He appears well-developed and well-nourished. No distress. HENT:   Head: Normocephalic and atraumatic. Eyes: Conjunctivae and EOM are normal.   Neck: Normal range of motion. Neck supple. Cardiovascular: Normal rate and regular rhythm. Pulses:       Popliteal pulses are 2+ on the right side, and 2+ on the left side. Pulmonary/Chest: Effort normal and breath sounds normal. No respiratory distress. He has no wheezes. He has no rales. He exhibits no tenderness. Musculoskeletal:        Lumbar back: He exhibits decreased range of motion (all ranges due to pain- flexion 35 degrees, extension less than 5 degrees, bilateral sidebending 25 degrees and rotation approximately 30 degrees ), tenderness, bony tenderness and pain. He exhibits no swelling, no edema, no deformity, no laceration, no spasm and normal pulse. Back:    Neurological: He is alert and oriented to person, place, and time. He has normal strength. He exhibits normal muscle tone. Coordination and gait normal.   Reflex Scores:       Patellar reflexes are 2+ on the right side and 2+ on the left side. Skin: Skin is warm and dry. No rash noted. He is not diaphoretic. Nursing note and vitals reviewed. ASSESSMENT/PLAN     [facility] was seen today for back pain.     Diagnoses and all orders for this visit:    Chronic bilateral low back pain, with sciatica presence unspecified  -     XR LUMBAR SPINE AP LATERAL AND OBLIQUES; Future  -     meloxicam (MOBIC) 7.5 MG tablet; Take 1 or 2 tabs PO daily for up to 10 days, with food; take lowest dose needed; no ibuprofen/naproxen while on this med. REVEALS ON MY OWN INDIVIDUAL INTERPRETATION  No acute abnormalities noted on my evaluation. Awaiting official radiology reading. If there are any discrepancies regarding the current condition, we will contact and inform patient. Reviewed red flag/ER precautions. Will treat acute flare-up but strongly encouraged him to follow up with primary for possible MRI and physical therapy as this is an ongoing problem. Work excuse provided. All questions answered and patient (parent if applicable) in agreement with treatment and discharge plan. Patient appropriately stable at time of discharge from urgent care clinic. The provisional diagnosis that the patient is discharged with today was based on the history taken, presenting symptoms, physical exam, and/or any ancillary testing. Patient (parent if applicable) states understanding that often times the diagnosis can change. If new symptoms occur or worsen, patient should seek immediate medical attention for re-evaluation. Follow up with Primary Care Provider as discussed in the after visit summary. See the patient discharge instructions section for additional instructions, follow-up plans and/or ER precautions discussed with the patient. 2 person assist

## 2024-04-20 NOTE — PATIENT PROFILE ADULT - FALL HARM RISK - HARM RISK INTERVENTIONS

## 2024-04-20 NOTE — PHYSICAL THERAPY INITIAL EVALUATION ADULT - NSPTDISCHREC_GEN_A_CORE
Recommended LILIANA. if d/c'd home; Pt would req. assistance with all mobility/ADls. Home PT to improve overall strength, balance, mobility and for safe return to her PLOF. DMEs: w/c, mechanical lift, hosp bed, commode if d/c home./Sub-acute Rehab

## 2024-04-21 RX ADMIN — TRAMADOL HYDROCHLORIDE 25 MILLIGRAM(S): 50 TABLET ORAL at 14:50

## 2024-04-21 RX ADMIN — ATORVASTATIN CALCIUM 10 MILLIGRAM(S): 80 TABLET, FILM COATED ORAL at 21:12

## 2024-04-21 RX ADMIN — Medication 1 MILLIGRAM(S): at 12:25

## 2024-04-21 RX ADMIN — APIXABAN 2.5 MILLIGRAM(S): 2.5 TABLET, FILM COATED ORAL at 05:08

## 2024-04-21 RX ADMIN — TRAMADOL HYDROCHLORIDE 25 MILLIGRAM(S): 50 TABLET ORAL at 22:00

## 2024-04-21 RX ADMIN — TRAMADOL HYDROCHLORIDE 25 MILLIGRAM(S): 50 TABLET ORAL at 05:04

## 2024-04-21 RX ADMIN — PREGABALIN 1000 MICROGRAM(S): 225 CAPSULE ORAL at 16:58

## 2024-04-21 RX ADMIN — TRAMADOL HYDROCHLORIDE 25 MILLIGRAM(S): 50 TABLET ORAL at 21:12

## 2024-04-21 RX ADMIN — CEFTRIAXONE 100 MILLIGRAM(S): 500 INJECTION, POWDER, FOR SOLUTION INTRAMUSCULAR; INTRAVENOUS at 16:57

## 2024-04-21 RX ADMIN — PREGABALIN 1000 MICROGRAM(S): 225 CAPSULE ORAL at 12:24

## 2024-04-21 RX ADMIN — Medication 650 MILLIGRAM(S): at 12:25

## 2024-04-21 RX ADMIN — DONEPEZIL HYDROCHLORIDE 10 MILLIGRAM(S): 10 TABLET, FILM COATED ORAL at 21:12

## 2024-04-21 RX ADMIN — APIXABAN 2.5 MILLIGRAM(S): 2.5 TABLET, FILM COATED ORAL at 18:24

## 2024-04-21 RX ADMIN — Medication 125 MICROGRAM(S): at 05:08

## 2024-04-21 RX ADMIN — TRAMADOL HYDROCHLORIDE 25 MILLIGRAM(S): 50 TABLET ORAL at 13:50

## 2024-04-21 RX ADMIN — Medication 180 MILLIGRAM(S): at 05:05

## 2024-04-22 LAB
HCT VFR BLD CALC: 27.1 % — LOW (ref 34.5–45)
HGB BLD-MCNC: 8.4 G/DL — LOW (ref 11.5–15.5)
MCHC RBC-ENTMCNC: 31 GM/DL — LOW (ref 32–36)
MCHC RBC-ENTMCNC: 31.3 PG — SIGNIFICANT CHANGE UP (ref 27–34)
MCV RBC AUTO: 101.1 FL — HIGH (ref 80–100)
NRBC # BLD: 0 /100 WBCS — SIGNIFICANT CHANGE UP (ref 0–0)
PLATELET # BLD AUTO: 295 K/UL — SIGNIFICANT CHANGE UP (ref 150–400)
RBC # BLD: 2.68 M/UL — LOW (ref 3.8–5.2)
RBC # FLD: 13.9 % — SIGNIFICANT CHANGE UP (ref 10.3–14.5)
WBC # BLD: 10.65 K/UL — HIGH (ref 3.8–10.5)
WBC # FLD AUTO: 10.65 K/UL — HIGH (ref 3.8–10.5)

## 2024-04-22 RX ADMIN — Medication 650 MILLIGRAM(S): at 19:04

## 2024-04-22 RX ADMIN — DONEPEZIL HYDROCHLORIDE 10 MILLIGRAM(S): 10 TABLET, FILM COATED ORAL at 21:29

## 2024-04-22 RX ADMIN — PREGABALIN 1000 MICROGRAM(S): 225 CAPSULE ORAL at 11:04

## 2024-04-22 RX ADMIN — APIXABAN 2.5 MILLIGRAM(S): 2.5 TABLET, FILM COATED ORAL at 05:39

## 2024-04-22 RX ADMIN — Medication 180 MILLIGRAM(S): at 05:39

## 2024-04-22 RX ADMIN — APIXABAN 2.5 MILLIGRAM(S): 2.5 TABLET, FILM COATED ORAL at 17:14

## 2024-04-22 RX ADMIN — TRAMADOL HYDROCHLORIDE 25 MILLIGRAM(S): 50 TABLET ORAL at 21:29

## 2024-04-22 RX ADMIN — TRAMADOL HYDROCHLORIDE 25 MILLIGRAM(S): 50 TABLET ORAL at 22:30

## 2024-04-22 RX ADMIN — Medication 1 MILLIGRAM(S): at 11:04

## 2024-04-22 RX ADMIN — Medication 125 MICROGRAM(S): at 05:39

## 2024-04-22 RX ADMIN — CEFTRIAXONE 100 MILLIGRAM(S): 500 INJECTION, POWDER, FOR SOLUTION INTRAMUSCULAR; INTRAVENOUS at 13:09

## 2024-04-22 RX ADMIN — ATORVASTATIN CALCIUM 10 MILLIGRAM(S): 80 TABLET, FILM COATED ORAL at 21:29

## 2024-04-22 RX ADMIN — TRAMADOL HYDROCHLORIDE 25 MILLIGRAM(S): 50 TABLET ORAL at 14:10

## 2024-04-22 RX ADMIN — TRAMADOL HYDROCHLORIDE 25 MILLIGRAM(S): 50 TABLET ORAL at 06:26

## 2024-04-22 RX ADMIN — TRAMADOL HYDROCHLORIDE 25 MILLIGRAM(S): 50 TABLET ORAL at 13:09

## 2024-04-22 RX ADMIN — TRAMADOL HYDROCHLORIDE 25 MILLIGRAM(S): 50 TABLET ORAL at 05:40

## 2024-04-22 RX ADMIN — Medication 650 MILLIGRAM(S): at 20:02

## 2024-04-22 NOTE — DIETITIAN INITIAL EVALUATION ADULT - ORAL INTAKE PTA/DIET HISTORY
Patient reports good, normal appetite and PO intake at home PTA. Reports no dietary restrictions followed PTA. Not consuming any oral nutrition supplements PTA. Confirms no known food allergies.

## 2024-04-22 NOTE — DIETITIAN INITIAL EVALUATION ADULT - PERSON TAUGHT/METHOD
Educated pt on the importance of consuming a diet adequate in protein and micronutrients for wound healing/skin integrity.  Pt made aware of menu ordering procedure in house w/ menu provided, encouraged pt to order preferred foods to optimize PO intake while in house./verbal instruction/patient instructed

## 2024-04-22 NOTE — DIETITIAN INITIAL EVALUATION ADULT - NSFNSPHYEXAMSKINFT_GEN_A_CORE
per flowsheets:  sacrum stage 1 pressure injury  left heel stage 1 pressure injury  right heel stage 1 pressure injury  right malleolus Suspected Deep Tissue Injury

## 2024-04-22 NOTE — DIETITIAN INITIAL EVALUATION ADULT - ADD RECOMMEND
-Continue DASH diet as patient with good appetite and PO intake.  -Recommend adding multivitamin and vitamin C for wound healing pending no medical contraindications.  -Monitor PO intake, diet, weight, labs, skin, GI symptoms, and BM regularity.  -RD remains available upon request and will follow up per protocol.

## 2024-04-22 NOTE — DIETITIAN INITIAL EVALUATION ADULT - NSFNSGIIOFT_GEN_A_CORE
Patient reports no nausea/vomiting; no diarrhea or constipation; last BM 4/21 per flowsheets; bowel regimen: none

## 2024-04-22 NOTE — DIETITIAN INITIAL EVALUATION ADULT - PERTINENT LABORATORY DATA
04-19   Na 140 mmol/L   Glu 140 mg/dL<H>   K+ 4.6 mmol/L   Cr 0.69 mg/dL   BUN 18 mg/dL   Phos n/a        A1C with Estimated Average Glucose Result: 5.8 % (04-20-24 @ 07:24)

## 2024-04-22 NOTE — DIETITIAN INITIAL EVALUATION ADULT - NSFNSNUTRHOMESUPPLEMENTFT_GEN_A_CORE
Patient reports taking vitamins PTA, unsure of which ones specifically. Folic acid and cyanocobalamin documented per H&P Home Medications.

## 2024-04-22 NOTE — DIETITIAN INITIAL EVALUATION ADULT - OTHER INFO
Patient unsure of UBW, unsure of changes in weight PTA as she does not weigh herself at home.  Dosing weight: 159.8lb (4/19, bed).   IBW: 130lb, %IBW: 123% based on dosing weight.  Weight history per [facility]: 138.8lb (12/14/22), 130.1lb (12/27/21).  Weight appears trending up if dosing weight accurate. RD to continue to monitor weight trends as available/able.     -Ordered for IV antibiotics.  -Ordered for cyanocobalamin and folic acid.

## 2024-04-22 NOTE — DIETITIAN INITIAL EVALUATION ADULT - PERTINENT MEDS FT
MEDICATIONS  (STANDING):  apixaban 2.5 milliGRAM(s) Oral two times a day  atorvastatin 10 milliGRAM(s) Oral at bedtime  cefTRIAXone   IVPB 1000 milliGRAM(s) IV Intermittent every 24 hours  cyanocobalamin 1000 MICROGram(s) Oral daily  cyanocobalamin Injectable 1000 MICROGram(s) SubCutaneous daily  digoxin     Tablet 125 MICROGram(s) Oral daily  diltiazem    milliGRAM(s) Oral daily  donepezil 10 milliGRAM(s) Oral at bedtime  folic acid 1 milliGRAM(s) Oral daily  traMADol 25 milliGRAM(s) Oral three times a day    MEDICATIONS  (PRN):  acetaminophen     Tablet .. 650 milliGRAM(s) Oral every 6 hours PRN Temp greater or equal to 38C (100.4F), Mild Pain (1 - 3)

## 2024-04-22 NOTE — DIETITIAN INITIAL EVALUATION ADULT - REASON INDICATOR FOR ASSESSMENT
RD assessment warranted for: Consult for MST score 2 or >, Pressure injury stage 2 or >. Chart reviewed, events noted.  Source: medical record, patient.

## 2024-04-23 ENCOUNTER — TRANSCRIPTION ENCOUNTER (OUTPATIENT)
Age: 84
End: 2024-04-23

## 2024-04-23 VITALS
SYSTOLIC BLOOD PRESSURE: 126 MMHG | TEMPERATURE: 98 F | HEART RATE: 65 BPM | DIASTOLIC BLOOD PRESSURE: 70 MMHG | OXYGEN SATURATION: 94 % | RESPIRATION RATE: 18 BRPM

## 2024-04-23 LAB
-  AMOXICILLIN/CLAVULANIC ACID: SIGNIFICANT CHANGE UP
-  AMPICILLIN/SULBACTAM: SIGNIFICANT CHANGE UP
-  AMPICILLIN: SIGNIFICANT CHANGE UP
-  AZTREONAM: SIGNIFICANT CHANGE UP
-  CEFAZOLIN: SIGNIFICANT CHANGE UP
-  CEFEPIME: SIGNIFICANT CHANGE UP
-  CEFOXITIN: SIGNIFICANT CHANGE UP
-  CEFTRIAXONE: SIGNIFICANT CHANGE UP
-  CEFUROXIME: SIGNIFICANT CHANGE UP
-  CIPROFLOXACIN: SIGNIFICANT CHANGE UP
-  ERTAPENEM: SIGNIFICANT CHANGE UP
-  GENTAMICIN: SIGNIFICANT CHANGE UP
-  IMIPENEM: SIGNIFICANT CHANGE UP
-  LEVOFLOXACIN: SIGNIFICANT CHANGE UP
-  MEROPENEM: SIGNIFICANT CHANGE UP
-  NITROFURANTOIN: SIGNIFICANT CHANGE UP
-  PIPERACILLIN/TAZOBACTAM: SIGNIFICANT CHANGE UP
-  TOBRAMYCIN: SIGNIFICANT CHANGE UP
-  TRIMETHOPRIM/SULFAMETHOXAZOLE: SIGNIFICANT CHANGE UP
ANION GAP SERPL CALC-SCNC: 15 MMOL/L — SIGNIFICANT CHANGE UP (ref 5–17)
BUN SERPL-MCNC: 30 MG/DL — HIGH (ref 7–23)
CALCIUM SERPL-MCNC: 8.9 MG/DL — SIGNIFICANT CHANGE UP (ref 8.4–10.5)
CHLORIDE SERPL-SCNC: 102 MMOL/L — SIGNIFICANT CHANGE UP (ref 96–108)
CO2 SERPL-SCNC: 21 MMOL/L — LOW (ref 22–31)
CREAT SERPL-MCNC: 0.85 MG/DL — SIGNIFICANT CHANGE UP (ref 0.5–1.3)
CULTURE RESULTS: ABNORMAL
EGFR: 67 ML/MIN/1.73M2 — SIGNIFICANT CHANGE UP
GLUCOSE SERPL-MCNC: 125 MG/DL — HIGH (ref 70–99)
HCT VFR BLD CALC: 25.2 % — LOW (ref 34.5–45)
HGB BLD-MCNC: 8.3 G/DL — LOW (ref 11.5–15.5)
MCHC RBC-ENTMCNC: 32.8 PG — SIGNIFICANT CHANGE UP (ref 27–34)
MCHC RBC-ENTMCNC: 32.9 GM/DL — SIGNIFICANT CHANGE UP (ref 32–36)
MCV RBC AUTO: 99.6 FL — SIGNIFICANT CHANGE UP (ref 80–100)
METHOD TYPE: SIGNIFICANT CHANGE UP
NRBC # BLD: 0 /100 WBCS — SIGNIFICANT CHANGE UP (ref 0–0)
ORGANISM # SPEC MICROSCOPIC CNT: ABNORMAL
ORGANISM # SPEC MICROSCOPIC CNT: ABNORMAL
PLATELET # BLD AUTO: 286 K/UL — SIGNIFICANT CHANGE UP (ref 150–400)
POTASSIUM SERPL-MCNC: 4.4 MMOL/L — SIGNIFICANT CHANGE UP (ref 3.5–5.3)
POTASSIUM SERPL-SCNC: 4.4 MMOL/L — SIGNIFICANT CHANGE UP (ref 3.5–5.3)
RBC # BLD: 2.53 M/UL — LOW (ref 3.8–5.2)
RBC # FLD: 14.6 % — HIGH (ref 10.3–14.5)
SODIUM SERPL-SCNC: 138 MMOL/L — SIGNIFICANT CHANGE UP (ref 135–145)
SPECIMEN SOURCE: SIGNIFICANT CHANGE UP
WBC # BLD: 10.06 K/UL — SIGNIFICANT CHANGE UP (ref 3.8–10.5)
WBC # FLD AUTO: 10.06 K/UL — SIGNIFICANT CHANGE UP (ref 3.8–10.5)

## 2024-04-23 PROCEDURE — 80053 COMPREHEN METABOLIC PANEL: CPT

## 2024-04-23 PROCEDURE — 70450 CT HEAD/BRAIN W/O DYE: CPT | Mod: MC

## 2024-04-23 PROCEDURE — 83036 HEMOGLOBIN GLYCOSYLATED A1C: CPT

## 2024-04-23 PROCEDURE — 81001 URINALYSIS AUTO W/SCOPE: CPT

## 2024-04-23 PROCEDURE — 96374 THER/PROPH/DIAG INJ IV PUSH: CPT

## 2024-04-23 PROCEDURE — 73552 X-RAY EXAM OF FEMUR 2/>: CPT

## 2024-04-23 PROCEDURE — 73564 X-RAY EXAM KNEE 4 OR MORE: CPT

## 2024-04-23 PROCEDURE — 87186 SC STD MICRODIL/AGAR DIL: CPT

## 2024-04-23 PROCEDURE — 87637 SARSCOV2&INF A&B&RSV AMP PRB: CPT

## 2024-04-23 PROCEDURE — 84436 ASSAY OF TOTAL THYROXINE: CPT

## 2024-04-23 PROCEDURE — 82746 ASSAY OF FOLIC ACID SERUM: CPT

## 2024-04-23 PROCEDURE — 80048 BASIC METABOLIC PNL TOTAL CA: CPT

## 2024-04-23 PROCEDURE — 72190 X-RAY EXAM OF PELVIS: CPT

## 2024-04-23 PROCEDURE — 87086 URINE CULTURE/COLONY COUNT: CPT

## 2024-04-23 PROCEDURE — 76377 3D RENDER W/INTRP POSTPROCES: CPT

## 2024-04-23 PROCEDURE — 84443 ASSAY THYROID STIM HORMONE: CPT

## 2024-04-23 PROCEDURE — 82607 VITAMIN B-12: CPT

## 2024-04-23 PROCEDURE — 84480 ASSAY TRIIODOTHYRONINE (T3): CPT

## 2024-04-23 PROCEDURE — 85027 COMPLETE CBC AUTOMATED: CPT

## 2024-04-23 PROCEDURE — 96375 TX/PRO/DX INJ NEW DRUG ADDON: CPT

## 2024-04-23 PROCEDURE — 71045 X-RAY EXAM CHEST 1 VIEW: CPT

## 2024-04-23 PROCEDURE — 97161 PT EVAL LOW COMPLEX 20 MIN: CPT

## 2024-04-23 PROCEDURE — 85025 COMPLETE CBC W/AUTO DIFF WBC: CPT

## 2024-04-23 PROCEDURE — 99285 EMERGENCY DEPT VISIT HI MDM: CPT | Mod: 25

## 2024-04-23 PROCEDURE — 72192 CT PELVIS W/O DYE: CPT | Mod: MC

## 2024-04-23 PROCEDURE — 87077 CULTURE AEROBIC IDENTIFY: CPT

## 2024-04-23 PROCEDURE — 73502 X-RAY EXAM HIP UNI 2-3 VIEWS: CPT

## 2024-04-23 PROCEDURE — 36415 COLL VENOUS BLD VENIPUNCTURE: CPT

## 2024-04-23 RX ORDER — FUROSEMIDE 40 MG
1 TABLET ORAL
Refills: 0 | DISCHARGE

## 2024-04-23 RX ORDER — ACETAMINOPHEN 500 MG
2 TABLET ORAL
Qty: 0 | Refills: 0 | DISCHARGE
Start: 2024-04-23

## 2024-04-23 RX ORDER — TRAMADOL HYDROCHLORIDE 50 MG/1
0.5 TABLET ORAL
Qty: 0 | Refills: 0 | DISCHARGE
Start: 2024-04-23

## 2024-04-23 RX ADMIN — APIXABAN 2.5 MILLIGRAM(S): 2.5 TABLET, FILM COATED ORAL at 05:11

## 2024-04-23 RX ADMIN — TRAMADOL HYDROCHLORIDE 25 MILLIGRAM(S): 50 TABLET ORAL at 05:11

## 2024-04-23 RX ADMIN — PREGABALIN 1000 MICROGRAM(S): 225 CAPSULE ORAL at 11:31

## 2024-04-23 RX ADMIN — Medication 1 MILLIGRAM(S): at 11:31

## 2024-04-23 RX ADMIN — TRAMADOL HYDROCHLORIDE 25 MILLIGRAM(S): 50 TABLET ORAL at 13:20

## 2024-04-23 RX ADMIN — TRAMADOL HYDROCHLORIDE 25 MILLIGRAM(S): 50 TABLET ORAL at 14:20

## 2024-04-23 RX ADMIN — TRAMADOL HYDROCHLORIDE 25 MILLIGRAM(S): 50 TABLET ORAL at 06:13

## 2024-04-23 RX ADMIN — Medication 125 MICROGRAM(S): at 05:11

## 2024-04-23 RX ADMIN — Medication 180 MILLIGRAM(S): at 05:11

## 2024-04-23 NOTE — DISCHARGE NOTE NURSING/CASE MANAGEMENT/SOCIAL WORK - NSDCPEPTCAREGIVEDUMATLIST _GEN_ALL_CORE
[facility]                                        April 15, 2017                   Chief Complaint  52 y.o. male with Gout (c/o right arm (elbow down) and right foot pain)    History of Present Illness  Alan Solorio presents to the emergency room with a gout exacerbation today  Patient is well-known to the ER for frequent issues with his gout, right foot  Patient has been off of his gout medication for over 2-3 months, noncompliant  Patient states he ate pulled seafood and drank alcohol yesterday, gout today  Patient on exam has mild gouty changes to the right foot, no erythema or redness  Patient states he has right elbow pain but has a normal exam in the ER today  Patient has good distal pulses and capillary refill, is neurovascular intact here    The history is provided by the patient  Past medical history: Gout and hypertension  History reviewed. No pertinent past surgical history.   No Known Allergies     Review of Systems and Physical Exam     Review of Systems  -- Constitution - no fever, denies fatigue, no weakness, no chills  -- Eyes - no tearing or redness, no visual disturbance  -- Ear, Nose - no tinnitus or earache, no nasal congestion or discharge  -- Mouth,Throat - no sore throat, no toothache, normal voice, normal swallowing  -- Respiratory - denies cough and congestion, no shortness of breath, no CAMPO  -- Cardiovascular - denies chest pain, no palpitations, denies claudication  -- Gastrointestinal - denies abdominal pain, nausea, vomiting, or diarrhea  -- Genitourinary - no dysuria, no denies flank pain, no hematuria or frequency   -- Musculoskeletal - right foot and right elbow gout chronically  -- Neurological - no headache, denies weakness or seizure; no LOC  -- Skin - denies pallor, rash, or changes in skin. no hives or welts noted    Physical Exam  -- Nursing note and vitals reviewed  -- Head: Atraumatic. Normocephalic. No obvious abnormality  -- Eyes: Pupils are equal and  reactive to light. Normal conjunctiva and lids  -- Neck: Normal range of motion. Neck supple. No masses, trachea midline  -- Cardiac: Normal rate, regular rhythm and normal heart sounds  -- Pulmonary: Normal respiratory effort, breath sounds clear to auscultation  -- Abdominal: Soft, no tenderness. Normal bowel sounds. Normal liver edge  -- Musculoskeletal: Gouty changes in the right ankle, no erythema or redness  -- Neurological: No focal deficits. Showed good interaction with staff  -- Vascular: Posterior tibial, dorsalis pedis and radial pulses 2+ bilaterally      Emergency Room Course     Treatment and Evaluation  -- IM 60 mg Toradol given today in the ER    Diagnosis  -- The encounter diagnosis was Acute gout of right foot, unspecified cause.    Disposition and Plan  -- Disposition: home  -- Condition: stable  -- Follow-up: Patient to follow up with a MD in 1-2 days.  -- I advised the patient that we have found no life threatening condition today  -- At this time, I believe the patient is clinically stable for discharge.   -- The patient acknowledges that close follow up with a MD is required   -- Patient agrees to comply with all instruction and direction given in the ER    This note is dictated on Dragon Natural Speaking word recognition program.  There are word recognition mistakes that are occasionally missed on review.           Jasbir Stinson MD  04/15/17 [id number]     Apixaban/Eliquis

## 2024-04-23 NOTE — DISCHARGE NOTE PROVIDER - NSDCFUADDAPPT_GEN_ALL_CORE_FT
APPTS ARE READY TO BE MADE: [ x] YES    Best Family or Patient Contact (if needed):    Additional Information about above appointments (if needed):    1:   2:   3:     Other comments or requests:    APPTS ARE READY TO BE MADE: [ x] YES    Best Family or Patient Contact (if needed):    Additional Information about above appointments (if needed):    1:   2:   3:     Other comments or requests:   Patient is being discharged to rehab. Patient/caregiver will arrange follow up appointments.

## 2024-04-23 NOTE — PROGRESS NOTE ADULT - REASON FOR ADMISSION
CHRONIC DISEASE MANAGEMENT  Office Progress Note    PRIMARY CARE PHYSICIAN  Daly Anderson MD    PATIENT'S PREFERRED PHARMACIES  [facility] - [place], [place] - [address] [id number] - [place], [place] - [address] [place] [address]  José Manuel Nova is a 60 year old year old Black/ male who [facility] for management of diabetes.      Initial Referral: 12/5/18 per Dr. Anderson; re-order 12/1/21  Supervising Physician: Dr. Anderson    Pertinent PMH: DM2, h/o left leg amputation with prosthesis, HTN, PVD, BPH, LIAN    ER/Hospitalization History:  Has not been hospitalized since last visit    Visit History:  • 12/1/21 - PharmD - no medication changes made    Today's Visit:   Patient states he quit smoking on 10/2/21 due to his sister passing away. He reports due to this, he has been snacking more leading to elevated BG sometimes.     Diabetes:    Past History:  - First Diagnosed: Cannot recall; at least been 10-15 years  - Past Medications: None    Current Regimen:   • Metformin 1000mg twice daily  • Ozempic 1mg weekly  • Tresiba 45 units daily    Blood glucose testing frequency: Twice a Day    Hypoglycemia: Never; rarely occurs; get green-yellow halos     Hyperglycemia:  • Polyuria? no  • Polydipsia? no  • Polyphagia? no  • Blurry Vision? no  • Unexplained weight loss? no    Macro/Microvascular Complications:  • Macrovascular Complications?  o History of CAD? no  o History of CVD? no  o History of PAD/PVD? yes  • Retinopathy? no  • Nephropathy? no  • Peripheral Neuropathy? no    General Diabetes Device Knowledge:  • Pen/syringe administration technique: Discussed 12/1/21  • Pen storage: Discussed 12/1/21  • Administration sites: Abdomen    Hypertension:    Current Regimen:   • Enalapril 5mg daily    Following Low Sodium Diet? yes    Hyperlipidemia:    Current Regimen:  • Atorvastatin 10mg daily    Lifestyle/Preventative 
Care:    Baseline Dietary Habits:  • Breakfast: Bowl of cereal (adds equal) with 1/2 cup of milk, or sausage, grits, biscuits, or oatmeal  • Lunch: Honey nut Cheerios   • Dinner: Fried or baked chicken, rice, spinach, asparagus, green beans  • Snacks: Plain popcorn  • Dessert: Cakes, cookies  • Beverages: Water, milk, Storm-Aid  • Alcohol: Denies    Activity Level/Exercise? Walks around with work as a     Prevention/Wellness:   • Last Eye Exam: 2/2021  • Last Foot Exam: PCP visits; educated 3/17/21 foot care  o Check feet daily?  yes  o Sores/lesions/cracks?   no  o Apply lotion daily?  yes  o Wears protective footwear at all times?  yes  • Influenza Vaccine: yes  • Pneumococcal Vaccines: yes  • ACE/ARB?  yes  • Statin?  yes    Social History:  · Work: Patient does  work as a     OBJECTIVE  Allergies as of 12/01/2021   • (No Known Allergies)       Current Outpatient Medications   Medication Sig   • atorvastatin (LIPITOR) 10 MG tablet Take 1 tablet by mouth once daily   • OneTouch Delica Lancets 33G Misc Use to check sugar twice daily   • blood glucose (OneTouch Verio) test strip Test blood sugar 2 times daily as directed. Diagnosis: E11.65. Meter: One Touch Verio Flex   • enalapril (VASOTEC) 5 MG tablet Take 1 tablet by mouth daily.   • metformin (GLUCOPHAGE) 1000 MG tablet TAKE 1 TABLET BY MOUTH TWICE DAILY WITH MEALS   • tamsulosin (FLOMAX) 0.4 MG Cap Take 1 capsule by mouth at bedtime   • Semaglutide, 1 MG/DOSE, (Ozempic, 1 MG/DOSE,) (1.34 mg/ml) 1 MG/DOSE Solution Pen-injector Inject 1 mg into the skin every 7 days.   • insulin degludec (Tresiba FlexTouch) 100 UNIT/ML pen-injector Inject 45 Units into the skin daily. Prime 2 units before each dose.   • Insulin Pen Needle (Pen Needles) 32G X 4 MM Misc Use as needed once daily with insulin pen     No current facility-administered medications for this visit.       VITALS  BP Readings from Last 3 Encounters:   12/01/21 128/68   10/01/21 132/78 
pelvic Fx
  21 129/74     Pulse Readings from Last 3 Encounters:   21 73   10/01/21 73   21 78     Wt Readings from Last 3 Encounters:   21 118.8 kg   10/01/21 116.1 kg   21 115.9 kg       There is no height or weight on file to calculate BMI.    LABS  Cholesterol (mg/dL)   Date Value   2021 131     Triglycerides (mg/dL)   Date Value   2021 59     HDL (mg/dL)   Date Value   2021 44     No components found for: LDLDIRECT  LDL (mg/dL)   Date Value   2021 75     Hemoglobin A1C (%)   Date Value   2021 6.8 (H)     No results found for: 5GLYH  No results found for: VWZKPEXT6S  Microalbumin/ Creatinine Ratio (mg/g)   Date Value   2021 4.1     TSH (mcUnits/mL)   Date Value   2019 0.724     Sodium (mmol/L)   Date Value   2021 142     Potassium (mmol/L)   Date Value   2021 4.6     Glucose (mg/dL)   Date Value   2021 101 (H)     BUN (mg/dL)   Date Value   2021 12     Creatinine (mg/dL)   Date Value   2021 0.85     Glomerular Filtration Rate (no units)   Date Value   2021 >90     GOT/AST (Units/L)   Date Value   2021 14     No results found for: ALT  The 10-year ASCVD risk score (Carol DC Jr., et al., 2013) is: 23.8%    Values used to calculate the score:      Age: 60 years      Sex: Male      Is Non- : Yes      Diabetic: Yes      Tobacco smoker: No      Systolic Blood Pressure: 128 mmHg      Is BP treated: Yes      HDL Cholesterol: 44 mg/dL      Total Cholesterol: 131 mg/dL    Diabetes:    IN OFFICE:   - B - ate breakfast about 6 hours ago    SELF-MONITORING OF BLOOD GLUCOSE   Patient is checking blood sugars 2 times/day.    FBG: Avg 142 n=30 with range of 111-196    Pre-Dinner: Avg 145 n=25 with range of      ASSESSMENT/PLAN  The above medical conditions and medications were assessed for goals of medical therapy and medication optimization (adherence, cost, medication simplification, side 
pelvic Fx
effects, DDI's, dose, etc.) and deemed appropriate except as listed below:    DM  Goals: A1c <7%, FPG  mg/dL, 2hPPG <180 mg/dL   · SMBG: available to assess per meter, at goal   · FPG: mg/dL  not at goal  · PPG: mg/dL  at goal  · Low BG: none reported   · Aware of s/sx of hypoglycemia and how to treat: Yes  · Assessment:   · While FBG appears elevated, most recent A1c is at goal  · Will apply for repeat PAP at next visit  · Continue present management  · Recommended to check SMBG twice daily  · Repeat A1c, [facility] 2/2022    HTN  Goal BP: <130/80 mmHg (2017 ACC/AHA Guidelines)   · BP today 128/68 mmHg, HR 73 bpm  · Assessment:   · BP is at goal  · Continue present management  · Monitor BP and HR each visit  · Repeat [facility] 2/2022    Lipids  • ASCVD Risk Score: 23.8%  • High intensity statin indicated due to h/o ASCVD  · Assessment:   · Patient willing to increase atorvastatin to 40mg daily today   · INCREASE atorvastatin 10mg to 40mg daily  · Repeat lipid panel annually    PATIENT ACTION PLAN  See above    EDUCATION  Patient was educated on the following topics:  Mechanism of action, side effects, and proper usage of medications  Lifestyle interventions including proper nutrition including limiting carbs, exercise and weight loss  Signs and symptoms of hypoglycemia and proper treatment  Strategies to improve adherence with medications    Printed education materials were not given to the patient. Patient verbalized understanding of the topics discussed and was given the pharmacist’s telephone number to call in the event a question arises.    RETURN  The patient will return to clinic on 4/1/22 to see Daly Anderson MD  The patient will return to clinic on 1/19/22 to see Ida Pimentel PHARMD    TIME SPENT ON VISIT  45 minutes were spent in today's visit of which 30 minutes were spent in face-to-face discussion related to medical management of the patient's disease states.    Ida Pimentel PharmD, BCACP  Clinical 
Pharmacist - Frances/Shimon Shah

## 2024-04-23 NOTE — DISCHARGE NOTE PROVIDER - NSDCMRMEDTOKEN_GEN_ALL_CORE_FT
acetaminophen 325 mg oral tablet: 2 tab(s) orally every 6 hours As needed Temp greater or equal to 38C (100.4F), Mild Pain (1 - 3)  apixaban 2.5 mg oral tablet: 1 tab(s) orally every 12 hours  cyanocobalamin 1000 mcg oral tablet: 1 tab(s) orally once a day  DilTIAZem (Eqv-Cardizem CD) 180 mg/24 hours oral capsule, extended release: 1 cap(s) orally once a day  donepezil 10 mg oral tablet: 1 tab(s) orally once a day (at bedtime)  folic acid 1 mg oral tablet: 1 tab(s) orally once a day  Fosamax 70 mg oral tablet: 1 tab(s) orally once a week on Saturdays  Hiprex 1 g oral tablet: 1 tab(s) orally 2 times a day  Lanoxin 125 mcg (0.125 mg) oral tablet: 1 tab(s) orally 3x a week, MWF  Lasix 20 mg oral tablet: 1 tab(s) orally 3 times a week on Tuesdays, Thursdays, and Saturdays  lovastatin 20 mg oral tablet: 1 tab(s) orally once a day (at bedtime)  traMADol 50 mg oral tablet: 0.5 tab(s) orally 3 times a day as needed for  moderate pain   acetaminophen 325 mg oral tablet: 2 tab(s) orally every 6 hours As needed Temp greater or equal to 38C (100.4F), Mild Pain (1 - 3)  apixaban 2.5 mg oral tablet: 1 tab(s) orally every 12 hours  cyanocobalamin 1000 mcg oral tablet: 1 tab(s) orally once a day  DilTIAZem (Eqv-Cardizem CD) 180 mg/24 hours oral capsule, extended release: 1 cap(s) orally once a day  donepezil 10 mg oral tablet: 1 tab(s) orally once a day (at bedtime)  folic acid 1 mg oral tablet: 1 tab(s) orally once a day  Fosamax 70 mg oral tablet: 1 tab(s) orally once a week on Saturdays  Hiprex 1 g oral tablet: 1 tab(s) orally 2 times a day  Lanoxin 125 mcg (0.125 mg) oral tablet: 1 tab(s) orally 3x a week, MWF  lovastatin 20 mg oral tablet: 1 tab(s) orally once a day (at bedtime)  traMADol 50 mg oral tablet: 0.5 tab(s) orally 3 times a day as needed for  moderate pain

## 2024-04-23 NOTE — DISCHARGE NOTE PROVIDER - HOSPITAL COURSE
HPI:  84-year-old female history of A-fib on Eliquis arthritis, dementia, frequent UTIs, HTN, HLD, osteoporosis, compression fracture of T12 status post spinal fusion, ambulatory with a walker at baseline presenting after a fall 1 week ago unable to ambulate with lower extremity pain.  Patient had a witnessed fall when transferring from the bed to the chair by son, patient missed stepped and fell to the floor landing on her left side.  Left knee and hip pain, unable to ambulate even with walker since fall.  Denies LOC.  Denies any recent fever/chills, abdominal pain N/V/D, CP, SOB, dysuria.  All vitals here are stable, patient is very well-appearing, no acute pain or distress, oral mucosa is dry, noted swelling to the left knee with tenderness and decreased range of motion, some left hip tenderness with decreased range of motion as well.  No abrasions lacerations or noted ecchymosis.  Remainder physical exam within normal limits see above for more details.  (19 Apr 2024 21:16)    Hospital Course:  nondisplaced S1 fracture.   - sacral Foam Donut for comfort while in bed or seated.   - Pain control  - Protected Weight Bearing.   - DVT Ppx  - PT/OT. Herber  - No acute orthopaedic surgical intervention   - follow up with Dr. Hernandez in the outpatient setting    Important Medication Changes and Reason:    Active or Pending Issues Requiring Follow-up:    Advanced Directives:   [ X] Full code  [ ] DNR  [ ] Hospice    Discharge Diagnoses:  Mechanical fall  nondisplaced S1 fracture

## 2024-04-23 NOTE — DISCHARGE NOTE PROVIDER - NSDCCPCAREPLAN_GEN_ALL_CORE_FT
PRINCIPAL DISCHARGE DIAGNOSIS  Diagnosis: Fall  Assessment and Plan of Treatment: Fall precautions  Physical therapy      SECONDARY DISCHARGE DIAGNOSES  Diagnosis: Sacral fracture  Assessment and Plan of Treatment: - sacral Foam Donut for comfort while in bed or seated  - Pain control  - Protected Weight Bearing  - PT/OT  - No acute orthopaedic surgical intervention   - follow up with Dr. Hernandez in the outpatient setting

## 2024-04-23 NOTE — DISCHARGE NOTE PROVIDER - CARE PROVIDER_API CALL
Tin Hernandez  Orthopaedic Surgery  [address] [facility] [address] [facility] [place] [address]  Phone: [phone]  Fax: [phone]  Follow Up Time:    Tin Hernandez  Orthopaedic Surgery  [address] [facility] [place] [address]  Phone: [phone]  Fax: [phone]  Follow Up Time: 2 weeks

## 2024-04-23 NOTE — PROGRESS NOTE ADULT - ASSESSMENT
84-year-old female history of A-fib on Eliquis arthritis, dementia, frequent UTIs, HTN, HLD, osteoporosis, compression fracture of T12 status post spinal fusion, ambulatory with a walker at baseline presenting after a fall 1 week ago unable to ambulate with lower extremity pain.  Patient had a witnessed fall when transferring from the bed to the chair by son, patient missed stepped and fell to the floor landing on her left side.  Left knee and hip pain, unable to ambulate even with walker since fall.  Denies LOC.  Denies any recent fever/chills, abdominal pain N/V/D, CP, SOB, dysuria.  All vitals here are stable, patient is very well-appearing, no acute pain or distress, oral mucosa is dry, noted swelling to the left knee with tenderness and decreased range of motion, some left hip tenderness with decreased range of motion as well.  No abrasions lacerations or noted ecchymosis.  Remainder physical exam within normal limits see above for more details.     mechanical fall  functional paraplegia 2/2 sacral fracture  ptn is pain free,   VIT B12 level is low, given IV.   cont outptn meds  completed IV Abx for UTI   accepted to [facility], dc today  medically cleared for DC  dvt ppx w sc Lovenox  
84-year-old female history of A-fib on Eliquis arthritis, dementia, frequent UTIs, HTN, HLD, osteoporosis, compression fracture of T12 status post spinal fusion, ambulatory with a walker at baseline presenting after a fall 1 week ago unable to ambulate with lower extremity pain.  Patient had a witnessed fall when transferring from the bed to the chair by son, patient missed stepped and fell to the floor landing on her left side.  Left knee and hip pain, unable to ambulate even with walker since fall.  Denies LOC.  Denies any recent fever/chills, abdominal pain N/V/D, CP, SOB, dysuria.  All vitals here are stable, patient is very well-appearing, no acute pain or distress, oral mucosa is dry, noted swelling to the left knee with tenderness and decreased range of motion, some left hip tenderness with decreased range of motion as well.  No abrasions lacerations or noted ecchymosis.  Remainder physical exam within normal limits see above for more details.     mechanical fall  functional paraplegia 2/2 sacral fracture  ptn is pain free,   VIT B12 level is low, will give IV.   cont outptn meds  check Urine cx,   cont  CTX x 3 days for UTI, but possibly 2/2 colonization. ptn is asymptomatic  dvt ppx w sc Lovenox
84-year-old female history of A-fib on Eliquis arthritis, dementia, frequent UTIs, HTN, HLD, osteoporosis, compression fracture of T12 status post spinal fusion, ambulatory with a walker at baseline presenting after a fall 1 week ago unable to ambulate with lower extremity pain.  Patient had a witnessed fall when transferring from the bed to the chair by son, patient missed stepped and fell to the floor landing on her left side.  Left knee and hip pain, unable to ambulate even with walker since fall.  Denies LOC.  Denies any recent fever/chills, abdominal pain N/V/D, CP, SOB, dysuria.  All vitals here are stable, patient is very well-appearing, no acute pain or distress, oral mucosa is dry, noted swelling to the left knee with tenderness and decreased range of motion, some left hip tenderness with decreased range of motion as well.  No abrasions lacerations or noted ecchymosis.  Remainder physical exam within normal limits see above for more details.     mechanical fall  functional paraplegia 2/2 sacral fracture  ptn is pain free,   VIT B12 level is low, given IV.   cont outptn meds  check Urine cx,   cont  CTX x 3 days for UTI, but possibly 2/2 colonization. ptn is asymptomatic  dvt ppx w sc Lovenox  PT eval
84-year-old female history of A-fib on Eliquis arthritis, dementia, frequent UTIs, HTN, HLD, osteoporosis, compression fracture of T12 status post spinal fusion, ambulatory with a walker at baseline presenting after a fall 1 week ago unable to ambulate with lower extremity pain.  Patient had a witnessed fall when transferring from the bed to the chair by son, patient missed stepped and fell to the floor landing on her left side.  Left knee and hip pain, unable to ambulate even with walker since fall.  Denies LOC.  Denies any recent fever/chills, abdominal pain N/V/D, CP, SOB, dysuria.  All vitals here are stable, patient is very well-appearing, no acute pain or distress, oral mucosa is dry, noted swelling to the left knee with tenderness and decreased range of motion, some left hip tenderness with decreased range of motion as well.  No abrasions lacerations or noted ecchymosis.  Remainder physical exam within normal limits see above for more details.     mechanical fall  functional paraplegia 2/2 sacral fracture  ptn is pain free,   VIT B12 level is low, given IV.   cont outptn meds  completed IV Abx for UTI   accepted to [facility], awaiting auth.   medically cleared for DC  dvt ppx w sc Lovenox

## 2024-04-23 NOTE — PROGRESS NOTE ADULT - SUBJECTIVE AND OBJECTIVE BOX
Patient is a 84y old  Female who presents with a chief complaint of pelvic Fx (20 Apr 2024 21:57)      SUBJECTIVE / OVERNIGHT EVENTS: no new events    MEDICATIONS  (STANDING):  apixaban 2.5 milliGRAM(s) Oral two times a day  atorvastatin 10 milliGRAM(s) Oral at bedtime  cefTRIAXone   IVPB 1000 milliGRAM(s) IV Intermittent every 24 hours  cyanocobalamin 1000 MICROGram(s) Oral daily  cyanocobalamin Injectable 1000 MICROGram(s) SubCutaneous daily  digoxin     Tablet 125 MICROGram(s) Oral daily  diltiazem    milliGRAM(s) Oral daily  donepezil 10 milliGRAM(s) Oral at bedtime  folic acid 1 milliGRAM(s) Oral daily  traMADol 25 milliGRAM(s) Oral three times a day    MEDICATIONS  (PRN):  acetaminophen     Tablet .. 650 milliGRAM(s) Oral every 6 hours PRN Temp greater or equal to 38C (100.4F), Mild Pain (1 - 3)      Vital Signs Last 24 Hrs  T(F): 97.6 (04-21-24 @ 13:12), Max: 98.2 (04-21-24 @ 05:09)  HR: 77 (04-21-24 @ 13:12) (77 - 96)  BP: 127/66 (04-21-24 @ 13:12) (127/66 - 141/82)  RR: 18 (04-21-24 @ 13:12) (18 - 18)  SpO2: 93% (04-21-24 @ 13:12) (92% - 93%)  Telemetry:   CAPILLARY BLOOD GLUCOSE        I&O's Summary      PHYSICAL EXAM:  GENERAL: NAD, well-developed  HEAD:  Atraumatic, Normocephalic  EYES: EOMI, PERRLA, conjunctiva and sclera clear  NECK: Supple, No JVD  CHEST/LUNG: Clear to auscultation bilaterally; No wheeze  HEART: Regular rate and rhythm; No murmurs, rubs, or gallops  ABDOMEN: Soft, Nontender, Nondistended; Bowel sounds present  EXTREMITIES:  2+ Peripheral Pulses, No clubbing, cyanosis, or edema  PSYCH: AAOx3  NEUROLOGY: non-focal  SKIN: No rashes or lesions    LABS:                    RADIOLOGY & ADDITIONAL TESTS:    Imaging Personally Reviewed:    Consultant(s) Notes Reviewed:      Care Discussed with Consultants/Other Providers:  
Patient is a 84y old  Female who presents with a chief complaint of Unspecified fall, initial encounter     (22 Apr 2024 09:53)      SUBJECTIVE / OVERNIGHT EVENTS: no new events, completed IV Abx for [facility] accepted to [facility], awaiting auth. medically cleared for DC    MEDICATIONS  (STANDING):  apixaban 2.5 milliGRAM(s) Oral two times a day  atorvastatin 10 milliGRAM(s) Oral at bedtime  cyanocobalamin 1000 MICROGram(s) Oral daily  digoxin     Tablet 125 MICROGram(s) Oral daily  diltiazem    milliGRAM(s) Oral daily  donepezil 10 milliGRAM(s) Oral at bedtime  folic acid 1 milliGRAM(s) Oral daily  traMADol 25 milliGRAM(s) Oral three times a day    MEDICATIONS  (PRN):  acetaminophen     Tablet .. 650 milliGRAM(s) Oral every 6 hours PRN Temp greater or equal to 38C (100.4F), Mild Pain (1 - 3)      Vital Signs Last 24 Hrs  T(F): 98.1 (04-22-24 @ 12:33), Max: 98.1 (04-22-24 @ 12:33)  HR: 75 (04-22-24 @ 12:33) (75 - 86)  BP: 110/53 (04-22-24 @ 12:33) (110/53 - 142/79)  RR: 18 (04-22-24 @ 12:33) (18 - 18)  SpO2: 95% (04-22-24 @ 12:33) (93% - 95%)  Telemetry:   CAPILLARY BLOOD GLUCOSE        I&O's Summary    21 Apr 2024 07:01  -  22 Apr 2024 07:00  --------------------------------------------------------  IN: 240 mL / OUT: 0 mL / NET: 240 mL        PHYSICAL EXAM:  GENERAL: NAD, well-developed  HEAD:  Atraumatic, Normocephalic  EYES: EOMI, PERRLA, conjunctiva and sclera clear  NECK: Supple, No JVD  CHEST/LUNG: Clear to auscultation bilaterally; No wheeze  HEART: Regular rate and rhythm; No murmurs, rubs, or gallops  ABDOMEN: Soft, Nontender, Nondistended; Bowel sounds present  EXTREMITIES:  2+ Peripheral Pulses, No clubbing, cyanosis, or edema  PSYCH: AAOx3  NEUROLOGY: non-focal  SKIN: No rashes or lesions    LABS:                        8.4    10.65 )-----------( 295      ( 22 Apr 2024 07:15 )             27.1                     RADIOLOGY & ADDITIONAL TESTS:    Imaging Personally Reviewed:    Consultant(s) Notes Reviewed:      Care Discussed with Consultants/Other Providers:  
Patient is a 84y old  Female who presents with a chief complaint of pelvic Fx (23 Apr 2024 08:22)      SUBJECTIVE / OVERNIGHT EVENTS: no new events, dc to [facility] today    MEDICATIONS  (STANDING):  apixaban 2.5 milliGRAM(s) Oral two times a day  atorvastatin 10 milliGRAM(s) Oral at bedtime  cyanocobalamin 1000 MICROGram(s) Oral daily  digoxin     Tablet 125 MICROGram(s) Oral daily  diltiazem    milliGRAM(s) Oral daily  donepezil 10 milliGRAM(s) Oral at bedtime  folic acid 1 milliGRAM(s) Oral daily  traMADol 25 milliGRAM(s) Oral three times a day    MEDICATIONS  (PRN):  acetaminophen     Tablet .. 650 milliGRAM(s) Oral every 6 hours PRN Temp greater or equal to 38C (100.4F), Mild Pain (1 - 3)      Vital Signs Last 24 Hrs  T(F): 98 (04-23-24 @ 18:00), Max: 98.7 (04-22-24 @ 20:38)  HR: 65 (04-23-24 @ 18:00) (65 - 96)  BP: 126/70 (04-23-24 @ 18:00) (98/67 - 132/67)  RR: 18 (04-23-24 @ 18:00) (18 - 18)  SpO2: 94% (04-23-24 @ 18:00) (94% - 96%)  Telemetry:   CAPILLARY BLOOD GLUCOSE        I&O's Summary      PHYSICAL EXAM:  GENERAL: NAD, well-developed  HEAD:  Atraumatic, Normocephalic  EYES: EOMI, PERRLA, conjunctiva and sclera clear  NECK: Supple, No JVD  CHEST/LUNG: Clear to auscultation bilaterally; No wheeze  HEART: Regular rate and rhythm; No murmurs, rubs, or gallops  ABDOMEN: Soft, Nontender, Nondistended; Bowel sounds present  EXTREMITIES:  2+ Peripheral Pulses, No clubbing, cyanosis, or edema  PSYCH: AAOx3  NEUROLOGY: non-focal  SKIN: No rashes or lesions    LABS:                        8.3    10.06 )-----------( 286      ( 23 Apr 2024 07:13 )             25.2     04-23    138  |  102  |  30<H>  ----------------------------<  125<H>  4.4   |  21<L>  |  0.85    Ca    8.9      23 Apr 2024 07:13            Urinalysis Basic - ( 23 Apr 2024 07:13 )    Color: x / Appearance: x / SG: x / pH: x  Gluc: 125 mg/dL / Ketone: x  / Bili: x / Urobili: x   Blood: x / Protein: x / Nitrite: x   Leuk Esterase: x / RBC: x / WBC x   Sq Epi: x / Non Sq Epi: x / Bacteria: x        RADIOLOGY & ADDITIONAL TESTS:    Imaging Personally Reviewed:    Consultant(s) Notes Reviewed:      Care Discussed with Consultants/Other Providers:  
Patient is a 84y old  Female who presents with a chief complaint of pelvic Fx (2024 23:00)      SUBJECTIVE / OVERNIGHT EVENTS: ptn is pain free, VIT B12 level is low, will give IV. PT eval    MEDICATIONS  (STANDING):  apixaban 2.5 milliGRAM(s) Oral two times a day  atorvastatin 10 milliGRAM(s) Oral at bedtime  cefTRIAXone   IVPB 1000 milliGRAM(s) IV Intermittent every 24 hours  cyanocobalamin 1000 MICROGram(s) Oral daily  cyanocobalamin Injectable 1000 MICROGram(s) SubCutaneous daily  digoxin     Tablet 125 MICROGram(s) Oral daily  diltiazem    milliGRAM(s) Oral daily  donepezil 10 milliGRAM(s) Oral at bedtime  folic acid 1 milliGRAM(s) Oral daily  traMADol 25 milliGRAM(s) Oral three times a day    MEDICATIONS  (PRN):  acetaminophen     Tablet .. 650 milliGRAM(s) Oral every 6 hours PRN Temp greater or equal to 38C (100.4F), Mild Pain (1 - 3)      Vital Signs Last 24 Hrs  T(F): 97.7 (24 @ 21:14), Max: 98.2 (24 @ 10:00)  HR: 96 (24 @ 21:14) (75 - 96)  BP: 129/60 (24 @ 21:14) (108/60 - 145/78)  RR: 18 (24 @ 21:14) (18 - 18)  SpO2: 93% (24 @ 21:14) (93% - 100%)  Telemetry:   CAPILLARY BLOOD GLUCOSE        I&O's Summary    2024 07:01  -  2024 07:00  --------------------------------------------------------  IN: 120 mL / OUT: 600 mL / NET: -480 mL        PHYSICAL EXAM:  GENERAL: NAD, well-developed  HEAD:  Atraumatic, Normocephalic  EYES: EOMI, PERRLA, conjunctiva and sclera clear  NECK: Supple, No JVD  CHEST/LUNG: Clear to auscultation bilaterally; No wheeze  HEART: Regular rate and rhythm; No murmurs, rubs, or gallops  ABDOMEN: Soft, Nontender, Nondistended; Bowel sounds present  EXTREMITIES:  2+ Peripheral Pulses, No clubbing, cyanosis, or edema  PSYCH: AAOx3  NEUROLOGY: non-focal  SKIN: No rashes or lesions    LABS:                        10.7   9.04  )-----------( 296      ( 2024 12:15 )             34.1         140  |  105  |  18  ----------------------------<  140<H>  4.6   |  23  |  0.69    Ca    9.1      2024 12:15    TPro  7.0  /  Alb  3.7  /  TBili  0.2  /  DBili  x   /  AST  13  /  ALT  8<L>  /  AlkPhos  55            Urinalysis Basic - ( 2024 12:56 )    Color: Yellow / Appearance: Cloudy / S.023 / pH: x  Gluc: x / Ketone: Negative mg/dL  / Bili: Negative / Urobili: 0.2 mg/dL   Blood: x / Protein: Trace mg/dL / Nitrite: Positive   Leuk Esterase: Trace / RBC: 66 /HPF / WBC 17 /HPF   Sq Epi: x / Non Sq Epi: 14 /HPF / Bacteria: Many /HPF        RADIOLOGY & ADDITIONAL TESTS:    Imaging Personally Reviewed:    Consultant(s) Notes Reviewed:      Care Discussed with Consultants/Other Providers:

## 2024-04-23 NOTE — DISCHARGE NOTE NURSING/CASE MANAGEMENT/SOCIAL WORK - NSDCVIVACCINE_GEN_ALL_CORE_FT
Tdap; 19-Sep-2021 00:19; Kan Kwong (RN); [employer]; [id number] (Exp. Date: 10-May-2023); IntraMuscular; Deltoid Right.; 0.5 milliLiter(s); VIS (VIS Published: 09-May-2013, VIS Presented: 19-Sep-2021);

## 2024-04-23 NOTE — DISCHARGE NOTE NURSING/CASE MANAGEMENT/SOCIAL WORK - PATIENT PORTAL LINK FT
You can access the FollowMyHealth Patient Portal offered by [facility] by registering at the following website: [email or web address]. By joining [email or web address]’s FollowMyHealth portal, you will also be able to view your health information using other applications (apps) compatible with our system.

## 2024-10-14 NOTE — H&P ADULT - PSYCHIATRIC
PAST SURGICAL HISTORY:  No significant past surgical history Affect and characteristics of appearance, verbalizations, behaviors are appropriate

## 2024-11-08 ENCOUNTER — EMERGENCY (EMERGENCY)
Facility: HOSPITAL | Age: 84
LOS: 1 days | Discharge: HOME | End: 2024-11-08
Attending: EMERGENCY MEDICINE
Payer: MEDICARE

## 2024-11-08 VITALS
TEMPERATURE: 98 F | SYSTOLIC BLOOD PRESSURE: 125 MMHG | RESPIRATION RATE: 18 BRPM | OXYGEN SATURATION: 96 % | HEART RATE: 111 BPM | DIASTOLIC BLOOD PRESSURE: 84 MMHG | WEIGHT: 110.01 LBS

## 2024-11-08 VITALS
HEART RATE: 91 BPM | TEMPERATURE: 98 F | SYSTOLIC BLOOD PRESSURE: 109 MMHG | RESPIRATION RATE: 18 BRPM | DIASTOLIC BLOOD PRESSURE: 60 MMHG | OXYGEN SATURATION: 96 %

## 2024-11-08 DIAGNOSIS — S22.080A WEDGE COMPRESSION FRACTURE OF T11-T12 VERTEBRA, INITIAL ENCOUNTER FOR CLOSED FRACTURE: Chronic | ICD-10-CM

## 2024-11-08 LAB
ALBUMIN SERPL ELPH-MCNC: 2.9 G/DL — LOW (ref 3.3–5)
ALP SERPL-CCNC: 63 U/L — SIGNIFICANT CHANGE UP (ref 40–120)
ALT FLD-CCNC: 12 U/L — SIGNIFICANT CHANGE UP (ref 10–45)
ANION GAP SERPL CALC-SCNC: 10 MMOL/L — SIGNIFICANT CHANGE UP (ref 5–17)
AST SERPL-CCNC: 10 U/L — SIGNIFICANT CHANGE UP (ref 10–40)
BASOPHILS # BLD AUTO: 0.02 K/UL — SIGNIFICANT CHANGE UP (ref 0–0.2)
BASOPHILS NFR BLD AUTO: 0.2 % — SIGNIFICANT CHANGE UP (ref 0–2)
BILIRUB SERPL-MCNC: 0.2 MG/DL — SIGNIFICANT CHANGE UP (ref 0.2–1.2)
BUN SERPL-MCNC: 29 MG/DL — HIGH (ref 7–23)
CALCIUM SERPL-MCNC: 8.8 MG/DL — SIGNIFICANT CHANGE UP (ref 8.4–10.5)
CHLORIDE SERPL-SCNC: 107 MMOL/L — SIGNIFICANT CHANGE UP (ref 96–108)
CO2 SERPL-SCNC: 25 MMOL/L — SIGNIFICANT CHANGE UP (ref 22–31)
CREAT SERPL-MCNC: 0.59 MG/DL — SIGNIFICANT CHANGE UP (ref 0.5–1.3)
EGFR: 88 ML/MIN/1.73M2 — SIGNIFICANT CHANGE UP
EOSINOPHIL # BLD AUTO: 0.33 K/UL — SIGNIFICANT CHANGE UP (ref 0–0.5)
EOSINOPHIL NFR BLD AUTO: 2.7 % — SIGNIFICANT CHANGE UP (ref 0–6)
GLUCOSE SERPL-MCNC: 135 MG/DL — HIGH (ref 70–99)
HCT VFR BLD CALC: 38.4 % — SIGNIFICANT CHANGE UP (ref 34.5–45)
HGB BLD-MCNC: 11.8 G/DL — SIGNIFICANT CHANGE UP (ref 11.5–15.5)
IMM GRANULOCYTES NFR BLD AUTO: 0.9 % — SIGNIFICANT CHANGE UP (ref 0–0.9)
LYMPHOCYTES # BLD AUTO: 1.45 K/UL — SIGNIFICANT CHANGE UP (ref 1–3.3)
LYMPHOCYTES # BLD AUTO: 11.9 % — LOW (ref 13–44)
MAGNESIUM SERPL-MCNC: 2.4 MG/DL — SIGNIFICANT CHANGE UP (ref 1.6–2.6)
MCHC RBC-ENTMCNC: 29 PG — SIGNIFICANT CHANGE UP (ref 27–34)
MCHC RBC-ENTMCNC: 30.7 G/DL — LOW (ref 32–36)
MCV RBC AUTO: 94.3 FL — SIGNIFICANT CHANGE UP (ref 80–100)
MONOCYTES # BLD AUTO: 0.79 K/UL — SIGNIFICANT CHANGE UP (ref 0–0.9)
MONOCYTES NFR BLD AUTO: 6.5 % — SIGNIFICANT CHANGE UP (ref 2–14)
NEUTROPHILS # BLD AUTO: 9.53 K/UL — HIGH (ref 1.8–7.4)
NEUTROPHILS NFR BLD AUTO: 77.8 % — HIGH (ref 43–77)
NRBC # BLD: 0 /100 WBCS — SIGNIFICANT CHANGE UP (ref 0–0)
PLATELET # BLD AUTO: 359 K/UL — SIGNIFICANT CHANGE UP (ref 150–400)
POTASSIUM SERPL-MCNC: 4.5 MMOL/L — SIGNIFICANT CHANGE UP (ref 3.5–5.3)
POTASSIUM SERPL-SCNC: 4.5 MMOL/L — SIGNIFICANT CHANGE UP (ref 3.5–5.3)
PROT SERPL-MCNC: 6.6 G/DL — SIGNIFICANT CHANGE UP (ref 6–8.3)
RBC # BLD: 4.07 M/UL — SIGNIFICANT CHANGE UP (ref 3.8–5.2)
RBC # FLD: 14.6 % — HIGH (ref 10.3–14.5)
SODIUM SERPL-SCNC: 142 MMOL/L — SIGNIFICANT CHANGE UP (ref 135–145)
TROPONIN T, HIGH SENSITIVITY RESULT: 19 NG/L — SIGNIFICANT CHANGE UP (ref 0–51)
WBC # BLD: 12.23 K/UL — HIGH (ref 3.8–10.5)
WBC # FLD AUTO: 12.23 K/UL — HIGH (ref 3.8–10.5)

## 2024-11-08 PROCEDURE — 71045 X-RAY EXAM CHEST 1 VIEW: CPT | Mod: 26

## 2024-11-08 PROCEDURE — 99284 EMERGENCY DEPT VISIT MOD MDM: CPT | Mod: GC

## 2024-11-08 RX ORDER — DOXYCYCLINE HYCLATE 100 MG/1
100 TABLET, FILM COATED ORAL ONCE
Refills: 0 | Status: COMPLETED | OUTPATIENT
Start: 2024-11-08 | End: 2024-11-08

## 2024-11-08 RX ADMIN — DOXYCYCLINE HYCLATE 100 MILLIGRAM(S): 100 TABLET, FILM COATED ORAL at 21:34

## 2024-11-08 NOTE — ED PROVIDER NOTE - PROGRESS NOTE DETAILS
Silvia PGY3 - Patient with WBC of 12, CXR showing small left pleural effusion with left basilar subsegmental atelectasis.  Concerning for pneumonia.  Patient given doxycycline. Dispo to nursing home.  Spoke with nursing supervisor at [facility] at [phone].  Nursing supervisor agreeable to plan of antibiotics at nursing care facility.

## 2024-11-08 NOTE — ED PROVIDER NOTE - PHYSICAL EXAMINATION
GENERAL: well appearing  HEAD: normocephalic, atraumatic  HEENT: normal conjunctiva, oral mucosa moist  CARDIAC: irregular rate and rhythm  PULM: speaking in full sentences, no increased work of breathing  GI: abdomen nondistended, soft, nontender  : no suprapubic tenderness  NEURO: moving all 4 extremities, answering questions appropriately  MSK: no peripheral edema  SKIN: extremities warm

## 2024-11-08 NOTE — ED PROVIDER NOTE - CLINICAL SUMMARY MEDICAL DECISION MAKING FREE TEXT BOX
84-year-old female with a history of dementia (AO x 1 to 2 at baseline), A-fib, recurrent UTIs, HTN, HLD, osteoporosis BIBEMS from nursing home with irregular heart rate. Likely underlying A-fib, possible ACS.  Lower likelihood infectious process as patient is afebrile.  Patient currently hemodynamically stable.  Labs, CXR ordered.

## 2024-11-08 NOTE — ED PROVIDER NOTE - NSFOLLOWUPINSTRUCTIONS_ED_ALL_ED_FT
Today in the emergency department you were evaluated for your irregular heart rate.  Your irregular heart rate is likely caused by your A-fib.  However we got a chest x-ray that showed concern for left-sided pneumonia.  This is likely triggering your A-fib.  Please take doxycycline 2 times per day for next 7 to 10 days.  Please continue to take all of your other medications as prescribed.    Please follow up with your primary care physician within 1-2 weeks of discharge from the emergency department.  Please bring a copy of your results with you.  Please return to the emergency department for worsening of your symptoms.    You may take Acetaminophen over the counter as needed for pain and/or fever. Use as directed and see medication warnings.

## 2024-11-08 NOTE — ED PROVIDER NOTE - PATIENT PORTAL LINK FT
You can access the FollowMyHealth Patient Portal offered by [facility] by registering at the following website: [email or web address]. By joining [employer]’s FollowMyHealth portal, you will also be able to view your health information using other applications (apps) compatible with our system.

## 2024-11-08 NOTE — ED PROVIDER NOTE - ATTENDING CONTRIBUTION TO CARE
I performed a history and physical exam of the patient and discussed their management with the resident. I reviewed the resident's note and agree with the documented findings and plan of care.  Keyla Guthrie MD  see MDM

## 2024-11-08 NOTE — ED ADULT NURSE NOTE - OBJECTIVE STATEMENT
PT is an 84 y.o female co 'abnormal HR'. PT has a hx of afib, HTN, and dementia. PT is A&Ox2 at baseline. As per EMS, pt was found to have "elevated HR" at nursing home and was sent in for further cardiac workup. Spontaneously breathing on RA>95%, CM afib, peripheral pulses present, B/L upper and lower extremities contracted. PT denies any CP, SOB, fever, N/V/D, chills, dysuria, or chills. Safety and comfort measures initiated- bed placed in lowest position and side rails raised. Pt oriented to call bell system.

## 2024-11-08 NOTE — ED PROVIDER NOTE - NSICDXPASTSURGICALHX_GEN_ALL_CORE_FT
Taltz Counseling: I discussed with the patient the risks of ixekizumab including but not limited to immunosuppression, serious infections, worsening of inflammatory bowel disease and drug reactions.  The patient understands that monitoring is required including a PPD at baseline and must alert us or the primary physician if symptoms of infection or other concerning signs are noted. Dupixent Pregnancy And Lactation Text: This medication likely crosses the placenta but the risk for the fetus is uncertain. This medication is excreted in breast milk. Bexarotene Counseling:  I discussed with the patient the risks of bexarotene including but not limited to hair loss, dry lips/skin/eyes, liver abnormalities, hyperlipidemia, pancreatitis, depression/suicidal ideation, photosensitivity, drug rash/allergic reactions, hypothyroidism, anemia, leukopenia, infection, cataracts, and teratogenicity.  Patient understands that they will need regular blood tests to check lipid profile, liver function tests, white blood cell count, thyroid function tests and pregnancy test if applicable. Azathioprine Pregnancy And Lactation Text: This medication is Pregnancy Category D and isn't considered safe during pregnancy. It is unknown if this medication is excreted in breast milk. Albendazole Counseling:  I discussed with the patient the risks of albendazole including but not limited to cytopenia, kidney damage, nausea/vomiting and severe allergy.  The patient understands that this medication is being used in an off-label manner. Tetracycline Pregnancy And Lactation Text: This medication is Pregnancy Category D and not consider safe during pregnancy. It is also excreted in breast milk. Itraconazole Pregnancy And Lactation Text: This medication is Pregnancy Category C and it isn't know if it is safe during pregnancy. It is also excreted in breast milk. Otezla Counseling: The side effects of Otezla were discussed with the patient, including but not limited to worsening or new depression, weight loss, diarrhea, nausea, upper respiratory tract infection, and headache. Patient instructed to call the office should any adverse effect occur.  The patient verbalized understanding of the proper use and possible adverse effects of Otezla.  All the patient's questions and concerns were addressed. PAST SURGICAL HISTORY:  Compression fracture of T12 vertebra      Solaraze Counseling:  I discussed with the patient the risks of Solaraze including but not limited to erythema, scaling, itching, weeping, crusting, and pain. Bexarotene Pregnancy And Lactation Text: This medication is Pregnancy Category X and should not be given to women who are pregnant or may become pregnant. This medication should not be used if you are breast feeding. Dupixent Counseling: I discussed with the patient the risks of dupilumab including but not limited to eye infection and irritation, cold sores, injection site reactions, worsening of asthma, allergic reactions and increased risk of parasitic infection.  Live vaccines should be avoided while taking dupilumab. Dupilumab will also interact with certain medications such as warfarin and cyclosporine. The patient understands that monitoring is required and they must alert us or the primary physician if symptoms of infection or other concerning signs are noted. Erivedge Pregnancy And Lactation Text: This medication is Pregnancy Category X and is absolutely contraindicated during pregnancy. It is unknown if it is excreted in breast milk. Xolair Pregnancy And Lactation Text: This medication is Pregnancy Category B and is considered safe during pregnancy. This medication is excreted in breast milk. Cosentyx Pregnancy And Lactation Text: This medication is Pregnancy Category B and is considered safe during pregnancy. It is unknown if this medication is excreted in breast milk. Minoxidil Pregnancy And Lactation Text: This medication has not been assigned a Pregnancy Risk Category but animal studies failed to show danger with the topical medication. It is unknown if the medication is excreted in breast milk. Tremfya Counseling: I discussed with the patient the risks of guselkumab including but not limited to immunosuppression, serious infections, worsening of inflammatory bowel disease and drug reactions.  The patient understands that monitoring is required including a PPD at baseline and must alert us or the primary physician if symptoms of infection or other concerning signs are noted. Methotrexate Pregnancy And Lactation Text: This medication is Pregnancy Category X and is known to cause fetal harm. This medication is excreted in breast milk. Hydroxychloroquine Counseling:  I discussed with the patient that a baseline ophthalmologic exam is needed at the start of therapy and every year thereafter while on therapy. A CBC may also be warranted for monitoring.  The side effects of this medication were discussed with the patient, including but not limited to agranulocytosis, aplastic anemia, seizures, rashes, retinopathy, and liver toxicity. Patient instructed to call the office should any adverse effect occur.  The patient verbalized understanding of the proper use and possible adverse effects of Plaquenil.  All the patient's questions and concerns were addressed. Prednisone Pregnancy And Lactation Text: This medication is Pregnancy Category C and it isn't know if it is safe during pregnancy. This medication is excreted in breast milk. Rifampin Pregnancy And Lactation Text: This medication is Pregnancy Category C and it isn't know if it is safe during pregnancy. It is also excreted in breast milk and should not be used if you are breast feeding. Gabapentin Pregnancy And Lactation Text: This medication is Pregnancy Category C and isn't considered safe during pregnancy. It is excreted in breast milk. Protopic Pregnancy And Lactation Text: This medication is Pregnancy Category C. It is unknown if this medication is excreted in breast milk when applied topically. Cimzia Counseling:  I discussed with the patient the risks of Cimzia including but not limited to immunosuppression, allergic reactions and infections.  The patient understands that monitoring is required including a PPD at baseline and must alert us or the primary physician if symptoms of infection or other concerning signs are noted. Include Pregnancy/Lactation Warning?: No Simponi Pregnancy And Lactation Text: The risk during pregnancy and breastfeeding is uncertain with this medication. Valtrex Counseling: I discussed with the patient the risks of valacyclovir including but not limited to kidney damage, nausea, vomiting and severe allergy.  The patient understands that if the infection seems to be worsening or is not improving, they are to call. Glycopyrrolate Pregnancy And Lactation Text: This medication is Pregnancy Category B and is considered safe during pregnancy. It is unknown if it is excreted breast milk. Erythromycin Pregnancy And Lactation Text: This medication is Pregnancy Category B and is considered safe during pregnancy. It is also excreted in breast milk. Imiquimod Counseling:  I discussed with the patient the risks of imiquimod including but not limited to erythema, scaling, itching, weeping, crusting, and pain.  Patient understands that the inflammatory response to imiquimod is variable from person to person and was educated regarded proper titration schedule.  If flu-like symptoms develop, patient knows to discontinue the medication and contact us. Zyclara Pregnancy And Lactation Text: This medication is Pregnancy Category C. It is unknown if this medication is excreted in breast milk. Erythromycin Counseling:  I discussed with the patient the risks of erythromycin including but not limited to GI upset, allergic reaction, drug rash, diarrhea, increase in liver enzymes, and yeast infections. Metronidazole Pregnancy And Lactation Text: This medication is Pregnancy Category B and considered safe during pregnancy.  It is also excreted in breast milk. Cyclophosphamide Counseling:  I discussed with the patient the risks of cyclophosphamide including but not limited to hair loss, hormonal abnormalities, decreased fertility, abdominal pain, diarrhea, nausea and vomiting, bone marrow suppression and infection. The patient understands that monitoring is required while taking this medication. Terbinafine Counseling: Patient counseling regarding adverse effects of terbinafine including but not limited to headache, diarrhea, rash, upset stomach, liver function test abnormalities, itching, taste/smell disturbance, nausea, abdominal pain, and flatulence.  There is a rare possibility of liver failure that can occur when taking terbinafine.  The patient understands that a baseline LFT and kidney function test may be required. The patient verbalized understanding of the proper use and possible adverse effects of terbinafine.  All of the patient's questions and concerns were addressed. Topical Clindamycin Counseling: Patient counseled that this medication may cause skin irritation or allergic reactions.  In the event of skin irritation, the patient was advised to reduce the amount of the drug applied or use it less frequently.   The patient verbalized understanding of the proper use and possible adverse effects of clindamycin.  All of the patient's questions and concerns were addressed. Otezla Pregnancy And Lactation Text: This medication is Pregnancy Category C and it isn't known if it is safe during pregnancy. It is unknown if it is excreted in breast milk. Cephalexin Counseling: I counseled the patient regarding use of cephalexin as an antibiotic for prophylactic and/or therapeutic purposes. Cephalexin (commonly prescribed under brand name Keflex) is a cephalosporin antibiotic which is active against numerous classes of bacteria, including most skin bacteria. Side effects may include nausea, diarrhea, gastrointestinal upset, rash, hives, yeast infections, and in rare cases, hepatitis, kidney disease, seizures, fever, confusion, neurologic symptoms, and others. Patients with severe allergies to penicillin medications are cautioned that there is about a 10% incidence of cross-reactivity with cephalosporins. When possible, patients with penicillin allergies should use alternatives to cephalosporins for antibiotic therapy. Topical Sulfur Applications Pregnancy And Lactation Text: This medication is Pregnancy Category C and has an unknown safety profile during pregnancy. It is unknown if this topical medication is excreted in breast milk. Ketoconazole Counseling:   Patient counseled regarding improving absorption with orange juice.  Adverse effects include but are not limited to breast enlargement, headache, diarrhea, nausea, upset stomach, liver function test abnormalities, taste disturbance, and stomach pain.  There is a rare possibility of liver failure that can occur when taking ketoconazole. The patient understands that monitoring of LFTs may be required, especially at baseline. The patient verbalized understanding of the proper use and possible adverse effects of ketoconazole.  All of the patient's questions and concerns were addressed. High Dose Vitamin A Pregnancy And Lactation Text: High dose vitamin A therapy is contraindicated during pregnancy and breast feeding. Gabapentin Counseling: I discussed with the patient the risks of gabapentin including but not limited to dizziness, somnolence, fatigue and ataxia. Oxybutynin Counseling:  I discussed with the patient the risks of oxybutynin including but not limited to skin rash, drowsiness, dry mouth, difficulty urinating, and blurred vision. Rituxan Counseling:  I discussed with the patient the risks of Rituxan infusions. Side effects can include infusion reactions, severe drug rashes including mucocutaneous reactions, reactivation of latent hepatitis and other infections and rarely progressive multifocal leukoencephalopathy.  All of the patient's questions and concerns were addressed. Ketoconazole Pregnancy And Lactation Text: This medication is Pregnancy Category C and it isn't know if it is safe during pregnancy. It is also excreted in breast milk and breast feeding isn't recommended. Dapsone Pregnancy And Lactation Text: This medication is Pregnancy Category C and is not considered safe during pregnancy or breast feeding. Benzoyl Peroxide Counseling: Patient counseled that medicine may cause skin irritation and bleach clothing.  In the event of skin irritation, the patient was advised to reduce the amount of the drug applied or use it less frequently.   The patient verbalized understanding of the proper use and possible adverse effects of benzoyl peroxide.  All of the patient's questions and concerns were addressed. Carac Pregnancy And Lactation Text: This medication is Pregnancy Category X and contraindicated in pregnancy and in women who may become pregnant. It is unknown if this medication is excreted in breast milk. Hydroquinone Counseling:  Patient advised that medication may result in skin irritation, lightening (hypopigmentation), dryness, and burning.  In the event of skin irritation, the patient was advised to reduce the amount of the drug applied or use it less frequently.  Rarely, spots that are treated with hydroquinone can become darker (pseudoochronosis).  Should this occur, patient instructed to stop medication and call the office. The patient verbalized understanding of the proper use and possible adverse effects of hydroquinone.  All of the patient's questions and concerns were addressed. Enbrel Counseling:  I discussed with the patient the risks of etanercept including but not limited to myelosuppression, immunosuppression, autoimmune hepatitis, demyelinating diseases, lymphoma, and infections.  The patient understands that monitoring is required including a PPD at baseline and must alert us or the primary physician if symptoms of infection or other concerning signs are noted. Albendazole Pregnancy And Lactation Text: This medication is Pregnancy Category C and it isn't known if it is safe during pregnancy. It is also excreted in breast milk. Carac Counseling:  I discussed with the patient the risks of Carac including but not limited to erythema, scaling, itching, weeping, crusting, and pain. Isotretinoin Counseling: Patient should get monthly blood tests, not donate blood, not drive at night if vision affected, not share medication, and not undergo elective surgery for 6 months after tx completed. Side effects reviewed, pt to contact office should one occur. Quinolones Counseling:  I discussed with the patient the risks of fluoroquinolones including but not limited to GI upset, allergic reaction, drug rash, diarrhea, dizziness, photosensitivity, yeast infections, liver function test abnormalities, tendonitis/tendon rupture. Rituxan Pregnancy And Lactation Text: This medication is Pregnancy Category C and it isn't know if it is safe during pregnancy. It is unknown if this medication is excreted in breast milk but similar antibodies are known to be excreted. Isotretinoin Pregnancy And Lactation Text: This medication is Pregnancy Category X and is considered extremely dangerous during pregnancy. It is unknown if it is excreted in breast milk. Picato Counseling:  I discussed with the patient the risks of Picato including but not limited to erythema, scaling, itching, weeping, crusting, and pain. Minocycline Counseling: Patient advised regarding possible photosensitivity and discoloration of the teeth, skin, lips, tongue and gums.  Patient instructed to avoid sunlight, if possible.  When exposed to sunlight, patients should wear protective clothing, sunglasses, and sunscreen.  The patient was instructed to call the office immediately if the following severe adverse effects occur:  hearing changes, easy bruising/bleeding, severe headache, or vision changes.  The patient verbalized understanding of the proper use and possible adverse effects of minocycline.  All of the patient's questions and concerns were addressed. Solaraze Pregnancy And Lactation Text: This medication is Pregnancy Category B and is considered safe. There is some data to suggest avoiding during the third trimester. It is unknown if this medication is excreted in breast milk. Terbinafine Pregnancy And Lactation Text: This medication is Pregnancy Category B and is considered safe during pregnancy. It is also excreted in breast milk and breast feeding isn't recommended. Clindamycin Counseling: I counseled the patient regarding use of clindamycin as an antibiotic for prophylactic and/or therapeutic purposes. Clindamycin is active against numerous classes of bacteria, including skin bacteria. Side effects may include nausea, diarrhea, gastrointestinal upset, rash, hives, yeast infections, and in rare cases, colitis. Azithromycin Counseling:  I discussed with the patient the risks of azithromycin including but not limited to GI upset, allergic reaction, drug rash, diarrhea, and yeast infections. Nsaids Pregnancy And Lactation Text: These medications are considered safe up to 30 weeks gestation. It is excreted in breast milk. Topical Sulfur Applications Counseling: Topical Sulfur Counseling: Patient counseled that this medication may cause skin irritation or allergic reactions.  In the event of skin irritation, the patient was advised to reduce the amount of the drug applied or use it less frequently.   The patient verbalized understanding of the proper use and possible adverse effects of topical sulfur application.  All of the patient's questions and concerns were addressed. Rifampin Counseling: I discussed with the patient the risks of rifampin including but not limited to liver damage, kidney damage, red-orange body fluids, nausea/vomiting and severe allergy. Birth Control Pills Counseling: Birth Control Pill Counseling: I discussed with the patient the potential side effects of OCPs including but not limited to increased risk of stroke, heart attack, thrombophlebitis, deep venous thrombosis, hepatic adenomas, breast changes, GI upset, headaches, and depression.  The patient verbalized understanding of the proper use and possible adverse effects of OCPs. All of the patient's questions and concerns were addressed. Elidel Counseling: Patient may experience a mild burning sensation during topical application. Elidel is not approved in children less than 2 years of age. There have been case reports of hematologic and skin malignancies in patients using topical calcineurin inhibitors although causality is questionable. Topical Clindamycin Pregnancy And Lactation Text: This medication is Pregnancy Category B and is considered safe during pregnancy. It is unknown if it is excreted in breast milk. Fluconazole Counseling:  Patient counseled regarding adverse effects of fluconazole including but not limited to headache, diarrhea, nausea, upset stomach, liver function test abnormalities, taste disturbance, and stomach pain.  There is a rare possibility of liver failure that can occur when taking fluconazole.  The patient understands that monitoring of LFTs and kidney function test may be required, especially at baseline. The patient verbalized understanding of the proper use and possible adverse effects of fluconazole.  All of the patient's questions and concerns were addressed. Doxepin Pregnancy And Lactation Text: This medication is Pregnancy Category C and it isn't known if it is safe during pregnancy. It is also excreted in breast milk and breast feeding isn't recommended. Prednisone Counseling:  I discussed with the patient the risks of prolonged use of prednisone including but not limited to weight gain, insomnia, osteoporosis, mood changes, diabetes, susceptibility to infection, glaucoma and high blood pressure.  In cases where prednisone use is prolonged, patients should be monitored with blood pressure checks, serum glucose levels and an eye exam.  Additionally, the patient may need to be placed on GI prophylaxis, PCP prophylaxis, and calcium and vitamin D supplementation and/or a bisphosphonate.  The patient verbalized understanding of the proper use and the possible adverse effects of prednisone.  All of the patient's questions and concerns were addressed. Minoxidil Counseling: Minoxidil is a topical medication which can increase blood flow where it is applied. It is uncertain how this medication increases hair growth. Side effects are uncommon and include stinging and allergic reactions. Dapsone Counseling: I discussed with the patient the risks of dapsone including but not limited to hemolytic anemia, agranulocytosis, rashes, methemoglobinemia, kidney failure, peripheral neuropathy, headaches, GI upset, and liver toxicity.  Patients who start dapsone require monitoring including baseline LFTs and weekly CBCs for the first month, then every month thereafter.  The patient verbalized understanding of the proper use and possible adverse effects of dapsone.  All of the patient's questions and concerns were addressed. Cosentyx Counseling:  I discussed with the patient the risks of Cosentyx including but not limited to worsening of Crohn's disease, immunosuppression, allergic reactions and infections.  The patient understands that monitoring is required including a PPD at baseline and must alert us or the primary physician if symptoms of infection or other concerning signs are noted. Bactrim Pregnancy And Lactation Text: This medication is Pregnancy Category D and is known to cause fetal risk.  It is also excreted in breast milk. Odomzo Counseling- I discussed with the patient the risks of Odomzo including but not limited to nausea, vomiting, diarrhea, constipation, weight loss, changes in the sense of taste, decreased appetite, muscle spasms, and hair loss.  The patient verbalized understanding of the proper use and possible adverse effects of Odomzo.  All of the patient's questions and concerns were addressed. Drysol Counseling:  I discussed with the patient the risks of drysol/aluminum chloride including but not limited to skin rash, itching, irritation, burning. Cimzia Pregnancy And Lactation Text: This medication crosses the placenta but can be considered safe in certain situations. Cimzia may be excreted in breast milk. Glycopyrrolate Counseling:  I discussed with the patient the risks of glycopyrrolate including but not limited to skin rash, drowsiness, dry mouth, difficulty urinating, and blurred vision. Xelzakiaz Pregnancy And Lactation Text: This medication is Pregnancy Category D and is not considered safe during pregnancy.  The risk during breast feeding is also uncertain. Doxepin Counseling:  Patient advised that the medication is sedating and not to drive a car after taking this medication. Patient informed of potential adverse effects including but not limited to dry mouth, urinary retention, and blurry vision.  The patient verbalized understanding of the proper use and possible adverse effects of doxepin.  All of the patient's questions and concerns were addressed. Metronidazole Counseling:  I discussed with the patient the risks of metronidazole including but not limited to seizures, nausea/vomiting, a metallic taste in the mouth, nausea/vomiting and severe allergy. Tazorac Pregnancy And Lactation Text: This medication is not safe during pregnancy. It is unknown if this medication is excreted in breast milk. Cimetidine Counseling:  I discussed with the patient the risks of Cimetidine including but not limited to gynecomastia, headache, diarrhea, nausea, drowsiness, arrhythmias, pancreatitis, skin rashes, psychosis, bone marrow suppression and kidney toxicity. Griseofulvin Counseling:  I discussed with the patient the risks of griseofulvin including but not limited to photosensitivity, cytopenia, liver damage, nausea/vomiting and severe allergy.  The patient understands that this medication is best absorbed when taken with a fatty meal (e.g., ice cream or french fries). High Dose Vitamin A Counseling: Side effects reviewed, pt to contact office should one occur. Doxycycline Counseling:  Patient counseled regarding possible photosensitivity and increased risk for sunburn.  Patient instructed to avoid sunlight, if possible.  When exposed to sunlight, patients should wear protective clothing, sunglasses, and sunscreen.  The patient was instructed to call the office immediately if the following severe adverse effects occur:  hearing changes, easy bruising/bleeding, severe headache, or vision changes.  The patient verbalized understanding of the proper use and possible adverse effects of doxycycline.  All of the patient's questions and concerns were addressed. Cyclosporine Counseling:  I discussed with the patient the risks of cyclosporine including but not limited to hypertension, gingival hyperplasia,myelosuppression, immunosuppression, liver damage, kidney damage, neurotoxicity, lymphoma, and serious infections. The patient understands that monitoring is required including baseline blood pressure, CBC, CMP, lipid panel and uric acid, and then 1-2 times monthly CMP and blood pressure. Griseofulvin Pregnancy And Lactation Text: This medication is Pregnancy Category X and is known to cause serious birth defects. It is unknown if this medication is excreted in breast milk but breast feeding should be avoided. Ivermectin Counseling:  Patient instructed to take medication on an empty stomach with a full glass of water.  Patient informed of potential adverse effects including but not limited to nausea, diarrhea, dizziness, itching, and swelling of the extremities or lymph nodes.  The patient verbalized understanding of the proper use and possible adverse effects of ivermectin.  All of the patient's questions and concerns were addressed. Siliq Counseling:  I discussed with the patient the risks of Siliq including but not limited to new or worsening depression, suicidal thoughts and behavior, immunosuppression, malignancy, posterior leukoencephalopathy syndrome, and serious infections.  The patient understands that monitoring is required including a PPD at baseline and must alert us or the primary physician if symptoms of infection or other concerning signs are noted. There is also a special program designed to monitor depression which is required with Siliq. Nsaids Counseling: NSAID Counseling: I discussed with the patient that NSAIDs should be taken with food. Prolonged use of NSAIDs can result in the development of stomach ulcers.  Patient advised to stop taking NSAIDs if abdominal pain occurs.  The patient verbalized understanding of the proper use and possible adverse effects of NSAIDs.  All of the patient's questions and concerns were addressed. Cyclophosphamide Pregnancy And Lactation Text: This medication is Pregnancy Category D and it isn't considered safe during pregnancy. This medication is excreted in breast milk. Topical Retinoid counseling:  Patient advised to apply a pea-sized amount only at bedtime and wait 30 minutes after washing their face before applying.  If too drying, patient may add a non-comedogenic moisturizer. The patient verbalized understanding of the proper use and possible adverse effects of retinoids.  All of the patient's questions and concerns were addressed. Acitretin Counseling:  I discussed with the patient the risks of acitretin including but not limited to hair loss, dry lips/skin/eyes, liver damage, hyperlipidemia, depression/suicidal ideation, photosensitivity.  Serious rare side effects can include but are not limited to pancreatitis, pseudotumor cerebri, bony changes, clot formation/stroke/heart attack.  Patient understands that alcohol is contraindicated since it can result in liver toxicity and significantly prolong the elimination of the drug by many years. Simponi Counseling:  I discussed with the patient the risks of golimumab including but not limited to myelosuppression, immunosuppression, autoimmune hepatitis, demyelinating diseases, lymphoma, and serious infections.  The patient understands that monitoring is required including a PPD at baseline and must alert us or the primary physician if symptoms of infection or other concerning signs are noted. Stelara Counseling:  I discussed with the patient the risks of ustekinumab including but not limited to immunosuppression, malignancy, posterior leukoencephalopathy syndrome, and serious infections.  The patient understands that monitoring is required including a PPD at baseline and must alert us or the primary physician if symptoms of infection or other concerning signs are noted. 5-Fu Counseling: 5-Fluorouracil Counseling:  I discussed with the patient the risks of 5-fluorouracil including but not limited to erythema, scaling, itching, weeping, crusting, and pain. Methotrexate Counseling:  Patient counseled regarding adverse effects of methotrexate including but not limited to nausea, vomiting, abnormalities in liver function tests. Patients may develop mouth sores, rash, diarrhea, and abnormalities in blood counts. The patient understands that monitoring is required including LFT's and blood counts.  There is a rare possibility of scarring of the liver and lung problems that can occur when taking methotrexate. Persistent nausea, loss of appetite, pale stools, dark urine, cough, and shortness of breath should be reported immediately. Patient advised to discontinue methotrexate treatment at least three months before attempting to become pregnant.  I discussed the need for folate supplements while taking methotrexate.  These supplements can decrease side effects during methotrexate treatment. The patient verbalized understanding of the proper use and possible adverse effects of methotrexate.  All of the patient's questions and concerns were addressed. Clofazimine Counseling:  I discussed with the patient the risks of clofazimine including but not limited to skin and eye pigmentation, liver damage, nausea/vomiting, gastrointestinal bleeding and allergy. Itraconazole Counseling:  I discussed with the patient the risks of itraconazole including but not limited to liver damage, nausea/vomiting, neuropathy, and severe allergy.  The patient understands that this medication is best absorbed when taken with acidic beverages such as non-diet cola or ginger ale.  The patient understands that monitoring is required including baseline LFTs and repeat LFTs at intervals.  The patient understands that they are to contact us or the primary physician if concerning signs are noted. Valtrex Pregnancy And Lactation Text: this medication is Pregnancy Category B and is considered safe during pregnancy. This medication is not directly found in breast milk but it's metabolite acyclovir is present. Bactrim Counseling:  I discussed with the patient the risks of sulfa antibiotics including but not limited to GI upset, allergic reaction, drug rash, diarrhea, dizziness, photosensitivity, and yeast infections.  Rarely, more serious reactions can occur including but not limited to aplastic anemia, agranulocytosis, methemoglobinemia, blood dyscrasias, liver or kidney failure, lung infiltrates or desquamative/blistering drug rashes. Cephalexin Pregnancy And Lactation Text: This medication is Pregnancy Category B and considered safe during pregnancy.  It is also excreted in breast milk but can be used safely for shorter doses. Birth Control Pills Pregnancy And Lactation Text: This medication should be avoided if pregnant and for the first 30 days post-partum. Xeljanz Counseling: I discussed with the patient the risks of Xeljanz therapy including increased risk of infection, liver issues, headache, diarrhea, or cold symptoms. Live vaccines should be avoided. They were instructed to call if they have any problems. Humira Counseling:  I discussed with the patient the risks of adalimumab including but not limited to myelosuppression, immunosuppression, autoimmune hepatitis, demyelinating diseases, lymphoma, and serious infections.  The patient understands that monitoring is required including a PPD at baseline and must alert us or the primary physician if symptoms of infection or other concerning signs are noted. Hydroxychloroquine Pregnancy And Lactation Text: This medication has been shown to cause fetal harm but it isn't assigned a Pregnancy Risk Category. There are small amounts excreted in breast milk. Hydroxyzine Counseling: Patient advised that the medication is sedating and not to drive a car after taking this medication.  Patient informed of potential adverse effects including but not limited to dry mouth, urinary retention, and blurry vision.  The patient verbalized understanding of the proper use and possible adverse effects of hydroxyzine.  All of the patient's questions and concerns were addressed. Zyclara Counseling:  I discussed with the patient the risks of imiquimod including but not limited to erythema, scaling, itching, weeping, crusting, and pain.  Patient understands that the inflammatory response to imiquimod is variable from person to person and was educated regarded proper titration schedule.  If flu-like symptoms develop, patient knows to discontinue the medication and contact us. Doxycycline Pregnancy And Lactation Text: This medication is Pregnancy Category D and not consider safe during pregnancy. It is also excreted in breast milk but is considered safe for shorter treatment courses. Protopic Counseling: Patient may experience a mild burning sensation during topical application. Protopic is not approved in children less than 2 years of age. There have been case reports of hematologic and skin malignancies in patients using topical calcineurin inhibitors although causality is questionable. Erivedge Counseling- I discussed with the patient the risks of Erivedge including but not limited to nausea, vomiting, diarrhea, constipation, weight loss, changes in the sense of taste, decreased appetite, muscle spasms, and hair loss.  The patient verbalized understanding of the proper use and possible adverse effects of Erivedge.  All of the patient's questions and concerns were addressed. Acitretin Pregnancy And Lactation Text: This medication is Pregnancy Category X and should not be given to women who are pregnant or may become pregnant in the future. This medication is excreted in breast milk. Azathioprine Counseling:  I discussed with the patient the risks of azathioprine including but not limited to myelosuppression, immunosuppression, hepatotoxicity, lymphoma, and infections.  The patient understands that monitoring is required including baseline LFTs, Creatinine, possible TPMP genotyping and weekly CBCs for the first month and then every 2 weeks thereafter.  The patient verbalized understanding of the proper use and possible adverse effects of azathioprine.  All of the patient's questions and concerns were addressed. Hydroxyzine Pregnancy And Lactation Text: This medication is not safe during pregnancy and should not be taken. It is also excreted in breast milk and breast feeding isn't recommended. Xolair Counseling:  Patient informed of potential adverse effects including but not limited to fever, muscle aches, rash and allergic reactions.  The patient verbalized understanding of the proper use and possible adverse effects of Xolair.  All of the patient's questions and concerns were addressed. Arava Counseling:  Patient counseled regarding adverse effects of Arava including but not limited to nausea, vomiting, abnormalities in liver function tests. Patients may develop mouth sores, rash, diarrhea, and abnormalities in blood counts. The patient understands that monitoring is required including LFTs and blood counts.  There is a rare possibility of scarring of the liver and lung problems that can occur when taking methotrexate. Persistent nausea, loss of appetite, pale stools, dark urine, cough, and shortness of breath should be reported immediately. Patient advised to discontinue Arava treatment and consult with a physician prior to attempting conception. The patient will have to undergo a treatment to eliminate Arava from the body prior to conception. Benzoyl Peroxide Pregnancy And Lactation Text: This medication is Pregnancy Category C. It is unknown if benzoyl peroxide is excreted in breast milk. Spironolactone Pregnancy And Lactation Text: This medication can cause feminization of the male fetus and should be avoided during pregnancy. The active metabolite is also found in breast milk. Detail Level: Detailed Drysol Pregnancy And Lactation Text: This medication is considered safe during pregnancy and breast feeding. Thalidomide Counseling: I discussed with the patient the risks of thalidomide including but not limited to birth defects, anxiety, weakness, chest pain, dizziness, cough and severe allergy. Spironolactone Counseling: Patient advised regarding risks of diarrhea, abdominal pain, hyperkalemia, birth defects (for female patients), liver toxicity and renal toxicity. The patient may need blood work to monitor liver and kidney function and potassium levels while on therapy. The patient verbalized understanding of the proper use and possible adverse effects of spironolactone.  All of the patient's questions and concerns were addressed. Tazorac Counseling:  Patient advised that medication is irritating and drying.  Patient may need to apply sparingly and wash off after an hour before eventually leaving it on overnight.  The patient verbalized understanding of the proper use and possible adverse effects of tazorac.  All of the patient's questions and concerns were addressed. Azithromycin Pregnancy And Lactation Text: This medication is considered safe during pregnancy and is also secreted in breast milk. Cellcept Counseling:  I discussed with the patient the risks of mycophenolate mofetil including but not limited to infection/immunosuppression, GI upset, hypokalemia, hypercholesterolemia, bone marrow suppression, lymphoproliferative disorders, malignancy, GI ulceration/bleed/perforation, colitis, interstitial lung disease, kidney failure, progressive multifocal leukoencephalopathy, and birth defects.  The patient understands that monitoring is required including a baseline creatinine and regular CBC testing. In addition, patient must alert us immediately if symptoms of infection or other concerning signs are noted. Infliximab Counseling:  I discussed with the patient the risks of infliximab including but not limited to myelosuppression, immunosuppression, autoimmune hepatitis, demyelinating diseases, lymphoma, and serious infections.  The patient understands that monitoring is required including a PPD at baseline and must alert us or the primary physician if symptoms of infection or other concerning signs are noted. SSKI Counseling:  I discussed with the patient the risks of SSKI including but not limited to thyroid abnormalities, metallic taste, GI upset, fever, headache, acne, arthralgias, paraesthesias, lymphadenopathy, easy bleeding, arrhythmias, and allergic reaction. Eucrisa Counseling: Patient may experience a mild burning sensation during topical application. Eucrisa is not approved in children less than 2 years of age. Sski Pregnancy And Lactation Text: This medication is Pregnancy Category D and isn't considered safe during pregnancy. It is excreted in breast milk. Clindamycin Pregnancy And Lactation Text: This medication can be used in pregnancy if certain situations. Clindamycin is also present in breast milk. Colchicine Counseling:  Patient counseled regarding adverse effects including but not limited to stomach upset (nausea, vomiting, stomach pain, or diarrhea).  Patient instructed to limit alcohol consumption while taking this medication.  Colchicine may reduce blood counts especially with prolonged use.  The patient understands that monitoring of kidney function and blood counts may be required, especially at baseline. The patient verbalized understanding of the proper use and possible adverse effects of colchicine.  All of the patient's questions and concerns were addressed. Tetracycline Counseling: Patient counseled regarding possible photosensitivity and increased risk for sunburn.  Patient instructed to avoid sunlight, if possible.  When exposed to sunlight, patients should wear protective clothing, sunglasses, and sunscreen.  The patient was instructed to call the office immediately if the following severe adverse effects occur:  hearing changes, easy bruising/bleeding, severe headache, or vision changes.  The patient verbalized understanding of the proper use and possible adverse effects of tetracycline.  All of the patient's questions and concerns were addressed. Patient understands to avoid pregnancy while on therapy due to potential birth defects.

## 2024-11-08 NOTE — ED PROVIDER NOTE - OBJECTIVE STATEMENT
84-year-old female with a history of dementia (AO x 1 to 2 at baseline), A-fib, recurrent UTIs, HTN, HLD, osteoporosis BIBEMS from nursing home with irregular heart rate.  Per EMS patient had a heart rate ranging from high 90s to low 120s and was sent to the ED for further evaluation.  Patient interacting appropriately and states she has no significant headache, chest pain, shortness of breath, N/V/D/abdominal, dysuria, peripheral edema, fever/chills.

## 2024-11-20 PROCEDURE — 85025 COMPLETE CBC W/AUTO DIFF WBC: CPT

## 2024-11-20 PROCEDURE — 99285 EMERGENCY DEPT VISIT HI MDM: CPT | Mod: 25

## 2024-11-20 PROCEDURE — 71045 X-RAY EXAM CHEST 1 VIEW: CPT

## 2024-11-20 PROCEDURE — 93005 ELECTROCARDIOGRAM TRACING: CPT

## 2024-11-20 PROCEDURE — 96374 THER/PROPH/DIAG INJ IV PUSH: CPT

## 2024-11-20 PROCEDURE — 83735 ASSAY OF MAGNESIUM: CPT

## 2024-11-20 PROCEDURE — 80053 COMPREHEN METABOLIC PANEL: CPT

## 2024-11-20 PROCEDURE — 84484 ASSAY OF TROPONIN QUANT: CPT

## 2025-01-02 NOTE — PHYSICAL THERAPY INITIAL EVALUATION ADULT - DIAGNOSIS, PT EVAL
Statement Selected Impaired functional mobility secondary to decreased strength, balance and endurance

## 2025-01-14 NOTE — ED PROVIDER NOTE - IV ALTEPLASE DOOR HIDDEN
History present illness: Patient presents today for evaluation of painful left long finger.  No discrete injury.  Baseline of arthritis.  Right-hand-dominant.  Diabetic well-controlled.      Past medical history: The patient's past medical history, family history, social history, and review of systems were documented on the patient medical intake.  The updated data was reviewed in the electronic medical record.  History is negative except otherwise stated in history of present illness.        Physical examination:  General: Alert and oriented to person, place, and time.  No acute distress and breathing comfortably: Pleasant and cooperative with examination.  HEENT: Head is normocephalic and atraumatic.  Neck: Supple, no visible swelling.  Cardiovascular: No palpable tachycardia  Lungs: No audible wheezing or labored breathing  Abdomen: Nondistended.  Extremities: Evaluation of the left upper extremity finds the patient had palpable radial artery at the wrist with brisk capillary refill to all digits.  Patient has intact sensation to axillary radial median and ulnar nerves.  There are no open wounds.  There are no signs of infection.  There is no evidence of lymphedema or lymphatic streaking.  The patient has supple compartments to left arm forearm and hand.  Stiffness and swelling notable to left long finger.  Most focal tenderness localizing to the PIP joint.      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  Patient elects for left long finger PIP joint injection.  We talked about silicone arthroplasty versus fusion.  Her distal interphalangeal joint is profoundly arthritic but that is not very painful.  6-week follow-up.  No x-rays upon return.        Procedure:  Hand / UE Inj/Asp: L long PIP for osteoarthritis on 1/14/2025 11:14  AM  Indications: pain  Details: 25 G needle, dorsal approach  Medications: 5 mg triamcinolone acetonide 10 mg/mL; 0.5 mL lidocaine 10 mg/mL (1 %)  Outcome: tolerated well, no immediate complications  Procedure, treatment alternatives, risks and benefits explained, specific risks discussed. Consent was given by the patient. Immediately prior to procedure a time out was called to verify the correct patient, procedure, equipment, support staff and site/side marked as required. Patient was prepped and draped in the usual sterile fashion.            show

## 2025-03-31 RX ORDER — TIZANIDINE 4 MG/1
3 TABLET ORAL
Refills: 0 | DISCHARGE
Start: 2025-03-31

## 2025-04-02 RX ORDER — IPRATROPIUM BROMIDE AND ALBUTEROL SULFATE .5; 2.5 MG/3ML; MG/3ML
3 SOLUTION RESPIRATORY (INHALATION)
Refills: 0 | DISCHARGE
Start: 2025-04-02 | End: 2025-04-09

## 2025-04-02 RX ORDER — SODIUM CHLORIDE 9 G/1000ML
45 INJECTION, SOLUTION INTRAVENOUS
Refills: 0 | DISCHARGE
Start: 2025-04-02 | End: 2025-04-05

## 2025-04-02 RX ORDER — TIZANIDINE 4 MG/1
1.5 TABLET ORAL
Refills: 0 | DISCHARGE
Start: 2025-04-02

## 2025-04-03 ENCOUNTER — INPATIENT (INPATIENT)
Facility: HOSPITAL | Age: 85
LOS: 3 days | Discharge: NURSING, REHAB OR LONG-TERM CARE | DRG: 872 | End: 2025-04-07
Attending: INTERNAL MEDICINE | Admitting: INTERNAL MEDICINE
Payer: MEDICARE

## 2025-04-03 VITALS
OXYGEN SATURATION: 98 % | RESPIRATION RATE: 30 BRPM | TEMPERATURE: 103 F | WEIGHT: 127.87 LBS | DIASTOLIC BLOOD PRESSURE: 70 MMHG | HEIGHT: 60 IN | HEART RATE: 138 BPM | SYSTOLIC BLOOD PRESSURE: 132 MMHG

## 2025-04-03 DIAGNOSIS — N17.9 ACUTE KIDNEY FAILURE, UNSPECIFIED: ICD-10-CM

## 2025-04-03 DIAGNOSIS — A41.9 SEPSIS, UNSPECIFIED ORGANISM: ICD-10-CM

## 2025-04-03 DIAGNOSIS — S22.080A WEDGE COMPRESSION FRACTURE OF T11-T12 VERTEBRA, INITIAL ENCOUNTER FOR CLOSED FRACTURE: Chronic | ICD-10-CM

## 2025-04-03 DIAGNOSIS — I48.91 UNSPECIFIED ATRIAL FIBRILLATION: ICD-10-CM

## 2025-04-03 DIAGNOSIS — E86.0 DEHYDRATION: ICD-10-CM

## 2025-04-03 DIAGNOSIS — Z29.9 ENCOUNTER FOR PROPHYLACTIC MEASURES, UNSPECIFIED: ICD-10-CM

## 2025-04-03 LAB
ALBUMIN SERPL ELPH-MCNC: 2.4 G/DL — LOW (ref 3.5–5)
ALBUMIN SERPL ELPH-MCNC: 2.9 G/DL — LOW (ref 3.5–5)
ALP SERPL-CCNC: 51 U/L — SIGNIFICANT CHANGE UP (ref 40–120)
ALP SERPL-CCNC: 68 U/L — SIGNIFICANT CHANGE UP (ref 40–120)
ALT FLD-CCNC: 27 U/L DA — SIGNIFICANT CHANGE UP (ref 10–60)
ALT FLD-CCNC: 34 U/L DA — SIGNIFICANT CHANGE UP (ref 10–60)
ANION GAP SERPL CALC-SCNC: 1 MMOL/L — LOW (ref 5–17)
ANION GAP SERPL CALC-SCNC: 3 MMOL/L — LOW (ref 5–17)
APPEARANCE UR: ABNORMAL
APTT BLD: 33.2 SEC — SIGNIFICANT CHANGE UP (ref 24.5–35.6)
AST SERPL-CCNC: 32 U/L — SIGNIFICANT CHANGE UP (ref 10–40)
AST SERPL-CCNC: 36 U/L — SIGNIFICANT CHANGE UP (ref 10–40)
BACTERIA # UR AUTO: ABNORMAL /HPF
BASE EXCESS BLDV CALC-SCNC: 10.6 MMOL/L — SIGNIFICANT CHANGE UP
BASOPHILS # BLD AUTO: 0.07 K/UL — SIGNIFICANT CHANGE UP (ref 0–0.2)
BASOPHILS NFR BLD AUTO: 0.5 % — SIGNIFICANT CHANGE UP (ref 0–2)
BILIRUB SERPL-MCNC: 0.5 MG/DL — SIGNIFICANT CHANGE UP (ref 0.2–1.2)
BILIRUB SERPL-MCNC: 0.6 MG/DL — SIGNIFICANT CHANGE UP (ref 0.2–1.2)
BILIRUB UR-MCNC: NEGATIVE — SIGNIFICANT CHANGE UP
BUN SERPL-MCNC: 48 MG/DL — HIGH (ref 7–18)
BUN SERPL-MCNC: 66 MG/DL — HIGH (ref 7–18)
CA-I SERPL-SCNC: SIGNIFICANT CHANGE UP MMOL/L (ref 1.15–1.33)
CALCIUM SERPL-MCNC: 8.6 MG/DL — SIGNIFICANT CHANGE UP (ref 8.4–10.5)
CALCIUM SERPL-MCNC: 9.8 MG/DL — SIGNIFICANT CHANGE UP (ref 8.4–10.5)
CHLORIDE SERPL-SCNC: 130 MMOL/L — HIGH (ref 96–108)
CHLORIDE SERPL-SCNC: 131 MMOL/L — HIGH (ref 96–108)
CO2 SERPL-SCNC: 29 MMOL/L — SIGNIFICANT CHANGE UP (ref 22–31)
CO2 SERPL-SCNC: 32 MMOL/L — HIGH (ref 22–31)
COLOR SPEC: YELLOW — SIGNIFICANT CHANGE UP
COMMENT - URINE: SIGNIFICANT CHANGE UP
CREAT SERPL-MCNC: 0.98 MG/DL — SIGNIFICANT CHANGE UP (ref 0.5–1.3)
CREAT SERPL-MCNC: 1.45 MG/DL — HIGH (ref 0.5–1.3)
DIFF PNL FLD: ABNORMAL
DIGOXIN SERPL-MCNC: 0.6 NG/ML — LOW (ref 0.8–2)
EGFR: 35 ML/MIN/1.73M2 — LOW
EGFR: 35 ML/MIN/1.73M2 — LOW
EGFR: 56 ML/MIN/1.73M2 — LOW
EGFR: 56 ML/MIN/1.73M2 — LOW
EOSINOPHIL # BLD AUTO: 0.02 K/UL — SIGNIFICANT CHANGE UP (ref 0–0.5)
EOSINOPHIL NFR BLD AUTO: 0.1 % — SIGNIFICANT CHANGE UP (ref 0–6)
EPI CELLS # UR: PRESENT
FLUAV AG NPH QL: SIGNIFICANT CHANGE UP
FLUBV AG NPH QL: SIGNIFICANT CHANGE UP
GAS PNL BLDV: 164 MMOL/L — CRITICAL HIGH (ref 136–145)
GAS PNL BLDV: SIGNIFICANT CHANGE UP
GLUCOSE BLDV-MCNC: 188 MG/DL — HIGH (ref 70–99)
GLUCOSE SERPL-MCNC: 174 MG/DL — HIGH (ref 70–99)
GLUCOSE SERPL-MCNC: 197 MG/DL — HIGH (ref 70–99)
GLUCOSE UR QL: NEGATIVE MG/DL — SIGNIFICANT CHANGE UP
HCO3 BLDV-SCNC: 36 MMOL/L — HIGH (ref 22–29)
HCT VFR BLD CALC: 39.5 % — SIGNIFICANT CHANGE UP (ref 34.5–45)
HCT VFR BLD CALC: 50.2 % — HIGH (ref 34.5–45)
HGB BLD-MCNC: 11.9 G/DL — SIGNIFICANT CHANGE UP (ref 11.5–15.5)
HGB BLD-MCNC: 14.9 G/DL — SIGNIFICANT CHANGE UP (ref 11.5–15.5)
IMM GRANULOCYTES NFR BLD AUTO: 0.8 % — SIGNIFICANT CHANGE UP (ref 0–0.9)
INR BLD: 1.27 RATIO — HIGH (ref 0.85–1.16)
KETONES UR-MCNC: NEGATIVE MG/DL — SIGNIFICANT CHANGE UP
LACTATE BLDV-MCNC: 1.9 MMOL/L — SIGNIFICANT CHANGE UP (ref 0.5–2)
LACTATE SERPL-SCNC: 2 MMOL/L — SIGNIFICANT CHANGE UP (ref 0.7–2)
LEUKOCYTE ESTERASE UR-ACNC: ABNORMAL
LYMPHOCYTES # BLD AUTO: 1.71 K/UL — SIGNIFICANT CHANGE UP (ref 1–3.3)
LYMPHOCYTES # BLD AUTO: 11.7 % — LOW (ref 13–44)
MAGNESIUM SERPL-MCNC: 2.7 MG/DL — HIGH (ref 1.6–2.6)
MCHC RBC-ENTMCNC: 29.7 G/DL — LOW (ref 32–36)
MCHC RBC-ENTMCNC: 29.9 PG — SIGNIFICANT CHANGE UP (ref 27–34)
MCHC RBC-ENTMCNC: 30.1 G/DL — LOW (ref 32–36)
MCHC RBC-ENTMCNC: 30.4 PG — SIGNIFICANT CHANGE UP (ref 27–34)
MCV RBC AUTO: 100.6 FL — HIGH (ref 80–100)
MCV RBC AUTO: 100.8 FL — HIGH (ref 80–100)
MONOCYTES # BLD AUTO: 1.35 K/UL — HIGH (ref 0–0.9)
MONOCYTES NFR BLD AUTO: 9.2 % — SIGNIFICANT CHANGE UP (ref 2–14)
NEUTROPHILS # BLD AUTO: 11.4 K/UL — HIGH (ref 1.8–7.4)
NEUTROPHILS NFR BLD AUTO: 77.7 % — HIGH (ref 43–77)
NITRITE UR-MCNC: NEGATIVE — SIGNIFICANT CHANGE UP
NRBC BLD AUTO-RTO: 0 /100 WBCS — SIGNIFICANT CHANGE UP (ref 0–0)
NRBC BLD AUTO-RTO: 0 /100 WBCS — SIGNIFICANT CHANGE UP (ref 0–0)
PCO2 BLDV: 51 MMHG — HIGH (ref 39–42)
PH BLDV: 7.46 — HIGH (ref 7.32–7.43)
PH UR: 5.5 — SIGNIFICANT CHANGE UP (ref 5–8)
PHOSPHATE SERPL-MCNC: 1.6 MG/DL — LOW (ref 2.5–4.5)
PLATELET # BLD AUTO: 234 K/UL — SIGNIFICANT CHANGE UP (ref 150–400)
PLATELET # BLD AUTO: 335 K/UL — SIGNIFICANT CHANGE UP (ref 150–400)
PO2 BLDV: 42 MMHG — SIGNIFICANT CHANGE UP
POTASSIUM BLDV-SCNC: 6.1 MMOL/L — HIGH (ref 3.5–5.1)
POTASSIUM SERPL-MCNC: 3.6 MMOL/L — SIGNIFICANT CHANGE UP (ref 3.5–5.3)
POTASSIUM SERPL-MCNC: 4.2 MMOL/L — SIGNIFICANT CHANGE UP (ref 3.5–5.3)
POTASSIUM SERPL-SCNC: 3.6 MMOL/L — SIGNIFICANT CHANGE UP (ref 3.5–5.3)
POTASSIUM SERPL-SCNC: 4.2 MMOL/L — SIGNIFICANT CHANGE UP (ref 3.5–5.3)
PROT SERPL-MCNC: 6.8 G/DL — SIGNIFICANT CHANGE UP (ref 6–8.3)
PROT SERPL-MCNC: 8.5 G/DL — HIGH (ref 6–8.3)
PROT UR-MCNC: 100 MG/DL
PROTHROM AB SERPL-ACNC: 14.7 SEC — HIGH (ref 9.9–13.4)
RBC # BLD: 3.92 M/UL — SIGNIFICANT CHANGE UP (ref 3.8–5.2)
RBC # BLD: 4.99 M/UL — SIGNIFICANT CHANGE UP (ref 3.8–5.2)
RBC # FLD: 16.3 % — HIGH (ref 10.3–14.5)
RBC # FLD: 16.5 % — HIGH (ref 10.3–14.5)
RBC CASTS # UR COMP ASSIST: 5 /HPF — HIGH (ref 0–4)
RSV RNA NPH QL NAA+NON-PROBE: SIGNIFICANT CHANGE UP
SAO2 % BLDV: 71.4 % — SIGNIFICANT CHANGE UP
SARS-COV-2 RNA SPEC QL NAA+PROBE: SIGNIFICANT CHANGE UP
SODIUM SERPL-SCNC: 163 MMOL/L — CRITICAL HIGH (ref 135–145)
SODIUM SERPL-SCNC: 163 MMOL/L — CRITICAL HIGH (ref 135–145)
SOURCE RESPIRATORY: SIGNIFICANT CHANGE UP
SP GR SPEC: 1.02 — SIGNIFICANT CHANGE UP (ref 1–1.03)
UROBILINOGEN FLD QL: 0.2 MG/DL — SIGNIFICANT CHANGE UP (ref 0.2–1)
WBC # BLD: 13.88 K/UL — HIGH (ref 3.8–10.5)
WBC # BLD: 14.66 K/UL — HIGH (ref 3.8–10.5)
WBC # FLD AUTO: 13.88 K/UL — HIGH (ref 3.8–10.5)
WBC # FLD AUTO: 14.66 K/UL — HIGH (ref 3.8–10.5)
WBC UR QL: ABNORMAL /HPF (ref 0–5)

## 2025-04-03 PROCEDURE — 93010 ELECTROCARDIOGRAM REPORT: CPT

## 2025-04-03 PROCEDURE — 99223 1ST HOSP IP/OBS HIGH 75: CPT

## 2025-04-03 PROCEDURE — 71045 X-RAY EXAM CHEST 1 VIEW: CPT | Mod: 26

## 2025-04-03 PROCEDURE — 99285 EMERGENCY DEPT VISIT HI MDM: CPT

## 2025-04-03 RX ORDER — HYPROMELLOSE 0.4 %
1 DROPS OPHTHALMIC (EYE)
Refills: 0 | DISCHARGE

## 2025-04-03 RX ORDER — SODIUM CHLORIDE 9 G/1000ML
1000 INJECTION, SOLUTION INTRAVENOUS ONCE
Refills: 0 | Status: COMPLETED | OUTPATIENT
Start: 2025-04-03 | End: 2025-04-03

## 2025-04-03 RX ORDER — DIGOXIN 250 UG/1
125 TABLET ORAL DAILY
Refills: 0 | Status: DISCONTINUED | OUTPATIENT
Start: 2025-04-03 | End: 2025-04-07

## 2025-04-03 RX ORDER — LIDOCAINE HYDROCHLORIDE 20 MG/ML
1 JELLY TOPICAL
Refills: 0 | DISCHARGE

## 2025-04-03 RX ORDER — METHENAMINE MANDELATE 1 G
1 TABLET ORAL
Refills: 0 | DISCHARGE

## 2025-04-03 RX ORDER — TIZANIDINE 4 MG/1
1.5 TABLET ORAL
Refills: 0 | DISCHARGE

## 2025-04-03 RX ORDER — APIXABAN 2.5 MG/1
1 TABLET, FILM COATED ORAL
Refills: 0 | DISCHARGE

## 2025-04-03 RX ORDER — ACETAMINOPHEN 500 MG/5ML
650 LIQUID (ML) ORAL ONCE
Refills: 0 | Status: COMPLETED | OUTPATIENT
Start: 2025-04-03 | End: 2025-04-03

## 2025-04-03 RX ORDER — DILTIAZEM HYDROCHLORIDE 240 MG/1
1 TABLET, EXTENDED RELEASE ORAL
Refills: 0 | DISCHARGE

## 2025-04-03 RX ORDER — MIRTAZAPINE 30 MG/1
7.5 TABLET, FILM COATED ORAL AT BEDTIME
Refills: 0 | Status: DISCONTINUED | OUTPATIENT
Start: 2025-04-03 | End: 2025-04-07

## 2025-04-03 RX ORDER — CEFEPIME 2 G/20ML
2000 INJECTION, POWDER, FOR SOLUTION INTRAVENOUS EVERY 12 HOURS
Refills: 0 | Status: DISCONTINUED | OUTPATIENT
Start: 2025-04-04 | End: 2025-04-04

## 2025-04-03 RX ORDER — VANCOMYCIN HCL IN 5 % DEXTROSE 1.5G/250ML
1000 PLASTIC BAG, INJECTION (ML) INTRAVENOUS ONCE
Refills: 0 | Status: COMPLETED | OUTPATIENT
Start: 2025-04-03 | End: 2025-04-03

## 2025-04-03 RX ORDER — DONEPEZIL HYDROCHLORIDE 5 MG/1
10 TABLET ORAL AT BEDTIME
Refills: 0 | Status: DISCONTINUED | OUTPATIENT
Start: 2025-04-03 | End: 2025-04-05

## 2025-04-03 RX ORDER — DONEPEZIL HYDROCHLORIDE 5 MG/1
1 TABLET ORAL
Refills: 0 | DISCHARGE

## 2025-04-03 RX ORDER — AMMONIUM LACTATE 12 %
1 LOTION (ML) TOPICAL
Refills: 0 | DISCHARGE

## 2025-04-03 RX ORDER — HYPROMELLOSE 0.4 %
1 DROPS OPHTHALMIC (EYE)
Refills: 0 | Status: DISCONTINUED | OUTPATIENT
Start: 2025-04-03 | End: 2025-04-07

## 2025-04-03 RX ORDER — CEFEPIME 2 G/20ML
1000 INJECTION, POWDER, FOR SOLUTION INTRAVENOUS ONCE
Refills: 0 | Status: COMPLETED | OUTPATIENT
Start: 2025-04-03 | End: 2025-04-03

## 2025-04-03 RX ORDER — DIGOXIN 250 UG/1
1 TABLET ORAL
Refills: 0 | DISCHARGE

## 2025-04-03 RX ORDER — B1/B2/B3/B5/B6/B12/VIT C/FOLIC 500-0.5 MG
1 TABLET ORAL DAILY
Refills: 0 | Status: DISCONTINUED | OUTPATIENT
Start: 2025-04-03 | End: 2025-04-07

## 2025-04-03 RX ORDER — ONDANSETRON HCL/PF 4 MG/2 ML
4 VIAL (ML) INJECTION EVERY 8 HOURS
Refills: 0 | Status: DISCONTINUED | OUTPATIENT
Start: 2025-04-03 | End: 2025-04-07

## 2025-04-03 RX ORDER — PIPERACILLIN-TAZO-DEXTROSE,ISO 3.375G/5
3.38 IV SOLUTION, PIGGYBACK PREMIX FROZEN(ML) INTRAVENOUS ONCE
Refills: 0 | Status: DISCONTINUED | OUTPATIENT
Start: 2025-04-03 | End: 2025-04-03

## 2025-04-03 RX ORDER — ALENDRONATE SODIUM 70 MG/1
1 TABLET ORAL
Refills: 0 | DISCHARGE

## 2025-04-03 RX ORDER — B1/B2/B3/B5/B6/B12/VIT C/FOLIC 500-0.5 MG
1 TABLET ORAL
Refills: 0 | DISCHARGE

## 2025-04-03 RX ORDER — TRAMADOL HYDROCHLORIDE 50 MG/1
1 TABLET, FILM COATED ORAL
Refills: 0 | DISCHARGE

## 2025-04-03 RX ORDER — DICLOFENAC SODIUM 10 MG/G
1 GEL TOPICAL
Refills: 0 | DISCHARGE

## 2025-04-03 RX ORDER — SODIUM CHLORIDE 9 G/1000ML
1000 INJECTION, SOLUTION INTRAVENOUS
Refills: 0 | Status: DISCONTINUED | OUTPATIENT
Start: 2025-04-03 | End: 2025-04-04

## 2025-04-03 RX ORDER — TIZANIDINE 4 MG/1
3 TABLET ORAL
Refills: 0 | DISCHARGE

## 2025-04-03 RX ORDER — CYST/ALA/Q10/PHOS.SER/DHA/BROC 100-20-50
1 POWDER (GRAM) ORAL
Refills: 0 | DISCHARGE

## 2025-04-03 RX ORDER — MIRTAZAPINE 30 MG/1
1 TABLET, FILM COATED ORAL
Refills: 0 | DISCHARGE

## 2025-04-03 RX ORDER — MELATONIN 5 MG
3 TABLET ORAL AT BEDTIME
Refills: 0 | Status: DISCONTINUED | OUTPATIENT
Start: 2025-04-03 | End: 2025-04-07

## 2025-04-03 RX ORDER — DILTIAZEM HYDROCHLORIDE 240 MG/1
180 TABLET, EXTENDED RELEASE ORAL DAILY
Refills: 0 | Status: DISCONTINUED | OUTPATIENT
Start: 2025-04-03 | End: 2025-04-04

## 2025-04-03 RX ORDER — SODIUM CHLORIDE 9 G/1000ML
60 INJECTION, SOLUTION INTRAVENOUS
Refills: 0 | DISCHARGE
Start: 2025-04-03

## 2025-04-03 RX ORDER — APIXABAN 2.5 MG/1
2.5 TABLET, FILM COATED ORAL
Refills: 0 | Status: DISCONTINUED | OUTPATIENT
Start: 2025-04-03 | End: 2025-04-07

## 2025-04-03 RX ORDER — ACETAMINOPHEN 500 MG/5ML
650 LIQUID (ML) ORAL EVERY 6 HOURS
Refills: 0 | Status: DISCONTINUED | OUTPATIENT
Start: 2025-04-03 | End: 2025-04-07

## 2025-04-03 RX ORDER — CALCIUM CARBONATE/VITAMIN D3 500MG-5MCG
1 TABLET ORAL
Refills: 0 | DISCHARGE

## 2025-04-03 RX ORDER — TIZANIDINE 4 MG/1
6 TABLET ORAL THREE TIMES A DAY
Refills: 0 | Status: DISCONTINUED | OUTPATIENT
Start: 2025-04-03 | End: 2025-04-07

## 2025-04-03 RX ORDER — AZTREONAM 2 G/1
2000 INJECTION, POWDER, LYOPHILIZED, FOR SOLUTION INTRAMUSCULAR; INTRAVENOUS ONCE
Refills: 0 | Status: DISCONTINUED | OUTPATIENT
Start: 2025-04-03 | End: 2025-04-03

## 2025-04-03 RX ORDER — MIRTAZAPINE 30 MG/1
2 TABLET, FILM COATED ORAL
Refills: 0 | DISCHARGE

## 2025-04-03 RX ADMIN — SODIUM CHLORIDE 1000 MILLILITER(S): 9 INJECTION, SOLUTION INTRAVENOUS at 17:04

## 2025-04-03 RX ADMIN — CEFEPIME 100 MILLIGRAM(S): 2 INJECTION, POWDER, FOR SOLUTION INTRAVENOUS at 12:52

## 2025-04-03 RX ADMIN — Medication 1800 MILLILITER(S): at 12:09

## 2025-04-03 RX ADMIN — Medication 1000 MILLIGRAM(S): at 14:48

## 2025-04-03 RX ADMIN — CEFEPIME 1000 MILLIGRAM(S): 2 INJECTION, POWDER, FOR SOLUTION INTRAVENOUS at 13:28

## 2025-04-03 RX ADMIN — Medication 650 MILLIGRAM(S): at 12:10

## 2025-04-03 RX ADMIN — Medication 1800 MILLILITER(S): at 13:09

## 2025-04-03 RX ADMIN — SODIUM CHLORIDE 1000 MILLILITER(S): 9 INJECTION, SOLUTION INTRAVENOUS at 16:04

## 2025-04-03 RX ADMIN — Medication 650 MILLIGRAM(S): at 12:40

## 2025-04-03 RX ADMIN — Medication 250 MILLIGRAM(S): at 13:48

## 2025-04-03 NOTE — ED PROVIDER NOTE - CLINICAL SUMMARY MEDICAL DECISION MAKING FREE TEXT BOX
85-year-old female with suspected sepsis.  Plan to provide IV hydration check sepsis workup, IV antibiotics and admit.

## 2025-04-03 NOTE — CONSULT NOTE ADULT - ATTENDING COMMENTS
85 year old female from NH and PMH of A fib on AC, HTN, HLD, dementia, kyphosis, osteoporosis, T12 severe compression fracture s/p surgery, contracture of bilateral LE, cholelithiasis, diverticulosis who presented to the ED with a fever.  Patient poor historian.  Patient unable to provide history.  Patient was sent in from NH with low BP and fever.  Patient noted to be normotensive on exam, mildly tachycardic. Patient noted on labs to have hypernatermia.  Patient with sepsis.  Patient appears uncomfortable, contracted, no acute respiratory distress.  patient appears with abdominal pain. 85 year old female from NH and PMH of A fib on AC, HTN, HLD, dementia, kyphosis, osteoporosis, T12 severe compression fracture s/p surgery, contracture of bilateral LE, cholelithiasis, diverticulosis, large hiatal hernia, and recurrent UTI presenting from NH for fever and hypotension. Labs significant for leukocytosis, hypernatremia, hyperchloremia,  elevated BUN and elevated SCr. During my assessment pt not in distress, dry skin, BP has been stable post initial volume resuscitation, denying pain, RS bibasilar diminished breathe sounds, CVS mild tachycardic, abdomen tenderness over lower abdomen, and LE contracted no edema.     Pt with severe sepsis, possible UTI, ODILON, dehydration, hypernatremia and rest PMH of above     IV cefepime, follow cultures, obtain CT abdomen pelvis without contrast, can advance diet as tolerated, serial abdominal exam, IVF with D5W, monitor sodium q12, monitor daily renal function, arango, hold BP lowering medications, on eliquis, prognosis is guarded, discussed with ER, re-consult MICU as needed, and CCM time 75 minutes exclude any procedure time

## 2025-04-03 NOTE — H&P ADULT - CONVERSATION DETAILS
GOC discussion carried out by ED attending. GOC reaffirmed with family. Pt is DNR/ DNI. MOLST placed in chart

## 2025-04-03 NOTE — H&P ADULT - ATTENDING COMMENTS
Vital Signs Last 24 Hrs  T(C): 37.1 (03 Apr 2025 23:33), Max: 39.4 (03 Apr 2025 11:51)  T(F): 98.8 (03 Apr 2025 23:33), Max: 103 (03 Apr 2025 11:51)  HR: 133 (03 Apr 2025 23:33) (108 - 138)  BP: 112/63 (03 Apr 2025 23:33) (112/63 - 150/90)  BP(mean): 79 (03 Apr 2025 23:33) (79 - 79)  RR: 23 (03 Apr 2025 23:33) (20 - 30)  SpO2: 96% (03 Apr 2025 23:33) (95% - 98%)  Parameters below as of 03 Apr 2025 23:33  Patient On (Oxygen Delivery Method): nasal cannula  O2 Flow (L/min): 2    Labs reviewed  leucocytosis  hgb 14.9 ( appears hemoconcentrated) baseline about 11  macrocytic anemia    Na 163    HCO3 -32  BUN 66 --> 48  Cr 1.45 --> 0.98  gfr 35 --> 56  Phos 1.6    VBG  7.46  HCO3 - 31   CO2 - 51  --> metabolic alkalosis with respiratory compensation.    UA   turbid  large LE  wbc tntc  rbc 5  few yeast in urine    Low dig level  CXR - no acute findings    Old urine cxs - consistently show E.coli almost pansensitive with R only to quinolones and penicillin    Impression   85 year old woman- resident of [facility] with medical hx including dementia, hx of frequent UTIs, HTN, HLD, OP with T12 compression fracture sp spinal fusion brought in from SNF with fevers.  In the ED, she meets sepsis criteria with work up showing UTI with dehydration, ODILON and severe hypernatremia with acid base and electrolyte disturbance ( with metabolic alkalosis with resp compensation and hypophosphatemia.  She appears to have a mild change in mental status --> acute encephalopathy.  She is admitted for further management.    Plan   Admit to Medicine   Sepsis work up  Empiric antibiotics; s/p cefepime in the ED; will continue with ceftriaxone.  **Total calculated free water deficit is 4.3L   **To correct serum Na @ 0.5meq/L/hour; will infuse 83cc/hour of hypotonic D5W and restore fluid deficit as well.  Fall and aspiration precaution   Keep NPO for now and re-evaluate mental status ( somnolent presently) in AM  Replenish phosphate  Med reconciliation; resume appropriate home meds once mental status improves.

## 2025-04-03 NOTE — H&P ADULT - PROBLEM SELECTOR PLAN 1
pt A&OX1 but lethargic  Temp 1.3, , /72, RR 25, 96% on 2L  wbc 14.88, na 163, Scr 1.45, lactate 2.0  COVID, flu rsv negative  UA positive  EKG: Afib with RVR  sepsis with ams 2/2 UTI  s/p 1.8L NS, 1L LR in ed  s/p cefepime and Vanc in ed    -started Cefepime 2g q12  -c/w IVF   -NPO iso lethargy  -may resume diet when pt more awake  -f/u UCx, BCx,  -ID consult in am  -f/u S&S eval pt A&OX1 but lethargic  Temp 1.3, , /72, RR 25, 96% on 2L  wbc 14.88, na 163, Scr 1.45, lactate 2.0  COVID, flu rsv negative  UA positive  EKG: Afib with RVR  sepsis with ams 2/2 UTI  s/p 1.8L NS, 1L LR in ed  s/p cefepime and Vanc in ed    -started Ceftriaxone   -c/w IVF   -NPO iso lethargy  -may resume diet when pt more awake  -f/u UCx, BCx,  -f/u S&S eval

## 2025-04-03 NOTE — H&P ADULT - PROBLEM SELECTOR PLAN 4
p/w Na 163>163  s/p 1L LR, 1.8L NS in ed  pt with poor oral hygiene and poor oral intake  free water deficit 4.3L    -started D5 at 83cc/hr  -monitor BMP q12  -monitor volume status

## 2025-04-03 NOTE — ED ADULT NURSE NOTE - NSFALLHARMRISKINTERV_ED_ALL_ED
Assistance OOB with selected safe patient handling equipment if applicable/Assistance with ambulation/Communicate risk of Fall with Harm to all staff, patient, and family/Monitor gait and stability/Provide visual cue: red socks, yellow wristband, yellow gown, etc/Reinforce activity limits and safety measures with patient and family/Bed in lowest position, wheels locked, appropriate side rails in place/Call bell, personal items and telephone in reach/Instruct patient to call for assistance before getting out of bed/chair/stretcher/Non-slip footwear applied when patient is off stretcher/[place] to call system/Physically safe environment - no spills, clutter or unnecessary equipment/Purposeful Proactive Rounding/Room/bathroom lighting operational, light cord in reach

## 2025-04-03 NOTE — ED ADULT NURSE NOTE - OBJECTIVE STATEMENT
Patient presented to the ED with heart rate in the 150s and fever of 103 as per EMS. Patient arrived on nonrebreather to 15L satting 100%. Patient with pressure wound to the mid back, sacrum, and bilateral heel area.

## 2025-04-03 NOTE — H&P ADULT - PROBLEM SELECTOR PLAN 3
EKG showing Afib with RVR; non sustaining  pt on Cardizem and digoxin  s/p cardizem 10 IVP   likely iso sepsis and dehydration  -admit to tele  -c/w cardizem and digoxin  -c/w eliquis

## 2025-04-03 NOTE — ED PROVIDER NOTE - PROGRESS NOTE DETAILS
Spoke with admitting Dr. Naik who is requesting ICU evaluation for elevated sodium. Case d/w ICU attending Dr. Bell. Spoke with patient's son Humphrey who reiterates desire to have patient DNR/DNI.  MOLST form was filled out. Patient seen by ICU.  Patient cleared for medical floor

## 2025-04-03 NOTE — H&P ADULT - ASSESSMENT
85y/F, from [facility], bedbound, PMH of Dementia (A&OX1 at baseline), Afib,  OP with T12 compression fracture sp spinal fusion, recurrent UTI was brought in by EMS for hypotension and fever at rehab. Meet sepsis Criteria. Na 163, UA positive. Admitted for sepsis 2/2 UTI with Afib with RVR and hypernatremia

## 2025-04-03 NOTE — H&P ADULT - PROBLEM SELECTOR PROBLEM 1
Asleep but arousable, NAD noted. pending bed assignment Sepsis secondary to UTI Sepsis with encephalopathy

## 2025-04-03 NOTE — H&P ADULT - NSHPPHYSICALEXAM_GEN_ALL_CORE
T(C): 37.1 (04-03-25 @ 23:33), Max: 39.4 (04-03-25 @ 11:51)  HR: 133 (04-03-25 @ 23:33) (108 - 138)  BP: 112/63 (04-03-25 @ 23:33) (112/63 - 150/90)  RR: 23 (04-03-25 @ 23:33) (20 - 30)  SpO2: 96% (04-03-25 @ 23:33) (95% - 98%)      GENERAL: NAD, speaks in full sentences, no signs of respiratory distress  HEAD:  Atraumatic, Normocephalic  EYES: EOMI, PERRLA, conjunctiva and sclera clear  NECK: Supple, No JVD  CHEST/LUNG: Clear to auscultation bilaterally; No wheeze; No crackles; No accessory muscles used  HEART: Regular rate and rhythm; No murmurs;   ABDOMEN: Soft, Nontender, Nondistended; Bowel sounds present; No guarding  EXTREMITIES:  2+ Peripheral Pulses, No cyanosis or edema  PSYCH: AAOx3  NEUROLOGY: non-focal  SKIN: No rashes or lesions T(C): 37.1 (04-03-25 @ 23:33), Max: 39.4 (04-03-25 @ 11:51)  HR: 133 (04-03-25 @ 23:33) (108 - 138)  BP: 112/63 (04-03-25 @ 23:33) (112/63 - 150/90)  RR: 23 (04-03-25 @ 23:33) (20 - 30)  SpO2: 96% (04-03-25 @ 23:33) (95% - 98%)      GENERAL: frail, drowsy, severely dehydrated, poor oral hygiene; diffuse tenderness +; contracted  HEAD:  Atraumatic, Normocephalic  EYES: EOMI, PERRLA, conjunctiva and sclera clear  NECK: Supple  CHEST/LUNG: Tachypnea +; Clear to auscultation bilaterally; No wheeze; No crackles  HEART: tachycardia, irregular rhythm; No murmurs;   ABDOMEN: Soft, Nondistended; Bowel sounds present; No guarding  EXTREMITIES:  2+ Peripheral Pulses, No cyanosis or edema  PSYCH: AAOx1  NEUROLOGY: follows simple commands; moving all extremities spontaneously

## 2025-04-03 NOTE — CONSULT NOTE ADULT - ASSESSMENT
Patient is a 85 year old female from NH, with PMH of HTN, HLD, A. Fib(Eliquis), Dementia, Osteoporosis who presented to the ED with a fever.  In the ED patient septic with Temp of 103F and HR of 138.  BP between 120s and 140s.  Na of 163.  Patient is stable for medical admissions.    # Sepsis  - HR 130s  - Temp 103F  - WBC 14k  - Lactate 2.0  - Patient received Cefepime and Vancomycin  - S/P 2.8L IV Fluid in ED  - Continue Septic workup  - Continue Cefepime for now  - IV Fluids for free water deficit 75ml d5w    # Hypernatermia  - Na of 163  - S/P 2.8L IV Fluid in ED  - IV Fluids for free water deficit 75ml d5w  - Monitor BMP q12h

## 2025-04-03 NOTE — ED PROVIDER NOTE - OBJECTIVE STATEMENT
85-year-old female with history of dementia presenting from nursing home with hypotension and fever Tmax 103.  Received dose of antibiotic at nursing home yesterday.  Patient arrived as notification.  Of note patient is DNR/DNI with MOLST form record.

## 2025-04-03 NOTE — ED ADULT TRIAGE NOTE - CHIEF COMPLAINT QUOTE
Detail Level: Detailed Sent from NH to r/o sepsis. Pt febrile, tachycardic and responsive to pain only on arrival. Received with NRB mask at 15L

## 2025-04-03 NOTE — PHARMACOTHERAPY INTERVENTION NOTE - COMMENTS
[place] [facility] medication list was used to update the outpatient medication review.
Treated with ceftriaxone 4/19/24-4/22/24

## 2025-04-03 NOTE — ED ADULT NURSE NOTE - CHIEF COMPLAINT QUOTE
Sent from NH to r/o sepsis. Pt febrile, tachycardic and responsive to pain only on arrival. Received with NRB mask at 15L

## 2025-04-03 NOTE — H&P ADULT - HISTORY OF PRESENT ILLNESS
85y/F, from [facility], bedbound, PMH of Dementia (A&OX1 at baseline), Afib,  OP with T12 compression fracture sp spinal fusion, recurrent UTI was brought in by EMS for hypotension and fever at rehab. Patient is poor historian, her son, Delroy reached over phone for collateral. States he also does not know more, because nursing home did not give him much information either, but states she did not have any vomiting episodes or diarrhea. States patient has chronic pain everywhere. He mentioned that whenever patient has UTi she is more confused, does not make sense and is very lethargic with poor oral intake and he noticed all of these symptoms this time around too.   In ED, Temp 1.3, , /72, RR 25, 96% on 2L  wbc 14.88, na 163, Scr 1.45, lactate 2.0  COVIID, flu rsv negative  UA positive  EKG: Ashley with RVR  s/p 1.8L NS, 1L LR in ed

## 2025-04-03 NOTE — ED ADULT NURSE NOTE - CAS TRG GEN SKIN COLOR
[facility]  Hospital Medicine Progress Note        Chief Complaint: Inpatient Follow-up    HPI:   65-year-old male with significant history of type 2 diabetes mellitus, HTN, chronic diastolic heart failure, HLD, GERD and  hydrocele.  Patient had a recent 19 day hospitalization for acute necrotizing pancreatitis secondary to gallstones/choledocholithiasis, severe sepsis with Enterococcus bacteremia, acute kidney injury, acute hypoxemic respiratory failure. patient had cholecystectomy in 2022, underwent ERCP this previous hospitalization with stone and sludge removal and sphincterotomy.  Repeat CT abdomen pelvis showed continued findings of necrotizing pancreatitis with possible organizing fluid collection around pancreatic neck and body.  GI recommended repeat CT in 3 months.  Patient was treated with meropenem, ampicillin while in-house which was discontinued upon DC.  He completed 14 day course while in-house.  Patient remained symptomatic even after DC.  Patient continued with abdominal discomfort, nausea, unable to keep anything down mouth .  also reports diarrhea, unquantified unintentional weight loss.  Patient was febrile and tachycardic.  Lab significant for leukocytosis, acute kidney injury/dehydration.  Lipase was elevated.  Patient was initiated on conservative management with IV fluids, NPO and GI services consulted, admitted to hospitalist medicine service.  CT abdomen pelvis repeated-overall similar findings compared to before with persistent fluid collection/necrotizing pancreatitis.  Stool studies ordered given diarrhea.  Given concern for necrotizing pancreatitis decided to initiate meropenem.  GI evaluated, CT findings similar to prior and therefore no interventions planned, recommended conservative management.  Diet advanced to clear liquids on 01/10.     Interval Hx:     Patient seen at bedside .  Comfortably laying in bed. wife is at bedside, patient is afebrile,  hemodynamics stable except for mild tachycardia.  no nausea, vomiting or abdominal discomfort, but the patient is having bowel movements after each liquid meal.  Also complaining of intermittent abdominal cramps and hiccups    Objective/physical exam:  General: In no acute distress, afebrile  Chest: Clear to auscultation bilaterally  Heart: S1, S2, no appreciable murmur  Abdomen: Soft, nontender today, BS +  MSK: Warm, no lower extremity edema, no clubbing or cyanosis  Neurologic: Alert and oriented x4, moving all extremities with good strength     VITAL SIGNS: 24 HRS MIN & MAX LAST   Temp  Min: 98.4 °F (36.9 °C)  Max: 99.8 °F (37.7 °C) 98.4 °F (36.9 °C)   BP  Min: 100/58  Max: 124/76 124/76   Pulse  Min: 94  Max: 120  (!) 111   Resp  Min: 18  Max: 19 18   SpO2  Min: 92 %  Max: 96 % (!) 93 %       Recent Labs   Lab 01/11/24  0537   WBC 8.46  8.46   RBC 3.36*   HGB 10.0*   HCT 32.2*   MCV 95.8*   MCH 29.8   MCHC 31.1*   RDW 13.7      MPV 9.8         Recent Labs   Lab 01/11/24  0537   *   K 4.0   CO2 28   BUN 13.9   CREATININE 0.87   CALCIUM 8.1*   ALBUMIN 1.8*   ALKPHOS 75   ALT 19   AST 29   BILITOT 0.9          Microbiology Results (last 7 days)       Procedure Component Value Units Date/Time    Clostridium Diff Toxin, A & B, EIA [[id number]]  (Normal) Collected: 01/10/24 0341    Order Status: Completed Specimen: Stool Updated: 01/10/24 1124     Clostridium Difficile GDH Antigen Negative     Clostridium Difficile Toxin A/B Negative    Blood Culture [[id number]]  (Normal) Collected: 01/09/24 1008    Order Status: Completed Specimen: Blood Updated: 01/10/24 1101     CULTURE, BLOOD ([facility]) No Growth At 24 Hours    Blood Culture [[id number]]  (Normal) Collected: 01/09/24 1008    Order Status: Completed Specimen: Blood Updated: 01/10/24 1101     CULTURE, BLOOD ([facility]) No Growth At 24 Hours    Blood Culture [[id number]] Collected: 01/10/24 0715    Order Status: Resulted Specimen: Blood Updated: 01/10/24 0730     Blood Culture [[id number]] Collected: 01/10/24 0715    Order Status: Resulted Specimen: Blood Updated: 01/10/24 0730    Stool Culture **CANNOT BE ORDERED STAT** [[id number]] Collected: 01/10/24 0341    Order Status: Resulted Specimen: Stool Updated: 01/10/24 0341             Scheduled Med:   aspirin  81 mg Oral Daily    atorvastatin  20 mg Oral Daily    enoxparin  40 mg Subcutaneous Q24H (prophylaxis, 1700)    fenofibrate  145 mg Oral Daily    fluticasone propionate  2 spray Each Nostril Daily    meropenem (MERREM) IVPB  1 g Intravenous Q8H    metoprolol succinate  25 mg Oral Daily          Assessment/Plan:    Acute onset diarrhea-likely noninfectious  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 with peripancreatic fluid collection  Sirs with fever spike, leukocytosis and tachycardia secondary to above-improved  Acute kidney injury secondary to dehydration-improved  Recent Enterococcus bacteremia secondary to necrotizing pancreatitis-completed treatment  Recent choledocholithiasis status post ERCP, sphincterotomy in December, 2023  History of essential HTN-now borderline low BP   Type 2 diabetes mellitus-stable   Chronic diastolic heart failure-appears compensated  HLD  GERD  Scrotal hydrocele   Prophylaxis      Symptomatically better except for persistent diarrhea   Infectious etiology ruled out   Initiated [place]  Patient is able to tolerate clear liquid and therefore diet advanced to full liquids today  Keep IV fluid resuscitation with Ringer lactate  No more fever spikes or leukocytosis  Tachycardia persist   On Merrem given his history of necrotizing pancreatitis   CT from this hospitalization with similar findings   If no more fever spikes/leukocytosis can possibly discontinue meropenem in the next 1-2 days  Evaluated by GI, recommends conservative management   he was treated with Merrem/ampicillin during previous hospitalization for necrotizing pancreatitis with Enterococcus bacteremia   Hold home amlodipine  since blood pressure is low normal   Cautiously continue Toprol-XL   Continue other home meds-aspirin, statin and fenofibrate  P.r.n. Bentyl for abdominal cramps  P.r.n. baclofen for hiccups  DVT prophylaxis- subQ Lovenox      Lisa Willoughby MD   01/11/2024                 Normal for race

## 2025-04-04 DIAGNOSIS — A41.9 SEPSIS, UNSPECIFIED ORGANISM: ICD-10-CM

## 2025-04-04 DIAGNOSIS — N39.0 URINARY TRACT INFECTION, SITE NOT SPECIFIED: ICD-10-CM

## 2025-04-04 LAB
ALBUMIN SERPL ELPH-MCNC: 2.2 G/DL — LOW (ref 3.5–5)
ALP SERPL-CCNC: 56 U/L — SIGNIFICANT CHANGE UP (ref 40–120)
ALT FLD-CCNC: 27 U/L DA — SIGNIFICANT CHANGE UP (ref 10–60)
ANION GAP SERPL CALC-SCNC: 1 MMOL/L — LOW (ref 5–17)
ANION GAP SERPL CALC-SCNC: 2 MMOL/L — LOW (ref 5–17)
AST SERPL-CCNC: 29 U/L — SIGNIFICANT CHANGE UP (ref 10–40)
BASOPHILS # BLD AUTO: 0.03 K/UL — SIGNIFICANT CHANGE UP (ref 0–0.2)
BASOPHILS NFR BLD AUTO: 0.3 % — SIGNIFICANT CHANGE UP (ref 0–2)
BILIRUB SERPL-MCNC: 0.5 MG/DL — SIGNIFICANT CHANGE UP (ref 0.2–1.2)
BUN SERPL-MCNC: 39 MG/DL — HIGH (ref 7–18)
BUN SERPL-MCNC: 49 MG/DL — HIGH (ref 7–18)
CALCIUM SERPL-MCNC: 7.8 MG/DL — LOW (ref 8.4–10.5)
CALCIUM SERPL-MCNC: 8.3 MG/DL — LOW (ref 8.4–10.5)
CHLORIDE SERPL-SCNC: 127 MMOL/L — HIGH (ref 96–108)
CHLORIDE SERPL-SCNC: 129 MMOL/L — HIGH (ref 96–108)
CO2 SERPL-SCNC: 29 MMOL/L — SIGNIFICANT CHANGE UP (ref 22–31)
CO2 SERPL-SCNC: 30 MMOL/L — SIGNIFICANT CHANGE UP (ref 22–31)
CREAT SERPL-MCNC: 0.81 MG/DL — SIGNIFICANT CHANGE UP (ref 0.5–1.3)
CREAT SERPL-MCNC: 1.01 MG/DL — SIGNIFICANT CHANGE UP (ref 0.5–1.3)
EGFR: 54 ML/MIN/1.73M2 — LOW
EGFR: 54 ML/MIN/1.73M2 — LOW
EGFR: 70 ML/MIN/1.73M2 — SIGNIFICANT CHANGE UP
EGFR: 70 ML/MIN/1.73M2 — SIGNIFICANT CHANGE UP
EOSINOPHIL # BLD AUTO: 0.15 K/UL — SIGNIFICANT CHANGE UP (ref 0–0.5)
EOSINOPHIL NFR BLD AUTO: 1.5 % — SIGNIFICANT CHANGE UP (ref 0–6)
GLUCOSE SERPL-MCNC: 164 MG/DL — HIGH (ref 70–99)
GLUCOSE SERPL-MCNC: 212 MG/DL — HIGH (ref 70–99)
HCT VFR BLD CALC: 39.6 % — SIGNIFICANT CHANGE UP (ref 34.5–45)
HGB BLD-MCNC: 11.5 G/DL — SIGNIFICANT CHANGE UP (ref 11.5–15.5)
IMM GRANULOCYTES NFR BLD AUTO: 0.7 % — SIGNIFICANT CHANGE UP (ref 0–0.9)
LYMPHOCYTES # BLD AUTO: 1.44 K/UL — SIGNIFICANT CHANGE UP (ref 1–3.3)
LYMPHOCYTES # BLD AUTO: 14 % — SIGNIFICANT CHANGE UP (ref 13–44)
MAGNESIUM SERPL-MCNC: 2.4 MG/DL — SIGNIFICANT CHANGE UP (ref 1.6–2.6)
MAGNESIUM SERPL-MCNC: 2.7 MG/DL — HIGH (ref 1.6–2.6)
MCHC RBC-ENTMCNC: 29 G/DL — LOW (ref 32–36)
MCHC RBC-ENTMCNC: 29.7 PG — SIGNIFICANT CHANGE UP (ref 27–34)
MCV RBC AUTO: 102.3 FL — HIGH (ref 80–100)
MONOCYTES # BLD AUTO: 0.84 K/UL — SIGNIFICANT CHANGE UP (ref 0–0.9)
MONOCYTES NFR BLD AUTO: 8.2 % — SIGNIFICANT CHANGE UP (ref 2–14)
NEUTROPHILS # BLD AUTO: 7.77 K/UL — HIGH (ref 1.8–7.4)
NEUTROPHILS NFR BLD AUTO: 75.3 % — SIGNIFICANT CHANGE UP (ref 43–77)
NRBC BLD AUTO-RTO: 0 /100 WBCS — SIGNIFICANT CHANGE UP (ref 0–0)
PHOSPHATE SERPL-MCNC: 1.7 MG/DL — LOW (ref 2.5–4.5)
PHOSPHATE SERPL-MCNC: 2.3 MG/DL — LOW (ref 2.5–4.5)
PLATELET # BLD AUTO: 211 K/UL — SIGNIFICANT CHANGE UP (ref 150–400)
POTASSIUM SERPL-MCNC: 3.6 MMOL/L — SIGNIFICANT CHANGE UP (ref 3.5–5.3)
POTASSIUM SERPL-MCNC: 4 MMOL/L — SIGNIFICANT CHANGE UP (ref 3.5–5.3)
POTASSIUM SERPL-SCNC: 3.6 MMOL/L — SIGNIFICANT CHANGE UP (ref 3.5–5.3)
POTASSIUM SERPL-SCNC: 4 MMOL/L — SIGNIFICANT CHANGE UP (ref 3.5–5.3)
PROT SERPL-MCNC: 6.5 G/DL — SIGNIFICANT CHANGE UP (ref 6–8.3)
RBC # BLD: 3.87 M/UL — SIGNIFICANT CHANGE UP (ref 3.8–5.2)
RBC # FLD: 16.3 % — HIGH (ref 10.3–14.5)
SODIUM SERPL-SCNC: 158 MMOL/L — HIGH (ref 135–145)
SODIUM SERPL-SCNC: 160 MMOL/L — CRITICAL HIGH (ref 135–145)
TSH SERPL-MCNC: 0.84 UU/ML — SIGNIFICANT CHANGE UP (ref 0.34–4.82)
WBC # BLD: 10.3 K/UL — SIGNIFICANT CHANGE UP (ref 3.8–10.5)
WBC # FLD AUTO: 10.3 K/UL — SIGNIFICANT CHANGE UP (ref 3.8–10.5)

## 2025-04-04 PROCEDURE — 99233 SBSQ HOSP IP/OBS HIGH 50: CPT | Mod: GC

## 2025-04-04 PROCEDURE — 74018 RADEX ABDOMEN 1 VIEW: CPT | Mod: 26

## 2025-04-04 RX ORDER — DILTIAZEM HYDROCHLORIDE 240 MG/1
180 TABLET, EXTENDED RELEASE ORAL DAILY
Refills: 0 | Status: DISCONTINUED | OUTPATIENT
Start: 2025-04-04 | End: 2025-04-04

## 2025-04-04 RX ORDER — DILTIAZEM HYDROCHLORIDE 240 MG/1
10 TABLET, EXTENDED RELEASE ORAL ONCE
Refills: 0 | Status: COMPLETED | OUTPATIENT
Start: 2025-04-04 | End: 2025-04-04

## 2025-04-04 RX ORDER — INSULIN LISPRO 100 U/ML
INJECTION, SOLUTION INTRAVENOUS; SUBCUTANEOUS EVERY 6 HOURS
Refills: 0 | Status: DISCONTINUED | OUTPATIENT
Start: 2025-04-04 | End: 2025-04-04

## 2025-04-04 RX ORDER — POTASSIUM PHOSPHATE, MONOBASIC POTASSIUM PHOSPHATE, DIBASIC INJECTION, 236; 224 MG/ML; MG/ML
15 SOLUTION, CONCENTRATE INTRAVENOUS ONCE
Refills: 0 | Status: DISCONTINUED | OUTPATIENT
Start: 2025-04-04 | End: 2025-04-04

## 2025-04-04 RX ORDER — POTASSIUM PHOSPHATE, MONOBASIC POTASSIUM PHOSPHATE, DIBASIC INJECTION, 236; 224 MG/ML; MG/ML
15 SOLUTION, CONCENTRATE INTRAVENOUS ONCE
Refills: 0 | Status: COMPLETED | OUTPATIENT
Start: 2025-04-04 | End: 2025-04-04

## 2025-04-04 RX ORDER — ACETAMINOPHEN 500 MG/5ML
1000 LIQUID (ML) ORAL EVERY 8 HOURS
Refills: 0 | Status: DISCONTINUED | OUTPATIENT
Start: 2025-04-04 | End: 2025-04-07

## 2025-04-04 RX ORDER — DILTIAZEM HYDROCHLORIDE 240 MG/1
180 TABLET, EXTENDED RELEASE ORAL DAILY
Refills: 0 | Status: DISCONTINUED | OUTPATIENT
Start: 2025-04-04 | End: 2025-04-05

## 2025-04-04 RX ORDER — ACETAMINOPHEN 500 MG/5ML
1000 LIQUID (ML) ORAL ONCE
Refills: 0 | Status: COMPLETED | OUTPATIENT
Start: 2025-04-04 | End: 2025-04-04

## 2025-04-04 RX ORDER — CEFTRIAXONE 500 MG/1
1000 INJECTION, POWDER, FOR SOLUTION INTRAMUSCULAR; INTRAVENOUS EVERY 24 HOURS
Refills: 0 | Status: COMPLETED | OUTPATIENT
Start: 2025-04-04 | End: 2025-04-06

## 2025-04-04 RX ORDER — SODIUM CHLORIDE 9 G/1000ML
1000 INJECTION, SOLUTION INTRAVENOUS
Refills: 0 | Status: DISCONTINUED | OUTPATIENT
Start: 2025-04-04 | End: 2025-04-05

## 2025-04-04 RX ADMIN — DILTIAZEM HYDROCHLORIDE 10 MILLIGRAM(S): 240 TABLET, EXTENDED RELEASE ORAL at 05:24

## 2025-04-04 RX ADMIN — Medication 1 DROP(S): at 17:31

## 2025-04-04 RX ADMIN — Medication 1000 MILLIGRAM(S): at 13:55

## 2025-04-04 RX ADMIN — Medication 1000 MILLIGRAM(S): at 21:30

## 2025-04-04 RX ADMIN — Medication 1 DROP(S): at 07:42

## 2025-04-04 RX ADMIN — POTASSIUM PHOSPHATE, MONOBASIC POTASSIUM PHOSPHATE, DIBASIC INJECTION, 63.75 MILLIMOLE(S): 236; 224 SOLUTION, CONCENTRATE INTRAVENOUS at 07:04

## 2025-04-04 RX ADMIN — Medication 1 TABLET(S): at 11:09

## 2025-04-04 RX ADMIN — TIZANIDINE 6 MILLIGRAM(S): 4 TABLET ORAL at 13:56

## 2025-04-04 RX ADMIN — DONEPEZIL HYDROCHLORIDE 10 MILLIGRAM(S): 5 TABLET ORAL at 21:30

## 2025-04-04 RX ADMIN — Medication 400 MILLIGRAM(S): at 00:40

## 2025-04-04 RX ADMIN — Medication 1000 MILLILITER(S): at 11:02

## 2025-04-04 RX ADMIN — CEFTRIAXONE 100 MILLIGRAM(S): 500 INJECTION, POWDER, FOR SOLUTION INTRAMUSCULAR; INTRAVENOUS at 11:02

## 2025-04-04 RX ADMIN — APIXABAN 2.5 MILLIGRAM(S): 2.5 TABLET, FILM COATED ORAL at 17:32

## 2025-04-04 RX ADMIN — Medication 1000 MILLIGRAM(S): at 12:35

## 2025-04-04 RX ADMIN — CEFEPIME 100 MILLIGRAM(S): 2 INJECTION, POWDER, FOR SOLUTION INTRAVENOUS at 00:39

## 2025-04-04 RX ADMIN — SODIUM CHLORIDE 100 MILLILITER(S): 9 INJECTION, SOLUTION INTRAVENOUS at 00:38

## 2025-04-04 RX ADMIN — Medication 1000 MILLIGRAM(S): at 22:30

## 2025-04-04 RX ADMIN — SODIUM CHLORIDE 75 MILLILITER(S): 9 INJECTION, SOLUTION INTRAVENOUS at 11:05

## 2025-04-04 RX ADMIN — MIRTAZAPINE 7.5 MILLIGRAM(S): 30 TABLET, FILM COATED ORAL at 21:30

## 2025-04-04 RX ADMIN — TIZANIDINE 6 MILLIGRAM(S): 4 TABLET ORAL at 21:31

## 2025-04-04 RX ADMIN — Medication 500 MILLIGRAM(S): at 11:09

## 2025-04-04 RX ADMIN — SODIUM CHLORIDE 75 MILLILITER(S): 9 INJECTION, SOLUTION INTRAVENOUS at 21:30

## 2025-04-04 NOTE — PROGRESS NOTE ADULT - SUBJECTIVE AND OBJECTIVE BOX
MS3 Progress Note discussed with resident and attending    PLEASE MS TEAMS RESIDENT TILL 5:00 PM  PLEASE CONTACT ON CALL TEAM:  - On Call Team (Please refer to Pool) FROM 5:00 PM - 8:30PM  - Nightfloat Team FROM 8:30 -7:30 AM      INTERVAL HPI/OVERNIGHT EVENTS: No acute events overnight.    SUBJECTIVE: Patient seen and examined at bedside this morning.    ---------------------------    REVIEW OF SYSTEMS:  CONSTITUTIONAL: No fever, weight loss, fatigue  RESPIRATORY: No cough, wheezing, chills, hemoptysis, shortness of breath  CARDIOVASCULAR: No chest pain, palpitations, dizziness, leg swelling  GASTROINTESTINAL: No abdominal pain, nausea, vomiting, hematemesis, diarrhea, constipation, melena, hematochezia  GENITOURINARY: No dysuria, hematuria, urinary frequency  NEUROLOGICAL: No headaches, memory loss, loss of strength, numbness, tremors  SKIN: No itching, burning, rashes, lesions     MEDICATIONS  (STANDING):  acetaminophen     Tablet .. 1000 milliGRAM(s) Oral every 8 hours  apixaban 2.5 milliGRAM(s) Oral two times a day  artificial  tears Solution 1 Drop(s) Both EYES two times a day  ascorbic acid 500 milliGRAM(s) Oral daily  cefTRIAXone   IVPB 1000 milliGRAM(s) IV Intermittent every 24 hours  digoxin     Tablet 125 MICROGram(s) Oral daily  diltiazem    milliGRAM(s) Oral daily  donepezil 10 milliGRAM(s) Oral at bedtime  lactated ringers. 1000 milliLiter(s) (75 mL/Hr) IV Continuous <Continuous>  mirtazapine 7.5 milliGRAM(s) Oral at bedtime  multivitamin 1 Tablet(s) Oral daily  tiZANidine 6 milliGRAM(s) Oral three times a day    MEDICATIONS  (PRN):  acetaminophen     Tablet .. 650 milliGRAM(s) Oral every 6 hours PRN Temp greater or equal to 38C (100.4F), Mild Pain (1 - 3)  melatonin 3 milliGRAM(s) Oral at bedtime PRN Insomnia  ondansetron Injectable 4 milliGRAM(s) IV Push every 8 hours PRN Nausea and/or Vomiting      Vital Signs Last 24 Hrs  T(C): 36.9 (04 Apr 2025 11:11), Max: 37.6 (04 Apr 2025 05:16)  T(F): 98.4 (04 Apr 2025 11:11), Max: 99.7 (04 Apr 2025 05:16)  HR: 95 (04 Apr 2025 11:11) (95 - 133)  BP: 117/52 (04 Apr 2025 11:11) (108/91 - 141/67)  BP(mean): 85 (04 Apr 2025 05:16) (79 - 85)  RR: 18 (04 Apr 2025 11:11) (18 - 27)  SpO2: 97% (04 Apr 2025 11:11) (94% - 98%)    Parameters below as of 04 Apr 2025 11:11  Patient On (Oxygen Delivery Method): room air        -----------------------------    PHYSICAL EXAMINATION:  GENERAL: NAD, lying in bed  HEAD:  Atraumatic, Normocephalic  EYES:  conjunctiva and sclera clear  NECK: Supple, No JVD  CHEST/LUNG: Clear to auscultation bilaterally; No rales, rhonchi, wheezing, or rubs  HEART: Regular rate and rhythm; No murmurs, rubs, or gallops  ABDOMEN: Soft, Nontender, Nondistended; Bowel sounds present, no guarding  NERVOUS SYSTEM:  Alert & Oriented X3  : voiding well  EXTREMITIES:  2+ Peripheral Pulses, No clubbing, cyanosis, or edema  SKIN: warm dry                          11.5   10.30 )-----------( 211      ( 04 Apr 2025 05:15 )             39.6     04-04    160[HH]  |  129[H]  |  49[H]  ----------------------------<  212[H]  3.6   |  30  |  1.01    Ca    8.3[L]      04 Apr 2025 05:15  Phos  1.7     04-04  Mg     2.7     04-04    TPro  6.5  /  Alb  2.2[L]  /  TBili  0.5  /  DBili  x   /  AST  29  /  ALT  27  /  AlkPhos  56  04-04    LIVER FUNCTIONS - ( 04 Apr 2025 05:15 )  Alb: 2.2 g/dL / Pro: 6.5 g/dL / ALK PHOS: 56 U/L / ALT: 27 U/L DA / AST: 29 U/L / GGT: x               PT/INR - ( 03 Apr 2025 11:46 )   PT: 14.7 sec;   INR: 1.27 ratio         PTT - ( 03 Apr 2025 11:46 )  PTT:33.2 sec    I&O's Summary    04 Apr 2025 07:01  -  04 Apr 2025 13:53  --------------------------------------------------------  IN: 430 mL / OUT: 0 mL / NET: 430 mL          Urinalysis with Rflx Culture (collected 03 Apr 2025 18:00)        CAPILLARY BLOOD GLUCOSE      RADIOLOGY & ADDITIONAL TESTS:                   MS3 Progress Note discussed with resident and attending    PLEASE MS TEAMS RESIDENT TILL 5:00 PM  PLEASE CONTACT ON CALL TEAM:  - On Call Team (Please refer to Pool) FROM 5:00 PM - 8:30PM  - Nightfloat Team FROM 8:30 -7:30 AM      INTERVAL HPI/OVERNIGHT EVENTS: Patient seen and examined at bedside this morning. Pt is [place] and AOx2. Pt reports back pain (flank pain). Pt reported feeling fatigued. Pt arango catheter fluid is cloudy. Pt reports no other complaints.     ---------------------------    REVIEW OF SYSTEMS:  CONSTITUTIONAL: No fever, weight loss; positive fatigue  RESPIRATORY: No cough, wheezing, chills, hemoptysis, shortness of breath  CARDIOVASCULAR: No chest pain, palpitations, dizziness, leg swelling  GASTROINTESTINAL: No abdominal pain, nausea, vomiting, hematemesis, diarrhea, constipation, melena, hematochezia  GENITOURINARY: No dysuria, hematuria, urinary frequency  NEUROLOGICAL: No headaches, memory loss, loss of strength, numbness, tremors  SKIN: No itching, burning, rashes, lesions     MEDICATIONS  (STANDING):  acetaminophen     Tablet .. 1000 milliGRAM(s) Oral every 8 hours  apixaban 2.5 milliGRAM(s) Oral two times a day  artificial  tears Solution 1 Drop(s) Both EYES two times a day  ascorbic acid 500 milliGRAM(s) Oral daily  cefTRIAXone   IVPB 1000 milliGRAM(s) IV Intermittent every 24 hours  digoxin     Tablet 125 MICROGram(s) Oral daily  diltiazem    milliGRAM(s) Oral daily  donepezil 10 milliGRAM(s) Oral at bedtime  lactated ringers. 1000 milliLiter(s) (75 mL/Hr) IV Continuous <Continuous>  mirtazapine 7.5 milliGRAM(s) Oral at bedtime  multivitamin 1 Tablet(s) Oral daily  tiZANidine 6 milliGRAM(s) Oral three times a day    MEDICATIONS  (PRN):  acetaminophen     Tablet .. 650 milliGRAM(s) Oral every 6 hours PRN Temp greater or equal to 38C (100.4F), Mild Pain (1 - 3)  melatonin 3 milliGRAM(s) Oral at bedtime PRN Insomnia  ondansetron Injectable 4 milliGRAM(s) IV Push every 8 hours PRN Nausea and/or Vomiting      Vital Signs Last 24 Hrs  T(C): 36.9 (04 Apr 2025 11:11), Max: 37.6 (04 Apr 2025 05:16)  T(F): 98.4 (04 Apr 2025 11:11), Max: 99.7 (04 Apr 2025 05:16)  HR: 95 (04 Apr 2025 11:11) (95 - 133)  BP: 117/52 (04 Apr 2025 11:11) (108/91 - 141/67)  BP(mean): 85 (04 Apr 2025 05:16) (79 - 85)  RR: 18 (04 Apr 2025 11:11) (18 - 27)  SpO2: 97% (04 Apr 2025 11:11) (94% - 98%)    Parameters below as of 04 Apr 2025 11:11  Patient On (Oxygen Delivery Method): room air        -----------------------------    PHYSICAL EXAMINATION:  GENERAL: NAD, lying in bed  HEAD:  Atraumatic, Normocephalic  EYES:  conjunctiva and sclera clear  NECK: Supple, No JVD  CHEST/LUNG: Clear to auscultation bilaterally; No rales, rhonchi, wheezing, or rubs  HEART: Regular rate and rhythm; No murmurs, rubs, or gallops  ABDOMEN: Soft, Nontender, Nondistended; Bowel sounds present, no guarding; pt has verbal reactions to palpation of the LUQ however endorses no pain in the abdomen.  NERVOUS SYSTEM:  Alert & Oriented X2  : voiding well  EXTREMITIES:  2+ Peripheral Pulses, No clubbing, cyanosis, or edema  SKIN: warm dry                          11.5   10.30 )-----------( 211      ( 04 Apr 2025 05:15 )             39.6     04-04    160[HH]  |  129[H]  |  49[H]  ----------------------------<  212[H]  3.6   |  30  |  1.01    Ca    8.3[L]      04 Apr 2025 05:15  Phos  1.7     04-04  Mg     2.7     04-04    TPro  6.5  /  Alb  2.2[L]  /  TBili  0.5  /  DBili  x   /  AST  29  /  ALT  27  /  AlkPhos  56  04-04    LIVER FUNCTIONS - ( 04 Apr 2025 05:15 )  Alb: 2.2 g/dL / Pro: 6.5 g/dL / ALK PHOS: 56 U/L / ALT: 27 U/L DA / AST: 29 U/L / GGT: x               PT/INR - ( 03 Apr 2025 11:46 )   PT: 14.7 sec;   INR: 1.27 ratio         PTT - ( 03 Apr 2025 11:46 )  PTT:33.2 sec    I&O's Summary    04 Apr 2025 07:01  -  04 Apr 2025 13:53  --------------------------------------------------------  IN: 430 mL / OUT: 0 mL / NET: 430 mL          Urinalysis with Rflx Culture (collected 03 Apr 2025 18:00)        CAPILLARY BLOOD GLUCOSE      RADIOLOGY & ADDITIONAL TESTS:

## 2025-04-04 NOTE — SWALLOW BEDSIDE ASSESSMENT ADULT - PHARYNGEAL PHASE
Delayed pharyngeal swallow Delayed pharyngeal swallow/Cough post oral intake/Delayed cough post oral intake/Multiple swallows Delayed pharyngeal swallow/Decreased laryngeal elevation/Throat clear post oral intake/Delayed cough post oral intake/Multiple swallows Within functional limits

## 2025-04-04 NOTE — SWALLOW BEDSIDE ASSESSMENT ADULT - SWALLOW EVAL: DIAGNOSIS
Pt p/w s&s oropharyngeal dysphagia, exacerbated by posture, c/b reduced oral aperture (resulting in limited PO acceptance), decreased bolus formation, prolonged mastication, slow A-P transport, oral stasis, multiple swallows, delayed swallow trigger, & decreased hyolaryngeal elevation. Cough on thin liquid trials & PO trials of larger presentation size. Malnutrition risk present.

## 2025-04-04 NOTE — SWALLOW BEDSIDE ASSESSMENT ADULT - ORAL PHASE
Within functional limits Decreased anterior-posterior movement of the bolus/Delayed oral transit time/Lingual stasis Decreased anterior-posterior movement of the bolus slight holding/Decreased anterior-posterior movement of the bolus

## 2025-04-04 NOTE — SWALLOW BEDSIDE ASSESSMENT ADULT - SLP PERTINENT HISTORY OF CURRENT PROBLEM
85y/F, from [facility], bedbound, PMH of Dementia (A&OX1 at baseline), Afib,  OP with T12 compression fracture sp spinal fusion, recurrent UTI was brought in by EMS for hypotension and fever at rehab. Meet sepsis Criteria. Na 163, UA positive. Admitted for sepsis 2/2 UTI with Afib with RVR and hypernatremia

## 2025-04-04 NOTE — SWALLOW BEDSIDE ASSESSMENT ADULT - COMMENTS
Pt began showing signs of fatigue fatigued easily Pt is AA+Ox1 (name only), confused (stated she lived with  & son; offered non-sequitur responses), +contracted; Positioned elevated to 45°. Mild cachexia. +UTI.

## 2025-04-04 NOTE — CONSULT NOTE ADULT - ASSESSMENT
Patient is a 84yo Female from [facility] with Dementia (A&OX1 at baseline), Afib, T12 compression fracture sp spinal fusion, recurrent UTI presents with hypotension and fever. Pt a/w Sepsis 2/2/ UTI, hypernatremia and ODILON. Nephrology consulted for hypernatremia and ODILON.     1. Hypernatremia- due to poor PO Intake. Free water deficit on admission is 4.3L. Recc D5 1/2 NS @ 75ml/hr. Monitor Serum Na.   2. ODILON- likely pre-renal. Renal function improving with IVF. Strict I/Os. Avoid nephrotoxins/ NSAIDs/ RCA. Monitor BMP.  3. Sepsis 2/2 UTI- +UA. f/u UCX and BCx. Pt on Ceftriaxone.   4. Atrial Fibrillation- pt on Digoxin/ Cardizem CD for rate control. Pt on Eliquis.       [facility]  Sung Alan M.D.  Ole White D.O.  Natalie Dietz M.D.  MD Donna Carter, MSN, ANP-C    Telephone: [phone]  Facsimile: [phone]    [address] [place] [address], [address]  [place] [address]

## 2025-04-04 NOTE — PATIENT PROFILE ADULT - DOES PATIENT HAVE ADVANCE DIRECTIVE
UA negative, monitor off Abx  Monitor urine output  obtain post void bladder scan to make sure not retaining Yes

## 2025-04-04 NOTE — SWALLOW BEDSIDE ASSESSMENT ADULT - MODE OF PRESENTATION
cup & spoon unsuccessful for feeding due to positioning/straw/fed by clinician spoon/straw x 4/spoon x 3/spoon/fed by clinician

## 2025-04-04 NOTE — PATIENT PROFILE ADULT - [id number]
Transplant Recipient Adult Psychosocial Assessment    SW completed assessment with patient and pt's friend & Christian superior Fr. Jonathan Rose in clinic.    Mark Lopez  [address] [place] [id number]  Telephone Information:   Mobile [phone]   Home  [phone] (home)  Work  There is no work phone number on file.  E-mail  [email or web address]    Sex: male  YOB: 1961  Age: 62 y.o.    Encounter Date: 2023  [place] Citizen: yes  Primary Language: English   Needed: no    Emergency Contact:  Name: Fr. Jonathan Hardin Rose, O.P.  Relationship: friend and Christian superior  Address: same as patient ([address] [place] [address]; [place] [address])  Phone Numbers: [phone] (work); [phone] (mobile)    Family/Social Support:   Number of dependents/: None  Marital history: Pt is single and has never been .  Other family dynamics: Pt's father and sister is .  Pt's mother is living in [place], [place].  Pt's brother lives in [place], [place] and is a dialysis patient as well.  Patient is a [place]  and brother / friar belonging to the Hindu Christian order, [facility].  Patient reports living with 13 other priests & brothers / friars (ages 45-85) at the [facility] at [address] [place] [address].  Pt reports he and 13 other brothers / friars share living quarters and all financial resources and expenses.  Pt reports the 13 other priests & friars will always be available to provide support post-transplant for any / all needs.  Pt reports currently spending most of his time working on the [employer] at the [employer] in [place], [place].    Household Composition:  Patient reports living with 13 other priests & brothers / friars (ages 45-85) at the [facility] at [address] [place] [address].  Pt states  at [facility] is as follows:  Name: Fr. Jonathan Hardin Milton O.P. ([phone])  Age: 58  Relationship: friend & Holiness superior  Does person drive? yes    However, pt reports currently spending most of his time working on the [employer] at the [facility] [employer] in [place], [place].  Pt states  at [facility] is as follows:  Name: Fr. Avery Andres O.P. ([phone])  Age: 60s  Relationship:   at [facility]  Does person drive? yes    Do you and your caregivers have access to reliable transportation? yes  PRIMARY CAREGIVER: Fr. Castillo ELIAS Kapadia., pt's friend & Holiness superior, will be primary caregiver, phone number [phone].      provided in-depth information to patient and caregiver regarding pre- and post-transplant caregiver role.   strongly encourages patient and caregiver to have concrete plan regarding post-transplant care giving, including back-up caregiver(s) to ensure care giving needs are met as needed.    Patient and Caregiver states understanding all aspects of caregiver role/commitment and is able/willing/committed to being caregiver to the fullest extent necessary.    Patient and Caregiver verbalizes understanding of the education provided today and caregiver responsibilities.         remains available. Patient and Caregiver agree to contact  in a timely manner if concerns arise.      Able to take time off work without financial concerns: yes.     Additional Significant Others who will Assist with Transplant:  Pt reports the 13 other priests & friars living with him at [facility] will always be available to provide support post-transplant for any / all needs.    Living Will: yes  Healthcare Power of : yes - Pt reports having completed LW & HCPA documents and designates [facility] ([employer]onden[employer] order) ([phone]) as HCPA.  SW strongly encouraged pt to provide these documents to [facility] as soon as possible for entering into EMR.  Advance Directives on file: <<no information> per medical record.  Verbally reviewed LW/HCPA information.   provided patient with copy of LW/HCPA documents and provided education on completion of forms.    Living Donors: No. Education and resource information given to patient.    Highest Education Level: Post-College Graduate Degree - pt holds multiple Masters Degrees including M. Div. & M.A.  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    Spouse/Significant Other Employment: N/A    Disabled: no    Monthly Income:  Other: $30k-35k  (combined income / shelter of all residents)  Able to afford all costs now and if transplanted, including medications: yes  Patient and Caregiver verbalizes understanding of personal responsibilities related to transplant costs and the importance of having a financial plan to ensure that patients transplant costs are fully covered.      provided fundraising information/education.  Patient and Caregiver verbalizes understanding.   remains available.    Insurance:   Payer/Plan Subscr  Sex Relation Sub. Ins. ID Effective Group Num   1. BLUE CROSS BL* ERASTO,KRISTIE KAY 1961 Male Self [id number]                                   [address] [address]     Primary Insurance (for UNOS reporting): Private Insurance - BCBS via employment benefit  Secondary Insurance (for UNOS reporting):  None  Patient and Caregiver verbalizes clear understanding that patient may experience difficulty obtaining and/or be denied insurance coverage post-surgery. This includes and is not limited to disability insurance, life insurance, health insurance, burial insurance, long term care insurance, and other insurances.    Patient and Caregiver also reports understanding that future health concerns related to or unrelated to transplantation may not be covered by patient's insurance.  Resources and information provided and reviewed.      Patient and Caregiver provides verbal permission to release any necessary information to outside resources for patient care and discharge planning.  Resources and information provided are reviewed.      Dialysis Adherence:  Patient reports currently receiving peritoneal dialysis, reports staring dialysis in May 2023, and reports maintaining full adherence to dialysis treatment regimen.  Dialysis adherence form completed and returned by patient's dialysis unit can be found under 'Media' section of EMR.  Compliance reported as satisfactory.    Infusion Service: patient utilizing? no  Home Health: patient utilizing? no  DME: no  Pulmonary/Cardiac Rehab: no   ADLS:  Pt reports being independent with all ADLs and mobility and driving himself.    Adherence:   Pt/caregiver reports pt has exhibited good adherence to medication regimen, appointment schedule, and medical recommendations in the past.  Adherence education and counseling provided.     Per History Section:  Past Medical History:   Diagnosis Date    Anemia     Cataract     Diabetes mellitus     Diabetes mellitus, type 2     Glaucoma     Gout, unspecified     HLD (hyperlipidemia)     Hypertension     Hypothyroidism, unspecified     Kidney failure     Renal disorder      Social History     Tobacco Use    Smoking status: Never    Smokeless tobacco: Never   Substance Use Topics    Alcohol use: Never     Social History     Substance and  Sexual Activity   Drug Use Never     Social History     Substance and Sexual Activity   Sexual Activity Not Currently       Per Today's Psychosocial:  Tobacco: none, patient denies any use.  Alcohol: none, patient denies any use.  Illicit Drugs/Non-prescribed Medications: none, patient denies any use.    Patient and Caregiver states clear understanding of the potential impact of substance use as it relates to transplant candidacy and is aware of possible random substance screening.  Substance abstinence/cessation counseling, education and resources provided and reviewed.     Arrests/DWI/Treatment/Rehab: patient denies    Psychiatric History:    Mental Health: Pt reports previous diagnoses of adjustment disorder in his 20's and dysthymia in 2011.  Pt reports receiving psychotherapy both times with good outcomes.  Pt denies any current mental health difficulties.  Psychiatrist/Counselor: Pt reports receiving psychotherapy from counselors in his 20's and in 2011.  Medications:  Pt denies ever being prescribed medications for mental health care.  Suicide/Homicide Issues:  Pt reports experiencing passive SI in his 20's at time he received diagnosis of adjustment disorder.  Pt denies ever experiencing HI and denies experiencing any SI since his 20's.  Safety at home: Pt denies having any past or present concerns regarding safety at home including but not limited to physical/emotional/sexual abuse, neglect, or exploitation.    Knowledge: Patient and Caregiver states having clear understanding and realistic expectations regarding the potential risks and potential benefits of organ transplantation and organ donation, agrees to discuss with health care team members and support system members, and to utilize available resources and express questions and/or concerns in order to further facilitate the pt informed decision-making.  Resources and information provided and reviewed.     Patient and Caregiver is aware of [facility]  affiliation and/or partnership with agencies in home health care, LTAC, SNF, [facility], and other hospitals and clinics.    Understanding: Patient and Caregiver reports having a clear understanding of the many lifetime commitments involved with being a transplant recipient, including costs, compliance, medications, lab work, procedures, appointments, concrete and financial planning, preparedness, timely and appropriate communication of concerns, abstinence (ETOH, tobacco, illicit non-prescribed drugs), adherence to all health care team recommendations, support system and caregiver involvement, appropriate and timely resource utilization and follow-through, mental health counseling as needed/recommended, and patient and caregiver responsibilities.  Social Service Handbook, resources and detailed educational information provided and reviewed.  Educational information provided.    Patient and Caregiver also reports current and expected compliance with health care regime and states having a clear understanding of the importance of compliance.      Patient and Caregiver reports a clear understanding that risks and benefits may be involved with organ transplantation and with organ donation.      Patient and Caregiver also reports clear understanding that psychosocial risk factors may affect patient, and include but are not limited to feelings of depression, generalized anxiety, anxiety regarding dependence on others, post traumatic stress disorder, feelings of guilt and other emotional and/or mental concerns, and/or exacerbation of existing mental health concerns.  Detailed resources provided and discussed.     Patient and Caregiver agrees to access appropriate resources in a timely manner as needed and/or as recommended, and to communicate concerns appropriately.  Patient and Caregiver also reports a clear understanding of treatment options available.      reviewed education, provided additional information, and  answered questions.    Feelings or Concerns: Pt expresses motivation to continue pursuing transplant and denies any transplant related concerns at this time.    Coping: Identify Patient & Caregiver Strategies to [place]:   1. In the past - walking, reading, cooking, bib, martial arts   2. Currently & Pre-transplant - bib, walking, reading, and cooking   3. At the time of surgery - bib, reading, walking   4. During post-Transplant & Recovery Period - all of the above    Goals: Pt reports goals of transplant included regaining strength and returning to practicing martial arts.    Interview Behavior: Patient presents as alert and oriented x 4, pleasant, good eye contact, well groomed, recall good, concentration/judgement good, average intelligence, calm, communicative, cooperative, and asking and answering questions appropriately.  Patient was accompanied to clinic visit by friend and Anglican superior FrMartín Rose who was observed to be pre-occupied on his cell phone throughout the majority of interview.  However, Fr. Rose expressed understanding of pt's support needs for transplant and verbalized ability (along with the rest of the Anglican order) to ensure pt has all needs met for post-transplant recovery.         Transplant Social Work - Candidacy  Assessment/Plan:     Psychosocial Suitability: Based on psychosocial risk factors, patient presents as  a low risk candidate for kidney transplant  at this time due to established caregiver plan, presence of robust support system, no reported history of substance abuse, healthy and effective coping strategies, adequate insurance coverage and personal finances to cover transplant cost, and realistic beliefs and expectations regarding risks and benefits of transplant.    Final determination of transplant candidacy to be made by Transplant Selection Committee.    Recommendations/Additional Comments: SW recommends that pt conduct fundraising to assist pt  with pay for cost of medications, food, gas, and other transplant related needs.  SW recommends that pt remain aware of potential mental health concerns and contact the team if any concerns arise.  SW recommends that pt remain abstinent from tobacco, ETOH, and drug use.  SW supports pt's continued adherence. SW remains available to answer any questions or concerns that arise as the pt moves through the transplant process.     Tyshawn Wakefield              suspected deep tissue injury

## 2025-04-04 NOTE — PROGRESS NOTE ADULT - PROBLEM SELECTOR PLAN 3
EKG showing Afib with RVR; non sustaining  pt on Cardizem and digoxin  s/p cardizem 10 IVP   likely iso sepsis and dehydration  -admit to tele  -c/w cardizem and digoxin  -c/w eliquis. EKG showing Afib with RVR; non sustaining  pt on Cardizem and digoxin  s/p cardizem 10 IVP   likely iso sepsis and dehydration  - c/w cardizem and digoxin for rate control   - c/w eliquis for AC  - tele-monitoring, goal HR <110

## 2025-04-04 NOTE — PROGRESS NOTE ADULT - PROBLEM SELECTOR PLAN 1
pt A&OX1 but lethargic; Pt is AOx2 this AM and endorses fatigue  Temp 1.3, , /72, RR 25, 96% on 2L  wbc 14.88, na 163, Scr 1.45, lactate 2.0  COVID, flu rsv negative  UA positive  EKG: Afib with RVR  sepsis with ams 2/2 UTI  s/p 1.8L NS, 1L LR in ed  s/p cefepime and Vanc in ed    -c/w Ceftriaxone   -c/w IVF   -NPO iso lethargy  -may resume diet when pt more awake  -f/u UCx, BCx,  -f/u S&S eval. pt A&OX1 but lethargic; Pt is AOx2 this AM and endorses fatigue  Temp 1.3, , /72, RR 25, 96% on 2L  wbc 14.88, na 163, Scr 1.45, lactate 2.0  COVID, flu rsv negative  UA positive  EKG: Afib with RVR  sepsis with ams 2/2 UTI  s/p 1.8L NS, 1L LR in ed  s/p cefepime and Vanc in ed-- completed    -c/w Ceftriaxone   -c/w IVF   -NPO iso lethargy  -may resume diet when pt more awake  -f/u UCx, BCx,  -f/u S&S eval. p/w hypotension and AMS  Temp 1.3, , /72, RR 25, 96% on 2L  wbc 14.88, na 163, Scr 1.45, lactate 2.0  COVID/flu/RSV negative  UA positive  EKG Afib with RVR  sepsis with ams 2/2 UTI  s/p 1.8L NS, 1L LR + cefepime and Vanc in ED   - c/w ceftriaxone for UTI   - c/w IVF   - f/u UCx + BCx

## 2025-04-04 NOTE — PROGRESS NOTE ADULT - PROBLEM SELECTOR PLAN 4
p/w Na 163>163; 160 this AM  s/p 1L LR, 1.8L NS in ed  pt with poor oral hygiene and poor oral intake  free water deficit 4.3L    -c/w LR IVF  -monitor BMP q12  -monitor volume status. Na 163 on admission  s/p 1L LR, 1.8L NS in ed  likely 2/2 poor oral hygiene and poor oral intake  free water deficit 4.3L  - c/w LR IVF  - monitor BMP q12  - monitor volume status  - nephro Dr. Dietz consulted

## 2025-04-04 NOTE — PROGRESS NOTE ADULT - ATTENDING COMMENTS
Patient seen and examined  this morning around 11.30 AM     85 F, from [facility], bedbound, PMHx dementia, Afib, OP with T12 compression fracture s/p spinal fusion, recurrent UTI p/w hypotension and fever. Na 163, UA +ve. Admitted for sepsis 2/2 UTI + Afib with RVR and hyperNa.    Vital Signs Last 24 Hrs  T(C): 36.3 (04 Apr 2025 17:10), Max: 37.6 (04 Apr 2025 05:16)  T(F): 97.3 (04 Apr 2025 17:10), Max: 99.7 (04 Apr 2025 05:16)  HR: 122 (04 Apr 2025 17:10) (95 - 133)  BP: 107/80 (04 Apr 2025 17:10) (107/80 - 136/67)  BP(mean): 85 (04 Apr 2025 05:16) (79 - 85)  RR: 19 (04 Apr 2025 17:10) (18 - 24)  SpO2: 96% (04 Apr 2025 17:10) (94% - 98%): nasal cannula  O2 Flow (L/min): 1    Labs reviewed                      11.5   10.30 )-----------( 211      ( 04 Apr 2025 05:15 )             39.6     04-04  158[H]  |  127[H]  |  39[H]  ----------------------------<  164[H]  4.0   |  29  |  0.81  Ca    7.8[L]      04 Apr 2025 15:45  Phos  2.3     04-04  Mg     2.4     04-04  TPro  6.5  /  Alb  2.2[L]  /  TBili  0.5  /  DBili  x   /  AST  29  /  ALT  27  /  AlkPhos  56  04-04      Low dig level  CXR - no acute findings    Old urine cxs - consistently show E.coli almost pansensitive with R only to quinolones and penicillin    Impression   85 year old woman- resident of [facility] with medical hx including dementia, hx of frequent UTIs, HTN, HLD, OP with T12 compression fracture sp spinal fusion brought in from [facility] with fevers.  In the ED, she meets sepsis criteria with work up showing UTI with dehydration, ODILON and severe hypernatremia with acid base and electrolyte disturbance ( with metabolic alkalosis with resp compensation and hypophosphatemia.  She appears to have a mild change in mental status --> acute encephalopathy.  She is admitted for further management.    Plan   Continue IV hydration; Will give a bolus 500 ml followed by maintenance fluid switch to NS/LR x 24 hrs; once Euvolemic will switch to Hypotonic fluids  Patient is very dehydrated  Sepsis work up  Empiric antibiotics; s/p cefepime in the ED; will continue with ceftriaxone.  Nephrology consult d/w Dr. Dietz  Fall and aspiration precaution   Passed Dysphagia screen; Advanced to pureed   Replenish phosphate  Med reconciliation; resume appropriate home meds once mental status improves. Patient seen and examined  this morning around 11.30 AM;     85 F, from [facility], bedbound, PMH of Dementia, Afib, Osteoporosis, T12 compression fracture s/p spinal fusion, recurrent UTI p/w hypotension and fever. Na 163, UA +ve. Admitted for sepsis 2/2 UTI + Afib with RVR and hyperNa.  In the ED, she met sepsis criteria with work up showing UTI with dehydration, ODILON and severe hypernatremia with acid base and electrolyte disturbance ( with metabolic alkalosis with resp compensation and hypophosphatemia with mild change in mental status --> acute encephalopathy.    Patient  today appears improved with fluids and antibiotics more alert and awake participate in conversation, c/o severe back pain; passed dysphagia screen. No fever; Robb placed with cloudy urine output.     Vital Signs Last 24 Hrs  T(C): 36.3 (04 Apr 2025 17:10), Max: 37.6 (04 Apr 2025 05:16)  T(F): 97.3 (04 Apr 2025 17:10), Max: 99.7 (04 Apr 2025 05:16)  HR: 122 (04 Apr 2025 17:10) (95 - 133)  BP: 107/80 (04 Apr 2025 17:10) (107/80 - 136/67)  BP(mean): 85 (04 Apr 2025 05:16) (79 - 85)  RR: 19 (04 Apr 2025 17:10) (18 - 24)  SpO2: 96% (04 Apr 2025 17:10) (94% - 98%): nasal cannula  O2 Flow (L/min): 1    Labs reviewed                      11.5   10.30 )-----------( 211      ( 04 Apr 2025 05:15 )             39.6     04-04  158[H]  |  127[H]  |  39[H]  ----------------------------<  164[H]  4.0   |  29  |  0.81  Ca    7.8[L]      04 Apr 2025 15:45  Phos  2.3     04-04  Mg     2.4     04-04  TPro  6.5  /  Alb  2.2[L]  /  TBili  0.5  /  DBili  x   /  AST  29  /  ALT  27  /  AlkPhos  56  04-04    Digoxin Level, Serum (04.03.25 @ 11:46) Digoxin Level, Serum: 0.6 ng/mL    Xray Chest 1 View-PORTABLE IMMEDIATE (04.03.25 @ 12:47)   IMPRESSION: No acute cardiopulmonary disease process.    A/P:  Acute encephalopathy due to likely  Sepsis with Acute UTI with sever Acute Hypernatremia with   Acute Kidney injury due to Prerenal azotemia with Severe dehydration  Atrial fib with RVR due to underlying sepsis and severe dehydration  Dementia  Sacral pressure ulcers  Severe Protein calorie malnutrition      Plan   Continue IV hydration; Will give a bolus 500 ml followed by maintenance fluid switch to NS/LR x 24 hrs; once Euvolemic will switch to Hypotonic fluids  Sepsis work up; Follow up Blood and Urine Cx testing  Empiric antibiotics; s/p cefepime in the ED; will continue with ceftriaxone. ID consult   Nephrology consult d/w Dr. Dietz; discussed will use isotonic until Euvolemia then hypotonic  Monitor Na levels q 8 hrs to twice dialy; Dop not drop more than 6 to 8 meq in 24 hrs  Fall and aspiration precaution   Passed Dysphagia screen; Pureed ordered  Speech and swallow eval appreciated; Recommend Minced and moist with mildly thicjk liquids  Replenish electrolyte imbalances  Tylenol 1gm q 8 hrs routine please; Patient has Hx compression fractures  Sever pain can use Oxycodone  GOC: DNR status    Discussed with Housestaff; PGY1 updated family at bedside earlier this morning  Discussed with RN

## 2025-04-04 NOTE — PROGRESS NOTE ADULT - ASSESSMENT
85y/F, from [facility], bedbound, PMH of Dementia (A&OX1 at baseline), Afib,  OP with T12 compression fracture sp spinal fusion, recurrent UTI was brought in by EMS for hypotension and fever at rehab. Meet sepsis Criteria. Na 163, UA positive. Admitted for sepsis 2/2 UTI with Afib with RVR and hypernatremia. Pt admitted to telemetry 04/04/25.    85 F, from [facility], bedbound, PMHx dementia, Afib, OP with T12 compression fracture s/p spinal fusion, recurrent UTI p/w hypotension and fever. Na 163, UA +ve. Admitted for sepsis 2/2 UTI + Afib with RVR and hyperNa. 85 F, from [facility], bedbound, PMHx dementia, Afib, OP with T12 compression fracture s/p spinal fusion, recurrent UTI p/w hypotension and fever. Na 163, UA +ve. Admitted for sepsis 2/2 UTI + Afib with RVR and hyperNa.

## 2025-04-04 NOTE — SWALLOW BEDSIDE ASSESSMENT ADULT - ORAL PREPARATORY PHASE
Reduced oral grading/Decreased mastication ability/Bolus falls into left lateral sulci Within functional limits reduced intra oral pressure/Within functional limits reduced intra oral pressure/Reduced oral grading Reduced oral grading

## 2025-04-04 NOTE — PROGRESS NOTE ADULT - PROBLEM SELECTOR PLAN 5
p/w Scr 1.45>0.98  prerenal iso sepsis and dehydration  resolved with IVF    - c/w IVF Scr 1.45 on admission  likely prerenal ISO sepsis and dehydration  SCr improved  arango catheter placed 4/4  - monitor UOP x 24hrs   - monitor BMP daily

## 2025-04-05 DIAGNOSIS — A41.9 SEPSIS, UNSPECIFIED ORGANISM: ICD-10-CM

## 2025-04-05 LAB
A1C WITH ESTIMATED AVERAGE GLUCOSE RESULT: 6.2 % — HIGH (ref 4–5.6)
ALBUMIN SERPL ELPH-MCNC: 1.9 G/DL — LOW (ref 3.5–5)
ALP SERPL-CCNC: 46 U/L — SIGNIFICANT CHANGE UP (ref 40–120)
ALT FLD-CCNC: 23 U/L DA — SIGNIFICANT CHANGE UP (ref 10–60)
ANION GAP SERPL CALC-SCNC: 2 MMOL/L — LOW (ref 5–17)
ANION GAP SERPL CALC-SCNC: 4 MMOL/L — LOW (ref 5–17)
AST SERPL-CCNC: 29 U/L — SIGNIFICANT CHANGE UP (ref 10–40)
BILIRUB SERPL-MCNC: 0.3 MG/DL — SIGNIFICANT CHANGE UP (ref 0.2–1.2)
BUN SERPL-MCNC: 28 MG/DL — HIGH (ref 7–18)
BUN SERPL-MCNC: 35 MG/DL — HIGH (ref 7–18)
CALCIUM SERPL-MCNC: 7.8 MG/DL — LOW (ref 8.4–10.5)
CALCIUM SERPL-MCNC: 7.9 MG/DL — LOW (ref 8.4–10.5)
CHLORIDE SERPL-SCNC: 124 MMOL/L — HIGH (ref 96–108)
CHLORIDE SERPL-SCNC: 126 MMOL/L — HIGH (ref 96–108)
CO2 SERPL-SCNC: 27 MMOL/L — SIGNIFICANT CHANGE UP (ref 22–31)
CO2 SERPL-SCNC: 27 MMOL/L — SIGNIFICANT CHANGE UP (ref 22–31)
CREAT SERPL-MCNC: 0.7 MG/DL — SIGNIFICANT CHANGE UP (ref 0.5–1.3)
CREAT SERPL-MCNC: 0.76 MG/DL — SIGNIFICANT CHANGE UP (ref 0.5–1.3)
CULTURE RESULTS: ABNORMAL
EGFR: 76 ML/MIN/1.73M2 — SIGNIFICANT CHANGE UP
EGFR: 76 ML/MIN/1.73M2 — SIGNIFICANT CHANGE UP
EGFR: 84 ML/MIN/1.73M2 — SIGNIFICANT CHANGE UP
EGFR: 84 ML/MIN/1.73M2 — SIGNIFICANT CHANGE UP
ESTIMATED AVERAGE GLUCOSE: 131 MG/DL — HIGH (ref 68–114)
GLUCOSE SERPL-MCNC: 128 MG/DL — HIGH (ref 70–99)
GLUCOSE SERPL-MCNC: 173 MG/DL — HIGH (ref 70–99)
HCT VFR BLD CALC: 32.5 % — LOW (ref 34.5–45)
HGB BLD-MCNC: 9.6 G/DL — LOW (ref 11.5–15.5)
MAGNESIUM SERPL-MCNC: 2.5 MG/DL — SIGNIFICANT CHANGE UP (ref 1.6–2.6)
MCHC RBC-ENTMCNC: 29.5 G/DL — LOW (ref 32–36)
MCHC RBC-ENTMCNC: 29.8 PG — SIGNIFICANT CHANGE UP (ref 27–34)
MCV RBC AUTO: 100.9 FL — HIGH (ref 80–100)
NRBC BLD AUTO-RTO: 0 /100 WBCS — SIGNIFICANT CHANGE UP (ref 0–0)
PHOSPHATE SERPL-MCNC: 2.2 MG/DL — LOW (ref 2.5–4.5)
PLATELET # BLD AUTO: 161 K/UL — SIGNIFICANT CHANGE UP (ref 150–400)
POTASSIUM SERPL-MCNC: 3.5 MMOL/L — SIGNIFICANT CHANGE UP (ref 3.5–5.3)
POTASSIUM SERPL-MCNC: 4 MMOL/L — SIGNIFICANT CHANGE UP (ref 3.5–5.3)
POTASSIUM SERPL-SCNC: 3.5 MMOL/L — SIGNIFICANT CHANGE UP (ref 3.5–5.3)
POTASSIUM SERPL-SCNC: 4 MMOL/L — SIGNIFICANT CHANGE UP (ref 3.5–5.3)
PROT SERPL-MCNC: 5.4 G/DL — LOW (ref 6–8.3)
RBC # BLD: 3.22 M/UL — LOW (ref 3.8–5.2)
RBC # FLD: 16 % — HIGH (ref 10.3–14.5)
SODIUM SERPL-SCNC: 155 MMOL/L — HIGH (ref 135–145)
SODIUM SERPL-SCNC: 155 MMOL/L — HIGH (ref 135–145)
SPECIMEN SOURCE: SIGNIFICANT CHANGE UP
WBC # BLD: 9.24 K/UL — SIGNIFICANT CHANGE UP (ref 3.8–10.5)
WBC # FLD AUTO: 9.24 K/UL — SIGNIFICANT CHANGE UP (ref 3.8–10.5)

## 2025-04-05 PROCEDURE — 99233 SBSQ HOSP IP/OBS HIGH 50: CPT | Mod: GC

## 2025-04-05 PROCEDURE — 99222 1ST HOSP IP/OBS MODERATE 55: CPT

## 2025-04-05 RX ORDER — DONEPEZIL HYDROCHLORIDE 5 MG/1
5 TABLET ORAL AT BEDTIME
Refills: 0 | Status: DISCONTINUED | OUTPATIENT
Start: 2025-04-05 | End: 2025-04-07

## 2025-04-05 RX ORDER — SODIUM CHLORIDE 9 G/1000ML
500 INJECTION, SOLUTION INTRAVENOUS ONCE
Refills: 0 | Status: COMPLETED | OUTPATIENT
Start: 2025-04-05 | End: 2025-04-05

## 2025-04-05 RX ORDER — POTASSIUM CHLORIDE, DEXTROSE MONOHYDRATE AND SODIUM CHLORIDE 150; 5; 900 MG/100ML; G/100ML; MG/100ML
1000 INJECTION, SOLUTION INTRAVENOUS
Refills: 0 | Status: DISCONTINUED | OUTPATIENT
Start: 2025-04-05 | End: 2025-04-07

## 2025-04-05 RX ORDER — SODIUM CHLORIDE 9 G/1000ML
500 INJECTION, SOLUTION INTRAVENOUS ONCE
Refills: 0 | Status: DISCONTINUED | OUTPATIENT
Start: 2025-04-05 | End: 2025-04-05

## 2025-04-05 RX ORDER — DILTIAZEM HYDROCHLORIDE 240 MG/1
120 TABLET, EXTENDED RELEASE ORAL DAILY
Refills: 0 | Status: DISCONTINUED | OUTPATIENT
Start: 2025-04-06 | End: 2025-04-07

## 2025-04-05 RX ORDER — SOD PHOS DI, MONO/K PHOS MONO 250 MG
1 TABLET ORAL ONCE
Refills: 0 | Status: COMPLETED | OUTPATIENT
Start: 2025-04-05 | End: 2025-04-05

## 2025-04-05 RX ORDER — POLYETHYLENE GLYCOL 3350 17 G/17G
17 POWDER, FOR SOLUTION ORAL DAILY
Refills: 0 | Status: COMPLETED | OUTPATIENT
Start: 2025-04-05 | End: 2025-04-07

## 2025-04-05 RX ORDER — SENNA 187 MG
2 TABLET ORAL AT BEDTIME
Refills: 0 | Status: DISCONTINUED | OUTPATIENT
Start: 2025-04-05 | End: 2025-04-07

## 2025-04-05 RX ADMIN — CEFTRIAXONE 100 MILLIGRAM(S): 500 INJECTION, POWDER, FOR SOLUTION INTRAMUSCULAR; INTRAVENOUS at 11:10

## 2025-04-05 RX ADMIN — APIXABAN 2.5 MILLIGRAM(S): 2.5 TABLET, FILM COATED ORAL at 17:56

## 2025-04-05 RX ADMIN — Medication 1 DROP(S): at 17:56

## 2025-04-05 RX ADMIN — Medication 1 DROP(S): at 05:34

## 2025-04-05 RX ADMIN — MIRTAZAPINE 7.5 MILLIGRAM(S): 30 TABLET, FILM COATED ORAL at 21:39

## 2025-04-05 RX ADMIN — Medication 2 TABLET(S): at 21:39

## 2025-04-05 RX ADMIN — POLYETHYLENE GLYCOL 3350 17 GRAM(S): 17 POWDER, FOR SOLUTION ORAL at 11:09

## 2025-04-05 RX ADMIN — Medication 1 TABLET(S): at 11:10

## 2025-04-05 RX ADMIN — DONEPEZIL HYDROCHLORIDE 5 MILLIGRAM(S): 5 TABLET ORAL at 21:39

## 2025-04-05 RX ADMIN — POTASSIUM CHLORIDE, DEXTROSE MONOHYDRATE AND SODIUM CHLORIDE 75 MILLILITER(S): 150; 5; 900 INJECTION, SOLUTION INTRAVENOUS at 11:08

## 2025-04-05 RX ADMIN — TIZANIDINE 6 MILLIGRAM(S): 4 TABLET ORAL at 21:38

## 2025-04-05 RX ADMIN — Medication 1000 MILLIGRAM(S): at 21:39

## 2025-04-05 RX ADMIN — Medication 1000 MILLIGRAM(S): at 22:09

## 2025-04-05 RX ADMIN — Medication 1000 MILLIGRAM(S): at 16:16

## 2025-04-05 RX ADMIN — Medication 500 MILLIGRAM(S): at 11:10

## 2025-04-05 RX ADMIN — DIGOXIN 125 MICROGRAM(S): 250 TABLET ORAL at 05:35

## 2025-04-05 RX ADMIN — Medication 1000 MILLIGRAM(S): at 05:35

## 2025-04-05 RX ADMIN — Medication 1 PACKET(S): at 11:07

## 2025-04-05 RX ADMIN — Medication 3 MILLIGRAM(S): at 21:38

## 2025-04-05 RX ADMIN — Medication 1000 MILLIGRAM(S): at 17:16

## 2025-04-05 RX ADMIN — TIZANIDINE 6 MILLIGRAM(S): 4 TABLET ORAL at 16:16

## 2025-04-05 RX ADMIN — Medication 1000 MILLIGRAM(S): at 07:06

## 2025-04-05 RX ADMIN — SODIUM CHLORIDE 1000 MILLILITER(S): 9 INJECTION, SOLUTION INTRAVENOUS at 07:07

## 2025-04-05 RX ADMIN — APIXABAN 2.5 MILLIGRAM(S): 2.5 TABLET, FILM COATED ORAL at 05:35

## 2025-04-05 NOTE — DIETITIAN INITIAL EVALUATION ADULT - NSFNSPHYEXAMSKINFT_GEN_A_CORE
stage 2 PU on right buttocks, unstageable DTI on right heel; unstageable PU on medial back, stage 1 bilateral buttocks

## 2025-04-05 NOTE — PROGRESS NOTE ADULT - PROBLEM SELECTOR PLAN 5
RESOLVED   Scr 1.45 on admission  likely prerenal ISO sepsis and dehydration  SCr improved with hydration   arango catheter placed 4/4 to monitor UOP, removed 4/5  - monitor BMP daily

## 2025-04-05 NOTE — PROGRESS NOTE ADULT - PROBLEM SELECTOR PLAN 4
EKG showing Afib with RVR; non sustaining  pt on Cardizem 180 mg and digoxin 125 mg   s/p cardizem 10 IVP   likely iso sepsis and dehydration  - c/w cardizem 120 mg and digoxin 125 mg for rate control   - c/w eliquis for AC  - tele-monitoring, goal HR <110

## 2025-04-05 NOTE — PROGRESS NOTE ADULT - SUBJECTIVE AND OBJECTIVE BOX
PGY-1 Progress Note discussed with attending    PAGER #: [phone]] TILL 5:00 PM  PLEASE CONTACT ON CALL TEAM:  - On Call Team (Please refer to Pool) FROM 5:00 PM - 8:30PM  - Nightfloat Team FROM 8:30 -7:30 AM    INTERVAL HPI/OVERNIGHT EVENTS:   - No acute events overnight. Patient examined at bedside this AM. AAOx2, oriented to person and place, not time. Patient is more awake this morning and more cooperative than yesterday. Mental status now at baseline.     REVIEW OF SYSTEMS:  CONSTITUTIONAL: No fever, weight loss, or fatigue  RESPIRATORY: No cough, wheezing, chills; No shortness of breath  CARDIOVASCULAR: No chest pain, palpitations, dizziness, or leg swelling  GASTROINTESTINAL: No abdominal pain. No nausea, vomiting; No diarrhea or constipation.  GENITOURINARY: No dysuria or hematuria, urinary frequency  NEUROLOGICAL: No headaches, numbness, or tingling   SKIN: No itching, burning, rashes, or lesions     MEDICATIONS  (STANDING):  acetaminophen     Tablet .. 1000 milliGRAM(s) Oral every 8 hours  apixaban 2.5 milliGRAM(s) Oral two times a day  artificial  tears Solution 1 Drop(s) Both EYES two times a day  ascorbic acid 500 milliGRAM(s) Oral daily  cefTRIAXone   IVPB 1000 milliGRAM(s) IV Intermittent every 24 hours  dextrose 5% + sodium chloride 0.45% with potassium chloride 20 mEq/L 1000 milliLiter(s) (75 mL/Hr) IV Continuous <Continuous>  digoxin     Tablet 125 MICROGram(s) Oral daily  donepezil 5 milliGRAM(s) Oral at bedtime  mirtazapine 7.5 milliGRAM(s) Oral at bedtime  multivitamin 1 Tablet(s) Oral daily  polyethylene glycol 3350 17 Gram(s) Oral daily  senna 2 Tablet(s) Oral at bedtime  tiZANidine 6 milliGRAM(s) Oral three times a day    MEDICATIONS  (PRN):  acetaminophen     Tablet .. 650 milliGRAM(s) Oral every 6 hours PRN Temp greater or equal to 38C (100.4F), Mild Pain (1 - 3)  melatonin 3 milliGRAM(s) Oral at bedtime PRN Insomnia  ondansetron Injectable 4 milliGRAM(s) IV Push every 8 hours PRN Nausea and/or Vomiting      Vital Signs Last 24 Hrs  T(C): 36.5 (05 Apr 2025 07:47), Max: 36.5 (05 Apr 2025 07:47)  T(F): 97.7 (05 Apr 2025 07:47), Max: 97.7 (05 Apr 2025 07:47)  HR: 84 (05 Apr 2025 08:49) (82 - 122)  BP: 109/46 (05 Apr 2025 08:49) (89/50 - 110/57)  BP(mean): --  RR: 19 (05 Apr 2025 07:47) (19 - 19)  SpO2: 98% (05 Apr 2025 07:47) (93% - 98%)    Parameters below as of 05 Apr 2025 05:05  Patient On (Oxygen Delivery Method): room air      PHYSICAL EXAMINATION:  GENERAL: Elderly, NAD, lying in bed comfortably  HEAD:  Atraumatic, normocephalic  EYES: Conjunctiva and sclera clear  ENT: Dry mucous membranes, poor oral hygiene   NECK: Supple, normal appearance  CHEST/LUNG: Clear to auscultation bilaterally; no rales, rhonchi, wheezing, or rubs; no respiratory distress, no use of accessory muscles  HEART: Regular rate and rhythm; no murmurs, rubs, or gallops  ABDOMEN: Soft, nontender, nondistended; bowel sounds present  GENITOURINARY: Robb catheter in place draining cloudy urine  EXTREMITIES/MSK: Contracted B/L LE, 2+ peripheral pulses, brisk capillary refill; no clubbing, cyanosis, or edema  NERVOUS SYSTEM:  Alert & Oriented X3, speech clear, no deficits  SKIN: Warm, dry, no rashes or lesions                          9.6    9.24  )-----------( 161      ( 05 Apr 2025 06:42 )             32.5     04-05    155[H]  |  124[H]  |  28[H]  ----------------------------<  128[H]  3.5   |  27  |  0.70    Ca    7.8[L]      05 Apr 2025 06:42  Phos  2.2     04-05  Mg     2.5     04-05    TPro  5.4[L]  /  Alb  1.9[L]  /  TBili  0.3  /  DBili  x   /  AST  29  /  ALT  23  /  AlkPhos  46  04-05    LIVER FUNCTIONS - ( 05 Apr 2025 06:42 )  Alb: 1.9 g/dL / Pro: 5.4 g/dL / ALK PHOS: 46 U/L / ALT: 23 U/L DA / AST: 29 U/L / GGT: x

## 2025-04-05 NOTE — INPATIENT CERTIFICATION FOR MEDICARE PATIENTS - THE STATUS OF COMORBIDITIES.
Topical Clindamycin Pregnancy And Lactation Text: This medication is Pregnancy Category B and is considered safe during pregnancy. It is unknown if it is excreted in breast milk. Azithromycin Pregnancy And Lactation Text: This medication is considered safe during pregnancy and is also secreted in breast milk. Tazorac Pregnancy And Lactation Text: This medication is not safe during pregnancy. It is unknown if this medication is excreted in breast milk. Erythromycin Counseling:  I discussed with the patient the risks of erythromycin including but not limited to GI upset, allergic reaction, drug rash, diarrhea, increase in liver enzymes, and yeast infections. 2. The status of comorbities. (See ED/admit documents) Minocycline Pregnancy And Lactation Text: This medication is Pregnancy Category D and not consider safe during pregnancy. It is also excreted in breast milk. Doxycycline Counseling:  Patient counseled regarding possible photosensitivity and increased risk for sunburn.  Patient instructed to avoid sunlight, if possible.  When exposed to sunlight, patients should wear protective clothing, sunglasses, and sunscreen.  The patient was instructed to call the office immediately if the following severe adverse effects occur:  hearing changes, easy bruising/bleeding, severe headache, or vision changes.  The patient verbalized understanding of the proper use and possible adverse effects of doxycycline.  All of the patient's questions and concerns were addressed. Aklief counseling:  Patient advised to apply a pea-sized amount only at bedtime and wait 30 minutes after washing their face before applying.  If too drying, patient may add a non-comedogenic moisturizer.  The most commonly reported side effects including irritation, redness, scaling, dryness, stinging, burning, itching, and increased risk of sunburn.  The patient verbalized understanding of the proper use and possible adverse effects of retinoids.  All of the patient's questions and concerns were addressed. Dapsone Counseling: I discussed with the patient the risks of dapsone including but not limited to hemolytic anemia, agranulocytosis, rashes, methemoglobinemia, kidney failure, peripheral neuropathy, headaches, GI upset, and liver toxicity.  Patients who start dapsone require monitoring including baseline LFTs and weekly CBCs for the first month, then every month thereafter.  The patient verbalized understanding of the proper use and possible adverse effects of dapsone.  All of the patient's questions and concerns were addressed. Winlevi Counseling:  I discussed with the patient the risks of topical clascoterone including but not limited to erythema, scaling, itching, and stinging. Patient voiced their understanding. High Dose Vitamin A Pregnancy And Lactation Text: High dose vitamin A therapy is contraindicated during pregnancy and breast feeding. Tetracycline Counseling: Patient counseled regarding possible photosensitivity and increased risk for sunburn.  Patient instructed to avoid sunlight, if possible.  When exposed to sunlight, patients should wear protective clothing, sunglasses, and sunscreen.  The patient was instructed to call the office immediately if the following severe adverse effects occur:  hearing changes, easy bruising/bleeding, severe headache, or vision changes.  The patient verbalized understanding of the proper use and possible adverse effects of tetracycline.  All of the patient's questions and concerns were addressed. Patient understands to avoid pregnancy while on therapy due to potential birth defects. Topical Retinoid Pregnancy And Lactation Text: This medication is Pregnancy Category C. It is unknown if this medication is excreted in breast milk. Isotretinoin Pregnancy And Lactation Text: This medication is Pregnancy Category X and is considered extremely dangerous during pregnancy. It is unknown if it is excreted in breast milk. Benzoyl Peroxide Pregnancy And Lactation Text: This medication is Pregnancy Category C. It is unknown if benzoyl peroxide is excreted in breast milk. Spironolactone Counseling: Patient advised regarding risks of diarrhea, abdominal pain, hyperkalemia, birth defects (for female patients), liver toxicity and renal toxicity. The patient may need blood work to monitor liver and kidney function and potassium levels while on therapy. The patient verbalized understanding of the proper use and possible adverse effects of spironolactone.  All of the patient's questions and concerns were addressed. Topical Sulfur Applications Counseling: Topical Sulfur Counseling: Patient counseled that this medication may cause skin irritation or allergic reactions.  In the event of skin irritation, the patient was advised to reduce the amount of the drug applied or use it less frequently.   The patient verbalized understanding of the proper use and possible adverse effects of topical sulfur application.  All of the patient's questions and concerns were addressed. Birth Control Pills Counseling: Birth Control Pill Counseling: I discussed with the patient the potential side effects of OCPs including but not limited to increased risk of stroke, heart attack, thrombophlebitis, deep venous thrombosis, hepatic adenomas, breast changes, GI upset, headaches, and depression.  The patient verbalized understanding of the proper use and possible adverse effects of OCPs. All of the patient's questions and concerns were addressed. Detail Level: Detailed Bactrim Counseling:  I discussed with the patient the risks of sulfa antibiotics including but not limited to GI upset, allergic reaction, drug rash, diarrhea, dizziness, photosensitivity, and yeast infections.  Rarely, more serious reactions can occur including but not limited to aplastic anemia, agranulocytosis, methemoglobinemia, blood dyscrasias, liver or kidney failure, lung infiltrates or desquamative/blistering drug rashes. Azelaic Acid Pregnancy And Lactation Text: This medication is considered safe during pregnancy and breast feeding. Topical Clindamycin Counseling: Patient counseled that this medication may cause skin irritation or allergic reactions.  In the event of skin irritation, the patient was advised to reduce the amount of the drug applied or use it less frequently.   The patient verbalized understanding of the proper use and possible adverse effects of clindamycin.  All of the patient's questions and concerns were addressed. Sarecycline Counseling: Patient advised regarding possible photosensitivity and discoloration of the teeth, skin, lips, tongue and gums.  Patient instructed to avoid sunlight, if possible.  When exposed to sunlight, patients should wear protective clothing, sunglasses, and sunscreen.  The patient was instructed to call the office immediately if the following severe adverse effects occur:  hearing changes, easy bruising/bleeding, severe headache, or vision changes.  The patient verbalized understanding of the proper use and possible adverse effects of sarecycline.  All of the patient's questions and concerns were addressed. Erythromycin Pregnancy And Lactation Text: This medication is Pregnancy Category B and is considered safe during pregnancy. It is also excreted in breast milk. Doxycycline Pregnancy And Lactation Text: This medication is Pregnancy Category D and not consider safe during pregnancy. It is also excreted in breast milk but is considered safe for shorter treatment courses. Aklief Pregnancy And Lactation Text: It is unknown if this medication is safe to use during pregnancy.  It is unknown if this medication is excreted in breast milk.  Breastfeeding women should use the topical cream on the smallest area of the skin for the shortest time needed while breastfeeding.  Do not apply to nipple and areola. Minocycline Counseling: Patient advised regarding possible photosensitivity and discoloration of the teeth, skin, lips, tongue and gums.  Patient instructed to avoid sunlight, if possible.  When exposed to sunlight, patients should wear protective clothing, sunglasses, and sunscreen.  The patient was instructed to call the office immediately if the following severe adverse effects occur:  hearing changes, easy bruising/bleeding, severe headache, or vision changes.  The patient verbalized understanding of the proper use and possible adverse effects of minocycline.  All of the patient's questions and concerns were addressed. Include Pregnancy/Lactation Warning?: No Azithromycin Counseling:  I discussed with the patient the risks of azithromycin including but not limited to GI upset, allergic reaction, drug rash, diarrhea, and yeast infections. Tazorac Counseling:  Patient advised that medication is irritating and drying.  Patient may need to apply sparingly and wash off after an hour before eventually leaving it on overnight.  The patient verbalized understanding of the proper use and possible adverse effects of tazorac.  All of the patient's questions and concerns were addressed. Topical Retinoid counseling:  Patient advised to apply a pea-sized amount only at bedtime and wait 30 minutes after washing their face before applying.  If too drying, patient may add a non-comedogenic moisturizer. The patient verbalized understanding of the proper use and possible adverse effects of retinoids.  All of the patient's questions and concerns were addressed. High Dose Vitamin A Counseling: Side effects reviewed, pt to contact office should one occur. Dapsone Pregnancy And Lactation Text: This medication is Pregnancy Category C and is not considered safe during pregnancy or breast feeding. Birth Control Pills Pregnancy And Lactation Text: This medication should be avoided if pregnant and for the first 30 days post-partum. Winlevi Pregnancy And Lactation Text: This medication is considered safe during pregnancy and breastfeeding. Spironolactone Pregnancy And Lactation Text: This medication can cause feminization of the male fetus and should be avoided during pregnancy. The active metabolite is also found in breast milk. Topical Sulfur Applications Pregnancy And Lactation Text: This medication is Pregnancy Category C and has an unknown safety profile during pregnancy. It is unknown if this topical medication is excreted in breast milk. Isotretinoin Counseling: Patient should get monthly blood tests, not donate blood, not drive at night if vision affected, not share medication, and not undergo elective surgery for 6 months after tx completed. Side effects reviewed, pt to contact office should one occur. Benzoyl Peroxide Counseling: Patient counseled that medicine may cause skin irritation and bleach clothing.  In the event of skin irritation, the patient was advised to reduce the amount of the drug applied or use it less frequently.   The patient verbalized understanding of the proper use and possible adverse effects of benzoyl peroxide.  All of the patient's questions and concerns were addressed. Azelaic Acid Counseling: Patient counseled that medicine may cause skin irritation and to avoid applying near the eyes.  In the event of skin irritation, the patient was advised to reduce the amount of the drug applied or use it less frequently.   The patient verbalized understanding of the proper use and possible adverse effects of azelaic acid.  All of the patient's questions and concerns were addressed. Bactrim Pregnancy And Lactation Text: This medication is Pregnancy Category D and is known to cause fetal risk.  It is also excreted in breast milk.

## 2025-04-05 NOTE — PROGRESS NOTE ADULT - PROBLEM SELECTOR PLAN 3
SNa 163 on admission  s/p 1L LR, 1.8L NS in ed  likely 2/2 poor oral hygiene and poor oral intake  SNa corrected appropriately s/p 500 cc NS bolus + continuous LR @ 75 cc/hr   FW deficit 2.8L with SNa 155 today   - change fluids to D5 + 1/2 NS with KCl 20 mEq/L x 48 hrs   - nephro Dr. Dietz following

## 2025-04-05 NOTE — PROGRESS NOTE ADULT - PROBLEM SELECTOR PLAN 1
p/w hypotension and AMS  Temp 1.3, , /72, RR 25, 96% on 2L  wbc 14.88, na 163, Scr 1.45, lactate 2.0  COVID/flu/RSV negative  UA positive  EKG Afib with RVR  sepsis with ams 2/2 UTI  s/p 1.8L NS, 1L LR + cefepime and Vanc in ED   BCx neg @ 24 hrs  UCx +ve prelim  - c/w ceftriaxone 1g 24h for UTI   - f/u final UCx read + sensitivities

## 2025-04-05 NOTE — PROGRESS NOTE ADULT - ASSESSMENT
Patient is a 84yo Female from [facility] with Dementia (A&OX1 at baseline), Afib, T12 compression fracture sp spinal fusion, recurrent UTI presents with hypotension and fever. Pt a/w Sepsis 2/2/ UTI, hypernatremia and ODILON. Nephrology consulted for hypernatremia and ODILON.     1. Hypernatremia- due to poor PO Intake. Free water deficit on admission is 4.3L. Pt now on D5 1/2 NS with 20 meq KCL @ 75ml/hr. Repeat BMP this evening. Monitor Serum Na.   2. ODILON- likely pre-renal. ODILON resolved with IVF. Strict I/Os. Avoid nephrotoxins/ NSAIDs/ RCA. Monitor BMP.  3. Sepsis 2/2 UTI- +UA. UCx <50k CFU; bacteria not listed yet. BCx NG. Pt on Ceftriaxone.   4. Atrial Fibrillation- pt on Digoxin/ Cardizem CD for rate control. Pt on Eliquis.       [facility] NEPHROLOGY  Sung Alan M.D.  Ole White D.O.  Natalie Dietz M.D.  MD Donna Carter, MSN, ANP-C    Telephone: [phone]  Facsimile: [phone], [address]  [place] [address]

## 2025-04-05 NOTE — DIETITIAN INITIAL EVALUATION ADULT - ORAL INTAKE PTA/DIET HISTORY
Pt is from rehab facility, alert however disorientated, verbally responsive however with confusion/ poor historian. H/o dementia, Afib, frequent UTI, osteoporosis, HTN; admitted for sepsis 2/2 UTI and Afib with RVR and hypernatremia. Pt is with poor PO intake in-house consuming <50% of meals as per observation; noted with severe hypernatremia possibly d/t persistent lack of appetite. NFPE completed with results below. No chewing/ swallowing issues reported. Denies any GI distress. No food preferences at this time. No known food allergies reported.

## 2025-04-05 NOTE — DIETITIAN NUTRITION RISK NOTIFICATION - ADDITIONAL COMMENTS/DIETITIAN RECOMMENDATIONS
Recommend oral supplement Two Frederic HN QD (475 kcal, 20 g pro each) and Magic Cup QD (300 kcal, 9 g pro each) as medically feasible.

## 2025-04-05 NOTE — DIETITIAN INITIAL EVALUATION ADULT - ADD RECOMMEND
Moderate malnutrition; Recommend oral supplement Two Frederic HN QD (475 kcal, 20 g pro each) and Magic Cup QD (300 kcal, 9 g pro each) as medically feasible.

## 2025-04-05 NOTE — PROGRESS NOTE ADULT - SUBJECTIVE AND OBJECTIVE BOX
[facility] NEPHROLOGY- PROGRESS NOTE    Patient is a 84yo Female from [facility] with Dementia (A&OX1 at baseline), Afib, T12 compression fracture sp spinal fusion, recurrent UTI presents with hypotension and fever. Pt a/w Sepsis 2/2/ UTI, hypernatremia and ODILON. Nephrology consulted for hypernatremia and ODILON.       Hospital Medications: Medications reviewed.  REVIEW OF SYSTEMS:  CONSTITUTIONAL: No fevers or chills  RESPIRATORY: No shortness of breath  CARDIOVASCULAR: No chest pain.  GASTROINTESTINAL: No nausea, vomiting, diarrhea or abdominal pain.   VASCULAR: No bilateral lower extremity edema.     VITALS:  T(F): 98.4 (04-05-25 @ 15:43), Max: 98.4 (04-05-25 @ 15:43)  HR: 81 (04-05-25 @ 15:43)  BP: 111/66 (04-05-25 @ 15:43)  RR: 18 (04-05-25 @ 15:43)  SpO2: 97% (04-05-25 @ 15:43)  Wt(kg): --  Height (cm): 152.4 (04-03 @ 11:51)  Weight (kg): 58 (04-03 @ 11:51)  BMI (kg/m2): 25 (04-03 @ 11:51)  BSA (m2): 1.54 (04-03 @ 11:51)    04-04 @ 07:01  -  04-05 @ 07:00  --------------------------------------------------------  IN: 2280 mL / OUT: 950 mL / NET: 1330 mL    04-05 @ 07:01  -  04-05 @ 17:20  --------------------------------------------------------  IN: 540 mL / OUT: 125 mL / NET: 415 mL      PHYSICAL EXAM:  Constitutional: NAD  HEENT: anicteric sclera,   Respiratory: CTAB, no wheezes, rales or rhonchi  Cardiovascular: S1, S2, RRR  Gastrointestinal: BS+, soft, NT/ND  : No arango.  Extremities: No peripheral edema    LABS:  04-05  155 <--, 155 <--, 158 <--, 160 <--, 163 <--, 163 <--  155[H]  |  124[H]  |  28[H]  ----------------------------<  128[H]  3.5   |  27  |  0.70    Ca    7.8[L]      05 Apr 2025 06:42  Phos  2.2     04-05  Mg     2.5     04-05    TPro  5.4[L]  /  Alb  1.9[L]  /  TBili  0.3  /  DBili      /  AST  29  /  ALT  23  /  AlkPhos  46  04-05    Creatinine Trend: 0.70 <--, 0.76 <--, 0.81 <--, 1.01 <--, 0.98 <--, 1.45 <--                        9.6    9.24  )-----------( 161      ( 05 Apr 2025 06:42 )             32.5     Urine Studies:  Urinalysis Basic - ( 05 Apr 2025 06:42 )    Color:  / Appearance:  / SG:  / pH:   Gluc: 128 mg/dL / Ketone:   / Bili:  / Urobili:    Blood:  / Protein:  / Nitrite:    Leuk Esterase:  / RBC:  / WBC    Sq Epi:  / Non Sq Epi:  / Bacteria:         RADIOLOGY & ADDITIONAL STUDIES:

## 2025-04-05 NOTE — PROGRESS NOTE ADULT - ATTENDING COMMENTS
Patient seen and examined  this morning around 11.30 AM;     85 F, from [facility], bedbound, PMH of Dementia, Afib, Osteoporosis, T12 compression fracture s/p spinal fusion, recurrent UTI p/w hypotension and fever. Na 163, UA +ve. Admitted for sepsis 2/2 UTI + Afib with RVR and hyperNa.  In the ED, she met sepsis criteria with work up showing UTI with dehydration, ODILON and severe hypernatremia with acid base and electrolyte disturbance ( with metabolic alkalosis with resp compensation and hypophosphatemia with mild change in mental status --> acute encephalopathy.    Patient  today appears improved with fluids and antibiotics more alert and awake participate in conversation, has ongoing back pain; No fever; Robb placed with cloudy urine output yesterday; good urine output noted    Vital Signs Last 24 Hrs  T(C): 36.6 (05 Apr 2025 11:14), Max: 36.6 (05 Apr 2025 11:14)  T(F): 97.9 (05 Apr 2025 11:14), Max: 97.9 (05 Apr 2025 11:14)  HR: 98 (05 Apr 2025 11:14) (82 - 122)  BP: 121/53 (05 Apr 2025 11:14) (89/50 - 121/53)  RR: 19 (05 Apr 2025 11:14) (19 - 19)  SpO2: 94% (05 Apr 2025 11:14) (93% - 98%): room air    Labs reviewed                                9.6    9.24  )-----------( 161      ( 05 Apr 2025 06:42 )             32.5     04-05  155[H]  |  124[H]  |  28[H]  ----------------------------<  128[H]  3.5   |  27  |  0.70  Ca    7.8[L]      05 Apr 2025 06:42  Phos  2.2     04-05  Mg     2.5     04-05  TPro  5.4[L]  /  Alb  1.9[L]  /  TBili  0.3  /  DBili  x   /  AST  29  /  ALT  23  /  AlkPhos  46  04-05    Digoxin Level, Serum (04.03.25 @ 11:46) Digoxin Level, Serum: 0.6 ng/mL    Xray Chest 1 View-PORTABLE IMMEDIATE (04.03.25 @ 12:47)   IMPRESSION: No acute cardiopulmonary disease process.    A/P:  Acute encephalopathy due to likely  Sepsis with Acute UTI with sever Acute Hypernatremia with   Acute Kidney injury due to Prerenal azotemia with Severe dehydration  Atrial fib with RVR due to underlying sepsis and severe dehydration  Anemia likely Anemia chronic disease  Dementia  Sacral pressure ulcers  Severe Protein calorie malnutrition    Plan   Continue IV hydration;  maintenance fluid switch to D51/2NS with 20 meq KCL as patirnt appears euvolemic  Sepsis work up; Follow up Blood and Urine Cx testing  Empiric antibiotics; s/p cefepime in the ED; will continue with ceftriaxone. ID consult if resistant infection; f/u Urine Cx  Nephrology f/u d/w Dr. Dietz;   Monitor Na levels q 8 hrs to twice daily; Dop not drop more than 6 to 8 meq in 24 hrs  Fall and aspiration precaution   Speech and swallow eval appreciated; Recommend Minced and moist with mildly thick liquids  Replenish electrolyte imbalances  Tylenol 1gm q 8 hrs routine please; Patient has Hx compression fractures  Sever pain can use Oxycodone  GOC: DNR status    Discussed with Housestaff and RN  Discussed with Son at Nursing station this afternoon and updated; if improved and stable D/C Plan Monday in to Tuesday pending Cx results

## 2025-04-05 NOTE — PROGRESS NOTE ADULT - ASSESSMENT
85 F, from [facility], bedbound, PMHx dementia, Afib, OP with T12 compression fracture s/p spinal fusion, recurrent UTI p/w hypotension and fever. Na 163, UA +ve. Admitted for sepsis 2/2 UTI + Afib with RVR and hyperNa.

## 2025-04-06 LAB
ALBUMIN SERPL ELPH-MCNC: 2.2 G/DL — LOW (ref 3.5–5)
ALP SERPL-CCNC: 57 U/L — SIGNIFICANT CHANGE UP (ref 40–120)
ALT FLD-CCNC: 16 U/L DA — SIGNIFICANT CHANGE UP (ref 10–60)
ANION GAP SERPL CALC-SCNC: 6 MMOL/L — SIGNIFICANT CHANGE UP (ref 5–17)
AST SERPL-CCNC: 17 U/L — SIGNIFICANT CHANGE UP (ref 10–40)
BILIRUB SERPL-MCNC: 0.4 MG/DL — SIGNIFICANT CHANGE UP (ref 0.2–1.2)
BUN SERPL-MCNC: 19 MG/DL — HIGH (ref 7–18)
CALCIUM SERPL-MCNC: 8.2 MG/DL — LOW (ref 8.4–10.5)
CHLORIDE SERPL-SCNC: 120 MMOL/L — HIGH (ref 96–108)
CO2 SERPL-SCNC: 23 MMOL/L — SIGNIFICANT CHANGE UP (ref 22–31)
CREAT SERPL-MCNC: 0.63 MG/DL — SIGNIFICANT CHANGE UP (ref 0.5–1.3)
EGFR: 86 ML/MIN/1.73M2 — SIGNIFICANT CHANGE UP
EGFR: 86 ML/MIN/1.73M2 — SIGNIFICANT CHANGE UP
GLUCOSE SERPL-MCNC: 100 MG/DL — HIGH (ref 70–99)
HCT VFR BLD CALC: 39.3 % — SIGNIFICANT CHANGE UP (ref 34.5–45)
HGB BLD-MCNC: 11.8 G/DL — SIGNIFICANT CHANGE UP (ref 11.5–15.5)
MAGNESIUM SERPL-MCNC: 2.2 MG/DL — SIGNIFICANT CHANGE UP (ref 1.6–2.6)
MCHC RBC-ENTMCNC: 29.8 PG — SIGNIFICANT CHANGE UP (ref 27–34)
MCHC RBC-ENTMCNC: 30 G/DL — LOW (ref 32–36)
MCV RBC AUTO: 99.2 FL — SIGNIFICANT CHANGE UP (ref 80–100)
NRBC BLD AUTO-RTO: 0 /100 WBCS — SIGNIFICANT CHANGE UP (ref 0–0)
PHOSPHATE SERPL-MCNC: 2.2 MG/DL — LOW (ref 2.5–4.5)
PLATELET # BLD AUTO: 174 K/UL — SIGNIFICANT CHANGE UP (ref 150–400)
POTASSIUM SERPL-MCNC: 4.2 MMOL/L — SIGNIFICANT CHANGE UP (ref 3.5–5.3)
POTASSIUM SERPL-SCNC: 4.2 MMOL/L — SIGNIFICANT CHANGE UP (ref 3.5–5.3)
PROT SERPL-MCNC: 6.5 G/DL — SIGNIFICANT CHANGE UP (ref 6–8.3)
RBC # BLD: 3.96 M/UL — SIGNIFICANT CHANGE UP (ref 3.8–5.2)
RBC # FLD: 15.9 % — HIGH (ref 10.3–14.5)
SODIUM SERPL-SCNC: 149 MMOL/L — HIGH (ref 135–145)
WBC # BLD: 11.59 K/UL — HIGH (ref 3.8–10.5)
WBC # FLD AUTO: 11.59 K/UL — HIGH (ref 3.8–10.5)

## 2025-04-06 PROCEDURE — 99232 SBSQ HOSP IP/OBS MODERATE 35: CPT

## 2025-04-06 PROCEDURE — 99233 SBSQ HOSP IP/OBS HIGH 50: CPT

## 2025-04-06 RX ORDER — SOD PHOS DI, MONO/K PHOS MONO 250 MG
1 TABLET ORAL
Refills: 0 | Status: DISCONTINUED | OUTPATIENT
Start: 2025-04-06 | End: 2025-04-07

## 2025-04-06 RX ADMIN — Medication 1000 MILLIGRAM(S): at 06:50

## 2025-04-06 RX ADMIN — CEFTRIAXONE 100 MILLIGRAM(S): 500 INJECTION, POWDER, FOR SOLUTION INTRAMUSCULAR; INTRAVENOUS at 12:59

## 2025-04-06 RX ADMIN — Medication 1000 MILLIGRAM(S): at 15:19

## 2025-04-06 RX ADMIN — APIXABAN 2.5 MILLIGRAM(S): 2.5 TABLET, FILM COATED ORAL at 18:39

## 2025-04-06 RX ADMIN — Medication 1 DROP(S): at 06:20

## 2025-04-06 RX ADMIN — Medication 1 TABLET(S): at 22:12

## 2025-04-06 RX ADMIN — Medication 1 TABLET(S): at 18:39

## 2025-04-06 RX ADMIN — Medication 1000 MILLIGRAM(S): at 16:19

## 2025-04-06 RX ADMIN — POTASSIUM CHLORIDE, DEXTROSE MONOHYDRATE AND SODIUM CHLORIDE 75 MILLILITER(S): 150; 5; 900 INJECTION, SOLUTION INTRAVENOUS at 06:20

## 2025-04-06 RX ADMIN — Medication 1000 MILLIGRAM(S): at 06:21

## 2025-04-06 RX ADMIN — Medication 500 MILLIGRAM(S): at 12:57

## 2025-04-06 RX ADMIN — APIXABAN 2.5 MILLIGRAM(S): 2.5 TABLET, FILM COATED ORAL at 06:20

## 2025-04-06 RX ADMIN — MIRTAZAPINE 7.5 MILLIGRAM(S): 30 TABLET, FILM COATED ORAL at 22:13

## 2025-04-06 RX ADMIN — Medication 1000 MILLIGRAM(S): at 23:13

## 2025-04-06 RX ADMIN — Medication 1000 MILLIGRAM(S): at 22:13

## 2025-04-06 RX ADMIN — TIZANIDINE 6 MILLIGRAM(S): 4 TABLET ORAL at 06:20

## 2025-04-06 RX ADMIN — Medication 1 DROP(S): at 18:39

## 2025-04-06 RX ADMIN — Medication 1 TABLET(S): at 15:19

## 2025-04-06 RX ADMIN — Medication 2 TABLET(S): at 22:12

## 2025-04-06 RX ADMIN — Medication 1 TABLET(S): at 12:57

## 2025-04-06 RX ADMIN — POTASSIUM CHLORIDE, DEXTROSE MONOHYDRATE AND SODIUM CHLORIDE 75 MILLILITER(S): 150; 5; 900 INJECTION, SOLUTION INTRAVENOUS at 23:08

## 2025-04-06 RX ADMIN — TIZANIDINE 6 MILLIGRAM(S): 4 TABLET ORAL at 22:13

## 2025-04-06 RX ADMIN — DIGOXIN 125 MICROGRAM(S): 250 TABLET ORAL at 06:21

## 2025-04-06 RX ADMIN — TIZANIDINE 6 MILLIGRAM(S): 4 TABLET ORAL at 15:19

## 2025-04-06 RX ADMIN — DILTIAZEM HYDROCHLORIDE 120 MILLIGRAM(S): 240 TABLET, EXTENDED RELEASE ORAL at 06:21

## 2025-04-06 RX ADMIN — DONEPEZIL HYDROCHLORIDE 5 MILLIGRAM(S): 5 TABLET ORAL at 22:13

## 2025-04-06 NOTE — PROGRESS NOTE ADULT - SUBJECTIVE AND OBJECTIVE BOX
Addended by: Darcie Duane on: 10/19/2022 01:32 PM     Modules accepted: Orders [facility] NEPHROLOGY- PROGRESS NOTE    Patient is a 84yo Female from [facility] with Dementia (A&OX1 at baseline), Afib, T12 compression fracture sp spinal fusion, recurrent UTI presents with hypotension and fever. Pt a/w Sepsis 2/2/ UTI, hypernatremia and ODILON. Nephrology consulted for hypernatremia and ODILON.       Hospital Medications: Medications reviewed.  REVIEW OF SYSTEMS:  CONSTITUTIONAL: No fevers or chills  RESPIRATORY: No shortness of breath  CARDIOVASCULAR: No chest pain.  GASTROINTESTINAL: No nausea, vomiting, diarrhea or abdominal pain.   VASCULAR: No bilateral lower extremity edema.     VITALS:  T(F): 97.9 (04-06-25 @ 11:51), Max: 98.4 (04-05-25 @ 15:43)  HR: 77 (04-06-25 @ 11:51)  BP: 105/47 (04-06-25 @ 11:51)  RR: 18 (04-06-25 @ 11:51)  SpO2: 99% (04-06-25 @ 11:51)  Wt(kg): --    04-05 @ 07:01  -  04-06 @ 07:00  --------------------------------------------------------  IN: 926 mL / OUT: 875 mL / NET: 51 mL      PHYSICAL EXAM:  Constitutional: NAD  HEENT: anicteric sclera,   Respiratory: CTAB, no wheezes, rales or rhonchi  Cardiovascular: S1, S2, RRR  Gastrointestinal: BS+, soft, NT/ND  : No arango.  Extremities: No peripheral edema    LABS:  04-06    149[H]  |  120[H]  |  19[H]  ----------------------------<  100[H]  4.2   |  23  |  0.63    Ca    8.2[L]      06 Apr 2025 06:40  Phos  2.2     04-06  Mg     2.2     04-06    TPro  6.5  /  Alb  2.2[L]  /  TBili  0.4  /  DBili      /  AST  17  /  ALT  16  /  AlkPhos  57  04-06    Creatinine Trend: 0.63 <--, 0.70 <--, 0.76 <--, 0.81 <--, 1.01 <--, 0.98 <--, 1.45 <--                        11.8   11.59 )-----------( 174      ( 06 Apr 2025 06:40 )             39.3     Urine Studies:  Urinalysis Basic - ( 06 Apr 2025 06:40 )    Color:  / Appearance:  / SG:  / pH:   Gluc: 100 mg/dL / Ketone:   / Bili:  / Urobili:    Blood:  / Protein:  / Nitrite:    Leuk Esterase:  / RBC:  / WBC    Sq Epi:  / Non Sq Epi:  / Bacteria:

## 2025-04-06 NOTE — PROGRESS NOTE ADULT - SUBJECTIVE AND OBJECTIVE BOX
Patient seen and examined  this morning around 11.30 AM;     85 F, from [facility], bedbound, PMH of Dementia, Afib, Osteoporosis, T12 compression fracture s/p spinal fusion, recurrent UTI p/w hypotension and fever. Na 163, UA +ve. Admitted for sepsis 2/2 UTI + Afib with RVR and hyperNa.  In the ED, she met sepsis criteria with work up showing UTI with dehydration, ODILON and severe hypernatremia with acid base and electrolyte disturbance ( with metabolic alkalosis with resp compensation and hypophosphatemia with mild change in mental status --> acute encephalopathy.    Patient  today appears improved with fluids and antibiotics more alert and awake participate in conversation, has ongoing back pain; No fever; Robb placed with cloudy urine output yesterday; good urine output noted    Vital Signs Last 24 Hrs  T(C): 36.6 (05 Apr 2025 11:14), Max: 36.6 (05 Apr 2025 11:14)  T(F): 97.9 (05 Apr 2025 11:14), Max: 97.9 (05 Apr 2025 11:14)  HR: 98 (05 Apr 2025 11:14) (82 - 122)  BP: 121/53 (05 Apr 2025 11:14) (89/50 - 121/53)  RR: 19 (05 Apr 2025 11:14) (19 - 19)  SpO2: 94% (05 Apr 2025 11:14) (93% - 98%): room air    Labs reviewed                                           11.8   11.59 )-----------( 174      ( 06 Apr 2025 06:40 )             39.3   04-06  149[H]  |  120[H]  |  19[H]  ----------------------------<  100[H]  4.2   |  23  |  0.63    Ca    8.2[L]      06 Apr 2025 06:40  Phos  2.2     04-06  Mg     2.2     04-06  TPro  6.5  /  Alb  2.2[L]  /  TBili  0.4  /  DBili  x   /  AST  17  /  ALT  16  /  AlkPhos  57  04-06      Digoxin Level, Serum (04.03.25 @ 11:46) Digoxin Level, Serum: 0.6 ng/mL    Xray Chest 1 View-PORTABLE IMMEDIATE (04.03.25 @ 12:47)   IMPRESSION: No acute cardiopulmonary disease process.    A/P:  Acute encephalopathy due to likely  Sepsis with Acute UTI with sever Acute Hypernatremia with   Acute Kidney injury due to Prerenal azotemia with Severe dehydration  Atrial fib with RVR due to underlying sepsis and severe dehydration  Anemia likely Anemia chronic disease  Dementia  Sacral pressure ulcers  Severe Protein calorie malnutrition    Plan   Continue IV hydration;  maintenance fluid switch to D51/2NS with 20 meq KCL as patirnt appears euvolemic  Sepsis work up; Follow up Blood and Urine Cx testing  Empiric antibiotics; s/p cefepime in the ED; will continue with ceftriaxone. ID consult if resistant infection; f/u Urine Cx  Nephrology f/u d/w Dr. Dietz;   Monitor Na levels q 8 hrs to twice daily; Dop not drop more than 6 to 8 meq in 24 hrs  Fall and aspiration precaution   Speech and swallow eval appreciated; Recommend Minced and moist with mildly thick liquids  Replenish electrolyte imbalances  Tylenol 1gm q 8 hrs routine please; Patient has Hx compression fractures  Sever pain can use Oxycodone  GOC: DNR status    Discussed with Housestaff and RN  Discussed with Son at Nursing station this afternoon and updated; if improved and stable D/C Plan Monday in to Tuesday pending Cx results . Patient seen and examined  this morning around 11.30 AM;     85 F, from [facility], bedbound, PMH of Dementia, Afib, Osteoporosis, T12 compression fracture s/p spinal fusion, recurrent UTI p/w hypotension and fever. Na 163, UA +ve. Admitted for sepsis 2/2 UTI + Afib with RVR and hyperNa.  In the ED, she met sepsis criteria with work up showing UTI with dehydration, ODILON and severe hypernatremia with acid base and electrolyte disturbance ( with metabolic alkalosis with resp compensation and hypophosphatemia with mild change in mental status --> acute encephalopathy.    Patient  today appears improved with fluids and antibiotics more alert and awake participate in conversation, has ongoing back pain; No fever; Robb placed with cloudy urine output yesterday; good urine output noted    Vital Signs Last 24 Hrs  T(C): 36.6 (05 Apr 2025 11:14), Max: 36.6 (05 Apr 2025 11:14)  T(F): 97.9 (05 Apr 2025 11:14), Max: 97.9 (05 Apr 2025 11:14)  HR: 98 (05 Apr 2025 11:14) (82 - 122)  BP: 121/53 (05 Apr 2025 11:14) (89/50 - 121/53)  RR: 19 (05 Apr 2025 11:14) (19 - 19)  SpO2: 94% (05 Apr 2025 11:14) (93% - 98%): room air    Labs reviewed                                           11.8   11.59 )-----------( 174      ( 06 Apr 2025 06:40 )             39.3   04-06  149[H]  |  120[H]  |  19[H]  ----------------------------<  100[H]  4.2   |  23  |  0.63    Ca    8.2[L]      06 Apr 2025 06:40  Phos  2.2     04-06  Mg     2.2     04-06  TPro  6.5  /  Alb  2.2[L]  /  TBili  0.4  /  DBili  x   /  AST  17  /  ALT  16  /  AlkPhos  57  04-06      Digoxin Level, Serum (04.03.25 @ 11:46) Digoxin Level, Serum: 0.6 ng/mL    Xray Chest 1 View-PORTABLE IMMEDIATE (04.03.25 @ 12:47)   IMPRESSION: No acute cardiopulmonary disease process.    A/P:  Acute encephalopathy due to likely  Sepsis with Acute UTI with sever Acute Hypernatremia with   Acute Kidney injury due to Prerenal azotemia with Severe dehydration  Atrial fib with RVR due to underlying sepsis and severe dehydration  Anemia likely Anemia chronic disease  Dementia  Sacral pressure ulcers  Severe Protein calorie malnutrition    Plan   Continue IV hydration;  maintenance fluids D51/2NS with 20 meq KCL as patient appears euvolemic now  Sepsis work up; Follow up Blood and Urine Cx testing  Empiric antibiotics; s/p cefepime in the ED; will continue with ceftriaxone. ID consult if resistant infection; f/u Urine Cx  Nephrology f/u d/w Dr. Dietz; Monitor Na levels; can follow up once or twice a day as clinically improved and trending down at goal rate;  Dop not drop more than 6 to 8 meq in 24 hrs  Fall and aspiration precaution   Speech and swallow eval appreciated; Recommend Minced and moist with mildly thick liquids; will request further follow up Monday  Replenish electrolyte imbalances  Tylenol 1gm q 8 hrs routine please; Patient has Hx compression fractures; Sever pain can use Oxycodone  GOC: DNR/ DNI status  Case mgmt follow up Monday for discharge plan to SNF    Discussed with Housestaff and RN  Discussed with Son yesterday and updated; if improved and stable D/C Plan next 48 hrs pending Cx results .

## 2025-04-06 NOTE — PROGRESS NOTE ADULT - ASSESSMENT
Patient is a 84yo Female from [facility] with Dementia (A&OX1 at baseline), Afib, T12 compression fracture sp spinal fusion, recurrent UTI presents with hypotension and fever. Pt a/w Sepsis 2/2/ UTI, hypernatremia and ODILON. Nephrology consulted for hypernatremia and ODILON.     1. Hypernatremia- due to poor PO Intake. Free water deficit on admission is 4.3L. Serum Na improving appropriately on D5 1/2 NS with 20 meq KCL @ 75ml/hr. Monitor serum potassium. Monitor Serum Na.   2. ODILON- likely pre-renal. ODILON resolved with IVF. Strict I/Os. Avoid nephrotoxins/ NSAIDs/ RCA. Monitor BMP.  3. Sepsis 2/2 UTI- +UA. UCx <50k CFU; Candida glabrata. BCx NG. Pt on Ceftriaxone. Consider antifungal  4. Atrial Fibrillation- pt on Digoxin/ Cardizem CD for rate control. Pt on Eliquis.   Hypophosphatemia- pt given Kphos PO x3 doses. Monitor serum [facility] NEPHROLOGY  Sung Alan M.D.  Ole White D.O.  Natalie Dietz M.D.  MD Donna Carter, MSN, ANP-C    Telephone: [phone]  Facsimile: [phone] [place] [address], [address]  [place] [address]

## 2025-04-06 NOTE — PROGRESS NOTE ADULT - SUBJECTIVE AND OBJECTIVE BOX
Cardiology Progress Note  ------------------------------------------------------------------------------------------  SUBJECTIVE:   No events overnight. Denies CP, SOB or Palpitations.   -------------------------------------------------------------------------------------------  ROS:  CV: chest pain (-), palpitation (-), orthopnea (-), PND (-), edema (-)  PULM: SOB (-), cough (-), wheezing (-), hemoptysis (-).   CONST: fever (-), chills (-) or fatigue (-)  GI: abdominal distension (-), abdominal pain (-) , nausea/vomiting (-), hematemesis, (-), melena (-), hematochezia (-)  : dysuria (-), frequency (-), hematuria (-).   NEURO: numbness (-), weakness (-), dizziness (-)  MSK: myalgia (-), joint pain (-)   SKIN: itching (-), rash (-)  HEENT:  visual changes (-); vertigo or throat pain (-);  neck stiffness (-)   Psych: change in mood (-), anxiety (-), depression (-)     All other review of systems is negative unless indicated above.   -------------------------------------------------------------------------------------------  VS:  T(F): 97.9 (04-06), Max: 98.4 (04-05)  HR: 77 (04-06) (77 - 91)  BP: 105/47 (04-06) (102/52 - 138/58)  RR: 18 (04-06)  SpO2: 99% (04-06)  I&O's Summary    05 Apr 2025 07:01  -  06 Apr 2025 07:00  --------------------------------------------------------  IN: 926 mL / OUT: 875 mL / NET: 51 mL      ------------------------------------------------------------------------------------------  PHYSICAL EXAM:    Gen: NAD,   HEENT: anicteric  Neck: no JVD  Cards: RRR, +S1/S2, no M/G/R  Resp: CTA B/L  GI: soft, NT/ND, NABS  : +arango  Extremities: no LE edema B/L  Derm: no rashes  Neuro: AOx 2 (not to date)-------------------------------------------------------------------------------------------  LABS:  04-06    149[H]  |  120[H]  |  19[H]  ----------------------------<  100[H]  4.2   |  23  |  0.63    Ca    8.2[L]      06 Apr 2025 06:40  Phos  2.2     04-06  Mg     2.2     04-06    TPro  6.5  /  Alb  2.2[L]  /  TBili  0.4  /  DBili  x   /  AST  17  /  ALT  16  /  AlkPhos  57  04-06    Creatinine Trend: 0.63<--, 0.70<--, 0.76<--, 0.81<--, 1.01<--, 0.98<--                        11.8   11.59 )-----------( 174      ( 06 Apr 2025 06:40 )             39.3         Lipid Panel: T(F): 97.9 (04-06), Max: 98.4 (04-05)  HR: 77 (04-06) (77 - 91)  BP: 105/47 (04-06) (102/52 - 138/58)  RR: 18 (04-06)  SpO2: 99% (04-06)  Cardiac Enzymes:         -------------------------------------------------------------------------------------------  Meds:  acetaminophen     Tablet .. 650 milliGRAM(s) Oral every 6 hours PRN  acetaminophen     Tablet .. 1000 milliGRAM(s) Oral every 8 hours  apixaban 2.5 milliGRAM(s) Oral two times a day  artificial  tears Solution 1 Drop(s) Both EYES two times a day  ascorbic acid 500 milliGRAM(s) Oral daily  dextrose 5% + sodium chloride 0.45% with potassium chloride 20 mEq/L 1000 milliLiter(s) IV Continuous <Continuous>  digoxin     Tablet 125 MICROGram(s) Oral daily  diltiazem    milliGRAM(s) Oral daily  donepezil 5 milliGRAM(s) Oral at bedtime  melatonin 3 milliGRAM(s) Oral at bedtime PRN  mirtazapine 7.5 milliGRAM(s) Oral at bedtime  multivitamin 1 Tablet(s) Oral daily  ondansetron Injectable 4 milliGRAM(s) IV Push every 8 hours PRN  polyethylene glycol 3350 17 Gram(s) Oral daily  potassium phosphate / sodium phosphate Tablet (K-PHOS No. 2) 1 Tablet(s) Oral four times a day with meals  senna 2 Tablet(s) Oral at bedtime  tiZANidine 6 milliGRAM(s) Oral three times a day    -------------------------------------------------------------------------------------------  Cardiovascular Diagnostic Testing:  -------------------------------------------------------------------------------------------  ECG:     Echo:     Stress Testing:    Cath:    Imaging:    CXR:  reviewed  -------------------------------------------------------------------------------------------  Assessment and Plan:   -------------------------------------------------------------------------------------------  Problems Assessed:   1. Chronic atrial fibrillation- rate controlled.    2. HTN- stable.   3. HLD   4. Hypernatremia   5. Sepsis     Plan:   • Continue on diltiazem ER at current dose. Patient's afib is rate controlled.   • Patient is on digoxin 0.125 mcg, digoxin level is subtherapeutic.   • Management of electrolyte abnormalities as per nephrology. Avoid hypokalemia in setting of of digoxin use.     ------------------------------------------------------------------------------------------  Donaldo Vidales MD   of Cardiology  [facility] [employer] at [address], [address]  [place] [id number]  Phone: [phone]  Fax: [phone]  Please check [email or web address] for cardiology service schedule and contact information.

## 2025-04-07 ENCOUNTER — TRANSCRIPTION ENCOUNTER (OUTPATIENT)
Age: 85
End: 2025-04-07

## 2025-04-07 VITALS
SYSTOLIC BLOOD PRESSURE: 106 MMHG | HEART RATE: 84 BPM | TEMPERATURE: 98 F | DIASTOLIC BLOOD PRESSURE: 56 MMHG | RESPIRATION RATE: 19 BRPM | OXYGEN SATURATION: 94 %

## 2025-04-07 LAB
ALBUMIN SERPL ELPH-MCNC: 1.9 G/DL — LOW (ref 3.5–5)
ALP SERPL-CCNC: 47 U/L — SIGNIFICANT CHANGE UP (ref 40–120)
ALT FLD-CCNC: 19 U/L DA — SIGNIFICANT CHANGE UP (ref 10–60)
ANION GAP SERPL CALC-SCNC: 7 MMOL/L — SIGNIFICANT CHANGE UP (ref 5–17)
AST SERPL-CCNC: 32 U/L — SIGNIFICANT CHANGE UP (ref 10–40)
BILIRUB SERPL-MCNC: 0.3 MG/DL — SIGNIFICANT CHANGE UP (ref 0.2–1.2)
BUN SERPL-MCNC: 20 MG/DL — HIGH (ref 7–18)
CALCIUM SERPL-MCNC: 8.4 MG/DL — SIGNIFICANT CHANGE UP (ref 8.4–10.5)
CHLORIDE SERPL-SCNC: 114 MMOL/L — HIGH (ref 96–108)
CO2 SERPL-SCNC: 23 MMOL/L — SIGNIFICANT CHANGE UP (ref 22–31)
CREAT SERPL-MCNC: 0.54 MG/DL — SIGNIFICANT CHANGE UP (ref 0.5–1.3)
EGFR: 89 ML/MIN/1.73M2 — SIGNIFICANT CHANGE UP
EGFR: 89 ML/MIN/1.73M2 — SIGNIFICANT CHANGE UP
FLUAV AG NPH QL: SIGNIFICANT CHANGE UP
FLUBV AG NPH QL: SIGNIFICANT CHANGE UP
GLUCOSE SERPL-MCNC: 192 MG/DL — HIGH (ref 70–99)
HCT VFR BLD CALC: 30.9 % — LOW (ref 34.5–45)
HGB BLD-MCNC: 9.8 G/DL — LOW (ref 11.5–15.5)
MAGNESIUM SERPL-MCNC: 2.1 MG/DL — SIGNIFICANT CHANGE UP (ref 1.6–2.6)
MCHC RBC-ENTMCNC: 30.5 PG — SIGNIFICANT CHANGE UP (ref 27–34)
MCHC RBC-ENTMCNC: 31.7 G/DL — LOW (ref 32–36)
MCV RBC AUTO: 96.3 FL — SIGNIFICANT CHANGE UP (ref 80–100)
NRBC BLD AUTO-RTO: 0 /100 WBCS — SIGNIFICANT CHANGE UP (ref 0–0)
PHOSPHATE SERPL-MCNC: 2.6 MG/DL — SIGNIFICANT CHANGE UP (ref 2.5–4.5)
PLATELET # BLD AUTO: 145 K/UL — LOW (ref 150–400)
POTASSIUM SERPL-MCNC: 4.7 MMOL/L — SIGNIFICANT CHANGE UP (ref 3.5–5.3)
POTASSIUM SERPL-SCNC: 4.7 MMOL/L — SIGNIFICANT CHANGE UP (ref 3.5–5.3)
PROT SERPL-MCNC: 5.4 G/DL — LOW (ref 6–8.3)
RBC # BLD: 3.21 M/UL — LOW (ref 3.8–5.2)
RBC # FLD: 15.7 % — HIGH (ref 10.3–14.5)
RSV RNA NPH QL NAA+NON-PROBE: SIGNIFICANT CHANGE UP
SARS-COV-2 RNA SPEC QL NAA+PROBE: SIGNIFICANT CHANGE UP
SODIUM SERPL-SCNC: 144 MMOL/L — SIGNIFICANT CHANGE UP (ref 135–145)
SOURCE RESPIRATORY: SIGNIFICANT CHANGE UP
WBC # BLD: 10.38 K/UL — SIGNIFICANT CHANGE UP (ref 3.8–10.5)
WBC # FLD AUTO: 10.38 K/UL — SIGNIFICANT CHANGE UP (ref 3.8–10.5)

## 2025-04-07 PROCEDURE — 80162 ASSAY OF DIGOXIN TOTAL: CPT

## 2025-04-07 PROCEDURE — 99239 HOSP IP/OBS DSCHRG MGMT >30: CPT

## 2025-04-07 PROCEDURE — 87637 SARSCOV2&INF A&B&RSV AMP PRB: CPT

## 2025-04-07 PROCEDURE — 82803 BLOOD GASES ANY COMBINATION: CPT

## 2025-04-07 PROCEDURE — 85027 COMPLETE CBC AUTOMATED: CPT

## 2025-04-07 PROCEDURE — 84100 ASSAY OF PHOSPHORUS: CPT

## 2025-04-07 PROCEDURE — 96365 THER/PROPH/DIAG IV INF INIT: CPT

## 2025-04-07 PROCEDURE — 84132 ASSAY OF SERUM POTASSIUM: CPT

## 2025-04-07 PROCEDURE — 71045 X-RAY EXAM CHEST 1 VIEW: CPT

## 2025-04-07 PROCEDURE — 85730 THROMBOPLASTIN TIME PARTIAL: CPT

## 2025-04-07 PROCEDURE — 99223 1ST HOSP IP/OBS HIGH 75: CPT

## 2025-04-07 PROCEDURE — 93005 ELECTROCARDIOGRAM TRACING: CPT

## 2025-04-07 PROCEDURE — 81001 URINALYSIS AUTO W/SCOPE: CPT

## 2025-04-07 PROCEDURE — 87077 CULTURE AEROBIC IDENTIFY: CPT

## 2025-04-07 PROCEDURE — 80048 BASIC METABOLIC PNL TOTAL CA: CPT

## 2025-04-07 PROCEDURE — 83735 ASSAY OF MAGNESIUM: CPT

## 2025-04-07 PROCEDURE — 80053 COMPREHEN METABOLIC PANEL: CPT

## 2025-04-07 PROCEDURE — 82962 GLUCOSE BLOOD TEST: CPT

## 2025-04-07 PROCEDURE — 83605 ASSAY OF LACTIC ACID: CPT

## 2025-04-07 PROCEDURE — 87040 BLOOD CULTURE FOR BACTERIA: CPT

## 2025-04-07 PROCEDURE — 82330 ASSAY OF CALCIUM: CPT

## 2025-04-07 PROCEDURE — G0545: CPT

## 2025-04-07 PROCEDURE — 84443 ASSAY THYROID STIM HORMONE: CPT

## 2025-04-07 PROCEDURE — 96367 TX/PROPH/DG ADDL SEQ IV INF: CPT

## 2025-04-07 PROCEDURE — 92610 EVALUATE SWALLOWING FUNCTION: CPT

## 2025-04-07 PROCEDURE — 85025 COMPLETE CBC W/AUTO DIFF WBC: CPT

## 2025-04-07 PROCEDURE — 74018 RADEX ABDOMEN 1 VIEW: CPT

## 2025-04-07 PROCEDURE — 36415 COLL VENOUS BLD VENIPUNCTURE: CPT

## 2025-04-07 PROCEDURE — 82947 ASSAY GLUCOSE BLOOD QUANT: CPT

## 2025-04-07 PROCEDURE — 87086 URINE CULTURE/COLONY COUNT: CPT

## 2025-04-07 PROCEDURE — 85610 PROTHROMBIN TIME: CPT

## 2025-04-07 PROCEDURE — 83036 HEMOGLOBIN GLYCOSYLATED A1C: CPT

## 2025-04-07 PROCEDURE — 84295 ASSAY OF SERUM SODIUM: CPT

## 2025-04-07 PROCEDURE — 99285 EMERGENCY DEPT VISIT HI MDM: CPT

## 2025-04-07 RX ORDER — DILTIAZEM HYDROCHLORIDE 240 MG/1
1 TABLET, EXTENDED RELEASE ORAL
Qty: 30 | Refills: 0
Start: 2025-04-07 | End: 2025-05-06

## 2025-04-07 RX ORDER — DILTIAZEM HYDROCHLORIDE 240 MG/1
1 TABLET, EXTENDED RELEASE ORAL
Refills: 0 | DISCHARGE

## 2025-04-07 RX ADMIN — APIXABAN 2.5 MILLIGRAM(S): 2.5 TABLET, FILM COATED ORAL at 17:37

## 2025-04-07 RX ADMIN — TIZANIDINE 6 MILLIGRAM(S): 4 TABLET ORAL at 14:53

## 2025-04-07 RX ADMIN — Medication 500 MILLIGRAM(S): at 13:04

## 2025-04-07 RX ADMIN — TIZANIDINE 6 MILLIGRAM(S): 4 TABLET ORAL at 05:45

## 2025-04-07 RX ADMIN — DIGOXIN 125 MICROGRAM(S): 250 TABLET ORAL at 05:45

## 2025-04-07 RX ADMIN — Medication 1000 MILLIGRAM(S): at 06:14

## 2025-04-07 RX ADMIN — Medication 1 TABLET(S): at 13:03

## 2025-04-07 RX ADMIN — POLYETHYLENE GLYCOL 3350 17 GRAM(S): 17 POWDER, FOR SOLUTION ORAL at 13:02

## 2025-04-07 RX ADMIN — Medication 1000 MILLIGRAM(S): at 14:53

## 2025-04-07 RX ADMIN — APIXABAN 2.5 MILLIGRAM(S): 2.5 TABLET, FILM COATED ORAL at 05:45

## 2025-04-07 RX ADMIN — Medication 1000 MILLIGRAM(S): at 15:17

## 2025-04-07 RX ADMIN — Medication 1 DROP(S): at 05:45

## 2025-04-07 RX ADMIN — Medication 1 TABLET(S): at 17:37

## 2025-04-07 RX ADMIN — Medication 1 DROP(S): at 17:37

## 2025-04-07 RX ADMIN — Medication 1000 MILLIGRAM(S): at 05:44

## 2025-04-07 RX ADMIN — DILTIAZEM HYDROCHLORIDE 120 MILLIGRAM(S): 240 TABLET, EXTENDED RELEASE ORAL at 05:45

## 2025-04-07 NOTE — PROGRESS NOTE ADULT - REASON FOR ADMISSION
Sepsis 2/2 UTI and Hypernatremia

## 2025-04-07 NOTE — DISCHARGE NOTE NURSING/CASE MANAGEMENT/SOCIAL WORK - NSDCVIVACCINE_GEN_ALL_CORE_FT
Tdap; 19-Sep-2021 00:19; Kan Kwong (RN); [employer]; [id number] (Exp. Date: 10-May-2023); IntraMuscular; Deltoid Right.; 0.5 milliLiter(s); VIS (VIS Published: 09-May-2013, VIS Presented: 19-Sep-2021);

## 2025-04-07 NOTE — PROGRESS NOTE ADULT - NUTRITIONAL ASSESSMENT
This patient has been assessed with a concern for Malnutrition and has been determined to have a diagnosis/diagnoses of Moderate protein-calorie malnutrition.    This patient is being managed with:   Diet Minced and Moist-  Mildly Thick Liquids (MILDTHICKLIQS)  Free Water Flush Instructions:  Please give Magic Cup One Time a day  Supplement Feeding Modality:  Oral  Two Frederic HN Cans or Servings Per Day:  1       Frequency:  Daily  Entered: Apr 5 2025 11:01AM  
This patient has been assessed with a concern for Malnutrition and has been determined to have a diagnosis/diagnoses of Moderate protein-calorie malnutrition.    This patient is being managed with:   Diet Minced and Moist-  Mildly Thick Liquids (MILDTHICKLIQS)  Free Water Flush Instructions:  Please give Magic Cup One Time a day  Supplement Feeding Modality:  Oral  Two Frederic HN Cans or Servings Per Day:  1       Frequency:  Daily  Entered: Apr 5 2025 11:01AM  
This patient has been assessed with a concern for Malnutrition and has been determined to have a diagnosis/diagnoses of Moderate protein-calorie malnutrition.    This patient is being managed with:   Diet Minced and Moist-  Mildly Thick Liquids (MILDTHICKLIQS)  Entered: Apr 4 2025  3:12PM    The following pending diet order is being considered for treatment of Moderate protein-calorie malnutrition:  Diet Minced and Moist-  Mildly Thick Liquids (MILDTHICKLIQS)  Free Water Flush Instructions:  Please give Magic Cup One Time a day  Supplement Feeding Modality:  Oral  Two Frederic HN Cans or Servings Per Day:  1       Frequency:  Daily  Entered: Apr 5 2025 11:01AM    This patient has been assessed with a concern for Malnutrition and has been determined to have a diagnosis/diagnoses of Moderate protein-calorie malnutrition.    This patient is being managed with:   Diet Minced and Moist-  Mildly Thick Liquids (MILDTHICKLIQS)  Entered: Apr 4 2025  3:12PM    The following pending diet order is being considered for treatment of Moderate protein-calorie malnutrition:  Diet Minced and Moist-  Mildly Thick Liquids (MILDTHICKLIQS)  Free Water Flush Instructions:  Please give Magic Cup One Time a day  Supplement Feeding Modality:  Oral  Two Frederic HN Cans or Servings Per Day:  1       Frequency:  Daily  Entered: Apr 5 2025 11:01AM

## 2025-04-07 NOTE — DISCHARGE NOTE PROVIDER - NSDCFUADDAPPT_GEN_ALL_CORE_FT
APPTS ARE READY TO BE MADE: [X] YES    Best Family or Patient Contact (if needed):    Additional Information about above appointments (if needed):    1: Nephrology (Dr. Dietz)   2:   3:     Other comments or requests:   APPTS ARE READY TO BE MADE: [X] YES    Best Family or Patient Contact (if needed):    Additional Information about above appointments (if needed):    1: Nephrology (Dr. Dietz)   2:   3:     Other comments or requests:    Patient is being transferred. Caregiver will arrange follow up.

## 2025-04-07 NOTE — DISCHARGE NOTE NURSING/CASE MANAGEMENT/SOCIAL WORK - NSDCPEFALRISK_GEN_ALL_CORE
For information on Fall & Injury Prevention, visit: [email or web address] OR  [email or web address] OR  https://www.cdc.gov/steadi/patient.html

## 2025-04-07 NOTE — DISCHARGE NOTE PROVIDER - NSDCMRMEDTOKEN_GEN_ALL_CORE_FT
Artificial Tears ophthalmic solution: 1 drop(s) in each eye 2 times a day  Cardizem  mg/24 hours oral capsule, extended release: 1 cap(s) orally once a day  digoxin 125 mcg (0.125 mg) oral tablet: 1 tab(s) orally once a day  donepezil 10 mg oral tablet: 1 tab(s) orally once a day  Eliquis 2.5 mg oral tablet: 1 tab(s) orally 2 times a day  Fosamax 70 mg oral tablet: 1 tab(s) orally once a week  Hiprex 1 g oral tablet: 1 tab(s) orally 2 times a day  mirtazapine 7.5 mg oral tablet: 2 tab(s) orally once a day  multivitamin: 1 tab(s) orally once a day  tiZANidine 2 mg oral tablet: 3 tab(s) orally 3 times a day  Vitamin C 500 mg oral tablet: 1 tab(s) orally once a day   Artificial Tears ophthalmic solution: 1 drop(s) in each eye 2 times a day  digoxin 125 mcg (0.125 mg) oral tablet: 1 tab(s) orally once a day  dilTIAZem 120 mg/24 hours oral capsule, extended release: 1 cap(s) orally once a day  donepezil 10 mg oral tablet: 1 tab(s) orally once a day  Eliquis 2.5 mg oral tablet: 1 tab(s) orally 2 times a day  Fosamax 70 mg oral tablet: 1 tab(s) orally once a week  Hiprex 1 g oral tablet: 1 tab(s) orally 2 times a day  mirtazapine 7.5 mg oral tablet: 2 tab(s) orally once a day  multivitamin: 1 tab(s) orally once a day  tiZANidine 2 mg oral tablet: 3 tab(s) orally 3 times a day  Vitamin C 500 mg oral tablet: 1 tab(s) orally once a day

## 2025-04-07 NOTE — CONSULT NOTE ADULT - SUBJECTIVE AND OBJECTIVE BOX
Cardiology Consult Note   [Please check [email or web address] password: "[email or web address]" for cardiology service schedule and contact information]    History of Present Illness:   Following history obtained from admission HPI  HPI:  85y/F, from [facility], bedbound, PMH of Dementia (A&OX1 at baseline), Afib,  OP with T12 compression fracture sp spinal fusion, recurrent UTI was brought in by EMS for hypotension and fever at rehab. Patient is poor historian, her son, Delroy reached over phone for collateral. States he also does not know more, because nursing home did not give him much information either, but states she did not have any vomiting episodes or diarrhea. States patient has chronic pain everywhere. He mentioned that whenever patient has UTi she is more confused, does not make sense and is very lethargic with poor oral intake and he noticed all of these symptoms this time around too.   In ED, Temp 1.3, , /72, RR 25, 96% on 2L  wbc 14.88, na 163, Scr 1.45, lactate 2.0  COVIID, flu rsv negative  UA positive  EKG: Ashley with RVR  s/p 1.8L NS, 1L LR in ed   (03 Apr 2025 23:56)    Additional HPI: patient denies any palpitations or feelings of rapid heart rate.     PAST MEDICAL & SURGICAL HISTORY:  Arthritis      Hypertension      High cholesterol      Afib      Osteoporosis      Frequent UTI      Dementia      Compression fracture of T12 vertebra        FAMILY HISTORY:    SOCIAL HISTORY:  unchanged    MEDICATIONS:  apixaban 2.5 milliGRAM(s) Oral two times a day  digoxin     Tablet 125 MICROGram(s) Oral daily    cefTRIAXone   IVPB 1000 milliGRAM(s) IV Intermittent every 24 hours      acetaminophen     Tablet .. 650 milliGRAM(s) Oral every 6 hours PRN  acetaminophen     Tablet .. 1000 milliGRAM(s) Oral every 8 hours  donepezil 5 milliGRAM(s) Oral at bedtime  melatonin 3 milliGRAM(s) Oral at bedtime PRN  mirtazapine 7.5 milliGRAM(s) Oral at bedtime  ondansetron Injectable 4 milliGRAM(s) IV Push every 8 hours PRN  tiZANidine 6 milliGRAM(s) Oral three times a day    polyethylene glycol 3350 17 Gram(s) Oral daily  senna 2 Tablet(s) Oral at bedtime      artificial  tears Solution 1 Drop(s) Both EYES two times a day  ascorbic acid 500 milliGRAM(s) Oral daily  dextrose 5% + sodium chloride 0.45% with potassium chloride 20 mEq/L 1000 milliLiter(s) IV Continuous <Continuous>  multivitamin 1 Tablet(s) Oral daily      REVIEW OF SYSTEMS:  CV: chest pain (-), palpitation (-), orthopnea (-), PND (-), edema (-)  PULM: SOB (-), cough (-), wheezing (-), hemoptysis (-).   CONST: fever (-), chills (-) or fatigue (-)  GI: abdominal distension (-), abdominal pain (-) , nausea/vomiting (-), hematemesis, (-), melena (-), hematochezia (-)  : dysuria (-), frequency (-), hematuria (-).   NEURO: numbness (-), weakness (-), dizziness (-)  SKIN: itching (-), rash (-)  HEENT:  visual changes (-); vertigo or throat pain (-);  neck stiffness (-)     All other review of systems is negative unless indicated above.   -------------------------------------------------------------------------------------------  PHYSICAL EXAM:  T(C): 36.6 (04-05-25 @ 11:14), Max: 36.6 (04-05-25 @ 11:14)  HR: 98 (04-05-25 @ 11:14) (82 - 122)  BP: 121/53 (04-05-25 @ 11:14) (89/50 - 121/53)  RR: 19 (04-05-25 @ 11:14) (19 - 19)  SpO2: 94% (04-05-25 @ 11:14) (93% - 98%)  Wt(kg): --  I&O's Summary    04 Apr 2025 07:01  -  05 Apr 2025 07:00  --------------------------------------------------------  IN: 2280 mL / OUT: 950 mL / NET: 1330 mL        PHYSICAL EXAM:  Gen: NAD,   HEENT: anicteric  Neck: no JVD  Cards: RRR, +S1/S2, no M/G/R  Resp: CTA B/L  GI: soft, NT/ND, NABS  : +arango  Extremities: no LE edema B/L  Derm: no rashes  Neuro: AOx 2 (not to date)      -------------------------------------------------------------------------------------------  LABS:  04-05    155[H]  |  124[H]  |  28[H]  ----------------------------<  128[H]  3.5   |  27  |  0.70    Ca    7.8[L]      05 Apr 2025 06:42  Phos  2.2     04-05  Mg     2.5     04-05    TPro  5.4[L]  /  Alb  1.9[L]  /  TBili  0.3  /  DBili  x   /  AST  29  /  ALT  23  /  AlkPhos  46  04-05    Creatinine Trend: 0.70<--, 0.76<--, 0.81<--, 1.01<--, 0.98<--, 1.45<--                        9.6    9.24  )-----------( 161      ( 05 Apr 2025 06:42 )             32.5         Lipid Panel: apixaban 2.5 milliGRAM(s) Oral two times a day  digoxin     Tablet 125 MICROGram(s) Oral daily    Cardiac Enzymes:         -------------------------------------------------------------------------------------------  Meds:  polyethylene glycol 3350 17 Gram(s) Oral daily  senna 2 Tablet(s) Oral at bedtime    -------------------------------------------------------------------------------------------  Cardiovascular Diagnostic Testing:  ECG:   Echo: < from: Transthoracic Echocardiogram (08.25.21 @ 10:07) >  Conclusions:  1. Normal left ventricular systolic function. No segmental  wall motion abnormalities.  2. Normal right ventricular size and function.  3. Normal tricuspid valve. Minimal tricuspid regurgitation.  *** Compared with echocardiogram of 9/21/2020, no  significant changes noted.  ------------------------------------------------------------------------  Confirmed on  8/25/2021 - 17:22:45 by RAZ Llanos    < end of copied text >    Stress Testing:  Cath:  Imaging:  CXR:  reviewed  -------------------------------------------------------------------------------------------  Problems Assessed:   1. Chronic atrial fibrillation   2. HTN- stable.   3. HLD   4. Hypernatremia   5. Sepsis     Plan:   • Continue on diltiazem ER at current dose. Patient's afib is rate controlled.   • Patient is on digoxin 0.125 mcg, digoxin level is subtherapeutic.   • Management of electrolyte abnormalities as per nephrology. Avoid hypokalemia in setting of of digoxin use.     Donaldo Vidales MD   of Cardiology  [facility] at [address], [address]  [place] [address]  Phone: [phone]  Fax: [phone]    -------------------------------------------------------------------------------------------  
[facility] NEPHROLOGY- CONSULTATION NOTE    Patient is a 84yo Female from [facility] with Dementia (A&OX1 at baseline), Afib, T12 compression fracture sp spinal fusion, recurrent UTI presents with hypotension and fever. Pt a/w Sepsis 2/2/ UTI, hypernatremia and ODILON. Nephrology consulted for hypernatremia and ODILON.     Pt is a poor historian due to dementia. Pt denies any complaints.     As per pt's son Delroy at bedside, he was notified that she is lethargic with UTI on Wed. They opted for IV antibiotics. He was then notified yesterday that she has fever and low BP and was being sent to ER>       PAST MEDICAL & SURGICAL HISTORY:  Arthritis      Hypertension      High cholesterol      Afib      Osteoporosis      Frequent UTI      Dementia      Compression fracture of T12 vertebra        penicillin (Unknown)    Home Medications Reviewed  Hospital Medications:   MEDICATIONS  (STANDING):  acetaminophen     Tablet .. 1000 milliGRAM(s) Oral every 8 hours  apixaban 2.5 milliGRAM(s) Oral two times a day  artificial  tears Solution 1 Drop(s) Both EYES two times a day  ascorbic acid 500 milliGRAM(s) Oral daily  cefTRIAXone   IVPB 1000 milliGRAM(s) IV Intermittent every 24 hours  digoxin     Tablet 125 MICROGram(s) Oral daily  diltiazem    milliGRAM(s) Oral daily  donepezil 10 milliGRAM(s) Oral at bedtime  lactated ringers. 1000 milliLiter(s) (75 mL/Hr) IV Continuous <Continuous>  mirtazapine 7.5 milliGRAM(s) Oral at bedtime  multivitamin 1 Tablet(s) Oral daily  tiZANidine 6 milliGRAM(s) Oral three times a day      REVIEW OF SYSTEMS: Pt denies any complaints; unreliable due to dementia  Gen: no changes in weight  HEENT: no rhinorrhea  Neck: no sore throat  Cards: no chest pain  Resp: no dyspnea  GI: no nausea or vomiting or diarrhea  : no dysuria or hematuria  Vascular: no LE edema  Derm: no rashes  Neuro: no numbness/tingling  All other review of systems is negative unless indicated above.    VITALS:  T(F): 97.3 (04-04-25 @ 17:10), Max: 99.7 (04-04-25 @ 05:16)  HR: 122 (04-04-25 @ 17:10)  BP: 107/80 (04-04-25 @ 17:10)  RR: 19 (04-04-25 @ 17:10)  SpO2: 96% (04-04-25 @ 17:10)  Wt(kg): --    04-04 @ 07:01  -  04-04 @ 18:08  --------------------------------------------------------  IN: 430 mL / OUT: 600 mL / NET: -170 mL        PHYSICAL EXAM:  Gen: NAD,   HEENT: anicteric  Neck: no JVD  Cards: RRR, +S1/S2, no M/G/R  Resp: CTA B/L  GI: soft, NT/ND, NABS  : +arango  Extremities: no LE edema B/L  Derm: no rashes  Neuro: AOx 2 (not to date)    LABS:  04-04  158 <--, 160 <--, 163 <--, 163 <--  158[H]  |  127[H]  |  39[H]  ----------------------------<  164[H]  4.0   |  29  |  0.81    Ca    7.8[L]      04 Apr 2025 15:45  Phos  2.3     04-04  Mg     2.4     04-04    TPro  6.5  /  Alb  2.2[L]  /  TBili  0.5  /  DBili      /  AST  29  /  ALT  27  /  AlkPhos  56  04-04    Creatinine Trend: 0.81 <--, 1.01 <--, 0.98 <--, 1.45 <--                        11.5   10.30 )-----------( 211      ( 04 Apr 2025 05:15 )             39.6     Urine Studies:  Urinalysis Basic - ( 04 Apr 2025 15:45 )    Color:  / Appearance:  / SG:  / pH:   Gluc: 164 mg/dL / Ketone:   / Bili:  / Urobili:    Blood:  / Protein:  / Nitrite:    Leuk Esterase:  / RBC:  / WBC    Sq Epi:  / Non Sq Epi:  / Bacteria:         RADIOLOGY & ADDITIONAL STUDIES:                
Patient is a 85 year old female from NH, with PMH of HTN, HLD, A. Fib(Eliquis), Dementia, Osteoporosis who presented to the ED with a fever.  Patient poor historian.  Patient unable to provide history.  Patient was sent in from NH with low BP and fever.  Patient noted to be normotensive on exam, mildly tachycardic. Patient noted on labs to have hypernatermia.  Patient with sepsis.  Patient appears uncomfortable, contracted, no acute respiratory distress.  patient appears with abdominal pain.    Allergies    penicillin (Unknown)    Intolerances        MEDICATIONS  (STANDING):    MEDICATIONS  (PRN):      Daily Height in cm: 152.4 (2025 11:51)    Daily     Drug Dosing Weight  Height (cm): 152.4 (2025 11:51)  Weight (kg): 58 (2025 11:51)  BMI (kg/m2): 25 (2025 11:51)  BSA (m2): 1.54 (2025 11:51)    PAST MEDICAL & SURGICAL HISTORY:  Arthritis      Hypertension      High cholesterol      Afib      Osteoporosis      Frequent UTI      Dementia      Compression fracture of T12 vertebra          FAMILY HISTORY:      SOCIAL HISTORY:    ADVANCE DIRECTIVES:    REVIEW OF SYSTEMS:  UNABLE TO OBTAIN        ICU Vital Signs Last 24 Hrs  T(C): 37.3 (2025 15:59), Max: 39.4 (2025 11:51)  T(F): 99.1 (2025 15:59), Max: 103 (2025 11:51)  HR: 108 (2025 15:59) (108 - 138)  BP: 141/67 (2025 15:59) (128/72 - 150/90)  BP(mean): --  ABP: --  ABP(mean): --  RR: 27 (2025 15:59) (27 - 30)  SpO2: 96% (2025 15:59) (96% - 98%)    O2 Parameters below as of 2025 15:59  Patient On (Oxygen Delivery Method): nasal cannula                I&O's Detail      PHYSICAL EXAM:  GENERAL: Uncomfortable, lying in bed, contracted  HEAD:  Atraumatic, Normocephalic  EYES: EOMI, PERRLA, conjunctiva and sclera clear  ENMT: No tonsillar erythema, exudates, or enlargement; Dry mucus membranes   NECK: Supple, No JVD, Normal thyroid  NERVOUS SYSTEM:  Alert & Oriented X0-1, contracted  CHEST/LUNG: Clear to percussion bilaterally; No rales, rhonchi, wheezing, or rubs  HEART: Regular rate and rhythm; No murmurs, rubs, or gallops; POCUS showing adaquet heart function  ABDOMEN: Soft, Nontender, Nondistended; Bowel sounds present  EXTREMITIES:  2+ Peripheral Pulses, No clubbing, cyanosis, or edema  LYMPH: No lymphadenopathy noted  SKIN: as above    LABS:  CBC Full  -  ( 2025 11:46 )  WBC Count : 14.66 K/uL  RBC Count : 4.99 M/uL  Hemoglobin : 14.9 g/dL  Hematocrit : 50.2 %  Platelet Count - Automated : 335 K/uL  Mean Cell Volume : 100.6 fl  Mean Cell Hemoglobin : 29.9 pg  Mean Cell Hemoglobin Concentration : 29.7 g/dL  Auto Neutrophil # : x  Auto Lymphocyte # : x  Auto Monocyte # : x  Auto Eosinophil # : x  Auto Basophil # : x  Auto Neutrophil % : x  Auto Lymphocyte % : x  Auto Monocyte % : x  Auto Eosinophil % : x  Auto Basophil % : x    04-03    163[HH]  |  130[H]  |  66[H]  ----------------------------<  197[H]  4.2   |  32[H]  |  1.45[H]    Ca    9.8      2025 11:46    TPro  8.5[H]  /  Alb  2.9[L]  /  TBili  0.5  /  DBili  x   /  AST  36  /  ALT  34  /  AlkPhos  68  04-03    CAPILLARY BLOOD GLUCOSE      POCT Blood Glucose.: 173 mg/dL (2025 11:43)    PT/INR - ( 2025 11:46 )   PT: 14.7 sec;   INR: 1.27 ratio         PTT - ( 2025 11:46 )  PTT:33.2 sec  Urinalysis Basic - ( 2025 18:00 )    Color: Yellow / Appearance: Turbid / S.021 / pH: x  Gluc: x / Ketone: Negative mg/dL  / Bili: Negative / Urobili: 0.2 mg/dL   Blood: x / Protein: 100 mg/dL / Nitrite: Negative   Leuk Esterase: Large / RBC: 5 /HPF / WBC Too Numerous to count /HPF   Sq Epi: x / Non Sq Epi: x / Bacteria: Moderate /HPF              EKG:    ECHO, US:    RADIOLOGY:    CRITICAL CARE TIME SPENT:  
Patient is a 85y old  Female who presents with a chief complaint of Sepsis 2/2 UTI and Hypernatremia from [facility], bedbound, PMH of Dementia (A&OX1 at baseline), Afib,  OP with T12 compression fracture sp spinal fusion, recurrent UTI was brought in by EMS for hypotension and fever at rehab. Patient is poor historian, her son, Delroy reached over phone for collateral. States he also does not know more, because nursing home did not give him much information either, but states she did not have any vomiting episodes or diarrhea. States patient has chronic pain everywhere. He mentioned that whenever patient has UTi she is more confused, does not make sense and is very lethargic with poor oral intake and he noticed all of these symptoms this time around too.   In ED, Temp 1.3, , /72, RR 25, 96% on 2L  wbc 14.88, na 163, Scr 1.45, lactate 2.0  COVIID, flu rsv negative  UA positive  EKG: Ashley with RVR  s/p 1.8L NS, 1L LR in ed   (03 Apr 2025 23:56)   Wound consult requested by team to assist w/ management of      wound/ pressure injury.   Pt (unable to)  c/o pain, drainage, odor, color change,  or worsening swelling. Offloading and pericare initiated upon admission as pt Increasingly sedentary 2/2 to illness.   Pt is Incontinent of urine & stool. (+)arango/  purewick   No h/o bites, scratches, falls, trauma.     Current Diet: Diet, Minced and Moist:   Mildly Thick Liquids (MILDTHICKLIQS)  Free Water Flush Instructions:  Please give Magic Cup One Time a day  Supplement Feeding Modality:  Oral  Two Frederic HN Cans or Servings Per Day:  1       Frequency:  Daily (04-05-25 @ 11:02) [Active]          REVIEW OF SYSTEMSsee HPI and PMH others neg, pain 0 , has pain with movt, contracted knees at 90' hips as well  Allergies    penicillin (Unknown)    Intolerances         MEDICATIONS  (STANDING):  acetaminophen     Tablet .. 1000 milliGRAM(s) Oral every 8 hours  apixaban 2.5 milliGRAM(s) Oral two times a day  artificial  tears Solution 1 Drop(s) Both EYES two times a day  ascorbic acid 500 milliGRAM(s) Oral daily  dextrose 5% + sodium chloride 0.45% with potassium chloride 20 mEq/L 1000 milliLiter(s) (75 mL/Hr) IV Continuous <Continuous>  digoxin     Tablet 125 MICROGram(s) Oral daily  diltiazem    milliGRAM(s) Oral daily  donepezil 5 milliGRAM(s) Oral at bedtime  mirtazapine 7.5 milliGRAM(s) Oral at bedtime  multivitamin 1 Tablet(s) Oral daily  potassium phosphate / sodium phosphate Tablet (K-PHOS No. 2) 1 Tablet(s) Oral four times a day with meals  senna 2 Tablet(s) Oral at bedtime  tiZANidine 6 milliGRAM(s) Oral three times a day    MEDICATIONS  (PRN):  acetaminophen     Tablet .. 650 milliGRAM(s) Oral every 6 hours PRN Temp greater or equal to 38C (100.4F), Mild Pain (1 - 3)  melatonin 3 milliGRAM(s) Oral at bedtime PRN Insomnia  ondansetron Injectable 4 milliGRAM(s) IV Push every 8 hours PRN Nausea and/or Vomiting    SOCIAL HISTORY:  dnr dni    FAMILY HISTORY:   has  wound healing or skin/ significant problems  PAST MEDICAL & SURGICAL HISTORY:  fusion lumbar  Arthritis  Hypertension  High cholesterol  Afib  Osteoporosis  Frequent UTI  Dementia  Compression fracture of T12 vertebra    I&O's Summary    06 Apr 2025 07:01  -  07 Apr 2025 07:00  --------------------------------------------------------  IN: 1420 mL / OUT: 1100 mL / NET: 320 mL    07 Apr 2025 07:01  -  07 Apr 2025 13:24  --------------------------------------------------------  IN: 100 mL / OUT: 600 mL / NET: -500 mL      Vital Signs Last 24 Hrs  T(C): 36.6 (07 Apr 2025 11:18), Max: 36.8 (07 Apr 2025 00:12)  T(F): 97.9 (07 Apr 2025 11:18), Max: 98.2 (07 Apr 2025 00:12)  HR: 93 (07 Apr 2025 11:18) (75 - 95)  BP: 102/57 (07 Apr 2025 11:18) (102/57 - 144/71)  BP(mean): --  RR: 19 (07 Apr 2025 11:18) (18 - 19)  SpO2: 98% (07 Apr 2025 11:18) (96% - 99%)    Parameters below as of 07 Apr 2025 11:18  Patient On (Oxygen Delivery Method): room air            PHYSICAL EXAM:  Constitutional: NAD, Guarded but stable,  A&Ox3/ / Confused    HEENT:  NC/AT, PERRL, EOMI, sclera clear, mucosa moist, throat clear, trachea midline   Respiratory: nonlabored w/ equal chest rise   Gastrointestinal: soft NT/ND (+)BS   :  purewick/  groin no yeast  Neurology:  weakened strength & sensation grossly intact     follow commands, moves arms can not extend legs  Psych: calm/ appropriate/    Musculoskeletal:  limited stiff / / contractures, scars,  right foot  Vascular: BLE equally warm/   no cyanosis,+ 2 edema no acute ischemia, cap refill 3 s           >LE //BLE edema equal           BLE DP/PT pulses  not palpable          Skin:  moist w/ good turgor,     no, fluctuance, nor crepitus     wound low back sacral stage 2  1x1x.2cm stage 2   right wound femurstage 2 tochanter 1x.15x.1  wound  knee/ lower leg  wound ankle  right 1x3x.1 stage 2 old scar cavilon foam offloading boot  left foot 5th1x1 mid [id number], distal mt head 5th 2 x2 dti  10 toes viable   drainage Small  type serous   necrosis none,   granular ,  100%  CBC Full  -  ( 07 Apr 2025 06:37 )  WBC Count : 10.38 K/uL  RBC Count : 3.21 M/uL  Hemoglobin : 9.8 g/dL  Hematocrit : 30.9 %  Platelet Count - Automated : 145 K/uL  Mean Cell Volume : 96.3 fl  Mean Cell Hemoglobin : 30.5 pg  Mean Cell Hemoglobin Concentration : 31.7 g/dL     04-07    144  |  114[H]  |  20[H]  ----------------------------<  192[H]  4.7   |  23  |  0.54    Ca    8.4      07 Apr 2025 06:37  Phos  2.6     04-07  Mg     2.1     04-07    TPro  5.4[L]  /  Alb  1.9[L]  /  TBili  0.3  /  DBili  x   /  AST  32  /  ALT  19  /  AlkPhos  47  04-07    < from: Xray Pelvis 3 Views (04.19.24 @ 22:33) >    INTERPRETATION:  Pelvis.  Inlet and outlet views. CAT scan showed a nondisplaced S1 fracture.    Advanced bilateral hip degeneration again noted with loss of joint space.   No visible fracture on x-ray.    IMPRESSION: Advanced bilateral hip degeneration. No visible fracture on   x-ray.    --- End of Report ---      BECKY PEREYRA MD; Attending Radiologist  This documenthas been electronically signed. Apr 21 2024  4:45PM    < end of copied text >  < from: Xray Femur 2 Views, Left (04.19.24 @ 13:19) >  MPRESSION:  No hip fractures or dislocations.    tact pelvic and obturator rings and symmetrically aligned and spaced SI   joints and pubic symphysis.    Advanced bilateral hip osteoarthritis right greater than left. Left knee   tricompartment osteoarthritis with patellofemoral and medial tibiofemoral   compartment predominance.    Generalized osteopenia otherwise no discrete lytic or blastic lesions.    --- End of Report ---            NATALYA PELAYO MD; AttendingRadiologist  This document has been electronically signed. Apr 19 2024  1:23PM    < end of copied text >

## 2025-04-07 NOTE — DISCHARGE NOTE PROVIDER - ATTENDING DISCHARGE PHYSICAL EXAMINATION:
Patient admitted with acute encephalopathy with acute severe hypernatremia due to severe dehydration  Patient clinically improved with fluids and IV ABX; sacral ulcer seen by wound care team  Mental status at baseline    P/E:  Psych: AAO x2; Dementia  Neuro: No gross focal deficits; Power and sensation intact  CVS: S1S2 present, regular, no edema  Resp: BLAE+, No wheeze or Rhonchi  GI: Soft, BS+, Non tender, non distended  Extr: No  calf tenderness B/L Lower extremities  Skin: Warm and moist without any rashes    Plan:  D/C back to SNF; d/w Son Delroy and updated  spoke with [facility]  liaison; advised staff to assist feeding and fluids to prevent dehydration  see d/c instructions  discussed with housestaff and RN and case mgmt

## 2025-04-07 NOTE — CONSULT NOTE ADULT - REASON FOR ADMISSION
Sepsis 2/2 UTI and Hypernatremia

## 2025-04-07 NOTE — DISCHARGE NOTE PROVIDER - HOSPITAL COURSE
85 F, from [facility], bedbound, PMHx dementia, Afib, OP with T12 compression fracture s/p spinal fusion, recurrent UTI p/w hypotension and fever. Na 163, UA +ve. Admitted for sepsis 2/2 UTI + Afib with RVR and hyperNa. 85 F, from [facility], bedbound, PMHx dementia, Afib, OP with T12 compression fracture s/p spinal fusion, recurrent UTI p/w hypotension and fever. Na 163, UA +ve. Admitted for sepsis 2/2 UTI + Afib with RVR and hypernatremia. COVID/RSV/Flu were negative. UA was positive. EKG was showing Afib RVR likely due to sepsis. She was given was given cardizem 10mg IVP x 1 in ED, is normally on cardizem 180 digoxin 125mg at home. Patient was started on cardizem 120mg and digoxin 125 for rate control. Eliquis resumed for AC. Patient was given 2.8 bolus total. 1 dose of vancomycin and cefepime were given in ED. Blood cultures were negative. Urine cultures were positive for candida glabrata. Patient completed 3 days of ceftriaxone. Hypernatremia etiology was likely due to dehydration and poor oral intake. Sodium corrected from 162 to 144 over a few days with IVF. Nephrology Dr. Dietz was following. Patient also presented with ODILON, SCr 1.45, likely pre-renal in the setting of sepsis and dehydration, ODILON resolved with IVF. Patient has a history of dementia, takes donepezil 5mg qd, which was resumed during their hospital stay without complications.    Patient was noted to have a stage 2 sacral left trochanter and right heel dti  left 5th /foot, no sti. He was seen by Wound Care Dr. Tejeda. He recommended Dressing: Absorptive :  Allevyn foam/ OFFLOAD heels back consider probiotic acidophilus. Continue w/ attends under pads and Pericare / purewick care as per protocol. Topical Dry: CAVILON Advance/  Keon,  Moisturize intact skin w/ SWEEN cream BID      Pt deemed medically stable for discharge.

## 2025-04-07 NOTE — DISCHARGE NOTE PROVIDER - PROVIDER TOKENS
FREE:[LAST:[Rafael],FIRST:[Lynne],PHONE:[(   )    -],FAX:[(   )    -]] PROVIDER:[TOKEN:[[id number]],FOLLOWUP:[1 week]]

## 2025-04-07 NOTE — DISCHARGE NOTE NURSING/CASE MANAGEMENT/SOCIAL WORK - NSDCFUADDAPPT_GEN_ALL_CORE_FT
APPTS ARE READY TO BE MADE: [X] YES    Best Family or Patient Contact (if needed):    Additional Information about above appointments (if needed):    1: Nephrology (Dr. Dietz)   2:   3:     Other comments or requests:

## 2025-04-07 NOTE — PROGRESS NOTE ADULT - SUBJECTIVE AND OBJECTIVE BOX
PGY-1 Progress Note discussed with attending    INTERVAL HPI/OVERNIGHT EVENTS:   MEDICATIONS  (STANDING):  acetaminophen     Tablet .. 1000 milliGRAM(s) Oral every 8 hours  apixaban 2.5 milliGRAM(s) Oral two times a day  artificial  tears Solution 1 Drop(s) Both EYES two times a day  ascorbic acid 500 milliGRAM(s) Oral daily  dextrose 5% + sodium chloride 0.45% with potassium chloride 20 mEq/L 1000 milliLiter(s) (75 mL/Hr) IV Continuous <Continuous>  digoxin     Tablet 125 MICROGram(s) Oral daily  diltiazem    milliGRAM(s) Oral daily  donepezil 5 milliGRAM(s) Oral at bedtime  mirtazapine 7.5 milliGRAM(s) Oral at bedtime  multivitamin 1 Tablet(s) Oral daily  polyethylene glycol 3350 17 Gram(s) Oral daily  potassium phosphate / sodium phosphate Tablet (K-PHOS No. 2) 1 Tablet(s) Oral four times a day with meals  senna 2 Tablet(s) Oral at bedtime  tiZANidine 6 milliGRAM(s) Oral three times a day    MEDICATIONS  (PRN):  acetaminophen     Tablet .. 650 milliGRAM(s) Oral every 6 hours PRN Temp greater or equal to 38C (100.4F), Mild Pain (1 - 3)  melatonin 3 milliGRAM(s) Oral at bedtime PRN Insomnia  ondansetron Injectable 4 milliGRAM(s) IV Push every 8 hours PRN Nausea and/or Vomiting      REVIEW OF SYSTEMS:  CONSTITUTIONAL: No fever, weight loss, or fatigue  RESPIRATORY: No cough, wheezing, chills or hemoptysis; No shortness of breath  CARDIOVASCULAR: No chest pain, palpitations, dizziness, or leg swelling  GASTROINTESTINAL: No abdominal pain. No nausea, vomiting, or hematemesis; No diarrhea or constipation. No melena or hematochezia.  GENITOURINARY: No dysuria or hematuria, urinary frequency  NEUROLOGICAL: No headaches, memory loss, loss of strength, numbness, or tremors  SKIN: No itching, burning, rashes, or lesions     Vital Signs Last 24 Hrs  T(C): 36.5 (07 Apr 2025 07:08), Max: 36.8 (07 Apr 2025 00:12)  T(F): 97.7 (07 Apr 2025 07:08), Max: 98.2 (07 Apr 2025 00:12)  HR: 80 (07 Apr 2025 07:08) (75 - 95)  BP: 125/69 (07 Apr 2025 07:08) (105/47 - 144/71)  BP(mean): 59 (06 Apr 2025 11:51) (59 - 59)  RR: 19 (07 Apr 2025 07:08) (18 - 19)  SpO2: 98% (07 Apr 2025 07:08) (96% - 99%)    Parameters below as of 07 Apr 2025 07:08  Patient On (Oxygen Delivery Method): room air        PHYSICAL EXAMINATION:  GENERAL: NAD, well built  HEAD:  Atraumatic, Normocephalic  EYES:  conjunctiva and sclera clear  NECK: Supple, No JVD, Normal thyroid  CHEST/LUNG: Clear to auscultation. Clear to percussion bilaterally; No rales, rhonchi, wheezing, or rubs  HEART: Regular rate and rhythm; No murmurs, rubs, or gallops  ABDOMEN: Soft, Nontender, Nondistended; Bowel sounds present  NERVOUS SYSTEM:  Alert & Oriented X3,    EXTREMITIES:  2+ Peripheral Pulses, No clubbing, cyanosis, or edema  SKIN: warm dry                          9.8    10.38 )-----------( 145      ( 07 Apr 2025 06:37 )             30.9     04-07    144  |  114[H]  |  20[H]  ----------------------------<  192[H]  4.7   |  23  |  0.54    Ca    8.4      07 Apr 2025 06:37  Phos  2.6     04-07  Mg     2.1     04-07    TPro  5.4[L]  /  Alb  1.9[L]  /  TBili  0.3  /  DBili  x   /  AST  32  /  ALT  19  /  AlkPhos  47  04-07    LIVER FUNCTIONS - ( 07 Apr 2025 06:37 )  Alb: 1.9 g/dL / Pro: 5.4 g/dL / ALK PHOS: 47 U/L / ALT: 19 U/L DA / AST: 32 U/L / GGT: x                   CAPILLARY BLOOD GLUCOSE      RADIOLOGY & ADDITIONAL TESTS:

## 2025-04-07 NOTE — DISCHARGE NOTE NURSING/CASE MANAGEMENT/SOCIAL WORK - FINANCIAL ASSISTANCE
[facility] provides services at a reduced cost to those who are determined to be eligible through [facility]’s financial assistance program. Information regarding [facility]’s financial assistance program can be found by going to [email or web address] or by calling [phone].

## 2025-04-07 NOTE — PROGRESS NOTE ADULT - SUBJECTIVE AND OBJECTIVE BOX
[facility] NEPHROLOGY- PROGRESS NOTE    Patient is a 84yo Female from [facility] with Dementia (A&OX1 at baseline), Afib, T12 compression fracture sp spinal fusion, recurrent UTI presents with hypotension and fever. Pt a/w Sepsis 2/2/ UTI, hypernatremia and ODILON. Nephrology consulted for hypernatremia and ODILON.       Hospital Medications: Medications reviewed.  REVIEW OF SYSTEMS:  CONSTITUTIONAL: No fevers or chills  RESPIRATORY: No shortness of breath  CARDIOVASCULAR: No chest pain.  GASTROINTESTINAL: No nausea, vomiting, diarrhea or abdominal pain.   VASCULAR: No bilateral lower extremity edema.     VITALS:  T(F): 97.9 (04-07-25 @ 11:18), Max: 98.2 (04-07-25 @ 00:12)  HR: 93 (04-07-25 @ 11:18)  BP: 102/57 (04-07-25 @ 11:18)  RR: 19 (04-07-25 @ 11:18)  SpO2: 98% (04-07-25 @ 11:18)  Wt(kg): --    04-06 @ 07:01  -  04-07 @ 07:00  --------------------------------------------------------  IN: 1420 mL / OUT: 1100 mL / NET: 320 mL      PHYSICAL EXAM:  Constitutional: NAD  HEENT: anicteric sclera,   Respiratory: CTAB, no wheezes, rales or rhonchi  Cardiovascular: S1, S2, RRR  Gastrointestinal: BS+, soft, NT/ND  : No arango.  Extremities: No peripheral edema    LABS:  04-07    144  |  114[H]  |  20[H]  ----------------------------<  192[H]  4.7   |  23  |  0.54    Ca    8.4      07 Apr 2025 06:37  Phos  2.6     04-07  Mg     2.1     04-07    TPro  5.4[L]  /  Alb  1.9[L]  /  TBili  0.3  /  DBili      /  AST  32  /  ALT  19  /  AlkPhos  47  04-07    Creatinine Trend: 0.54 <--, 0.63 <--, 0.70 <--, 0.76 <--, 0.81 <--, 1.01 <--, 0.98 <--, 1.45 <--                        9.8    10.38 )-----------( 145      ( 07 Apr 2025 06:37 )             30.9     Urine Studies:  Urinalysis Basic - ( 07 Apr 2025 06:37 )    Color:  / Appearance:  / SG:  / pH:   Gluc: 192 mg/dL / Ketone:   / Bili:  / Urobili:    Blood:  / Protein:  / Nitrite:    Leuk Esterase:  / RBC:  / WBC    Sq Epi:  / Non Sq Epi:  / Bacteria:

## 2025-04-07 NOTE — PROGRESS NOTE ADULT - PROVIDER SPECIALTY LIST ADULT
Internal Medicine
Nephrology
Cardiology
Internal Medicine
Nephrology
Nephrology
Hospitalist
Internal Medicine

## 2025-04-07 NOTE — DISCHARGE NOTE PROVIDER - NSDCCPCAREPLAN_GEN_ALL_CORE_FT
PRINCIPAL DISCHARGE DIAGNOSIS  Diagnosis: Sepsis secondary to UTI  Assessment and Plan of Treatment: You presented to the hospital with low blood pressure and altered mental status. This was likely due to an infection in the urine. You completed a course of antibiotics and your mental status improved. You will need to follow up with your primary care doctor in 1-2 weeks after you are discharged from the hospital.     PRINCIPAL DISCHARGE DIAGNOSIS  Diagnosis: Sepsis secondary to UTI  Assessment and Plan of Treatment: You presented to the hospital send in from Nursing Home with low blood pressure (hypotension) and altered mental status. This was likely due to an infection in the urine. You was noted to be severely dehydrated with a sodium level significantly elevated to 163 and was placed on intravenous fluids . You was placed on IV antibiotic with Ceftriaoxone. You has prior episode of altered mental status when you was found to have UTI  With fluids and Antibiotics, you mental status imrpved and you are able to eat well without any evidence of Aspiration and seen by Speech and swallow pathologist.    You will complete antibiotic course orally with 2 more days Ceftin 250 mg twice dialy.  follow up with your primary care doctor at Nursing Home.   PLEASE MONITOR LABS TWICE A WEEK FOR  ONE WEEK THEN WEEKLY;  ENCOURAGE ADEQUATE HYDRATION AND PLEASE FEED PATIENT WITH ASSISTANCE        SECONDARY DISCHARGE DIAGNOSES  Diagnosis: Toxic metabolic encephalopathy  Assessment and Plan of Treatment: You likely AQcute encephalopathy related to severe dehydration and elevated sodium as well as potential underlying UTI. Your mental status has returned to baseline now and tolerating diet and fluids well. ENCOURAGE ORAL FOOD INTAKE AND FLUID INTAKE AND PLEASE ASSIST WITH FEEDING     PRINCIPAL DISCHARGE DIAGNOSIS  Diagnosis: Sepsis secondary to UTI  Assessment and Plan of Treatment: You presented to the hospital send in from Nursing Home with low blood pressure (hypotension) and altered mental status. This was likely due to an infection in the urine. You was noted to be severely dehydrated with a sodium level significantly elevated to 163 and was placed on intravenous fluids . You was placed on IV antibiotic with Ceftriaoxone. You has prior episode of altered mental status when you was found to have UTI  With fluids and Antibiotics, you mental status imrpved and you are able to eat well without any evidence of Aspiration and seen by Speech and swallow pathologist.    You will complete antibiotic course orally with 2 more days Ceftin 250 mg twice dialy.  follow up with your primary care doctor at Nursing Home.   PLEASE MONITOR LABS TWICE A WEEK FOR  ONE WEEK THEN WEEKLY;  ENCOURAGE ADEQUATE HYDRATION AND PLEASE FEED PATIENT WITH ASSISTANCE        SECONDARY DISCHARGE DIAGNOSES  Diagnosis: Toxic metabolic encephalopathy  Assessment and Plan of Treatment: You likely AQcute encephalopathy related to severe dehydration and elevated sodium as well as potential underlying UTI. Your mental status has returned to baseline now and tolerating diet and fluids well. ENCOURAGE ORAL FOOD INTAKE AND FLUID INTAKE AND PLEASE ASSIST WITH FEEDING    Diagnosis: ODILON (acute kidney injury)  Assessment and Plan of Treatment: You presented to the hospital with acute kidney injury. This was due to dehydration and poor oral intake as well as the urinary tract infection. You completed a course of antibiotics for your UTI and your kidney function improve with IV fluids. Please continue to stay hydrated throughout the day. Please follow up with your primary care doctor in 1-2 weeks after you are discharged.    Diagnosis: Atrial fibrillation with RVR  Assessment and Plan of Treatment: You have history of heart arrythmia and your heart rate was elevated due to a urinary tract infection. You take cardizem and digoxin at home to control you heart rate. You take a  blood thinner called eliquis at home. Your heart rate improved and you were resumed on the same medications during your hosptial stay without any complications. Please continue your medications as prescribed and follow up with you primary care doctor in 1-2 weeks after you are discharged.    Diagnosis: Dementia  Assessment and Plan of Treatment: You have a history of dementia and you take donepezil at home. Your home medication was resumed during your hospital stay without any complications. Please continue taking donepezil at home daily and follow up with your primary care doctor in 1-2 weeks after you are discharged.    Diagnosis: Dehydration with hypernatremia  Assessment and Plan of Treatment: You presented to the hospital with elevated sodium levels. This was due to dehydration and poor oral intake. You were started on IV fluids and your sodium returned to a normal level after a few days. Please continue to stay hydrated throughout the day. Please follow up with your primary care doctor in 1-2 weeks after you are discharged.    Diagnosis: Sacral pressure ulcer  Assessment and Plan of Treatment: You have a known history of pressure ulcers to the back from bed confined status. You was seen by wound care kelvin and MD. 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 This was likely due to an infection in the urine. You was noted to be severely dehydrated with a sodium level significantly elevated to 163 and was placed on intravenous fluids . You was placed on IV antibiotic with Ceftriaoxone. You has prior episode of altered mental status when you was found to have UTI  With fluids and Antibiotics, you mental status imrpved and you are able to eat well without any evidence of Aspiration and seen by Speech and swallow pathologist.    You will complete antibiotic course orally with 2 more days Ceftin 250 mg twice dialy.  follow up with your primary care doctor at Nursing Home.   PLEASE MONITOR LABS TWICE A WEEK FOR  ONE WEEK THEN WEEKLY;  ENCOURAGE ADEQUATE HYDRATION AND PLEASE FEED PATIENT WITH ASSISTANCE        SECONDARY DISCHARGE DIAGNOSES  Diagnosis: Toxic metabolic encephalopathy  Assessment and Plan of Treatment: You likely AQcute encephalopathy related to severe dehydration and elevated sodium as well as potential underlying UTI. Your mental status has returned to baseline now and tolerating diet and fluids well. ENCOURAGE ORAL FOOD INTAKE AND FLUID INTAKE AND PLEASE ASSIST WITH FEEDING    Diagnosis: ODILON (acute kidney injury)  Assessment and Plan of Treatment: You presented to the hospital with acute kidney injury. This was due to dehydration and poor oral intake as well as the urinary tract infection. You completed a course of antibiotics for your UTI and your kidney function improve with IV fluids. Please continue to stay hydrated throughout the day. Please follow up with your primary care doctor in 1-2 weeks after you are discharged.    Diagnosis: Atrial fibrillation with RVR  Assessment and Plan of Treatment: You have history of heart arrythmia and your heart rate was elevated due to a urinary tract infection. You take cardizem and digoxin at home to control you heart rate. You take a  blood thinner called eliquis at home. Your heart rate improved and you were resumed on the same medications during your hosptial stay without any complications. Please continue your medications as prescribed and follow up with you primary care doctor in 1-2 weeks after you are discharged.    Diagnosis: Dementia  Assessment and Plan of Treatment: You have a history of dementia and you take donepezil at home. Your home medication was resumed during your hospital stay without any complications. Please continue taking donepezil at home daily and follow up with your primary care doctor in 1-2 weeks after you are discharged.    Diagnosis: Dehydration with hypernatremia  Assessment and Plan of Treatment: You presented to the hospital with elevated sodium levels. This was due to dehydration and poor oral intake. You were started on IV fluids and your sodium returned to a normal level after a few days. Please continue to stay hydrated throughout the day. Please follow up with your primary care doctor in 1-2 weeks after you are discharged.    Diagnosis: Sacral pressure ulcer  Assessment and Plan of Treatment: You have a known history of pressure ulcers to the back from bed confined status. You was seen by wound care kelvin and MD. 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

## 2025-04-07 NOTE — CONSULT NOTE ADULT - ASSESSMENT
***** in progress, discussed with primary , note will follow HPI:  85y/F, from [facility], bedbound, PMH of Dementia (A&OX1 at baseline), Afib,  OP with T12 compression fracture sp spinal fusion, recurrent UTI was brought in by EMS for hypotension and fever at rehab. Patient is poor historian, her son, Delroy reached over phone for collateral. States he also does not know more, because nursing home did not give him much information either, but states she did not have any vomiting episodes or diarrhea. States patient has chronic pain everywhere. He mentioned that whenever patient has UTi she is more confused, does not make sense and is very lethargic with poor oral intake and he noticed all of these symptoms this time around too.   In ED, Temp 1.3, , /72, RR 25, 96% on 2L  wbc 14.88, na 163, Scr 1.45, lactate 2.0  COVIID, flu rsv negative  UA positive  EKG: Ashley with RVR  s/p 1.8L NS, 1L LR in ed   (03 Apr 2025 23:56)    Allergies: penicillin (Unknown)    Intolerances: unknown     PAST MEDICAL & SURGICAL HISTORY:  Arthritis  Hypertension  High cholesterol  Afib  Osteoporosis  Frequent UTI  Dementia  Compression fracture of T12 vertebra    SOCIAL HISTORY: No smoking ;no alcohol abuse, no IVDU  FAMILY HISTORY: no pertinent related medical history    REVIEW OF SYSTEMS: Unable to obtain, poor historian pleasantly demented , denies any complaints     PHYSICAL EXAM:  VITALS: Vital Signs Last 24 Hrs  T(C): 36.6 (07 Apr 2025 15:53), Max: 36.6 (07 Apr 2025 11:18)  T(F): 97.9 (07 Apr 2025 15:53), Max: 97.9 (07 Apr 2025 11:18)  HR: 84 (07 Apr 2025 15:53) (84 - 93)  BP: 106/56 (07 Apr 2025 15:53) (102/57 - 106/56)  BP(mean): --  RR: 19 (07 Apr 2025 15:53) (19 - 19)  SpO2: 94% (07 Apr 2025 15:53) (94% - 98%)    Parameters below as of 07 Apr 2025 15:53  Patient On (Oxygen Delivery Method): room air    Gen: AOx2, NAD, non-toxic, pleasant  Elderly female  HEAD:  Atraumatic, Normocephalic  EYES: PERRLA, conjunctiva and sclera clear  ENT: Moist mucous membranes  NECK: Supple, No JVD  CV: S1+S2 normal, no murmurs   Resp: Clear bilat, no resp distress, no crackles/wheezes  Abd: Soft, nontender, +BS  Ext: No LE edema, no cyanosis, LE pulses present  : no dysuria, no gross hematuria   IV/Skin: No thrombophlebitis, no skin rash, dry skin  Msk: no arthralgias, no joint swelling, atrophy and contractures of LE  Neuro: AAOx2. No focal signs     LABS/DIAGNOSTIC TESTS:                        9.8    10.38 )-----------( 145      ( 07 Apr 2025 06:37 )             30.9     WBC Count: 10.38 K/uL (04-07 @ 06:37)  WBC Count: 11.59 K/uL (04-06 @ 06:40)    04-07    144  |  114[H]  |  20[H]  ----------------------------<  192[H]  4.7   |  23  |  0.54    Ca    8.4      07 Apr 2025 06:37  Phos  2.6     04-07  Mg     2.1     04-07    TPro  5.4[L]  /  Alb  1.9[L]  /  TBili  0.3  /  DBili  x   /  AST  32  /  ALT  19  /  AlkPhos  47  04-07    Urine Microscopic-Add On (NC) (04.03.25 @ 18:00)    White Blood Cell - Urine: Too Numerous to count /HPF   Red Blood Cell - Urine: 5 /HPF   Bacteria: Moderate /HPF   Squamous Epithelial Cells: Present: Occasional   Comment - Urine: few budding yeasts present    CULTURES:   Culture - Urine (collected 04-03-25 @ 18:00)  Source: Clean Catch  Final Report (04-05-25 @ 18:28):    10,000 - 49,000 CFU/mL Candida glabrata "Susceptibilities not performed"    Culture - Blood (collected 04-03-25 @ 11:46)  Source: Blood Blood-Peripheral  Preliminary Report (04-07-25 @ 19:00):    No growth at 4 days    Culture - Blood (collected 04-03-25 @ 11:46)  Source: Blood Blood-Peripheral  Preliminary Report (04-07-25 @ 19:00):    No growth at 4 days    RADIOLOGY: All pertinent available imaging reviewed    ANTIBIOTICS: Ceftriaxone IV    IMPRESSION:  85y/F, from [facility], bedbound, PMH of Dementia (A&OX1 at baseline), Afib,  OP with T12 compression fracture sp spinal fusion, recurrent UTI was brought in by EMS for hypotension and fever at the NH associated to AMS to r/o sepsis.     +febrile  leukocytosis  FluA/FluB/RSV/COVID PCR (04.07.25 @ 11:50)    SARS-CoV-2 Result: NotDetec   Influenza A Result: NotDetec   Influenza B Result: NotDetec   Resp Syn Virus Result: NotDetec   Source Respiratory: Nasopharyngeal    Blood Gas Venous - Lactate (04.03.25 @ 11:52) Blood Gas Venous - Lactate: 1.90 mmol/L    Blood Gas Venous - Sodium: 164 mmol/L (04.03.25 @ 11:52) >>>> Sodium: 144 mmol/L (04.07.25 @ 06:37)  dehydrated w/significant electrolyte imbalances, hypernatremia, BP responded to IV fluids and broad spectrum atb  Dx with acute UTI   urine cx +<50 K candida candida glabrata   Blood cx NGTD  xray abdominal 04/04- rectal fecal impaction  CXR 04/03 no acute cardiopulmonary disease    -Sepsis due to acute UTI- doing well on ctx (completing course)  -ODILON- prerenal 2/2 dehydration   -Hypernatremia (poor PO intake)  -Acute encephalopathy- multifactorial- sepsis + dehydration + hypernatremia on baseline dementia  -Constipation  Failure to thrive      PLAN:    ID consulted for urine cx contaminated with yeast (candida glabrata) pt doing well , DAY 5 of broad spectrum antibiotics with significant clinical improvement, mental status baseline after IVF, correction of electrolyte imbalances, management of the constipation, proper feeding and hydration, urine cx did not recovered any bacteria(prob NH gave some abx before sending pt to the ED ?    Please reach ID with any questions or concerns  Dr. Faby aPstor  Available in Teams               HPI:  85y/F, from [facility], bedbound, PMH of Dementia (A&OX1 at baseline), Afib,  OP with T12 compression fracture sp spinal fusion, recurrent UTI was brought in by EMS for hypotension and fever at rehab. Patient is poor historian, her son, Delroy reached over phone for collateral. States he also does not know more, because nursing home did not give him much information either, but states she did not have any vomiting episodes or diarrhea. States patient has chronic pain everywhere. He mentioned that whenever patient has UTi she is more confused, does not make sense and is very lethargic with poor oral intake and he noticed all of these symptoms this time around too.   In ED, Temp 1.3, , /72, RR 25, 96% on 2L  wbc 14.88, na 163, Scr 1.45, lactate 2.0  COVIID, flu rsv negative  UA positive  EKG: Ashley with RVR  s/p 1.8L NS, 1L LR in ed   (03 Apr 2025 23:56)    Allergies: penicillin (Unknown)    Intolerances: unknown     PAST MEDICAL & SURGICAL HISTORY:  Arthritis  Hypertension  High cholesterol  Afib  Osteoporosis  Frequent UTI  Dementia  Compression fracture of T12 vertebra    SOCIAL HISTORY: No smoking ;no alcohol abuse, no IVDU  FAMILY HISTORY: no pertinent related medical history    REVIEW OF SYSTEMS: Unable to obtain, poor historian pleasantly demented , denies any complaints     PHYSICAL EXAM:  VITALS: Vital Signs Last 24 Hrs  T(C): 36.6 (07 Apr 2025 15:53), Max: 36.6 (07 Apr 2025 11:18)  T(F): 97.9 (07 Apr 2025 15:53), Max: 97.9 (07 Apr 2025 11:18)  HR: 84 (07 Apr 2025 15:53) (84 - 93)  BP: 106/56 (07 Apr 2025 15:53) (102/57 - 106/56)  BP(mean): --  RR: 19 (07 Apr 2025 15:53) (19 - 19)  SpO2: 94% (07 Apr 2025 15:53) (94% - 98%)    Parameters below as of 07 Apr 2025 15:53  Patient On (Oxygen Delivery Method): room air    Gen: AOx2, NAD, non-toxic, pleasant  Elderly female  HEAD:  Atraumatic, Normocephalic  EYES: PERRLA, conjunctiva and sclera clear  ENT: Moist mucous membranes  NECK: Supple, No JVD  CV: S1+S2 normal, no murmurs   Resp: Clear bilat, no resp distress, no crackles/wheezes  Abd: Soft, nontender, +BS  Ext: No LE edema, no cyanosis, LE pulses present  : no dysuria, no gross hematuria   IV/Skin: No thrombophlebitis, no skin rash, dry skin  Msk: no arthralgias, no joint swelling, atrophy and contractures of LE  Neuro: AAOx2. No focal signs     LABS/DIAGNOSTIC TESTS:                        9.8    10.38 )-----------( 145      ( 07 Apr 2025 06:37 )             30.9     WBC Count: 10.38 K/uL (04-07 @ 06:37)  WBC Count: 11.59 K/uL (04-06 @ 06:40)    04-07    144  |  114[H]  |  20[H]  ----------------------------<  192[H]  4.7   |  23  |  0.54    Ca    8.4      07 Apr 2025 06:37  Phos  2.6     04-07  Mg     2.1     04-07    TPro  5.4[L]  /  Alb  1.9[L]  /  TBili  0.3  /  DBili  x   /  AST  32  /  ALT  19  /  AlkPhos  47  04-07    Urine Microscopic-Add On (NC) (04.03.25 @ 18:00)    White Blood Cell - Urine: Too Numerous to count /HPF   Red Blood Cell - Urine: 5 /HPF   Bacteria: Moderate /HPF   Squamous Epithelial Cells: Present: Occasional   Comment - Urine: few budding yeasts present    CULTURES:   Culture - Urine (collected 04-03-25 @ 18:00)  Source: Clean Catch  Final Report (04-05-25 @ 18:28):    10,000 - 49,000 CFU/mL Candida glabrata "Susceptibilities not performed"    Culture - Blood (collected 04-03-25 @ 11:46)  Source: Blood Blood-Peripheral  Preliminary Report (04-07-25 @ 19:00):    No growth at 4 days    Culture - Blood (collected 04-03-25 @ 11:46)  Source: Blood Blood-Peripheral  Preliminary Report (04-07-25 @ 19:00):    No growth at 4 days    RADIOLOGY: All pertinent available imaging reviewed    ANTIBIOTICS: Ceftriaxone IV    IMPRESSION:  85y/F, from [facility], bedbound, PMH of Dementia (A&OX1 at baseline), Afib,  OP with T12 compression fracture sp spinal fusion, recurrent UTI was brought in by EMS for hypotension and fever at the NH associated to AMS to r/o sepsis.     +febrile  leukocytosis  FluA/FluB/RSV/COVID PCR (04.07.25 @ 11:50)    SARS-CoV-2 Result: NotDetec   Influenza A Result: NotDetec   Influenza B Result: NotDetec   Resp Syn Virus Result: NotDetec   Source Respiratory: Nasopharyngeal    Blood Gas Venous - Lactate (04.03.25 @ 11:52) Blood Gas Venous - Lactate: 1.90 mmol/L    Blood Gas Venous - Sodium: 164 mmol/L (04.03.25 @ 11:52) >>>> Sodium: 144 mmol/L (04.07.25 @ 06:37)  dehydrated w/significant electrolyte imbalances, hypernatremia, BP responded to IV fluids and broad spectrum atb  Dx with acute UTI   urine cx +<50 K candida candida glabrata   Blood cx NGTD  xray abdominal 04/04- rectal fecal impaction  CXR 04/03 no acute cardiopulmonary disease    -Sepsis due to acute UTI- doing well on ctx (completing course)  -ODILON- prerenal 2/2 dehydration   -Hypernatremia (poor PO intake)  -Acute encephalopathy- multifactorial- sepsis + dehydration + hypernatremia on baseline dementia  -Constipation  Failure to thrive      PLAN:    ID consulted for urine cx contaminated with yeast (candida glabrata) pt doing well , DAY 5 of broad spectrum antibiotics with significant clinical improvement, mental status baseline after IVF, correction of electrolyte imbalances, management of the constipation, proper feeding and hydration, urine cx did not recovered any bacteria(prob NH gave some abx before sending pt to the ED ?)  Agree with completing course of antibiotics for cystitis (5 days course )  Candida is often found in  in females contaminating urine samples, no need to tx since of not often cause of uti.  Supportive care, adequate feedings and free water intake, management of constipation   GOC  Discussed with Dr. Mares     Please reach ID with any questions or concerns  Dr. Faby Pastor  Available in Teams

## 2025-04-07 NOTE — DISCHARGE NOTE NURSING/CASE MANAGEMENT/SOCIAL WORK - PATIENT PORTAL LINK FT
You can access the FollowMyHealth Patient Portal offered by [facility] by registering at the following website: [email or web address]. By joining [employer]’s FollowMyHealth portal, you will also be able to view your health information using other applications (apps) compatible with our system.

## 2025-04-07 NOTE — CONSULT NOTE ADULT - ASSESSMENT
85y old  Female with known pressure wounds s/p Sepsis 2/2 UTI and Hypernatremia from [facility], bedbound, PMH of Dementia (A&OX1 at baseline), Afib,  OP with T12 compression fracture sp spinal fusion, recurrent UTI was brought in by EMS for hypotension and fever at rehab. 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   dti  left 5th /foot, no sti   Debridement : excisional sharp- not indicated   Dressing: Absorptive  :  Allevyn foam/     OFFLOAD heels back  consider probiotic acidophilus   Continue w/ attends under pads and Pericare / purewick care as per protocol     DVT prophylaxisis : on AC  Yes apixiban / Anemia  consider PPI-     Topical Dry: CAVILON Advance/  Keon,  Moisturize intact skin w/ SWEEN cream BID     Nutrition Consult for optimization in pt w/ Severe Protein Calorie Malnutrition,  Encourage  PO intake, & Increased nutritional needs with albumin <2.5;  35cal/kg, protien 1.2-1.5g/kg,supplement ,     MVI & Vit B/ C   fo   Continue w/ low air loss pressure redistribution bed surface   Pressure :Group 2 mattress on hospital bed and ROHO cushion for wheel chair upon discharge home  Offload: podiatric/  for home consider Waffle Cushion to chair when oob to chair/ Turning and positioning w/ offloading assistive devices as per protocol    Established/ New problem--- improved, stable , worsened  Additional workup : as noted/ Consider    FLORENTIN/PVR, Duplex, Xray,  feet  legs, podiatry  data reviewed lab, tests, records, image  Complexity high    Risk read yes sepsis   high -  due to dx, complexity data, risk  Risk Care as per medicine, will follow w/ you/ remain available as requested   Upon discharge f/u as outpatient at [facility] [address] [facility] [phone]/ [facility]  [address] [facility] [address]nd floor [phone]  time 75 min 223

## 2025-04-07 NOTE — PROGRESS NOTE ADULT - ASSESSMENT
Patient is a 84yo Female from [facility] with Dementia (A&OX1 at baseline), Afib, T12 compression fracture sp spinal fusion, recurrent UTI presents with hypotension and fever. Pt a/w Sepsis 2/2/ UTI, hypernatremia and ODILON. Nephrology consulted for hypernatremia and ODILON.     1. Hypernatremia- due to poor PO Intake. Free water deficit on admission is 4.3L. Serum Na improving appropriately on D5 1/2 NS with 20 meq KCL @ 75ml/hr. Recc to change IVF to D5 1/2 NS @ 70ml/hr x 12 hrs and then monitor off IVF. Encourage PO fluid intake. Monitor Serum Na.   2. ODILON- likely pre-renal. ODILON resolved with IVF. Strict I/Os. Avoid nephrotoxins/ NSAIDs/ RCA. Monitor BMP.  3. Sepsis 2/2 UTI- +UA. UCx <50k CFU; Candida glabrata. BCx NG. Pt on Ceftriaxone. Consider antifungal  4. Atrial Fibrillation- pt on Digoxin/ Cardizem CD for rate control. Pt on Eliquis.   Hypophosphatemia- improved s/p repletion. Monitor serum [facility]  Sung Alan M.D.  Ole White D.O.  Natalie Dietz M.D.  MD Donna Carter, MSN, ANP-C    Telephone: [phone]  Facsimile: [phone]    [address] [place] [address], [address]  [place] [address]     Patient is a 86yo Female from [facility] with Dementia (A&OX1 at baseline), Afib, T12 compression fracture sp spinal fusion, recurrent UTI presents with hypotension and fever. Pt a/w Sepsis 2/2/ UTI, hypernatremia and ODILON. Nephrology consulted for hypernatremia and ODILON.     1. Hypernatremia- due to poor PO Intake. Free water deficit on admission is 4.3L. Serum Na improving appropriately on D5 1/2 NS with 20 meq KCL @ 75ml/hr. Recc to change IVF to D5 1/2 NS @ 70ml/hr x 12 hrs and then monitor off IVF. Encourage PO fluid intake. Monitor Serum Na.   2. ODILON- likely pre-renal. ODILON resolved with IVF. Strict I/Os. Avoid nephrotoxins/ NSAIDs/ RCA. Monitor BMP.  3. Sepsis 2/2 UTI- +UA. UCx <50k CFU; Candida glabrata. BCx NG. Finished Ceftriaxone. Consider antifungal  4. Atrial Fibrillation- pt on Digoxin/ Cardizem CD for rate control. Pt on Eliquis.   Hypophosphatemia- improved s/p repletion. Monitor serum [facility] NEPHROLOGY  Sung Alan M.D.  Ole White D.O.  Natalie Dietz M.D.  MD Donna Carter, MSN, ANP-C    Telephone: [phone]  Facsimile: [phone]    [address] [place] [address], [address]  [place] [address]

## 2025-04-07 NOTE — DISCHARGE NOTE PROVIDER - DETAILS OF MALNUTRITION DIAGNOSIS/DIAGNOSES
This patient has been assessed with a concern for Malnutrition and was treated during this hospitalization for the following Nutrition diagnosis/diagnoses:     -  04/05/2025: Moderate protein-calorie malnutrition

## 2025-04-07 NOTE — DISCHARGE NOTE PROVIDER - CARE PROVIDER_API CALL
Lynne Hall  Phone: (   )    -  Fax: (   )    -  Follow Up Time:    Rafael Batista [facility]  Internal Medicine  [address] [place] [address]  Phone: [phone]  Fax: [phone]  Follow Up Time: 1 week

## 2025-04-08 DIAGNOSIS — G93.40 ENCEPHALOPATHY, UNSPECIFIED: ICD-10-CM

## 2025-04-08 NOTE — CHART NOTE - NSCHARTNOTEFT_GEN_A_CORE
Spoke with PCP at SNF Dr. Landry and updated need for closer monitoring for dehydration and wound care

## 2025-09-15 ENCOUNTER — TRANSCRIPTION ENCOUNTER (OUTPATIENT)
Age: 85
End: 2025-09-15